# Patient Record
Sex: FEMALE | Race: WHITE | NOT HISPANIC OR LATINO | URBAN - METROPOLITAN AREA
[De-identification: names, ages, dates, MRNs, and addresses within clinical notes are randomized per-mention and may not be internally consistent; named-entity substitution may affect disease eponyms.]

---

## 2017-04-28 ENCOUNTER — OUTPATIENT (OUTPATIENT)
Dept: OUTPATIENT SERVICES | Facility: HOSPITAL | Age: 67
LOS: 1 days | Discharge: HOME | End: 2017-04-28

## 2017-06-28 DIAGNOSIS — N39.0 URINARY TRACT INFECTION, SITE NOT SPECIFIED: ICD-10-CM

## 2017-08-29 ENCOUNTER — OUTPATIENT (OUTPATIENT)
Dept: OUTPATIENT SERVICES | Facility: HOSPITAL | Age: 67
LOS: 1 days | Discharge: HOME | End: 2017-08-29

## 2017-08-29 DIAGNOSIS — E11.9 TYPE 2 DIABETES MELLITUS WITHOUT COMPLICATIONS: ICD-10-CM

## 2017-08-29 DIAGNOSIS — M35.00 SJOGREN SYNDROME, UNSPECIFIED: ICD-10-CM

## 2017-08-29 DIAGNOSIS — K11.5 SIALOLITHIASIS: ICD-10-CM

## 2017-08-29 DIAGNOSIS — M81.0 AGE-RELATED OSTEOPOROSIS WITHOUT CURRENT PATHOLOGICAL FRACTURE: ICD-10-CM

## 2017-08-29 DIAGNOSIS — E03.9 HYPOTHYROIDISM, UNSPECIFIED: ICD-10-CM

## 2017-10-18 ENCOUNTER — OUTPATIENT (OUTPATIENT)
Dept: OUTPATIENT SERVICES | Facility: HOSPITAL | Age: 67
LOS: 1 days | Discharge: HOME | End: 2017-10-18

## 2017-10-18 DIAGNOSIS — N39.0 URINARY TRACT INFECTION, SITE NOT SPECIFIED: ICD-10-CM

## 2017-11-18 ENCOUNTER — OUTPATIENT (OUTPATIENT)
Dept: OUTPATIENT SERVICES | Facility: HOSPITAL | Age: 67
LOS: 1 days | Discharge: HOME | End: 2017-11-18

## 2017-11-18 DIAGNOSIS — M48.061 SPINAL STENOSIS, LUMBAR REGION WITHOUT NEUROGENIC CLAUDICATION: ICD-10-CM

## 2018-04-18 ENCOUNTER — OUTPATIENT (OUTPATIENT)
Dept: OUTPATIENT SERVICES | Facility: HOSPITAL | Age: 68
LOS: 1 days | Discharge: HOME | End: 2018-04-18

## 2018-04-18 DIAGNOSIS — D64.9 ANEMIA, UNSPECIFIED: ICD-10-CM

## 2018-04-18 DIAGNOSIS — D50.9 IRON DEFICIENCY ANEMIA, UNSPECIFIED: ICD-10-CM

## 2018-04-18 DIAGNOSIS — E78.5 HYPERLIPIDEMIA, UNSPECIFIED: ICD-10-CM

## 2018-04-18 DIAGNOSIS — E05.90 THYROTOXICOSIS, UNSPECIFIED WITHOUT THYROTOXIC CRISIS OR STORM: ICD-10-CM

## 2018-04-18 DIAGNOSIS — E53.8 DEFICIENCY OF OTHER SPECIFIED B GROUP VITAMINS: ICD-10-CM

## 2018-04-18 DIAGNOSIS — E11.9 TYPE 2 DIABETES MELLITUS WITHOUT COMPLICATIONS: ICD-10-CM

## 2018-04-30 ENCOUNTER — OUTPATIENT (OUTPATIENT)
Dept: OUTPATIENT SERVICES | Facility: HOSPITAL | Age: 68
LOS: 1 days | Discharge: HOME | End: 2018-04-30

## 2018-04-30 DIAGNOSIS — L23.9 ALLERGIC CONTACT DERMATITIS, UNSPECIFIED CAUSE: ICD-10-CM

## 2018-04-30 DIAGNOSIS — R21 RASH AND OTHER NONSPECIFIC SKIN ERUPTION: ICD-10-CM

## 2018-06-19 ENCOUNTER — OUTPATIENT (OUTPATIENT)
Dept: OUTPATIENT SERVICES | Facility: HOSPITAL | Age: 68
LOS: 1 days | Discharge: HOME | End: 2018-06-19

## 2018-06-19 DIAGNOSIS — D64.9 ANEMIA, UNSPECIFIED: ICD-10-CM

## 2018-08-24 ENCOUNTER — OUTPATIENT (OUTPATIENT)
Dept: OUTPATIENT SERVICES | Facility: HOSPITAL | Age: 68
LOS: 1 days | Discharge: HOME | End: 2018-08-24

## 2018-08-24 DIAGNOSIS — D64.9 ANEMIA, UNSPECIFIED: ICD-10-CM

## 2018-08-24 DIAGNOSIS — N39.0 URINARY TRACT INFECTION, SITE NOT SPECIFIED: ICD-10-CM

## 2018-08-24 DIAGNOSIS — E53.8 DEFICIENCY OF OTHER SPECIFIED B GROUP VITAMINS: ICD-10-CM

## 2018-08-24 DIAGNOSIS — D50.9 IRON DEFICIENCY ANEMIA, UNSPECIFIED: ICD-10-CM

## 2018-08-24 DIAGNOSIS — E78.5 HYPERLIPIDEMIA, UNSPECIFIED: ICD-10-CM

## 2018-08-24 DIAGNOSIS — E11.9 TYPE 2 DIABETES MELLITUS WITHOUT COMPLICATIONS: ICD-10-CM

## 2018-08-24 DIAGNOSIS — E05.90 THYROTOXICOSIS, UNSPECIFIED WITHOUT THYROTOXIC CRISIS OR STORM: ICD-10-CM

## 2018-08-24 PROBLEM — Z00.00 ENCOUNTER FOR PREVENTIVE HEALTH EXAMINATION: Status: ACTIVE | Noted: 2018-08-24

## 2018-09-14 ENCOUNTER — OUTPATIENT (OUTPATIENT)
Dept: OUTPATIENT SERVICES | Facility: HOSPITAL | Age: 68
LOS: 1 days | Discharge: HOME | End: 2018-09-14

## 2018-09-14 DIAGNOSIS — E55.9 VITAMIN D DEFICIENCY, UNSPECIFIED: ICD-10-CM

## 2018-09-14 DIAGNOSIS — D64.9 ANEMIA, UNSPECIFIED: ICD-10-CM

## 2018-09-14 DIAGNOSIS — R06.02 SHORTNESS OF BREATH: ICD-10-CM

## 2018-09-14 DIAGNOSIS — N39.0 URINARY TRACT INFECTION, SITE NOT SPECIFIED: ICD-10-CM

## 2018-09-14 DIAGNOSIS — E11.9 TYPE 2 DIABETES MELLITUS WITHOUT COMPLICATIONS: ICD-10-CM

## 2018-09-14 DIAGNOSIS — E05.90 THYROTOXICOSIS, UNSPECIFIED WITHOUT THYROTOXIC CRISIS OR STORM: ICD-10-CM

## 2018-09-14 DIAGNOSIS — E78.5 HYPERLIPIDEMIA, UNSPECIFIED: ICD-10-CM

## 2018-10-11 ENCOUNTER — OUTPATIENT (OUTPATIENT)
Dept: OUTPATIENT SERVICES | Facility: HOSPITAL | Age: 68
LOS: 1 days | Discharge: HOME | End: 2018-10-11

## 2018-10-11 DIAGNOSIS — D64.9 ANEMIA, UNSPECIFIED: ICD-10-CM

## 2019-01-04 ENCOUNTER — OUTPATIENT (OUTPATIENT)
Dept: OUTPATIENT SERVICES | Facility: HOSPITAL | Age: 69
LOS: 1 days | Discharge: HOME | End: 2019-01-04

## 2019-01-04 DIAGNOSIS — D64.9 ANEMIA, UNSPECIFIED: ICD-10-CM

## 2019-01-04 DIAGNOSIS — E11.9 TYPE 2 DIABETES MELLITUS WITHOUT COMPLICATIONS: ICD-10-CM

## 2019-01-04 DIAGNOSIS — D50.9 IRON DEFICIENCY ANEMIA, UNSPECIFIED: ICD-10-CM

## 2019-01-04 DIAGNOSIS — E53.8 DEFICIENCY OF OTHER SPECIFIED B GROUP VITAMINS: ICD-10-CM

## 2019-01-04 DIAGNOSIS — N39.9 DISORDER OF URINARY SYSTEM, UNSPECIFIED: ICD-10-CM

## 2019-01-04 DIAGNOSIS — E78.5 HYPERLIPIDEMIA, UNSPECIFIED: ICD-10-CM

## 2019-01-04 DIAGNOSIS — Z00.00 ENCOUNTER FOR GENERAL ADULT MEDICAL EXAMINATION WITHOUT ABNORMAL FINDINGS: ICD-10-CM

## 2019-08-29 ENCOUNTER — EMERGENCY (EMERGENCY)
Facility: HOSPITAL | Age: 69
LOS: 0 days | Discharge: HOME | End: 2019-08-29
Attending: EMERGENCY MEDICINE | Admitting: EMERGENCY MEDICINE
Payer: MEDICARE

## 2019-08-29 VITALS
HEART RATE: 76 BPM | TEMPERATURE: 98 F | RESPIRATION RATE: 18 BRPM | DIASTOLIC BLOOD PRESSURE: 60 MMHG | OXYGEN SATURATION: 98 % | SYSTOLIC BLOOD PRESSURE: 108 MMHG

## 2019-08-29 DIAGNOSIS — Z23 ENCOUNTER FOR IMMUNIZATION: ICD-10-CM

## 2019-08-29 DIAGNOSIS — M25.571 PAIN IN RIGHT ANKLE AND JOINTS OF RIGHT FOOT: ICD-10-CM
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
## 2019-08-29 DIAGNOSIS — S81.012A LACERATION WITHOUT FOREIGN BODY, LEFT KNEE, INITIAL ENCOUNTER: ICD-10-CM
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
## 2019-08-29 DIAGNOSIS — S80.02XA CONTUSION OF LEFT KNEE, INITIAL ENCOUNTER: ICD-10-CM
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
## 2019-08-29 PROCEDURE — 73610 X-RAY EXAM OF ANKLE: CPT | Mod: 26,RT

## 2019-08-29 PROCEDURE — 99283 EMERGENCY DEPT VISIT LOW MDM: CPT | Mod: 25

## 2019-08-29 PROCEDURE — 73502 X-RAY EXAM HIP UNI 2-3 VIEWS: CPT | Mod: 26,LT

## 2019-08-29 PROCEDURE — 73630 X-RAY EXAM OF FOOT: CPT | Mod: 26,RT

## 2019-08-29 PROCEDURE — 12002 RPR S/N/AX/GEN/TRNK2.6-7.5CM: CPT

## 2019-08-29 PROCEDURE — 73562 X-RAY EXAM OF KNEE 3: CPT | Mod: 26,LT

## 2019-08-29 RX ORDER — CEPHALEXIN 500 MG
1 CAPSULE ORAL
Qty: 40 | Refills: 0
Start: 2019-08-29 | End: 2019-09-07

## 2019-08-29 RX ORDER — TETANUS TOXOID, REDUCED DIPHTHERIA TOXOID AND ACELLULAR PERTUSSIS VACCINE, ADSORBED 5; 2.5; 8; 8; 2.5 [IU]/.5ML; [IU]/.5ML; UG/.5ML; UG/.5ML; UG/.5ML
0.5 SUSPENSION INTRAMUSCULAR ONCE
Refills: 0 | Status: COMPLETED | OUTPATIENT
Start: 2019-08-29 | End: 2019-08-29

## 2019-08-29 RX ORDER — OXYCODONE AND ACETAMINOPHEN 5; 325 MG/1; MG/1
1 TABLET ORAL ONCE
Refills: 0 | Status: DISCONTINUED | OUTPATIENT
Start: 2019-08-29 | End: 2019-08-29

## 2019-08-29 RX ADMIN — TETANUS TOXOID, REDUCED DIPHTHERIA TOXOID AND ACELLULAR PERTUSSIS VACCINE, ADSORBED 0.5 MILLILITER(S): 5; 2.5; 8; 8; 2.5 SUSPENSION INTRAMUSCULAR at 16:27

## 2019-08-29 RX ADMIN — OXYCODONE AND ACETAMINOPHEN 1 TABLET(S): 5; 325 TABLET ORAL at 16:27

## 2019-08-29 NOTE — ED PROVIDER NOTE - SECONDARY DIAGNOSIS.
Tetanus-diphtheria vaccination administered at current visit Knee contusion Ankle pain, right Status post fall Foot pain, right

## 2019-08-29 NOTE — ED PROVIDER NOTE - PATIENT PORTAL LINK FT
You can access the FollowMyHealth Patient Portal offered by [facility] by registering at the following website: [email or web address]. By joining [employer]’s FollowMyHealth portal, you will also be able to view your health information using other applications (apps) compatible with our system.

## 2019-08-29 NOTE — ED ADULT NURSE NOTE - OBJECTIVE STATEMENT
Pt states she twisted her right knee and fell on left knee. Pt has abrasion to left knee. Pt c/o pain to b/l legs/

## 2019-08-29 NOTE — ED ADULT NURSE NOTE - NSIMPLEMENTINTERV_GEN_ALL_ED
Implemented All Fall Risk Interventions:  [place] to call system. Call bell, personal items and telephone within reach. Instruct patient to call for assistance. Room bathroom lighting operational. Non-slip footwear when patient is off stretcher. Physically safe environment: no spills, clutter or unnecessary equipment. Stretcher in lowest position, wheels locked, appropriate side rails in place. Provide visual cue, wrist band, yellow gown, etc. Monitor gait and stability. Monitor for mental status changes and reorient to person, place, and time. Review medications for side effects contributing to fall risk. Reinforce activity limits and safety measures with patient and family.

## 2019-08-29 NOTE — ED PROVIDER NOTE - PROVIDER TOKENS
PROVIDER:[TOKEN:[[id number]]],FREE:[LAST:[EMERGENCY DEPT OR YOUR PRIMARY CARE PHYSICIAN IN 14 DAYS FOR SUTURE REMOVAL],PHONE:[(   )    -],FAX:[(   )    -]],PROVIDER:[TOKEN:[[id number]]]

## 2019-08-29 NOTE — ED PROVIDER NOTE - NSFOLLOWUPINSTRUCTIONS_ED_ALL_ED_FT
Follow up with your primary care physician / emergency department in 14 days for suture removal.    Laceration Care, Adult  ImageA laceration is a cut that goes through all of the layers of the skin and into the tissue that is right under the skin. Some lacerations heal on their own. Others need to be closed with stitches (sutures), staples, skin adhesive strips, or skin glue. Proper laceration care minimizes the risk of infection and helps the laceration to heal better.    How is this treated?  If sutures or staples were used:     Keep the wound clean and dry.  If you were given a bandage (dressing), you should change it at least one time per day or as told by your health care provider. You should also change it if it becomes wet or dirty.  Keep the wound completely dry for the first 24 hours or as told by your health care provider. After that time, you may shower or bathe. However, make sure that the wound is not soaked in water until after the sutures or staples have been removed.  Clean the wound one time each day or as told by your health care provider:    Wash the wound with soap and water.  Rinse the wound with water to remove all soap.  Pat the wound dry with a clean towel. Do not rub the wound.    After cleaning the wound, apply a thin layer of antibiotic ointment as told by your health care provider. This will help to prevent infection and keep the dressing from sticking to the wound.  Have the sutures or staples removed as told by your health care provider.  If skin adhesive strips were used:     Keep the wound clean and dry.  If you were given a bandage (dressing), you should change it at least one time per day or as told by your health care provider. You should also change it if it becomes dirty or wet.  Do not get the skin adhesive strips wet. You may shower or bathe, but be careful to keep the wound dry.  If the wound gets wet, pat it dry with a clean towel. Do not rub the wound.  Skin adhesive strips fall off on their own. You may trim the strips as the wound heals. Do not remove skin adhesive strips that are still stuck to the wound. They will fall off in time.  If skin glue was used:     Try to keep the wound dry, but you may briefly wet it in the shower or bath. Do not soak the wound in water, such as by swimming.  After you have showered or bathed, gently pat the wound dry with a clean towel. Do not rub the wound.  Do not do any activities that will make you sweat heavily until the skin glue has fallen off on its own.  Do not apply liquid, cream, or ointment medicine to the wound while the skin glue is in place. Using those may loosen the film before the wound has healed.  If you were given a bandage (dressing), you should change it at least one time per day or as told by your health care provider. You should also change it if it becomes dirty or wet.  If a dressing is placed over the wound, be careful not to apply tape directly over the skin glue. Doing that may cause the glue to be pulled off before the wound has healed.  Do not pick at the glue. The skin glue usually remains in place for 5–10 days, then it falls off of the skin.  General Instructions     Take over-the-counter and prescription medicines only as told by your health care provider.  If you were prescribed an antibiotic medicine or ointment, take or apply it as told by your doctor. Do not stop using it even if your condition improves.  To help prevent scarring, make sure to cover your wound with sunscreen whenever you are outside after stitches are removed, after adhesive strips are removed, or when glue remains in place and the wound is healed. Make sure to wear a sunscreen of at least 30 SPF.  Do not scratch or pick at the wound.  Keep all follow-up visits as told by your health care provider. This is important.  Check your wound every day for signs of infection. Watch for:    Redness, swelling, or pain.  Fluid, blood, or pus.    Raise (elevate) the injured area above the level of your heart while you are sitting or lying down, if possible.  Contact a health care provider if:  You received a tetanus shot and you have swelling, severe pain, redness, or bleeding at the injection site.  You have a fever.  A wound that was closed breaks open.  You notice a bad smell coming from your wound or your dressing.  You notice something coming out of the wound, such as wood or glass.  Your pain is not controlled with medicine.  You have increased redness, swelling, or pain at the site of your wound.  You have fluid, blood, or pus coming from your wound.  You notice a change in the color of your skin near your wound.  You need to change the dressing frequently due to fluid, blood, or pus draining from the wound.  You develop a new rash.  You develop numbness around the wound.  Get help right away if:  You develop severe swelling around the wound.  Your pain suddenly increases and is severe.  You develop painful lumps near the wound or on skin that is anywhere on your body.  You have a red streak going away from your wound.  The wound is on your hand or foot and you cannot properly move a finger or toe.  The wound is on your hand or foot and you notice that your fingers or toes look pale or bluish.  This information is not intended to replace advice given to you by your health care provider. Make sure you discuss any questions you have with your health care provider.    Contusion    A contusion is a deep bruise. Contusions are the result of a blunt injury to tissues and muscle fibers under the skin. The injury causes bleeding under the skin. The skin overlying the contusion may turn blue, purple, or yellow. Minor injuries will give you a painless contusion, but more severe contusions may stay painful and swollen for a few weeks.     CAUSES  This condition is usually caused by a blow, trauma, or direct force to an area of the body.    SYMPTOMS  Symptoms of this condition include:    Swelling of the injured area.  Pain and tenderness in the injured area.  Discoloration. The area may have redness and then turn blue, purple, or yellow.    DIAGNOSIS  This condition is diagnosed based on a physical exam and medical history. An X-ray, CT scan, or MRI may be needed to determine if there are any associated injuries, such as broken bones (fractures).    TREATMENT  Specific treatment for this condition depends on what area of the body was injured. In general, the best treatment for a contusion is resting, icing, applying pressure to (compression), and elevating the injured area. This is often called the RICE strategy. Over-the-counter anti-inflammatory medicines may also be recommended for pain control.     HOME CARE INSTRUCTIONS  Rest the injured area.   If directed, apply ice to the injured area:  Put ice in a plastic bag.  Place a towel between your skin and the bag.  Leave the ice on for 20 minutes, 2–3 times per day.  If directed, apply light compression to the injured area using an elastic bandage. Make sure the bandage is not wrapped too tightly. Remove and reapply the bandage as directed by your health care provider.  If possible, raise (elevate) the injured area above the level of your heart while you are sitting or lying down.   Take over-the-counter and prescription medicines only as told by your health care provider.    SEEK MEDICAL CARE IF:  Your symptoms do not improve after several days of treatment.  Your symptoms get worse.   You have difficulty moving the injured area.    SEEK IMMEDIATE MEDICAL CARE IF:  You have severe pain.  You have numbness in a hand or foot.   Your hand or foot turns pale or cold.

## 2019-08-29 NOTE — ED PROVIDER NOTE - CARE PLAN
Principal Discharge DX:	Knee laceration  Secondary Diagnosis:	Tetanus-diphtheria vaccination administered at current visit  Secondary Diagnosis:	Knee contusion  Secondary Diagnosis:	Ankle pain, right  Secondary Diagnosis:	Foot pain, right  Secondary Diagnosis:	Status post fall

## 2019-08-29 NOTE — ED PROVIDER NOTE - CARE PROVIDERS DIRECT ADDRESSES
,DirectAddress_Unknown,DirectAddress_Unknown,[email or web address]

## 2019-08-29 NOTE — ED PROVIDER NOTE - CLINICAL SUMMARY MEDICAL DECISION MAKING FREE TEXT BOX
70yo F presents after mechanical fall. Knee laceration repaired. Right ankle splinted. Xrays without obvious acute fractures. Tdap given. Will discharge with ortho fu.

## 2019-08-29 NOTE — ED PROVIDER NOTE - NEURO NEGATIVE STATEMENT, MLM
no head trauma/loss of consciousness, no gait abnormality, no headache, no sensory deficits, and no weakness.

## 2019-08-29 NOTE — ED PROVIDER NOTE - OBJECTIVE STATEMENT
70 y/o F, PMHx Pulmonary HTN, Chronic Back Pain, Gout & Dyslipidemia, presents to the ED with complaints of a laceration of her left knee sustained this afternoon. Patient was ambulating out of a car when she tripped and fell with resultant left knee laceration. She denies any head trauma/LOC and was helped up by her significant other following injury. She ambulated without difficulty following fall. She additionally admits to right knee foot pain 70 y/o F, PMHx Pulmonary HTN, Chronic Back Pain, Gout & Dyslipidemia, presents to the ED with complaints of a laceration of her left knee sustained this afternoon. Patient was ambulating out of a car when she tripped and fell with resultant left knee laceration. She denies any head trauma/LOC and was helped up by her significant other following injury. She ambulated without difficulty following fall. She additionally admits to right foot pain and right ankle pain although she states that she has a 'chronic right ankle sprain' - -follows with orthopedist and recently had MRI of right ankle three days ago. She does not recall when her last tetanus vaccine was. Patient denies blood thinner use.

## 2019-08-29 NOTE — ED PROVIDER NOTE - MUSCULOSKELETAL NEGATIVE STATEMENT, MLM
+ right knee pain ; + right foot pain + right ankle pain ; + hx  of gout ; + hx of chronic back pain; no weakness.

## 2019-08-29 NOTE — ED PROVIDER NOTE - SKIN, MLM
+ 6 cm jagged flap laceration along anterior aspect of left knee with minimal active bleeding ; no deep tissue injury visualized ; + left anterior knee ecchymoses ; no evidence of rash.

## 2019-08-29 NOTE — ED PROVIDER NOTE - ATTENDING CONTRIBUTION TO CARE
I personally evaluated the patient. I reviewed the Resident’s or Physician Assistant’s note (as assigned above), and agree with the findings and plan except as documented in my note.    68yo F with PMHx pulmonary HTN, DLD, gout, chronic back pain, presents after mechanical fall after tripping when getting out of a car, sustained laceration to left knee. Also c/o right ankle pain and right foot pain but states she follows with ortho for chronic ankle pain/chronic sprain. Ambulated after fall. No head trauma, no LOC, not on AC. Unknown last tetanus.     Vital signs reviewed  GENERAL: Patient well appearing, NAD  HEAD: NCAT  EYES: Anicteric  ENT: MMM  NECK: Supple, non tender  RESPIRATORY: Normal respiratory effort. CTA B/L. No wheezing, rales, rhonchi  CARDIOVASCULAR: Regular rate and rhythm  ABDOMEN: Soft. Nondistended. Nontender. No guarding or rebound.   MUSCULOSKELETAL/EXTREMITIES: Brisk cap refill. 2+ pedal pulses. Anterior left knee laceration with anterior tenderness. Full ROM of knee. No crepitus. Mild-moderate TTP along right lateral malleolus and right 1st MTP. Full ROM of ankle. No hip tenderness. No midline back TTP.   SKIN:  6cm jagged flap laceration to anterior left knee. No deep tissue injury seen or exposed bone.   NEURO: AAOx3. No gross FND.

## 2019-08-29 NOTE — ED PROVIDER NOTE - MUSCULOSKELETAL, MLM
No obvious deformities to RLE & LLE ; + tenderness overlying anterior aspect of left knee with full ROM exhibited ; no crepitus ; + tenderness along right 1st MTP and along right lateral malleolus with full ROM exhibited ; no hip tenderness ; Spine appears normal,

## 2019-08-29 NOTE — ED PROCEDURE NOTE - CPROC ED POST PROC CARE GUIDE1
Instructed patient/caregiver regarding signs and symptoms of infection./Elevate the injured extremity as instructed./Verbal/written post procedure instructions were given to patient/caregiver./Instructed patient/caregiver to follow-up with primary care physician./Keep the cast/splint/dressing clean and dry.
Instructed patient/caregiver regarding signs and symptoms of infection./Instructed patient/caregiver to follow-up with primary care physician./Verbal/written post procedure instructions were given to patient/caregiver./Keep the cast/splint/dressing clean and dry.

## 2019-09-11 ENCOUNTER — APPOINTMENT (OUTPATIENT)
Dept: NEUROLOGY | Facility: CLINIC | Age: 69
End: 2019-09-11
Payer: MEDICARE

## 2019-09-11 VITALS
TEMPERATURE: 95.3 F | WEIGHT: 168 LBS | SYSTOLIC BLOOD PRESSURE: 122 MMHG | HEIGHT: 58 IN | OXYGEN SATURATION: 99 % | HEART RATE: 66 BPM | BODY MASS INDEX: 35.26 KG/M2 | DIASTOLIC BLOOD PRESSURE: 70 MMHG

## 2019-09-11 PROCEDURE — 99214 OFFICE O/P EST MOD 30 MIN: CPT

## 2019-09-11 RX ORDER — ALLOPURINOL 300 MG/1
300 TABLET ORAL DAILY
Refills: 0 | Status: ACTIVE | COMMUNITY

## 2019-09-11 RX ORDER — ATORVASTATIN CALCIUM 80 MG/1
80 TABLET, FILM COATED ORAL DAILY
Refills: 0 | Status: ACTIVE | COMMUNITY

## 2019-09-11 RX ORDER — BUPROPION HYDROCHLORIDE 100 MG/1
100 TABLET, FILM COATED, EXTENDED RELEASE ORAL
Refills: 0 | Status: ACTIVE | COMMUNITY

## 2019-09-11 RX ORDER — HYDROXYZINE HYDROCHLORIDE 50 MG/1
50 TABLET ORAL
Refills: 0 | Status: ACTIVE | COMMUNITY

## 2019-09-11 RX ORDER — SPIRONOLACTONE 50 MG/1
50 TABLET ORAL DAILY
Refills: 0 | Status: ACTIVE | COMMUNITY

## 2019-09-11 RX ORDER — ATENOLOL 50 MG/1
50 TABLET ORAL DAILY
Refills: 0 | Status: ACTIVE | COMMUNITY

## 2019-09-11 RX ORDER — NIFEDIPINE 60 MG/1
60 TABLET, EXTENDED RELEASE ORAL DAILY
Refills: 0 | Status: ACTIVE | COMMUNITY

## 2019-09-11 RX ORDER — CHROMIUM 200 MCG
TABLET ORAL DAILY
Refills: 0 | Status: ACTIVE | COMMUNITY

## 2019-09-11 RX ORDER — TRAZODONE HYDROCHLORIDE 100 MG/1
100 TABLET ORAL DAILY
Refills: 0 | Status: ACTIVE | COMMUNITY

## 2019-09-11 RX ORDER — FLUOXETINE HYDROCHLORIDE 20 MG/1
20 CAPSULE ORAL
Refills: 0 | Status: ACTIVE | COMMUNITY

## 2019-09-11 NOTE — PHYSICAL EXAM
[FreeTextEntry1] : She is happy in a good mood does not have any tremor when she has extension of the arms she has a low amplitude tremor that is slightly worse with action but overall is having a reasonable handwriting and is able to draw a spiral without significant issues.\par Mental status exam is normal she does have a slight shortness of breath the carotids are not showing bruit. The range of motion of the left shoulder is very limited and also at the left elbow. There is no dysmetria no drift

## 2019-09-11 NOTE — HISTORY OF PRESENT ILLNESS
[FreeTextEntry1] : Evelyn is here for followup. She does carry multiple medical conditions. She does have significant bleeding issues and the diagnosis of COPD and also spine disease mostly affecting the lumbar spine and asleep apnea and recently she has been seeing me for higher essential tremor and according to her medication given at this low dose of primidone 50 mg half a tablet twice a day was helping her. About 2 weeks ago as she was going out she tripped on her right foot and had couple of the small fractures and fell on her left knee causing significant laceration that required multiple stitches.\par She is now on antibiotics for that reason and is planning to go back tomorrow to the surgeon to remove the stitches.\par Four[place]te she did not have any trauma to the head. Category 3 she denies having had any alteration of mental nystagmus prior to the fall. Overall she describes her gait to be a stable only by using the cane in the past and now she prefers to use the walker. Around the area of stitches she does have a slight redness and swelling and her left leg on the left side is also slightly swollen. She denies having had any issues with her mood

## 2019-09-11 NOTE — ASSESSMENT
[FreeTextEntry1] : Nuria is here for followup. She is seeing me now for her essential tremor it is my continue her treatment and I will see her in followup in 6 months.

## 2019-11-06 ENCOUNTER — OUTPATIENT (OUTPATIENT)
Dept: OUTPATIENT SERVICES | Facility: HOSPITAL | Age: 69
LOS: 1 days | Discharge: HOME | End: 2019-11-06

## 2019-11-06 DIAGNOSIS — I10 ESSENTIAL (PRIMARY) HYPERTENSION: ICD-10-CM

## 2019-11-06 DIAGNOSIS — I50.30 UNSPECIFIED DIASTOLIC (CONGESTIVE) HEART FAILURE: ICD-10-CM

## 2019-11-06 DIAGNOSIS — N39.0 URINARY TRACT INFECTION, SITE NOT SPECIFIED: ICD-10-CM

## 2019-11-06 DIAGNOSIS — E11.65 TYPE 2 DIABETES MELLITUS WITH HYPERGLYCEMIA: ICD-10-CM

## 2019-11-06 DIAGNOSIS — N18.3 CHRONIC KIDNEY DISEASE, STAGE 3 (MODERATE): ICD-10-CM

## 2019-11-12 ENCOUNTER — OUTPATIENT (OUTPATIENT)
Dept: OUTPATIENT SERVICES | Facility: HOSPITAL | Age: 69
LOS: 1 days | Discharge: HOME | End: 2019-11-12
Payer: MEDICARE

## 2019-11-12 DIAGNOSIS — Z12.31 ENCOUNTER FOR SCREENING MAMMOGRAM FOR MALIGNANT NEOPLASM OF BREAST: ICD-10-CM

## 2019-11-12 PROCEDURE — 77063 BREAST TOMOSYNTHESIS BI: CPT | Mod: 26

## 2019-11-12 PROCEDURE — 77067 SCR MAMMO BI INCL CAD: CPT | Mod: 26

## 2019-11-13 DIAGNOSIS — Z78.0 ASYMPTOMATIC MENOPAUSAL STATE: ICD-10-CM

## 2019-11-13 DIAGNOSIS — Z13.820 ENCOUNTER FOR SCREENING FOR OSTEOPOROSIS: ICD-10-CM

## 2019-11-13 DIAGNOSIS — M81.0 AGE-RELATED OSTEOPOROSIS WITHOUT CURRENT PATHOLOGICAL FRACTURE: ICD-10-CM

## 2020-02-15 ENCOUNTER — OUTPATIENT (OUTPATIENT)
Dept: OUTPATIENT SERVICES | Facility: HOSPITAL | Age: 70
LOS: 1 days | Discharge: HOME | End: 2020-02-15
Payer: MEDICARE

## 2020-02-15 DIAGNOSIS — R92.2 INCONCLUSIVE MAMMOGRAM: ICD-10-CM

## 2020-02-15 PROCEDURE — 76641 ULTRASOUND BREAST COMPLETE: CPT | Mod: 26,50

## 2020-03-11 ENCOUNTER — APPOINTMENT (OUTPATIENT)
Dept: NEUROLOGY | Facility: CLINIC | Age: 70
End: 2020-03-11

## 2020-06-01 RX ORDER — PRIMIDONE 50 MG/1
50 TABLET ORAL
Qty: 90 | Refills: 2 | Status: ACTIVE | COMMUNITY
Start: 2019-09-11 | End: 1900-01-01

## 2020-09-04 NOTE — ED PROVIDER NOTE - CARE PROVIDER_API CALL
Care Management/Social Work Discharge Arrangements: No need for help at home was identified during your hospital stay. If you feel you need more help please contact your primary care provider for additional resources.     You will be followed by a Care Transition Nurse to provide individualized support during your transition home.  This service is provided to you free of charge.  Your Transition Nurse is Bela Matos.  Please note your Transition Nurse will call you soon after your discharge home.  Your Transition Nurse is available to you Monday thru Friday, 8 am to 4:30 pm at [phone] for any questions or concerns.       Please follow up at [facility] for your Neulasta injection on Sunday 9/6 at 830am. They can be reached at [phone].  
Brielle Reid (DO)  Internal Medicine  [facility] [place] [address]  Phone: [phone]  Fax: [phone]  Follow Up Time:     EMERGENCY DEPT OR YOUR PRIMARY CARE PHYSICIAN IN 14 DAYS FOR SUTURE REMOVAL,   Phone: (   )    -  Fax: (   )    -  Follow Up Time:     Galdino Calderon (MD)  Orthopaedic Surgery  [address] [place] [address]  Phone: [phone]  Fax: [phone]  Follow Up Time:

## 2020-11-23 ENCOUNTER — APPOINTMENT (OUTPATIENT)
Dept: NEUROLOGY | Facility: CLINIC | Age: 70
End: 2020-11-23

## 2021-01-05 ENCOUNTER — APPOINTMENT (OUTPATIENT)
Dept: NEUROLOGY | Facility: CLINIC | Age: 71
End: 2021-01-05
Payer: MEDICARE

## 2021-01-05 VITALS
TEMPERATURE: 97.9 F | SYSTOLIC BLOOD PRESSURE: 132 MMHG | WEIGHT: 160 LBS | HEIGHT: 58 IN | HEART RATE: 90 BPM | DIASTOLIC BLOOD PRESSURE: 88 MMHG | OXYGEN SATURATION: 98 % | BODY MASS INDEX: 33.58 KG/M2

## 2021-01-05 PROCEDURE — 99072 ADDL SUPL MATRL&STAF TM PHE: CPT

## 2021-01-05 PROCEDURE — 99214 OFFICE O/P EST MOD 30 MIN: CPT

## 2021-01-06 NOTE — PHYSICAL EXAM
[FreeTextEntry1] : A/A/Ox3\par at her baseline of normal articulated language\par good facial expression\par good attention\par 3/3 recall\par CN-- normal\par Decreased R.O.M of the neck and left shoulder\par atrophy of IOM mainly first\par slight decrease \par slight atrophy of the left proximal deltoid\par ++ tremor  low amplitude fast action and also mild vocal \par No drift\par no dysmetria\par needs help to stand up ,hesitant gait. kyphoscolisis\par Decreased DTR in ankles and knees\par \par \par

## 2021-01-06 NOTE — ASSESSMENT
[FreeTextEntry1] : 1-----essential tremor\par 2-gait disorder \par 3- spinal stenosis\par 4-neuropathy  (spinal nerve and peripheral)\par \par Plan \par EMG\par C-spine MRI

## 2021-01-22 ENCOUNTER — OUTPATIENT (OUTPATIENT)
Dept: OUTPATIENT SERVICES | Facility: HOSPITAL | Age: 71
LOS: 1 days | Discharge: HOME | End: 2021-01-22
Payer: MEDICARE

## 2021-01-22 DIAGNOSIS — G25.0 ESSENTIAL TREMOR: ICD-10-CM

## 2021-01-22 PROCEDURE — 72141 MRI NECK SPINE W/O DYE: CPT | Mod: 26

## 2021-02-20 LAB
CK SERPL-CCNC: 46 U/L
ERYTHROCYTE [SEDIMENTATION RATE] IN BLOOD BY WESTERGREN METHOD: 40 MM/HR

## 2021-02-21 LAB — CRP SERPL-MCNC: 0.67 MG/DL

## 2021-02-22 LAB — ANA SER IF-ACNC: NEGATIVE

## 2021-02-28 DIAGNOSIS — E78.01 FAMILIAL HYPERCHOLESTEROLEMIA: ICD-10-CM

## 2021-02-28 DIAGNOSIS — M10.9 GOUT, UNSPECIFIED: ICD-10-CM

## 2021-02-28 RX ORDER — FUROSEMIDE 20 MG/1
20 TABLET ORAL
Refills: 0 | Status: ACTIVE | COMMUNITY

## 2021-02-28 RX ORDER — FLUTICASONE PROPIONATE 50 UG/1
50 SPRAY, METERED NASAL
Refills: 0 | Status: ACTIVE | COMMUNITY

## 2021-02-28 RX ORDER — LANSOPRAZOLE 30 MG/1
30 CAPSULE, DELAYED RELEASE ORAL
Refills: 0 | Status: ACTIVE | COMMUNITY

## 2021-02-28 RX ORDER — DIAZEPAM 10 MG/1
10 TABLET ORAL
Refills: 0 | Status: ACTIVE | COMMUNITY

## 2021-02-28 RX ORDER — FLUTICASONE FUROATE AND VILANTEROL TRIFENATATE 100; 25 UG/1; UG/1
100-25 POWDER RESPIRATORY (INHALATION)
Refills: 0 | Status: ACTIVE | COMMUNITY

## 2021-02-28 RX ORDER — GABAPENTIN 800 MG/1
800 TABLET, FILM COATED ORAL
Refills: 0 | Status: ACTIVE | COMMUNITY

## 2021-02-28 RX ORDER — NITROFURANTOIN (MONOHYDRATE/MACROCRYSTALS) 25; 75 MG/1; MG/1
100 CAPSULE ORAL
Refills: 0 | Status: ACTIVE | COMMUNITY

## 2021-03-13 ENCOUNTER — APPOINTMENT (OUTPATIENT)
Dept: PULMONOLOGY | Facility: CLINIC | Age: 71
End: 2021-03-13
Payer: MEDICARE

## 2021-03-13 VITALS
BODY MASS INDEX: 35.48 KG/M2 | DIASTOLIC BLOOD PRESSURE: 80 MMHG | HEART RATE: 55 BPM | RESPIRATION RATE: 16 BRPM | SYSTOLIC BLOOD PRESSURE: 118 MMHG | HEIGHT: 58 IN | WEIGHT: 169 LBS | OXYGEN SATURATION: 97 %

## 2021-03-13 DIAGNOSIS — I10 ESSENTIAL (PRIMARY) HYPERTENSION: ICD-10-CM

## 2021-03-13 DIAGNOSIS — E66.9 OBESITY, UNSPECIFIED: ICD-10-CM

## 2021-03-13 PROCEDURE — 99072 ADDL SUPL MATRL&STAF TM PHE: CPT

## 2021-03-13 PROCEDURE — 99214 OFFICE O/P EST MOD 30 MIN: CPT

## 2021-03-14 PROBLEM — E66.9 OBESITY: Status: ACTIVE | Noted: 2021-02-28

## 2021-03-14 PROBLEM — I10 HYPERTENSION: Status: ACTIVE | Noted: 2021-02-28

## 2021-03-14 RX ORDER — FLUTICASONE PROPIONATE AND SALMETEROL XINAFOATE 230; 21 UG/1; UG/1
230-21 AEROSOL, METERED RESPIRATORY (INHALATION)
Qty: 3 | Refills: 0 | Status: ACTIVE | COMMUNITY

## 2021-03-14 NOTE — PHYSICAL EXAM
[Clear to Auscultation Bilaterally] : clear to auscultation bilaterally [Normal to Percussion] : normal to percussion [Soft] : soft [TextBox_2] : appears to be in pain [TextBox_125] : l forearm scar

## 2021-03-19 ENCOUNTER — RX RENEWAL (OUTPATIENT)
Age: 71
End: 2021-03-19

## 2021-06-02 ENCOUNTER — RX RENEWAL (OUTPATIENT)
Age: 71
End: 2021-06-02

## 2021-06-02 RX ORDER — ALBUTEROL SULFATE 90 UG/1
108 (90 BASE) INHALANT RESPIRATORY (INHALATION)
Qty: 1 | Refills: 0 | Status: ACTIVE | COMMUNITY
Start: 2021-03-14 | End: 1900-01-01

## 2021-07-06 ENCOUNTER — APPOINTMENT (OUTPATIENT)
Dept: NEUROLOGY | Facility: CLINIC | Age: 71
End: 2021-07-06
Payer: MEDICARE

## 2021-07-06 VITALS
SYSTOLIC BLOOD PRESSURE: 124 MMHG | HEIGHT: 58 IN | BODY MASS INDEX: 35.57 KG/M2 | DIASTOLIC BLOOD PRESSURE: 60 MMHG | HEART RATE: 70 BPM | TEMPERATURE: 97.9 F | OXYGEN SATURATION: 97 % | WEIGHT: 169.44 LBS

## 2021-07-06 PROCEDURE — 99072 ADDL SUPL MATRL&STAF TM PHE: CPT

## 2021-07-06 PROCEDURE — 99213 OFFICE O/P EST LOW 20 MIN: CPT

## 2021-07-06 NOTE — ASSESSMENT
[FreeTextEntry1] : Spine disease ( cervical and lumbar)\par CTS S/P surgery\par neuropathy \par Radiculopathy\par asthma\par mood D/o\par DLD\par essential tremor\par \par plan\par PT\par continue current level\par repeat EMG\par blood test\par consider muscle BX after the next EMG

## 2021-07-06 NOTE — PHYSICAL EXAM
[FreeTextEntry1] : A/A/OX3\par good mood\par + tremor . mainly action Rt> left\par decreased ROM of the neck\par + atrophy of the thenar  and IOM\par slight scoliotic posture\par no drift\par no dysmetria

## 2021-07-06 NOTE — HISTORY OF PRESENT ILLNESS
[FreeTextEntry1] : Nuria is here for the F/u.\par She is doing  much better with the primidone. 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 and also CTS on both sides . The study also raised the possibility of myopathic discharges\par She says she decided not to get the spinal stimulator.and only to go with injections.\par No falls\par She does continue to have dysesthesia and radiating pain to the back of the right ear and to the Right shoulder

## 2021-09-03 ENCOUNTER — EMERGENCY (EMERGENCY)
Facility: HOSPITAL | Age: 71
LOS: 0 days | Discharge: HOME | End: 2021-09-03
Attending: STUDENT IN AN ORGANIZED HEALTH CARE EDUCATION/TRAINING PROGRAM | Admitting: STUDENT IN AN ORGANIZED HEALTH CARE EDUCATION/TRAINING PROGRAM
Payer: MEDICARE

## 2021-09-03 VITALS
RESPIRATION RATE: 18 BRPM | SYSTOLIC BLOOD PRESSURE: 99 MMHG | HEART RATE: 67 BPM | TEMPERATURE: 98 F | OXYGEN SATURATION: 97 % | DIASTOLIC BLOOD PRESSURE: 56 MMHG

## 2021-09-03 VITALS
SYSTOLIC BLOOD PRESSURE: 90 MMHG | DIASTOLIC BLOOD PRESSURE: 52 MMHG | HEART RATE: 74 BPM | RESPIRATION RATE: 18 BRPM | TEMPERATURE: 98 F | OXYGEN SATURATION: 99 %

## 2021-09-03 DIAGNOSIS — I27.20 PULMONARY HYPERTENSION, UNSPECIFIED: ICD-10-CM
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
## 2021-09-03 DIAGNOSIS — S82.842A DISPLACED BIMALLEOLAR FRACTURE OF LEFT LOWER LEG, INITIAL ENCOUNTER FOR CLOSED FRACTURE: ICD-10-CM

## 2021-09-03 DIAGNOSIS — M10.9 GOUT, UNSPECIFIED: ICD-10-CM
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
## 2021-09-03 DIAGNOSIS — M25.572 PAIN IN LEFT ANKLE AND JOINTS OF LEFT FOOT: ICD-10-CM

## 2021-09-03 DIAGNOSIS — I10 ESSENTIAL (PRIMARY) HYPERTENSION: ICD-10-CM

## 2021-09-03 DIAGNOSIS — Z96.651 PRESENCE OF RIGHT ARTIFICIAL KNEE JOINT: ICD-10-CM

## 2021-09-03 DIAGNOSIS — E78.5 HYPERLIPIDEMIA, UNSPECIFIED: ICD-10-CM

## 2021-09-03 LAB
ALBUMIN SERPL ELPH-MCNC: 4.2 G/DL — SIGNIFICANT CHANGE UP (ref 3.5–5.2)
ALP SERPL-CCNC: 158 U/L — HIGH (ref 30–115)
ALT FLD-CCNC: 213 U/L — HIGH (ref 0–41)
ANION GAP SERPL CALC-SCNC: 12 MMOL/L — SIGNIFICANT CHANGE UP (ref 7–14)
APTT BLD: 24.3 SEC — LOW (ref 27–39.2)
AST SERPL-CCNC: 207 U/L — HIGH (ref 0–41)
BASOPHILS # BLD AUTO: 0.05 K/UL — SIGNIFICANT CHANGE UP (ref 0–0.2)
BASOPHILS NFR BLD AUTO: 0.4 % — SIGNIFICANT CHANGE UP (ref 0–1)
BILIRUB SERPL-MCNC: 0.3 MG/DL — SIGNIFICANT CHANGE UP (ref 0.2–1.2)
BUN SERPL-MCNC: 31 MG/DL — HIGH (ref 10–20)
CALCIUM SERPL-MCNC: 9 MG/DL — SIGNIFICANT CHANGE UP (ref 8.5–10.1)
CHLORIDE SERPL-SCNC: 107 MMOL/L — SIGNIFICANT CHANGE UP (ref 98–110)
CO2 SERPL-SCNC: 19 MMOL/L — SIGNIFICANT CHANGE UP (ref 17–32)
CREAT SERPL-MCNC: 1.3 MG/DL — SIGNIFICANT CHANGE UP (ref 0.7–1.5)
EOSINOPHIL # BLD AUTO: 0.02 K/UL — SIGNIFICANT CHANGE UP (ref 0–0.7)
EOSINOPHIL NFR BLD AUTO: 0.2 % — SIGNIFICANT CHANGE UP (ref 0–8)
GLUCOSE SERPL-MCNC: 101 MG/DL — HIGH (ref 70–99)
HCT VFR BLD CALC: 34.6 % — LOW (ref 37–47)
HGB BLD-MCNC: 11.7 G/DL — LOW (ref 12–16)
IMM GRANULOCYTES NFR BLD AUTO: 0.6 % — HIGH (ref 0.1–0.3)
INR BLD: 1.03 RATIO — SIGNIFICANT CHANGE UP (ref 0.65–1.3)
LYMPHOCYTES # BLD AUTO: 1.33 K/UL — SIGNIFICANT CHANGE UP (ref 1.2–3.4)
LYMPHOCYTES # BLD AUTO: 12 % — LOW (ref 20.5–51.1)
MCHC RBC-ENTMCNC: 33.8 G/DL — SIGNIFICANT CHANGE UP (ref 32–37)
MCHC RBC-ENTMCNC: 36.7 PG — HIGH (ref 27–31)
MCV RBC AUTO: 108.5 FL — HIGH (ref 81–99)
MONOCYTES # BLD AUTO: 1.08 K/UL — HIGH (ref 0.1–0.6)
MONOCYTES NFR BLD AUTO: 9.7 % — HIGH (ref 1.7–9.3)
NEUTROPHILS # BLD AUTO: 8.57 K/UL — HIGH (ref 1.4–6.5)
NEUTROPHILS NFR BLD AUTO: 77.1 % — HIGH (ref 42.2–75.2)
NRBC # BLD: 0 /100 WBCS — SIGNIFICANT CHANGE UP (ref 0–0)
PLATELET # BLD AUTO: 148 K/UL — SIGNIFICANT CHANGE UP (ref 130–400)
POTASSIUM SERPL-MCNC: 6 MMOL/L — CRITICAL HIGH (ref 3.5–5)
POTASSIUM SERPL-SCNC: 6 MMOL/L — CRITICAL HIGH (ref 3.5–5)
PROT SERPL-MCNC: 6.3 G/DL — SIGNIFICANT CHANGE UP (ref 6–8)
PROTHROM AB SERPL-ACNC: 11.8 SEC — SIGNIFICANT CHANGE UP (ref 9.95–12.87)
RBC # BLD: 3.19 M/UL — LOW (ref 4.2–5.4)
RBC # FLD: 13.1 % — SIGNIFICANT CHANGE UP (ref 11.5–14.5)
SODIUM SERPL-SCNC: 138 MMOL/L — SIGNIFICANT CHANGE UP (ref 135–146)
WBC # BLD: 11.12 K/UL — HIGH (ref 4.8–10.8)
WBC # FLD AUTO: 11.12 K/UL — HIGH (ref 4.8–10.8)

## 2021-09-03 PROCEDURE — 73610 X-RAY EXAM OF ANKLE: CPT | Mod: 26,LT,77

## 2021-09-03 PROCEDURE — 73590 X-RAY EXAM OF LOWER LEG: CPT | Mod: 26,LT

## 2021-09-03 PROCEDURE — 73620 X-RAY EXAM OF FOOT: CPT | Mod: 26,LT

## 2021-09-03 PROCEDURE — 73610 X-RAY EXAM OF ANKLE: CPT | Mod: 26,LT

## 2021-09-03 PROCEDURE — 99284 EMERGENCY DEPT VISIT MOD MDM: CPT

## 2021-09-03 RX ORDER — MORPHINE SULFATE 50 MG/1
4 CAPSULE, EXTENDED RELEASE ORAL ONCE
Refills: 0 | Status: DISCONTINUED | OUTPATIENT
Start: 2021-09-03 | End: 2021-09-03

## 2021-09-03 RX ORDER — KETOROLAC TROMETHAMINE 30 MG/ML
15 SYRINGE (ML) INJECTION ONCE
Refills: 0 | Status: DISCONTINUED | OUTPATIENT
Start: 2021-09-03 | End: 2021-09-03

## 2021-09-03 RX ADMIN — MORPHINE SULFATE 4 MILLIGRAM(S): 50 CAPSULE, EXTENDED RELEASE ORAL at 14:39

## 2021-09-03 RX ADMIN — Medication 15 MILLIGRAM(S): at 18:21

## 2021-09-03 NOTE — ED PROVIDER NOTE - NS ED ROS FT
GENERAL: In mild distress   SKIN: No lacerations or abrasions   HEAD: Normocephalic; atraumatic.  EYES: PERRLA, EOMI  CARD: S1, S2 normal; no murmurs, gallops, or rubs. Regular rate and rhythm. DP and PT pulses 2+/4  RESP: LCTAB; No wheezes, rales, rhonchi, or stridor.  EXT: L ankle edematous with deformity noted, TTP over bilateral lateral malleoli and proximal foot.  Limited ROM testing due to pain  NEURO: Alert, oriented. Sensation intact in bilateral LEs, limited strength testing in LE due to pain but able to move toes.   PSYCH: Cooperative, appropriate. Constitutional: No fatigue, confusion, or amnesia.  Head: No headache  ENT: No visual changes.  Cardiac:  No chest pain or SOB.  Respiratory:  No respiratory distress or hemoptysis.  GI:  No nausea, vomiting, or abdominal pain.  :  No incontinence.  MS:  Reports L ankle pain  Neuro:  No dizziness, LOC, paralysis, or N/T.  Skin:  No lacerations or abrasions.

## 2021-09-03 NOTE — ED PROVIDER NOTE - NSFOLLOWUPINSTRUCTIONS_ED_ALL_ED_FT
Ankle Fracture    The ankle joint is made up of the lower (distal) sections of your lower leg bones(tibia and fibula) along with a bone in your foot (talus). An ankle fracture is a break in one, two, or all three of these sections of bone. There are two general types of ankle fractures:  Stable fracture. This happens when one of your bones is broken, but the bones of your ankle joint stay in their normal positions.  Unstable fracture. This type can include more than one broken bone. It can also happen if your outer bone is broken and the tough bands of tissue that connect bones (ligaments)are also injured at your inner ankle. This type of fracture allows the talus to move out of its normal position.  What are the causes?  This condition may be caused by:  A hard, direct hit (blow) to the ankle.  Quickly and severely twisting your ankle, often while your foot is planted and the rest of your body moving.  Trauma, such as a car accident or falling from a height.  What increases the risk?  This condition is more likely to occur in people who:  Smoke.  Are overweight.  Participate in sports that involve quick direction changes, as in soccer.  Do high-impact sports like gymnastics or football.  Are involved in a high-impact car accident.  What are the signs or symptoms?  ImageSymptoms of this condition include:  Tender and swollen ankle.  Bruising around the injured ankle.  Pain when moving or pressing on the ankle.  Trouble walking or using the ankle to support your body weight (putting weight on the ankle).  Pain that gets worse when moving or standing and gets better with rest.  How is this diagnosed?  An ankle fracture is usually diagnosed with a physical exam and X-rays. A CT scan or MRI may also be done.    How is this treated?  Treatment for this condition depends on the type of ankle fracture you have. Stable fractures are treated with a cast, boot, or splint to hold the ankle still and crutches to avoid putting weight on the injured ankle until the fracture heals. Unstable fractures require surgery to ensure that the bones heal properly. After surgery, you will have a splint. After your incision is healed, your surgeon may give you a cast or a boot. You will not be able to put weight on your injured side for several weeks.    After your ankle has healed, you will do exercises to improve the strength and mobility of your ankle.    Follow these instructions at home:  If you have a splint:     Wear the splint as told by your health care provider. Remove it only as told by your health care provider.  Loosen the splint if your toes tingle, become numb, or turn cold and blue.  Keep the splint clean.  If the splint is not waterproof:  Do not let it get wet.  Cover it with a watertight covering when you take a bath or a shower.  If you have a cast:     Do not stick anything inside the cast to scratch your skin. Doing that increases your risk of infection.  Check the skin around the cast every day. Tell your health care provider about any concerns.  You may put lotion on dry skin around the edges of the cast. Do not put lotion on the skin underneath the cast.  Keep the cast clean.  If the cast is not waterproof:  Do not let it get wet.  Cover it with a watertight covering when you take a bath or a shower.  Managing pain, stiffness, and swelling     If directed, put ice on the injured area:  If you have a removable splint, remove it as told by your health care provider.  Put ice in a plastic bag.  Place a towel between your skin and the bag or between your cast and the bag.  Leave the ice on for 20 minutes, 2–3 times a day.  Move your toes often to avoid stiffness and to lessen swelling.  Raise (elevate) the injured area above the level of your heart while you are sitting or lying down.  General instructions     Do not use the injured limb to support your body weight until your health care provider says that you can. Use crutches as told by your health care provider  Take over-the-counter and prescription medicines only as told by your health care provider.  Ask your health care provider when it is safe to drive if you have a cast or splint.  Do exercises as told by your health care provider.  Do not use any products that contain nicotine or tobacco, such as cigarettes and e-cigarettes. These can delay bone healing. If you need help quitting, ask your health care provider  Keep all follow-up visits as told by your health care provider. This is important.  Contact a health care provider if:  You have pain or swelling that gets worse or does not get better with rest or medicine.  Get help right away if:  Your cast gets damaged.  You have severe pain that lasts.  You develop new pain or swelling.  Your skin or toenails below the injury turn blue or gray, feel cold, become numb, or have a loss of sensitivity to touch.  Summary  An ankle fracture can either be stable or unstable. This is determined after a physical exam and imaging studies like X-rays, a CT scan, or MRI.  Stable fractures are treated with a cast, boot, or splint to hold the ankle still until the fracture heals. Unstable fractures require surgery to ensure that the bones heal properly.  You will not be able to put weight on your injured side for several weeks.  Pain medicines, icing, and raising (elevating) your injured ankle when sitting or lying down may help with pain relief. Follow instructions as told by your health care provider.  This information is not intended to replace advice given to you by your health care provider. Make sure you discuss any questions you have with your health care provider.

## 2021-09-03 NOTE — ED PROVIDER NOTE - PATIENT PORTAL LINK FT
You can access the FollowMyHealth Patient Portal offered by [facility] by registering at the following website: [email or web address]. By joining [employer]’s FollowMyHealth portal, you will also be able to view your health information using other applications (apps) compatible with our system.

## 2021-09-03 NOTE — CONSULT NOTE ADULT - ASSESSMENT
Orthopaedic Surgery Consult Note    70yo Female with past medical history of pulm HTN, gout, HLD, presenting with a left ankle injury after a fall today. Patient states states she had a mechanical fall at home this afternoon. Denies head trauma or LOC. Notes L ankle pain/swelling/deformity but denies pain elsewhere. Denies numbness/tingling in the extremity. Denies taking blood thinners. States she walks with a cane/walker at baseline, mostly walks at home. Says she is unable to walk more than one block.      PMHx  HTN  Gout  Pulm HTN  HLD    Surg Hx:  R TKA with Dr. Sales     Social Hx:  Lives at home with significant other    Allergies  No Known Allergies      T(C): 36.7 (09-03-21 @ 17:13), Max: 36.8 (09-03-21 @ 13:10)  HR: 67 (09-03-21 @ 17:13) (67 - 74)  BP: 99/56 (09-03-21 @ 17:13) (90/52 - 99/56)  RR: 18 (09-03-21 @ 17:13) (18 - 18)  SpO2: 97% (09-03-21 @ 17:13) (97% - 99%)    P/E:  AOx3, NAD  Non-labored breathing    LLE  Skin intact  Moderate edema to the ankle  Mild deformity noted  ROM limited by pain  TTP diffusely around the ankle  Compartments soft and compressible, no pain w/ passive stretch of digits  SILT sp/dp/t/sural/saph  Firing ta/ehl/fhl/gs  DP pulse 2+, cap refill <2      Labs                        11.7   11.12 )-----------( 148      ( 03 Sep 2021 14:19 )             34.6     09-03    138  |  107  |  31<H>  ----------------------------<  101<H>  6.0<HH>   |  19  |  1.3    Ca    9.0      03 Sep 2021 14:19    TPro  6.3  /  Alb  4.2  /  TBili  0.3  /  DBili  x   /  AST  207<H>  /  ALT  213<H>  /  AlkPhos  158<H>  09-03    LIVER FUNCTIONS - ( 03 Sep 2021 14:19 )  Alb: 4.2 g/dL / Pro: 6.3 g/dL / ALK PHOS: 158 U/L / ALT: 213 U/L / AST: 207 U/L / GGT: x           PT/INR - ( 03 Sep 2021 14:19 )   PT: 11.80 sec;   INR: 1.03 ratio         PTT - ( 03 Sep 2021 14:19 )  PTT:24.3 sec    Imaging:  Left bimalleolar ankle fracture  Os trigonum noted    A/P:  70yo Female w/ left supination-adduction bimalleolar ankle fracture s/p closed reduction in AO splint    Weight bearing: NWB LLE  Pain control  Ice/elevation  Cast/Splint care instructions provided  Discussed with patient that her ankle fracture is an unstable fracture pattern and will require surgery on an outpatient basis for definitive fixation  Follow-up w/ Dr. Clemons please call [phone] to schedule an appointment  Return to ED with uncontrolled pain/bleeding/fever/chills/numbness/tingling/cool extremity/inability to move extremity

## 2021-09-03 NOTE — ED ADULT NURSE NOTE - OBJECTIVE STATEMENT
pt c/o right ankle pain after trip down 1 step./ denies other injury. denies head trama . denies ac.

## 2021-09-03 NOTE — ED PROVIDER NOTE - CLINICAL SUMMARY MEDICAL DECISION MAKING FREE TEXT BOX
.  72 y/o F p/w L ankle pain and swelling s/p mech fall today from standing. No other injury. cannot ambulate after fall.    PE: NAD; NCAT; MMM; neck supple; RRR; No resp distress; LLE: + swelling and ttp b/l mal; dp pulses 2+ b/l symmetric; ROM limited by pain; neuro grossly non focal; cooperative.    xray shows elsie frx.   Pt seen abd splinted by Ortho.  Pt stable for dc w/ ortho f/up as discussed.  Pt understands signs and symptoms for ED return.  dc home.  .  .

## 2021-09-03 NOTE — ED ADULT NURSE NOTE - CHIEF COMPLAINT
32 y/o M with a PMHx of Anxiety and no PSHx presents to ED c/o sudden onset of back pain x 3 days. Pt denies any trauma/heavy lifting. Simply awoke with the pain. Denies urinary or fecal incontinence, denies saddle anaesthesia. NKDA.
The patient is a 71y Female complaining of fall.

## 2021-09-03 NOTE — ED PROVIDER NOTE - PROGRESS NOTE DETAILS
Mikey: ortho consulted and aware of bimalleolar fracture, will come and see. Mikey: hemolyzed labs discussed with pt, pt not having any abdominal pain, states shes scheduled for bloodwork soon anyways

## 2021-09-03 NOTE — ED PROVIDER NOTE - OBJECTIVE STATEMENT
70 yo female w/ PMH of pulmonary HTN, gout, DLD, HTN presents for L ankle pain after fall.  Was in between motorized chair and ramp when she tripped over ramp and fell, was able to brace herself, no LOC no head trauma no blood thinners, started having L ankle pain after, worse with palpation and walking, sharp pain, in bilateral malleoli and foot, denies having had before.  No paralysis or N/T.

## 2021-09-03 NOTE — ED PROVIDER NOTE - CARE PROVIDER_API CALL
Ghazala Clemons)  Orthopaedic Surgery  [address] [place] [id number]  Phone: [phone]  Fax: [phone]  Follow Up Time: 1-3 Days

## 2021-09-03 NOTE — ED PROVIDER NOTE - PHYSICAL EXAMINATION
GENERAL: In mild distress   SKIN: No lacerations or abrasions   HEAD: Normocephalic; atraumatic.  EYES: PERRLA, EOMI  CARD: S1, S2 normal; no murmurs, gallops, or rubs. Regular rate and rhythm. DP and PT pulses 2+/4  RESP: LCTAB; No wheezes, rales, rhonchi, or stridor.  EXT: L ankle edematous with deformity noted, TTP over bilateral lateral malleoli and proximal foot.  Limited ROM testing due to pain  NEURO: Alert, oriented. Sensation intact in bilateral LEs, limited strength testing in LE due to pain but able to move toes.   PSYCH: Cooperative, appropriate.

## 2021-09-13 ENCOUNTER — OUTPATIENT (OUTPATIENT)
Dept: OUTPATIENT SERVICES | Facility: HOSPITAL | Age: 71
LOS: 1 days | Discharge: HOME | End: 2021-09-13

## 2021-09-13 ENCOUNTER — OUTPATIENT (OUTPATIENT)
Dept: OUTPATIENT SERVICES | Facility: HOSPITAL | Age: 71
LOS: 1 days | Discharge: HOME | End: 2021-09-13
Payer: MEDICARE

## 2021-09-13 ENCOUNTER — APPOINTMENT (OUTPATIENT)
Dept: OPHTHALMOLOGY | Facility: CLINIC | Age: 71
End: 2021-09-13

## 2021-09-13 ENCOUNTER — RESULT REVIEW (OUTPATIENT)
Age: 71
End: 2021-09-13

## 2021-09-13 ENCOUNTER — LABORATORY RESULT (OUTPATIENT)
Age: 71
End: 2021-09-13

## 2021-09-13 VITALS
DIASTOLIC BLOOD PRESSURE: 55 MMHG | TEMPERATURE: 97 F | SYSTOLIC BLOOD PRESSURE: 117 MMHG | OXYGEN SATURATION: 97 % | WEIGHT: 147.71 LBS | RESPIRATION RATE: 20 BRPM | HEIGHT: 59 IN | HEART RATE: 66 BPM

## 2021-09-13 DIAGNOSIS — Z96.651 PRESENCE OF RIGHT ARTIFICIAL KNEE JOINT: Chronic | ICD-10-CM

## 2021-09-13 DIAGNOSIS — S82.202D UNSPECIFIED FRACTURE OF SHAFT OF LEFT TIBIA, SUBSEQUENT ENCOUNTER FOR CLOSED FRACTURE WITH ROUTINE HEALING: ICD-10-CM

## 2021-09-13 DIAGNOSIS — Z11.59 ENCOUNTER FOR SCREENING FOR OTHER VIRAL DISEASES: ICD-10-CM

## 2021-09-13 DIAGNOSIS — Z01.818 ENCOUNTER FOR OTHER PREPROCEDURAL EXAMINATION: ICD-10-CM

## 2021-09-13 DIAGNOSIS — T85.192A OTHER MECHANICAL COMPLICATION OF IMPLANTED ELECTRONIC NEUROSTIMULATOR OF SPINAL CORD ELECTRODE (LEAD), INITIAL ENCOUNTER: Chronic | ICD-10-CM

## 2021-09-13 LAB
ALBUMIN SERPL ELPH-MCNC: 4.3 G/DL — SIGNIFICANT CHANGE UP (ref 3.5–5.2)
ALP SERPL-CCNC: 136 U/L — HIGH (ref 30–115)
ALT FLD-CCNC: 27 U/L — SIGNIFICANT CHANGE UP (ref 0–41)
ANION GAP SERPL CALC-SCNC: 14 MMOL/L — SIGNIFICANT CHANGE UP (ref 7–14)
APTT BLD: 33.4 SEC — SIGNIFICANT CHANGE UP (ref 27–39.2)
AST SERPL-CCNC: 23 U/L — SIGNIFICANT CHANGE UP (ref 0–41)
BASOPHILS # BLD AUTO: 0.06 K/UL — SIGNIFICANT CHANGE UP (ref 0–0.2)
BASOPHILS NFR BLD AUTO: 0.6 % — SIGNIFICANT CHANGE UP (ref 0–1)
BILIRUB SERPL-MCNC: 0.7 MG/DL — SIGNIFICANT CHANGE UP (ref 0.2–1.2)
BUN SERPL-MCNC: 23 MG/DL — HIGH (ref 10–20)
CALCIUM SERPL-MCNC: 9.2 MG/DL — SIGNIFICANT CHANGE UP (ref 8.5–10.1)
CHLORIDE SERPL-SCNC: 103 MMOL/L — SIGNIFICANT CHANGE UP (ref 98–110)
CO2 SERPL-SCNC: 22 MMOL/L — SIGNIFICANT CHANGE UP (ref 17–32)
CREAT SERPL-MCNC: 1.3 MG/DL — SIGNIFICANT CHANGE UP (ref 0.7–1.5)
EOSINOPHIL # BLD AUTO: 0.35 K/UL — SIGNIFICANT CHANGE UP (ref 0–0.7)
EOSINOPHIL NFR BLD AUTO: 3.4 % — SIGNIFICANT CHANGE UP (ref 0–8)
GLUCOSE SERPL-MCNC: 120 MG/DL — HIGH (ref 70–99)
HCT VFR BLD CALC: 36.3 % — LOW (ref 37–47)
HGB BLD-MCNC: 11.9 G/DL — LOW (ref 12–16)
IMM GRANULOCYTES NFR BLD AUTO: 0.6 % — HIGH (ref 0.1–0.3)
INR BLD: 1.18 RATIO — SIGNIFICANT CHANGE UP (ref 0.65–1.3)
LYMPHOCYTES # BLD AUTO: 1.38 K/UL — SIGNIFICANT CHANGE UP (ref 1.2–3.4)
LYMPHOCYTES # BLD AUTO: 13.6 % — LOW (ref 20.5–51.1)
MCHC RBC-ENTMCNC: 32.8 G/DL — SIGNIFICANT CHANGE UP (ref 32–37)
MCHC RBC-ENTMCNC: 35.7 PG — HIGH (ref 27–31)
MCV RBC AUTO: 109 FL — HIGH (ref 81–99)
MONOCYTES # BLD AUTO: 0.87 K/UL — HIGH (ref 0.1–0.6)
MONOCYTES NFR BLD AUTO: 8.6 % — SIGNIFICANT CHANGE UP (ref 1.7–9.3)
NEUTROPHILS # BLD AUTO: 7.44 K/UL — HIGH (ref 1.4–6.5)
NEUTROPHILS NFR BLD AUTO: 73.2 % — SIGNIFICANT CHANGE UP (ref 42.2–75.2)
NRBC # BLD: 0 /100 WBCS — SIGNIFICANT CHANGE UP (ref 0–0)
PLATELET # BLD AUTO: 359 K/UL — SIGNIFICANT CHANGE UP (ref 130–400)
POTASSIUM SERPL-MCNC: 4.3 MMOL/L — SIGNIFICANT CHANGE UP (ref 3.5–5)
POTASSIUM SERPL-SCNC: 4.3 MMOL/L — SIGNIFICANT CHANGE UP (ref 3.5–5)
PROT SERPL-MCNC: 6.7 G/DL — SIGNIFICANT CHANGE UP (ref 6–8)
PROTHROM AB SERPL-ACNC: 13.6 SEC — HIGH (ref 9.95–12.87)
RBC # BLD: 3.33 M/UL — LOW (ref 4.2–5.4)
RBC # FLD: 13.9 % — SIGNIFICANT CHANGE UP (ref 11.5–14.5)
SODIUM SERPL-SCNC: 139 MMOL/L — SIGNIFICANT CHANGE UP (ref 135–146)
WBC # BLD: 10.16 K/UL — SIGNIFICANT CHANGE UP (ref 4.8–10.8)
WBC # FLD AUTO: 10.16 K/UL — SIGNIFICANT CHANGE UP (ref 4.8–10.8)

## 2021-09-13 PROCEDURE — 71046 X-RAY EXAM CHEST 2 VIEWS: CPT | Mod: 26

## 2021-09-13 PROCEDURE — 93010 ELECTROCARDIOGRAM REPORT: CPT

## 2021-09-13 NOTE — H&P PST ADULT - HISTORY OF PRESENT ILLNESS
71 year old female here for above, pt fell on a step at home on 9/3, seen in ED, +ankle fracture, seen by Dr Clemons, needs procedure.  pt denies cp, + sob, + rey has multiple pulmonary issues, denies palpitations  pt denies urinary frequency urgency or burning, pt states only urinates every 12 hours, unable to currently given urine specimen  Anesthesia Alert  NO--Difficult Airway  NO--History of neck surgery or radiation  NO--Limited ROM of neck  NO--History of Malignant hyperthermia  NO--Personal or family history of Pseudocholinesterase deficiency  NO--Prior Anesthesia Complication  NO--Latex Allergy  NO--Loose teeth  NO--History of Rheumatoid Arthritis  + HX od MARLENI, not using machine  NO-bleeding risks   71 year old female here for above, pt fell on a step at home on 9/3, seen in ED, +ankle fracture, seen by Dr Clemons, needs procedure.  pt denies cp, + sob, + rey has multiple pulmonary issues, denies palpitations  pt denies urinary frequency urgency or burning, pt states only urinates every 12 hours, unable to currently given urine specimen  Anesthesia Alert  NO--Difficult Airway  NO--History of neck surgery or radiation  NO--Limited ROM of neck  NO--History of Malignant hyperthermia  NO--Personal or family history of Pseudocholinesterase deficiency  NO--Prior Anesthesia Complication  NO--Latex Allergy  NO--Loose teeth  NO--History of Rheumatoid Arthritis  + HX of MARLENI, not using machine  NO-bleeding risks  Pt denies any s/s covid 19 and reports no contact with known positive people. Pt has appointment for repeat covid testing pre op and instructed to continue to self monitor and report any concerns to MD. Pt will continue to practice self isolation and  exposure control measures pre op

## 2021-09-13 NOTE — H&P PST ADULT - PSYCHIATRIC COMMENTS
Problem: Pain  Goal: #Acceptable pain level achieved/maintained at rest using NRS/Faces  Description: This goal is used for patients who can self-report.  Acceptable means the level is at or below the identified comfort/function goal.  Outcome: Outcome Not Met, Continue to Monitor   Pt denies any change in pain  
A: Met with Clinton Rogel to explain  role and to discuss aftercare options.    R: Outpatient Mental Health Medication Provider: Patient has an upcoming appointment with Denise Brachmann for outpatient mental health medication management. Patient signed an FALGUNI for this provider and would like an aftercare appointment made.         Primary Care Provider: Patient currently sees PCP Vitaly Velasco. Patient does not want information shared with this provider. Patient did not sign an FALGUNI for this provider.         Outpatient Therapist: Patient currently sees Monica Geren for outpatient therapy. Patient signed an FALGUNI for this provider and would like an aftercare appointment made.    Patient is aware of aftercare options. Patient does have virtual capabilities at home. Patient confirmed address and telephone number listed on the facesheet.     P: Will collaborate with treatment team and with the patient before setting up further aftercare, which will be complete by time of discharge.     Carlee López LPC   6/7/2021    
regarding upcoming surgery

## 2021-09-13 NOTE — H&P PST ADULT - NSICDXFAMILYHX_GEN_ALL_CORE_FT
FAMILY HISTORY:  Father  Still living? No  Family history of Alzheimer's disease, Age at diagnosis: 71-80    Mother  Still living? No  Family history of bladder cancer, Age at diagnosis: 51-60

## 2021-09-13 NOTE — H&P PST ADULT - NSICDXPASTMEDICALHX_GEN_ALL_CORE_FT
PAST MEDICAL HISTORY:  Anxiety     COPD (chronic obstructive pulmonary disease)     Depression     H/O osteoporosis     H/O pulmonary hypertension     History of neck pain     History of pulmonary hypertension     Hyperlipidemia     Osteoarthritis     Spinal stenosis of lumbar region with radiculopathy

## 2021-09-14 PROBLEM — M48.061 SPINAL STENOSIS, LUMBAR REGION WITHOUT NEUROGENIC CLAUDICATION: Chronic | Status: ACTIVE | Noted: 2021-09-13

## 2021-09-14 PROBLEM — M19.90 UNSPECIFIED OSTEOARTHRITIS, UNSPECIFIED SITE: Chronic | Status: ACTIVE | Noted: 2021-09-13

## 2021-09-14 PROBLEM — E78.5 HYPERLIPIDEMIA, UNSPECIFIED: Chronic | Status: ACTIVE | Noted: 2021-09-13

## 2021-09-14 PROBLEM — F41.9 ANXIETY DISORDER, UNSPECIFIED: Chronic | Status: ACTIVE | Noted: 2021-09-13

## 2021-09-14 PROBLEM — Z87.39 PERSONAL HISTORY OF OTHER DISEASES OF THE MUSCULOSKELETAL SYSTEM AND CONNECTIVE TISSUE: Chronic | Status: ACTIVE | Noted: 2021-09-13

## 2021-09-14 PROBLEM — J44.9 CHRONIC OBSTRUCTIVE PULMONARY DISEASE, UNSPECIFIED: Chronic | Status: ACTIVE | Noted: 2021-09-13

## 2021-09-14 PROBLEM — Z86.79 PERSONAL HISTORY OF OTHER DISEASES OF THE CIRCULATORY SYSTEM: Chronic | Status: ACTIVE | Noted: 2021-09-13

## 2021-09-14 PROBLEM — F32.9 MAJOR DEPRESSIVE DISORDER, SINGLE EPISODE, UNSPECIFIED: Chronic | Status: ACTIVE | Noted: 2021-09-13

## 2021-09-21 ENCOUNTER — INPATIENT (INPATIENT)
Facility: HOSPITAL | Age: 71
LOS: 22 days | Discharge: NURSING, REHAB OR LONG-TERM CARE | End: 2021-10-14
Attending: STUDENT IN AN ORGANIZED HEALTH CARE EDUCATION/TRAINING PROGRAM | Admitting: STUDENT IN AN ORGANIZED HEALTH CARE EDUCATION/TRAINING PROGRAM
Payer: MEDICARE

## 2021-09-21 VITALS
DIASTOLIC BLOOD PRESSURE: 52 MMHG | TEMPERATURE: 99 F | OXYGEN SATURATION: 96 % | HEART RATE: 79 BPM | SYSTOLIC BLOOD PRESSURE: 103 MMHG | RESPIRATION RATE: 18 BRPM

## 2021-09-21 DIAGNOSIS — M81.0 AGE-RELATED OSTEOPOROSIS WITHOUT CURRENT PATHOLOGICAL FRACTURE: ICD-10-CM

## 2021-09-21 DIAGNOSIS — I26.94 MULTIPLE SUBSEGMENTAL PULMONARY EMBOLI WITHOUT ACUTE COR PULMONALE: ICD-10-CM

## 2021-09-21 DIAGNOSIS — Z96.651 PRESENCE OF RIGHT ARTIFICIAL KNEE JOINT: Chronic | ICD-10-CM

## 2021-09-21 DIAGNOSIS — S82.899A OTHER FRACTURE OF UNSPECIFIED LOWER LEG, INITIAL ENCOUNTER FOR CLOSED FRACTURE: ICD-10-CM

## 2021-09-21 DIAGNOSIS — I12.9 HYPERTENSIVE CHRONIC KIDNEY DISEASE WITH STAGE 1 THROUGH STAGE 4 CHRONIC KIDNEY DISEASE, OR UNSPECIFIED CHRONIC KIDNEY DISEASE: ICD-10-CM

## 2021-09-21 DIAGNOSIS — Z91.048 OTHER NONMEDICINAL SUBSTANCE ALLERGY STATUS: ICD-10-CM

## 2021-09-21 DIAGNOSIS — E87.6 HYPOKALEMIA: ICD-10-CM

## 2021-09-21 DIAGNOSIS — I27.20 PULMONARY HYPERTENSION, UNSPECIFIED: ICD-10-CM

## 2021-09-21 DIAGNOSIS — N18.30 CHRONIC KIDNEY DISEASE, STAGE 3 UNSPECIFIED: ICD-10-CM

## 2021-09-21 DIAGNOSIS — J44.9 CHRONIC OBSTRUCTIVE PULMONARY DISEASE, UNSPECIFIED: ICD-10-CM

## 2021-09-21 DIAGNOSIS — S82.872A DISPLACED PILON FRACTURE OF LEFT TIBIA, INITIAL ENCOUNTER FOR CLOSED FRACTURE: ICD-10-CM

## 2021-09-21 DIAGNOSIS — G47.33 OBSTRUCTIVE SLEEP APNEA (ADULT) (PEDIATRIC): ICD-10-CM
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
## 2021-09-21 DIAGNOSIS — M19.90 UNSPECIFIED OSTEOARTHRITIS, UNSPECIFIED SITE: ICD-10-CM

## 2021-09-21 DIAGNOSIS — F41.9 ANXIETY DISORDER, UNSPECIFIED: ICD-10-CM

## 2021-09-21 DIAGNOSIS — M54.12 RADICULOPATHY, CERVICAL REGION: ICD-10-CM

## 2021-09-21 DIAGNOSIS — R53.81 OTHER MALAISE: ICD-10-CM

## 2021-09-21 DIAGNOSIS — N17.9 ACUTE KIDNEY FAILURE, UNSPECIFIED: ICD-10-CM
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
## 2021-09-21 DIAGNOSIS — J96.11 CHRONIC RESPIRATORY FAILURE WITH HYPOXIA: ICD-10-CM

## 2021-09-21 DIAGNOSIS — R33.8 OTHER RETENTION OF URINE: ICD-10-CM

## 2021-09-21 DIAGNOSIS — S82.62XA DISPLACED FRACTURE OF LATERAL MALLEOLUS OF LEFT FIBULA, INITIAL ENCOUNTER FOR CLOSED FRACTURE: ICD-10-CM

## 2021-09-21 DIAGNOSIS — D72.829 ELEVATED WHITE BLOOD CELL COUNT, UNSPECIFIED: ICD-10-CM

## 2021-09-21 DIAGNOSIS — E78.5 HYPERLIPIDEMIA, UNSPECIFIED: ICD-10-CM

## 2021-09-21 DIAGNOSIS — M48.061 SPINAL STENOSIS, LUMBAR REGION WITHOUT NEUROGENIC CLAUDICATION: ICD-10-CM

## 2021-09-21 DIAGNOSIS — Z96.651 PRESENCE OF RIGHT ARTIFICIAL KNEE JOINT: ICD-10-CM

## 2021-09-21 DIAGNOSIS — F32.A DEPRESSION, UNSPECIFIED: ICD-10-CM

## 2021-09-21 DIAGNOSIS — K56.7 ILEUS, UNSPECIFIED: ICD-10-CM
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
## 2021-09-21 DIAGNOSIS — T85.192A OTHER MECHANICAL COMPLICATION OF IMPLANTED ELECTRONIC NEUROSTIMULATOR OF SPINAL CORD ELECTRODE (LEAD), INITIAL ENCOUNTER: Chronic | ICD-10-CM

## 2021-09-21 DIAGNOSIS — M10.9 GOUT, UNSPECIFIED: ICD-10-CM

## 2021-09-21 LAB
ALBUMIN SERPL ELPH-MCNC: 4 G/DL — SIGNIFICANT CHANGE UP (ref 3.5–5.2)
ALP SERPL-CCNC: 117 U/L — HIGH (ref 30–115)
ALT FLD-CCNC: 10 U/L — SIGNIFICANT CHANGE UP (ref 0–41)
ANION GAP SERPL CALC-SCNC: 13 MMOL/L — SIGNIFICANT CHANGE UP (ref 7–14)
APTT BLD: 34.1 SEC — SIGNIFICANT CHANGE UP (ref 27–39.2)
AST SERPL-CCNC: 17 U/L — SIGNIFICANT CHANGE UP (ref 0–41)
BASOPHILS # BLD AUTO: 0.05 K/UL — SIGNIFICANT CHANGE UP (ref 0–0.2)
BASOPHILS NFR BLD AUTO: 0.8 % — SIGNIFICANT CHANGE UP (ref 0–1)
BILIRUB SERPL-MCNC: 0.5 MG/DL — SIGNIFICANT CHANGE UP (ref 0.2–1.2)
BLD GP AB SCN SERPL QL: SIGNIFICANT CHANGE UP
BUN SERPL-MCNC: 13 MG/DL — SIGNIFICANT CHANGE UP (ref 10–20)
CALCIUM SERPL-MCNC: 9.3 MG/DL — SIGNIFICANT CHANGE UP (ref 8.5–10.1)
CHLORIDE SERPL-SCNC: 109 MMOL/L — SIGNIFICANT CHANGE UP (ref 98–110)
CO2 SERPL-SCNC: 21 MMOL/L — SIGNIFICANT CHANGE UP (ref 17–32)
CREAT SERPL-MCNC: 1.1 MG/DL — SIGNIFICANT CHANGE UP (ref 0.7–1.5)
EOSINOPHIL # BLD AUTO: 0.24 K/UL — SIGNIFICANT CHANGE UP (ref 0–0.7)
EOSINOPHIL NFR BLD AUTO: 3.9 % — SIGNIFICANT CHANGE UP (ref 0–8)
GLUCOSE SERPL-MCNC: 89 MG/DL — SIGNIFICANT CHANGE UP (ref 70–99)
HCT VFR BLD CALC: 32.6 % — LOW (ref 37–47)
HGB BLD-MCNC: 10.8 G/DL — LOW (ref 12–16)
IMM GRANULOCYTES NFR BLD AUTO: 0.3 % — SIGNIFICANT CHANGE UP (ref 0.1–0.3)
INR BLD: 1.21 RATIO — SIGNIFICANT CHANGE UP (ref 0.65–1.3)
LYMPHOCYTES # BLD AUTO: 1.49 K/UL — SIGNIFICANT CHANGE UP (ref 1.2–3.4)
LYMPHOCYTES # BLD AUTO: 24.3 % — SIGNIFICANT CHANGE UP (ref 20.5–51.1)
MCHC RBC-ENTMCNC: 33.1 G/DL — SIGNIFICANT CHANGE UP (ref 32–37)
MCHC RBC-ENTMCNC: 35.3 PG — HIGH (ref 27–31)
MCV RBC AUTO: 106.5 FL — HIGH (ref 81–99)
MONOCYTES # BLD AUTO: 0.64 K/UL — HIGH (ref 0.1–0.6)
MONOCYTES NFR BLD AUTO: 10.4 % — HIGH (ref 1.7–9.3)
NEUTROPHILS # BLD AUTO: 3.7 K/UL — SIGNIFICANT CHANGE UP (ref 1.4–6.5)
NEUTROPHILS NFR BLD AUTO: 60.3 % — SIGNIFICANT CHANGE UP (ref 42.2–75.2)
NRBC # BLD: 0 /100 WBCS — SIGNIFICANT CHANGE UP (ref 0–0)
NT-PROBNP SERPL-SCNC: 1185 PG/ML — HIGH (ref 0–300)
PLATELET # BLD AUTO: 267 K/UL — SIGNIFICANT CHANGE UP (ref 130–400)
POTASSIUM SERPL-MCNC: 4 MMOL/L — SIGNIFICANT CHANGE UP (ref 3.5–5)
POTASSIUM SERPL-SCNC: 4 MMOL/L — SIGNIFICANT CHANGE UP (ref 3.5–5)
PROT SERPL-MCNC: 6.2 G/DL — SIGNIFICANT CHANGE UP (ref 6–8)
PROTHROM AB SERPL-ACNC: 13.9 SEC — HIGH (ref 9.95–12.87)
RBC # BLD: 3.06 M/UL — LOW (ref 4.2–5.4)
RBC # FLD: 13.5 % — SIGNIFICANT CHANGE UP (ref 11.5–14.5)
SARS-COV-2 RNA SPEC QL NAA+PROBE: SIGNIFICANT CHANGE UP
SODIUM SERPL-SCNC: 143 MMOL/L — SIGNIFICANT CHANGE UP (ref 135–146)
TROPONIN T SERPL-MCNC: <0.01 NG/ML — SIGNIFICANT CHANGE UP
WBC # BLD: 6.14 K/UL — SIGNIFICANT CHANGE UP (ref 4.8–10.8)
WBC # FLD AUTO: 6.14 K/UL — SIGNIFICANT CHANGE UP (ref 4.8–10.8)

## 2021-09-21 PROCEDURE — 93010 ELECTROCARDIOGRAM REPORT: CPT

## 2021-09-21 PROCEDURE — 99285 EMERGENCY DEPT VISIT HI MDM: CPT

## 2021-09-21 PROCEDURE — 71045 X-RAY EXAM CHEST 1 VIEW: CPT | Mod: 26

## 2021-09-21 RX ORDER — FLUTICASONE PROPIONATE 50 MCG
1 SPRAY, SUSPENSION NASAL
Refills: 0 | Status: DISCONTINUED | OUTPATIENT
Start: 2021-09-21 | End: 2021-09-23

## 2021-09-21 RX ORDER — HYOSCYAMINE SULFATE 0.13 MG
0.12 TABLET ORAL DAILY
Refills: 0 | Status: DISCONTINUED | OUTPATIENT
Start: 2021-09-21 | End: 2021-09-23

## 2021-09-21 RX ORDER — HEPARIN SODIUM 5000 [USP'U]/ML
5000 INJECTION INTRAVENOUS; SUBCUTANEOUS EVERY 8 HOURS
Refills: 0 | Status: DISCONTINUED | OUTPATIENT
Start: 2021-09-21 | End: 2021-09-22

## 2021-09-21 RX ORDER — PRIMIDONE 250 MG/1
50 TABLET ORAL AT BEDTIME
Refills: 0 | Status: DISCONTINUED | OUTPATIENT
Start: 2021-09-21 | End: 2021-09-23

## 2021-09-21 RX ORDER — BUPROPION HYDROCHLORIDE 150 MG/1
150 TABLET, EXTENDED RELEASE ORAL DAILY
Refills: 0 | Status: DISCONTINUED | OUTPATIENT
Start: 2021-09-21 | End: 2021-09-23

## 2021-09-21 RX ORDER — ATENOLOL 25 MG/1
50 TABLET ORAL DAILY
Refills: 0 | Status: DISCONTINUED | OUTPATIENT
Start: 2021-09-21 | End: 2021-09-23

## 2021-09-21 RX ORDER — ATORVASTATIN CALCIUM 80 MG/1
80 TABLET, FILM COATED ORAL AT BEDTIME
Refills: 0 | Status: DISCONTINUED | OUTPATIENT
Start: 2021-09-21 | End: 2021-09-23

## 2021-09-21 RX ORDER — FOLIC ACID 0.8 MG
1 TABLET ORAL DAILY
Refills: 0 | Status: DISCONTINUED | OUTPATIENT
Start: 2021-09-21 | End: 2021-09-23

## 2021-09-21 RX ORDER — COLCHICINE 0.6 MG
0.6 TABLET ORAL DAILY
Refills: 0 | Status: DISCONTINUED | OUTPATIENT
Start: 2021-09-21 | End: 2021-09-23

## 2021-09-21 RX ORDER — NIFEDIPINE 30 MG
60 TABLET, EXTENDED RELEASE 24 HR ORAL DAILY
Refills: 0 | Status: DISCONTINUED | OUTPATIENT
Start: 2021-09-21 | End: 2021-09-23

## 2021-09-21 RX ORDER — ALLOPURINOL 300 MG
300 TABLET ORAL AT BEDTIME
Refills: 0 | Status: DISCONTINUED | OUTPATIENT
Start: 2021-09-21 | End: 2021-09-23

## 2021-09-21 RX ORDER — BUDESONIDE AND FORMOTEROL FUMARATE DIHYDRATE 160; 4.5 UG/1; UG/1
2 AEROSOL RESPIRATORY (INHALATION)
Refills: 0 | Status: DISCONTINUED | OUTPATIENT
Start: 2021-09-21 | End: 2021-09-23

## 2021-09-21 RX ORDER — SPIRONOLACTONE 25 MG/1
50 TABLET, FILM COATED ORAL DAILY
Refills: 0 | Status: DISCONTINUED | OUTPATIENT
Start: 2021-09-21 | End: 2021-09-23

## 2021-09-21 RX ORDER — FUROSEMIDE 40 MG
20 TABLET ORAL DAILY
Refills: 0 | Status: DISCONTINUED | OUTPATIENT
Start: 2021-09-21 | End: 2021-09-23

## 2021-09-21 RX ORDER — OXYCODONE AND ACETAMINOPHEN 5; 325 MG/1; MG/1
2 TABLET ORAL ONCE
Refills: 0 | Status: DISCONTINUED | OUTPATIENT
Start: 2021-09-21 | End: 2021-09-21

## 2021-09-21 RX ORDER — CHLORHEXIDINE GLUCONATE 213 G/1000ML
1 SOLUTION TOPICAL
Refills: 0 | Status: DISCONTINUED | OUTPATIENT
Start: 2021-09-21 | End: 2021-09-23

## 2021-09-21 RX ORDER — OXYCODONE AND ACETAMINOPHEN 5; 325 MG/1; MG/1
2 TABLET ORAL EVERY 6 HOURS
Refills: 0 | Status: DISCONTINUED | OUTPATIENT
Start: 2021-09-21 | End: 2021-09-23

## 2021-09-21 RX ORDER — VALACYCLOVIR 500 MG/1
500 TABLET, FILM COATED ORAL AT BEDTIME
Refills: 0 | Status: DISCONTINUED | OUTPATIENT
Start: 2021-09-21 | End: 2021-09-23

## 2021-09-21 RX ORDER — DIAZEPAM 5 MG
10 TABLET ORAL THREE TIMES A DAY
Refills: 0 | Status: DISCONTINUED | OUTPATIENT
Start: 2021-09-21 | End: 2021-09-23

## 2021-09-21 RX ORDER — FENTANYL CITRATE 50 UG/ML
1 INJECTION INTRAVENOUS
Refills: 0 | Status: DISCONTINUED | OUTPATIENT
Start: 2021-09-21 | End: 2021-09-23

## 2021-09-21 RX ORDER — TRAZODONE HCL 50 MG
100 TABLET ORAL AT BEDTIME
Refills: 0 | Status: DISCONTINUED | OUTPATIENT
Start: 2021-09-21 | End: 2021-09-23

## 2021-09-21 RX ADMIN — OXYCODONE AND ACETAMINOPHEN 2 TABLET(S): 5; 325 TABLET ORAL at 18:27

## 2021-09-21 RX ADMIN — BUDESONIDE AND FORMOTEROL FUMARATE DIHYDRATE 2 PUFF(S): 160; 4.5 AEROSOL RESPIRATORY (INHALATION) at 21:51

## 2021-09-21 RX ADMIN — Medication 20 MILLIGRAM(S): at 21:53

## 2021-09-21 RX ADMIN — Medication 300 MILLIGRAM(S): at 21:53

## 2021-09-21 RX ADMIN — OXYCODONE AND ACETAMINOPHEN 2 TABLET(S): 5; 325 TABLET ORAL at 19:32

## 2021-09-21 RX ADMIN — HEPARIN SODIUM 5000 UNIT(S): 5000 INJECTION INTRAVENOUS; SUBCUTANEOUS at 21:53

## 2021-09-21 RX ADMIN — Medication 100 MILLIGRAM(S): at 21:52

## 2021-09-21 RX ADMIN — PRIMIDONE 50 MILLIGRAM(S): 250 TABLET ORAL at 21:53

## 2021-09-21 RX ADMIN — ATORVASTATIN CALCIUM 80 MILLIGRAM(S): 80 TABLET, FILM COATED ORAL at 21:52

## 2021-09-21 NOTE — ED PROVIDER NOTE - PROGRESS NOTE DETAILS
d/w ortho, as pt was in contact with Dr. Clemons earlier. Ortho to come to see, in hopes of expediting clearances

## 2021-09-21 NOTE — ED PROVIDER NOTE - NS ED ROS FT
CONST: No fever, chills or bodyaches  EYES: No pain, redness, drainage or visual changes.  ENT: No ear pain or discharge, nasal discharge or congestion. No sore throat  CARD: No chest pain. Previously had palpitations at home, resolved  RESP: SOB resolved  GI: No abdominal pain, N/V/D  MS: LLE in cast  SKIN: No rashes  NEURO: No  paresthesias

## 2021-09-21 NOTE — ED PROVIDER NOTE - CARE PLAN
1 Principal Discharge DX:	Fracture, ankle  Secondary Diagnosis:	Pulmonary hypertension, pre-operative cardiovascular examination  Secondary Diagnosis:	Encounter for pre-operative respiratory clearance  Secondary Diagnosis:	Insufficient social support

## 2021-09-21 NOTE — H&P ADULT - ATTENDING COMMENTS
HPI:  70 yo woman with a PMHx of COPD/Asthma uses home oxygen O2 NC 2L, chronic back pain, cervical radiculopathy, Pulmonary Hypertension, hx of Juda palsy, Gout, CKD 3, DLD, HTN, recent L ankle fx admitted for preoperative work up and optimization. She was told by Dr Clemons to come in after her surgery was cancelled due to not being able to f/u for medical clearance appointments   He reported a mechanical fall, sustaining a left ankle fracture which was casted. She was discharged and her care taker at home recently suffered a stroke and is also debilitated. She does not have and HHA at home, she was told that they would be in place following her ankle surgery. Due to her debility, chronic medical conditions, and lack of support she has been unable to adequately care for herself at home, having difficulty in all activities of daily living (toileting, eating, basic mobility). She is confined only to the first floor of her home, as a result, she has been unable to reach her O2 concentrator thus using only her portable O2 which ran out 3-4 days ago.   She reports increasing dyspnea with minimal exertion for months which was worsened now with her cast. She uses a cane / walker to ambulate but currently in a wheelchair.   She notes palpitations but not chest pain, diaphoresis, cough, fevers, chills, dyspnea at rest, n/v, abdominal pain, melena, hematuria, hematochezia. She noted some diarrhea which seems to have resolved though has been having poor oral intake as well.   She follows with Dr weinstein, as per her, he intended to have a repeat echo and NM stress test done for her she was unable to have it due to COVID and her increasing debility.       REVIEW OF SYSTEMS:  CONSTITUTIONAL:  +weakness and fatigue. No fevers, chills, night sweats, weight loss  EYES/ENT: No visual changes. No vertigo or dysphagia  NECK: No neck pain or stiffness  RESPIRATORY: No cough, wheezing, hemoptysis. +shortness of breath with exertion   CARDIOVASCULAR: No chest pain. + palpitations. No lower extremity edema  GASTROINTESTINAL: No abdominal pain. No nausea, vomiting, diarrhea, or hematemesis  GENITOURINARY: No dysuria or hematuria   NEUROLOGICAL: No focal numbness or weakness  SKIN: No rashes or itching  HEMATOLOGIC: No easy bruising or prolonged bleeding.      PHYSICAL EXAM:  GENERAL: NAD, well-developed, Non-toxic, stated age   HEAD:  Atraumatic, Normocephalic  EYES: EOMI, Sclera White   NECK: Supple, No JVD  CHEST/LUNG: Clear to auscultation bilaterally; No wheezing, rhonchi, or crackles  HEART: Regular rate and rhythm; s1, s2, No murmurs, rubs, or gallops  ABDOMEN: Soft, Nontender, Nondistended; Bowel sounds present, No rebound or guarding noted   EXTREMITIES:  No lower extremity edema or calf tenderness to palpation.  No clubbing or cyanosis  PSYCH: AAOx3, pleasant, cooperative, not anxious  NEUROLOGY: 5/5 strength in all extremities, no downward drift. Sensation grossly intact.   SKIN: No rashes or lesions      ASSESSMENT AND PLAN:        My note supersedes the residents in the event of a discrepancy. HPI:  70 yo woman with a PMHx of COPD/Asthma uses home oxygen O2 NC 2L, chronic back pain, cervical radiculopathy, Pulmonary Hypertension, hx of Weaver palsy, Gout, CKD 3, DLD, HTN, recent L ankle fx admitted for preoperative work up and optimization. She was told by Dr Clemons to come in after her surgery was cancelled due to not being able to f/u for medical clearance appointments   He reported a mechanical fall, sustaining a left ankle fracture which was casted. She was discharged and her care taker at home recently suffered a stroke and is also debilitated. She does not have and HHA at home, she was told that they would be in place following her ankle surgery. Due to her debility, chronic medical conditions, and lack of support she has been unable to adequately care for herself at home, having difficulty in all activities of daily living (toileting, eating, basic mobility). She is confined only to the first floor of her home, as a result, she has been unable to reach her O2 concentrator thus using only her portable O2 which ran out 3-4 days ago.   She reports increasing dyspnea with minimal exertion for months which was worsened now with her cast. She uses a cane / walker to ambulate but currently in a wheelchair.   She notes palpitations but not chest pain, diaphoresis, cough, fevers, chills, dyspnea at rest, n/v, abdominal pain, melena, hematuria, hematochezia. She noted some diarrhea which seems to have resolved though has been having poor oral intake as well.   She follows with Dr flood, as per her, he intended to have a repeat echo and NM stress test done for her she was unable to have it due to COVID and her increasing debility.     REVIEW OF SYSTEMS:  CONSTITUTIONAL:  +weakness and fatigue. No fevers, chills, night sweats, weight loss  EYES/ENT: No visual changes. No vertigo or dysphagia  NECK: No neck pain or stiffness  RESPIRATORY: No cough, wheezing, hemoptysis. +shortness of breath with exertion   CARDIOVASCULAR: No chest pain. + palpitations. No lower extremity edema  GASTROINTESTINAL: No abdominal pain. No nausea, vomiting, diarrhea, or hematemesis  GENITOURINARY: No dysuria or hematuria   NEUROLOGICAL: No focal numbness or weakness  SKIN: No rashes or itching  HEMATOLOGIC: No easy bruising or prolonged bleeding.    PHYSICAL EXAM:  GENERAL: NAD, well-developed, Non-toxic, stated age   HEAD:  Atraumatic, Normocephalic  EYES: EOMI, Sclera White   NECK: Supple, No JVD  CHEST/LUNG: Clear to auscultation bilaterally; No wheezing, rhonchi, or crackles  HEART: Regular rate and rhythm; s1, s2, No murmurs, rubs, or gallops  ABDOMEN: Soft, Nontender, Nondistended; Bowel sounds present, No rebound or guarding noted   EXTREMITIES:  No lower extremity edema or calf tenderness to palpation.  No clubbing or cyanosis  PSYCH: AAOx3, pleasant, cooperative, not anxious  NEUROLOGY: 5/5 strength in all extremities, no downward drift. Sensation grossly intact.   SKIN: No rashes or lesions      ASSESSMENT AND PLAN:  Debility   Inability to care for one's self  Left ankle fracture:   -Ortho eval with Dr Clemons (may likely benefit from having repair done during her hospital stay given how difficulty her mobility is)  -Cardio eval for surgical clearance       -Check 2D Echo, will leave the stress test to Dr Flood's discretion   -Pulm Evaluation for medical optimization and risk stratification if requested by ortho  -Case management / Social work evaluation to establish HHA, though more likely will benefit from STR/SNF rather than returning home    -MEDICAL RISK ASSESSMENT: she is likely a high risk for anesthesia and operative procedures when taking into account her overall comorbidities, declining functional capacity, and age.          -ACS surgical risk calculator estimates a  21.3% of any complication and 17.8% risk of a serious complication. Both are above the estimated          respective averages of 6.4% and 4.9%.        -This only serves as a risk estimation, the decision to proceed with a procedure is determined by the operating provider and the patient.     Chronic hypoxic respiratory failure, on 2L home O2  Pulm HTN / COPD / Asthma   -cont with sildenafil, ICS, flonase     Gout: cont with allopurinol and colchicine     Anxiety and depression: cont home psych medications     CBP, Cervical radiculopathy: cont home pain management regimen  -Start senna and dulcolax while on opioids     HTN: cont nifedipine    DVT ppx: Lovenox/Heparin  GI ppx: Not indicated  GOC: Full code.    **She should follow up with her PMD. She has an EXTENSIVE medication list. She is at risk for polypharmacy**     My note supersedes the residents in the event of a discrepancy.

## 2021-09-21 NOTE — ED PROVIDER NOTE - PHYSICAL EXAMINATION
CONST: Well appearing in NAD  EYES: PERRL, EOMI, Sclera and conjunctiva clear.   CARD: Normal S1 S2; Normal rate and rhythm  RESP: Equal BS B/L, No wheezes, rhonchi or rales. No distress  GI: Soft, non-tender, non-distended.  MS: LLE in short leg plaster cast  SKIN: Warm, dry, no acute rashes. Good turgor  NEURO: A&Ox3, No focal deficits. Strength 5/5 with no sensory deficits.

## 2021-09-21 NOTE — ED ADULT NURSE NOTE - COVID-19 ORDERING FACILITY
[facility]  - [facility] [place]

## 2021-09-21 NOTE — H&P ADULT - NSHPLABSRESULTS_GEN_ALL_CORE
LABS:                        10.8   6.14  )-----------( 267      ( 21 Sep 2021 15:41 )             32.6     09-21    143  |  109  |  13  ----------------------------<  89  4.0   |  21  |  1.1    Ca    9.3      21 Sep 2021 15:41    TPro  6.2  /  Alb  4.0  /  TBili  0.5  /  DBili  x   /  AST  17  /  ALT  10  /  AlkPhos  117<H>  09-21    PT/INR - ( 21 Sep 2021 15:41 )   PT: 13.90 sec;   INR: 1.21 ratio    PTT - ( 21 Sep 2021 15:41 )  PTT:34.1 sec    Xray Ankle Complete 3 Views, Left (09.03.21 @ 18:42)     Findings/  impression: Post splint casting of the left ankle obscuring the fine bony detail evaluation. Again seen is acute mildly displaced medial malleolar fracture and distal fibular fracture at the wrist moderate global. Diffuse osteopenia. The ankle mortise appears symmetric.

## 2021-09-21 NOTE — ED ADULT TRIAGE NOTE - CHIEF COMPLAINT QUOTE
pt c/o dyspnea over past couple of days, hx pf COPD and asthma. on home O2. pt also reports decrease UO, has known decrease kidney function

## 2021-09-21 NOTE — H&P ADULT - NSHPSOCIALHISTORY_GEN_ALL_CORE
Lives at home with partner. Uses cane to ambulate, has increasing difficulty due to ankle fx.  Denies hx of tobacco, alcohol, illicit drug use.

## 2021-09-21 NOTE — ED PROVIDER NOTE - OBJECTIVE STATEMENT
72yo female with PMHx pulmonary HTN, COPD, asthma on home 02 prn 3L, cervical disc herniations, neuropathy leading to chronic pain med use, s/p mechanical fall on 9/13/21 sustaining a L bimalleolar fracture presents for increased anxiety/SOB and unable to complete outpatient pre-op clearances secondary to inability to be able to functionally leave home. Pt also reports lacking help at home as she lives in a 3 floor home and significant other is partially blind. Pt was scheduled for ORIF with Dr. Clemons last week but surgery was canceled 2/2 to pt being unable to go for covid testing, see cardiology or pulmonary. Pt discussed with Dr. Clemons today who advised pt come to ED for assistance and evaluation of SOB. Pt reports SOB resolved upon ED arrival as she feels less anxious now

## 2021-09-21 NOTE — H&P ADULT - NSHPPHYSICALEXAM_GEN_ALL_CORE
VITALS:   T(C): 36.9 (09-21-21 @ 15:33), Max: 37.2 (09-21-21 @ 14:30)  HR: 79 (09-21-21 @ 15:33) (79 - 79)  BP: 118/65 (09-21-21 @ 15:33) (103/52 - 118/65)  RR: 18 (09-21-21 @ 15:33) (18 - 18)  SpO2: 98% (09-21-21 @ 15:33) (96% - 98%)    GENERAL: NAD, lying in bed comfortably, on O2 NC 2L  HEAD:  Atraumatic, normocephalic, mild L sided lower face weakness  EYES: EOMI, PERRLA, conjunctiva and sclera clear  ENT: Moist mucous membranes  NECK: Supple, no JVD  HEART: Regular rate and rhythm, no murmurs, rubs, or gallops  LUNGS: Slightly labored respirations when speaking, Clear to auscultation bilaterally, no crackles, wheezing, or rhonchi  ABDOMEN: Soft, slightly tender, large, reducible ventral hernia, BS+  EXTREMITIES: 2+ peripheral pulses bilaterally. No clubbing, cyanosis, or edema  NERVOUS SYSTEM:  A&Ox3, no focal deficits

## 2021-09-21 NOTE — ED ADULT NURSE NOTE - NSFALLRSKASSESASSIST_ED_ALL_ED
#[phone]  Pt states she was to have gotten a call back from the nurse or Dr. Ladi Hampton as to what to do prior to getting second COVID vaccine as she did so poorly with the first vaccine. Please call to advise as soon as possible as vaccine is this week.
MyChart message was sent to patient to stay hydrated, Gatorade helps, Vitamin C and Zinc.
yes

## 2021-09-21 NOTE — ED PROVIDER NOTE - CLINICAL SUMMARY MEDICAL DECISION MAKING FREE TEXT BOX
71 year old female with a pmh of COPD/ ASTHMA uses 02 prn, pulmonary htn gout hld htn and recent left ankle fx biba for admission. Patient was scheduled for repair but was cancelled as she was unable to get cardiac /pulm clearance as she's unable to get around. Endorsed sob at home but states it might have been anxiety and reports she's feeling better right now VS reviewed. LAbs imaging ekg obtained and reviewed. Ortho consulted. PAtient admitted for poor social support and preop testing.

## 2021-09-21 NOTE — H&P ADULT - HISTORY OF PRESENT ILLNESS
70 yo F with PMHx of COPD, Asthma uses home oxygen PRN, chronic back pain, Pulmonary Hypertension, Gout, DLD, HTN, recent L ankle fx presents for ankle fx repair. Pt was reportedly scheduled for repair of ankle with Dr. Clemons but pt was unable to obtain appointments for Pulmonary (Dr. Evans) and Cardiac (Dr. Flood) clearance due to difficulties getting to appointments and therefore, procedure canceled. Was advised by Dr. Clemons to report to hospital to obtain clearance and proceed with procedure.   Pt had initially endorsed shortness of breath but also reports feeling anxious and currently denies any complaints.  In the ED, T 98.9, /52, HR 79, RR 18, SpO2 96% on O2 NC 2L.  70 yo F with PMHx of COPD, Asthma uses home oxygen O2 NC 2L, chronic back pain, cervical radiculopathy, Pulmonary Hypertension, hx of [place] palsy, Gout, CKD 3, DLD, HTN, recent L ankle fx presents for ankle fx repair. Pt was reportedly scheduled for repair of ankle with Dr. Clemons but pt was unable to obtain appointments for Pulmonary (Dr. Evans) and Cardiac (Dr. Flood) clearance due to difficulties getting to appointments, inability to afford ambulette service and therefore, procedure canceled. Was advised by Dr. Clemons to report to hospital to obtain clearance and proceed with procedure.   Pt states she has been having shortness of breath for years, often has difficulties getting around due to shortness of breath, also has chronic pains in neck, back, RUE.  In the ED, T 98.9, /52, HR 79, RR 18, SpO2 96% on O2 NC 2L.

## 2021-09-21 NOTE — H&P ADULT - ASSESSMENT
72 yo F with PMHx of COPD, Asthma uses home oxygen PRN, chronic back pain, Pulmonary Hypertension, Gout, DLD, HTN, recent L ankle fx presents for ankle fx repair.    #L medial Malleolar Fx, Distal Fibular Fx  - Sees Dr. Clemons  - Had initially experienced fall earlier in September, presented to hospital and was found to have L bimalleolar ankle fx s/p closed reduction  - On follow up as outpt, found to have persistent fx and was scheduled for repair  - Was unable to obtain Pulmonary, Cardiac clearance due to physical dysfunction  - Will c/s Pulmonary (sees Dr. Evans) and Cardiology (sees Dr. Flood) for pre op risk stratification  - Ortho follow up regarding repair when clearance obtained     70 yo F with PMHx of COPD, Asthma uses home oxygen PRN, chronic back pain, cervical radiculopathy, Pulmonary Hypertension, Gout, DLD, HTN, CKD 3, recent L ankle fx presents for ankle fx repair.    #L medial Malleolar Fx, Distal Fibular Fx  - Sees Dr. Clemons  - Had initially experienced fall earlier in September, presented to hospital and was found to have L bimalleolar ankle fx s/p closed reduction  - On follow up as outpt, found to have persistent fx and was scheduled for repair  - Was unable to obtain Pulmonary, Cardiac clearance due to physical dysfunction  - Will c/s Pulmonary (sees Dr. Evans) and Cardiology (sees Dr. Flood) for pre op risk stratification  - Ortho follow up regarding repair when clearance obtained  - EKG, CXR performed    #Chronic Back Pain  #Hx of Cervical Radiculopathy  - Sees pain management, Dr. Mayers  - C/w Fentanyl patch PRN, Percocet PRN    #Gout  - C/w Allopurinol    #Pulmonary HTN  - C/w Sildenafil    #HTN  - C/w Atenolol, Nifedipine    #DLD  - C/w Atorvastatin    #COPD, Asthma on home O2 2L  #MARLENI  - C/w Symbicort    #Anxiety  - C/w Wellbutrin  - Valium PRN    #CKD 3  - Stable, sees Dr. Mcarthur    DVT ppx: HSQ  Diet: DASH  Activity: JIA KHANE  Full Code  Dispo: Acute   70 yo F with PMHx of COPD, Asthma uses home oxygen PRN, chronic back pain, cervical radiculopathy, Pulmonary Hypertension, Gout, DLD, HTN, CKD 3, recent L ankle fx presents for ankle fx repair.    #L medial Malleolar Fx, Distal Fibular Fx  - Sees Dr. Clemons  - Had initially experienced fall earlier in September, presented to hospital and was found to have L bimalleolar ankle fx s/p closed reduction  - On follow up as outpt, found to have persistent fx and was scheduled for repair  - Was unable to obtain Pulmonary, Cardiac clearance due to physical dysfunction  - Will c/s Cardiology (sees Dr. Flood) for pre op risk stratification  - Per Ortho, likely will not require Pulmonary clearance  - Ortho will follow, schedule procedure when clearance obtained  - EKG, CXR performed    #Chronic Back Pain  #Hx of Cervical Radiculopathy  - Sees pain management, Dr. Mayers  - C/w Fentanyl patch PRN, Percocet PRN    #Gout  - C/w Allopurinol    #Pulmonary HTN  - C/w Sildenafil    #HTN  - C/w Atenolol, Nifedipine    #DLD  - C/w Atorvastatin    #COPD, Asthma on home O2 2L  #MARLENI  - C/w Symbicort    #Anxiety  - C/w Wellbutrin  - Valium PRN    #CKD 3  - Stable, sees Dr. Mcarthur    DVT ppx: HSQ  Diet: DASH  Activity: JIA KHANE  Full Code  Dispo: Acute

## 2021-09-21 NOTE — ED PROVIDER NOTE - ATTENDING CONTRIBUTION TO CARE
71 year old female with a pmh of COPD/ ASTHMA uses 02 prn, pulmonary htn gout hld htn and recent left ankle fx biba for admission. Patient was scheduled for repair but was cancelled as she was unable to get cardiac /pulm clearance as she's unable to get around. Endorsed sob at home but states it might have been anxiety and reports she's feeling better right now. Does endorse decreased urination. No fever chills cough cp n/v abdominal pain.  on exam  CONSTITUTIONAL: WA / WN / NAD  HEAD: NCAT  EYES: PERRL; EOMI;   ENT: Normal pharynx; mucous membranes pink/moist, no erythema.  NECK: Supple; no meningeal signs  CARD: RRR; nl S1/S2; no M/R/G. Pulses equal bilaterally.  RESP: Respiratory rate and effort are normal; breath sounds clear and equal bilaterally.  ABD: Soft, NT ND + large reducible ventral hernia  MSK/EXT: + left lower extremity in cast.   SKIN: Warm and dry;   NEURO: AAOx3  PSYCH: Memory Intact, Normal Affect

## 2021-09-21 NOTE — ED ADULT NURSE NOTE - NSIMPLEMENTINTERV_GEN_ALL_ED
Implemented All Fall with Harm Risk Interventions:  [place] to call system. Call bell, personal items and telephone within reach. Instruct patient to call for assistance. Room bathroom lighting operational. Non-slip footwear when patient is off stretcher. Physically safe environment: no spills, clutter or unnecessary equipment. Stretcher in lowest position, wheels locked, appropriate side rails in place. Provide visual cue, wrist band, yellow gown, etc. Monitor gait and stability. Monitor for mental status changes and reorient to person, place, and time. Review medications for side effects contributing to fall risk. Reinforce activity limits and safety measures with patient and family. Provide visual clues: red socks.

## 2021-09-21 NOTE — ED PROVIDER NOTE - SECONDARY DIAGNOSIS.
Insufficient social support Encounter for pre-operative respiratory clearance Pulmonary hypertension, pre-operative cardiovascular examination

## 2021-09-22 LAB
ALBUMIN SERPL ELPH-MCNC: 3.9 G/DL — SIGNIFICANT CHANGE UP (ref 3.5–5.2)
ALP SERPL-CCNC: 112 U/L — SIGNIFICANT CHANGE UP (ref 30–115)
ALT FLD-CCNC: 9 U/L — SIGNIFICANT CHANGE UP (ref 0–41)
ANION GAP SERPL CALC-SCNC: 10 MMOL/L — SIGNIFICANT CHANGE UP (ref 7–14)
APPEARANCE UR: CLEAR — SIGNIFICANT CHANGE UP
AST SERPL-CCNC: 16 U/L — SIGNIFICANT CHANGE UP (ref 0–41)
BASOPHILS # BLD AUTO: 0.05 K/UL — SIGNIFICANT CHANGE UP (ref 0–0.2)
BASOPHILS NFR BLD AUTO: 1 % — SIGNIFICANT CHANGE UP (ref 0–1)
BILIRUB SERPL-MCNC: 0.4 MG/DL — SIGNIFICANT CHANGE UP (ref 0.2–1.2)
BILIRUB UR-MCNC: NEGATIVE — SIGNIFICANT CHANGE UP
BUN SERPL-MCNC: 14 MG/DL — SIGNIFICANT CHANGE UP (ref 10–20)
CALCIUM SERPL-MCNC: 9.3 MG/DL — SIGNIFICANT CHANGE UP (ref 8.5–10.1)
CHLORIDE SERPL-SCNC: 109 MMOL/L — SIGNIFICANT CHANGE UP (ref 98–110)
CO2 SERPL-SCNC: 24 MMOL/L — SIGNIFICANT CHANGE UP (ref 17–32)
COLOR SPEC: YELLOW — SIGNIFICANT CHANGE UP
CREAT SERPL-MCNC: 0.9 MG/DL — SIGNIFICANT CHANGE UP (ref 0.7–1.5)
DIFF PNL FLD: NEGATIVE — SIGNIFICANT CHANGE UP
EOSINOPHIL # BLD AUTO: 0.38 K/UL — SIGNIFICANT CHANGE UP (ref 0–0.7)
EOSINOPHIL NFR BLD AUTO: 7.5 % — SIGNIFICANT CHANGE UP (ref 0–8)
GLUCOSE SERPL-MCNC: 87 MG/DL — SIGNIFICANT CHANGE UP (ref 70–99)
GLUCOSE UR QL: NEGATIVE — SIGNIFICANT CHANGE UP
HCT VFR BLD CALC: 32.6 % — LOW (ref 37–47)
HCV AB S/CO SERPL IA: 0.04 COI — SIGNIFICANT CHANGE UP
HCV AB SERPL-IMP: SIGNIFICANT CHANGE UP
HGB BLD-MCNC: 10.6 G/DL — LOW (ref 12–16)
IMM GRANULOCYTES NFR BLD AUTO: 0.2 % — SIGNIFICANT CHANGE UP (ref 0.1–0.3)
KETONES UR-MCNC: NEGATIVE — SIGNIFICANT CHANGE UP
LEUKOCYTE ESTERASE UR-ACNC: NEGATIVE — SIGNIFICANT CHANGE UP
LYMPHOCYTES # BLD AUTO: 1.99 K/UL — SIGNIFICANT CHANGE UP (ref 1.2–3.4)
LYMPHOCYTES # BLD AUTO: 39.2 % — SIGNIFICANT CHANGE UP (ref 20.5–51.1)
MAGNESIUM SERPL-MCNC: 1.7 MG/DL — LOW (ref 1.8–2.4)
MCHC RBC-ENTMCNC: 32.5 G/DL — SIGNIFICANT CHANGE UP (ref 32–37)
MCHC RBC-ENTMCNC: 35.7 PG — HIGH (ref 27–31)
MCV RBC AUTO: 109.8 FL — HIGH (ref 81–99)
MONOCYTES # BLD AUTO: 0.59 K/UL — SIGNIFICANT CHANGE UP (ref 0.1–0.6)
MONOCYTES NFR BLD AUTO: 11.6 % — HIGH (ref 1.7–9.3)
NEUTROPHILS # BLD AUTO: 2.06 K/UL — SIGNIFICANT CHANGE UP (ref 1.4–6.5)
NEUTROPHILS NFR BLD AUTO: 40.5 % — LOW (ref 42.2–75.2)
NITRITE UR-MCNC: NEGATIVE — SIGNIFICANT CHANGE UP
NRBC # BLD: 0 /100 WBCS — SIGNIFICANT CHANGE UP (ref 0–0)
PH UR: 6 — SIGNIFICANT CHANGE UP (ref 5–8)
PLATELET # BLD AUTO: 241 K/UL — SIGNIFICANT CHANGE UP (ref 130–400)
POTASSIUM SERPL-MCNC: 4.6 MMOL/L — SIGNIFICANT CHANGE UP (ref 3.5–5)
POTASSIUM SERPL-SCNC: 4.6 MMOL/L — SIGNIFICANT CHANGE UP (ref 3.5–5)
PROT SERPL-MCNC: 5.8 G/DL — LOW (ref 6–8)
PROT UR-MCNC: SIGNIFICANT CHANGE UP
RBC # BLD: 2.97 M/UL — LOW (ref 4.2–5.4)
RBC # FLD: 14.2 % — SIGNIFICANT CHANGE UP (ref 11.5–14.5)
SODIUM SERPL-SCNC: 143 MMOL/L — SIGNIFICANT CHANGE UP (ref 135–146)
SP GR SPEC: 1.02 — SIGNIFICANT CHANGE UP (ref 1.01–1.03)
UROBILINOGEN FLD QL: SIGNIFICANT CHANGE UP
WBC # BLD: 5.08 K/UL — SIGNIFICANT CHANGE UP (ref 4.8–10.8)
WBC # FLD AUTO: 5.08 K/UL — SIGNIFICANT CHANGE UP (ref 4.8–10.8)

## 2021-09-22 PROCEDURE — 71045 X-RAY EXAM CHEST 1 VIEW: CPT | Mod: 26

## 2021-09-22 PROCEDURE — 93306 TTE W/DOPPLER COMPLETE: CPT | Mod: 26

## 2021-09-22 PROCEDURE — 73610 X-RAY EXAM OF ANKLE: CPT | Mod: 26,LT

## 2021-09-22 PROCEDURE — 99223 1ST HOSP IP/OBS HIGH 75: CPT

## 2021-09-22 RX ORDER — VALACYCLOVIR 500 MG/1
1 TABLET, FILM COATED ORAL
Qty: 0 | Refills: 0 | DISCHARGE

## 2021-09-22 RX ORDER — HYOSCYAMINE SULFATE 0.13 MG
0 TABLET ORAL
Qty: 0 | Refills: 0 | DISCHARGE

## 2021-09-22 RX ORDER — FLUOXETINE HCL 10 MG
1 CAPSULE ORAL
Qty: 0 | Refills: 0 | DISCHARGE

## 2021-09-22 RX ORDER — LIDOCAINE 4 G/100G
1 CREAM TOPICAL DAILY
Refills: 0 | Status: DISCONTINUED | OUTPATIENT
Start: 2021-09-22 | End: 2021-09-23

## 2021-09-22 RX ORDER — FOLIC ACID 0.8 MG
1 TABLET ORAL
Qty: 0 | Refills: 0 | DISCHARGE

## 2021-09-22 RX ORDER — MAGNESIUM SULFATE 500 MG/ML
2 VIAL (ML) INJECTION ONCE
Refills: 0 | Status: COMPLETED | OUTPATIENT
Start: 2021-09-22 | End: 2021-09-22

## 2021-09-22 RX ORDER — HEPARIN SODIUM 5000 [USP'U]/ML
5000 INJECTION INTRAVENOUS; SUBCUTANEOUS ONCE
Refills: 0 | Status: COMPLETED | OUTPATIENT
Start: 2021-09-22 | End: 2021-09-22

## 2021-09-22 RX ORDER — CHOLECALCIFEROL (VITAMIN D3) 125 MCG
400 CAPSULE ORAL DAILY
Refills: 0 | Status: DISCONTINUED | OUTPATIENT
Start: 2021-09-22 | End: 2021-09-23

## 2021-09-22 RX ORDER — METHYLPREDNISOLONE 4 MG
250 TABLET ORAL DAILY
Refills: 0 | Status: DISCONTINUED | OUTPATIENT
Start: 2021-09-22 | End: 2021-09-22

## 2021-09-22 RX ORDER — INFLUENZA VIRUS VACCINE 15; 15; 15; 15 UG/.5ML; UG/.5ML; UG/.5ML; UG/.5ML
0.5 SUSPENSION INTRAMUSCULAR ONCE
Refills: 0 | Status: DISCONTINUED | OUTPATIENT
Start: 2021-09-22 | End: 2021-10-14

## 2021-09-22 RX ORDER — FLUOXETINE HCL 10 MG
20 CAPSULE ORAL
Refills: 0 | Status: DISCONTINUED | OUTPATIENT
Start: 2021-09-22 | End: 2021-09-23

## 2021-09-22 RX ADMIN — OXYCODONE AND ACETAMINOPHEN 2 TABLET(S): 5; 325 TABLET ORAL at 11:20

## 2021-09-22 RX ADMIN — Medication 20 MILLIGRAM(S): at 13:22

## 2021-09-22 RX ADMIN — Medication 1 SPRAY(S): at 17:16

## 2021-09-22 RX ADMIN — Medication 100 MILLIGRAM(S): at 21:08

## 2021-09-22 RX ADMIN — VALACYCLOVIR 500 MILLIGRAM(S): 500 TABLET, FILM COATED ORAL at 21:10

## 2021-09-22 RX ADMIN — Medication 250 MILLIGRAM(S): at 08:53

## 2021-09-22 RX ADMIN — Medication 20 MILLIGRAM(S): at 05:19

## 2021-09-22 RX ADMIN — BUPROPION HYDROCHLORIDE 150 MILLIGRAM(S): 150 TABLET, EXTENDED RELEASE ORAL at 11:21

## 2021-09-22 RX ADMIN — SPIRONOLACTONE 50 MILLIGRAM(S): 25 TABLET, FILM COATED ORAL at 05:20

## 2021-09-22 RX ADMIN — ATORVASTATIN CALCIUM 80 MILLIGRAM(S): 80 TABLET, FILM COATED ORAL at 21:08

## 2021-09-22 RX ADMIN — HEPARIN SODIUM 5000 UNIT(S): 5000 INJECTION INTRAVENOUS; SUBCUTANEOUS at 05:21

## 2021-09-22 RX ADMIN — OXYCODONE AND ACETAMINOPHEN 2 TABLET(S): 5; 325 TABLET ORAL at 13:21

## 2021-09-22 RX ADMIN — HEPARIN SODIUM 5000 UNIT(S): 5000 INJECTION INTRAVENOUS; SUBCUTANEOUS at 17:09

## 2021-09-22 RX ADMIN — Medication 20 MILLIGRAM(S): at 05:21

## 2021-09-22 RX ADMIN — BUDESONIDE AND FORMOTEROL FUMARATE DIHYDRATE 2 PUFF(S): 160; 4.5 AEROSOL RESPIRATORY (INHALATION) at 21:02

## 2021-09-22 RX ADMIN — Medication 50 MILLIGRAM(S): at 11:22

## 2021-09-22 RX ADMIN — Medication 400 UNIT(S): at 08:55

## 2021-09-22 RX ADMIN — Medication 20 MILLIGRAM(S): at 21:08

## 2021-09-22 RX ADMIN — OXYCODONE AND ACETAMINOPHEN 2 TABLET(S): 5; 325 TABLET ORAL at 17:05

## 2021-09-22 RX ADMIN — Medication 0.6 MILLIGRAM(S): at 11:21

## 2021-09-22 RX ADMIN — Medication 0.12 MILLIGRAM(S): at 11:22

## 2021-09-22 RX ADMIN — Medication 10 MILLIGRAM(S): at 17:15

## 2021-09-22 RX ADMIN — LIDOCAINE 1 PATCH: 4 CREAM TOPICAL at 21:02

## 2021-09-22 RX ADMIN — LIDOCAINE 1 PATCH: 4 CREAM TOPICAL at 08:51

## 2021-09-22 RX ADMIN — Medication 300 MILLIGRAM(S): at 21:08

## 2021-09-22 RX ADMIN — PRIMIDONE 50 MILLIGRAM(S): 250 TABLET ORAL at 21:08

## 2021-09-22 RX ADMIN — ATENOLOL 50 MILLIGRAM(S): 25 TABLET ORAL at 05:20

## 2021-09-22 RX ADMIN — Medication 1 SPRAY(S): at 05:20

## 2021-09-22 RX ADMIN — Medication 20 MILLIGRAM(S): at 08:54

## 2021-09-22 RX ADMIN — Medication 50 GRAM(S): at 18:00

## 2021-09-22 RX ADMIN — Medication 1 MILLIGRAM(S): at 11:21

## 2021-09-22 RX ADMIN — BUDESONIDE AND FORMOTEROL FUMARATE DIHYDRATE 2 PUFF(S): 160; 4.5 AEROSOL RESPIRATORY (INHALATION) at 17:11

## 2021-09-22 RX ADMIN — CHLORHEXIDINE GLUCONATE 1 APPLICATION(S): 213 SOLUTION TOPICAL at 05:20

## 2021-09-22 RX ADMIN — Medication 20 MILLIGRAM(S): at 17:08

## 2021-09-22 RX ADMIN — Medication 60 MILLIGRAM(S): at 05:19

## 2021-09-22 RX ADMIN — VALACYCLOVIR 500 MILLIGRAM(S): 500 TABLET, FILM COATED ORAL at 17:13

## 2021-09-22 NOTE — CONSULT NOTE ADULT - ASSESSMENT
ORTHOPEDIC SURGERY INITIAL CONSULTATION      70yo Female w/ left supination-adduction bimalleolar ankle fracture s/p closed reduction in AO splint on 9/3/21. transitioned to [facility] in orthopaedic clinic.   Planned for outpatient surgery, booked for 9/23/21. However, with past medical history of pulm HTN, gout, HLD - and given decreased social support, she was unable to make it to her medical clearance appointments.   Denies head trauma or loss of consciousness.  Denies other injuries.    PMHx  HTN  Gout  Pulm HTN  HLD    Surg Hx:  R TKA with Dr. Henrry Sharpe Hx:  Lives at home with significant other    Allergies  No Known Allergies      Exam:  General: In no acute distress, awake/alert and oriented    LLE  cast removed  Skin intact, minimal swelling  TTP over the lateral and medial mal  Nontender calcaneous, base of 5th MT, midfoot, lisfranc joints, or forefoot  Firing FHL, EHL, tib ant, and gastroc-soleus  Sensation intact to light touch sural/saphenous/SP/DP/tibial  Toes warm and well perfused, brisk cap refill, 2+ DP/PT pulses    Complete orthopaedic secondary exam unremarkable for any other injuries    Imaging:  Multiple views of Left ankle, foot and tib-fib demonstrate   displaced bimalleolar ankle fracture      Procedure: placed in a new AO splint     Impression: 71F presenting with left elsie ankle fracture -- re-splinted today.   Patient booked for L ankle open reduction internal fixation Thursday - pending medical clearance / optimization       - Medicine team consult, needs medical clearance  - Cardiology consult per medicine eval  Private Cardiologist: Dr. Katz, Fellow [facility] ()  - Preop labs: need CBC, BMP, PT/PTT, active type and screen   - Chest XR  - EKG  - Nonweight bearing  - Pain control   ORTHOPEDIC SURGERY INITIAL CONSULTATION      70yo Female w/ left supination-adduction bimalleolar ankle fracture s/p closed reduction in AO splint on 9/3/21. transitioned to [facility] in orthopaedic clinic.   Planned for outpatient surgery, booked for 9/23/21. However, with past medical history of pulm HTN, gout, HLD - and given decreased social support, she was unable to make it to her medical clearance appointments.   Denies head trauma or loss of consciousness.  Denies other injuries.    PMHx  HTN  Gout  Pulm HTN  HLD    Surg Hx:  R TKA with Dr. Henrry Sharpe Hx:  Lives at home with significant other    Allergies  No Known Allergies      Exam:  General: In no acute distress, awake/alert and oriented    LLE  cast removed  Skin intact, minimal swelling  TTP over the lateral and medial mal  Nontender calcaneous, base of 5th MT, midfoot, lisfranc joints, or forefoot  Firing FHL, EHL, tib ant, and gastroc-soleus  Sensation intact to light touch sural/saphenous/SP/DP/tibial  Toes warm and well perfused, brisk cap refill, 2+ DP/PT pulses    Complete orthopaedic secondary exam unremarkable for any other injuries    Imaging:  Multiple views of Left ankle, foot and tib-fib demonstrate   displaced bimalleolar ankle fracture      Procedure: placed in a new AO splint     Impression: 71F presenting with left elsie ankle fracture -- re-splinted today.   Patient booked for L ankle open reduction internal fixation Thursday - pending medical clearance / optimization       - Medicine team consult, needs medical clearance  - Cardiology consult per medicine eval  Private Cardiologist: Dr. Flood   - Preop labs: need CBC, BMP, PT/PTT, active type and screen   - Chest XR  - EKG  - Nonweight bearing  - Pain control

## 2021-09-22 NOTE — PRE PROCEDURE NOTE - PRE PROCEDURE EVALUATION
ORTHOPEDIC SURGERY PRE OP NOTE    Diagnosis: left bimalleolar ankle fracture      Planned Procedure: left ankle open reduction internal fixation     Consent: TO BE OBTAINED BY ATTENDING                   Risks/benefits/alternatives were discussed with the patient/family and they wish to proceed with surgery.       ANTICIPATED DATE OF PROCEDURE : 2021  SCHEDULED CASE OR ADD-ON CASE: scheduled      Consent: to be obtained in pre-op by attending; patient is able to consent for herself at this time     Clearances:   [X] Medicine:   -MEDICAL RISK ASSESSMENT: she is likely a high risk for anesthesia and operative procedures when taking into account her overall comorbidities, declining functional capacity, and age.          -ACS surgical risk calculator estimates a  21.3% of any complication and 17.8% risk of a serious complication. Both are above the estimated          respective averages of 6.4% and 4.9%.        -This only serves as a risk estimation, the decision to proceed with a procedure is determined by the operating provider and the patient.     [x] Cardiology:  on 21 outpatient; note in [employer] application: moderate risk surgery  [X] Pulmonology:  on 9/15/21 outpatient; note in [employer] application: intermediate risk surgery   [ ]Anesthesia: asking for echo to be done prior to OR     T(C): 35.3 (21 @ 01:02), Max: 37.2 (21 @ 14:30)  HR: 70 (21 @ 01:02) (70 - 79)  BP: 122/58 (21 @ 01:02) (103/52 - 122/58)  RR: 18 (21 @ 01:02) (18 - 18)  SpO2: 98% (21 @ 01:02) (96% - 98%)    Labs:                        10.6   5.08  )-----------( 241      ( 22 Sep 2021 08:00 )             32.6         143  |  109  |  14  ----------------------------<  87  4.6   |  24  |  0.9    Ca    9.3      22 Sep 2021 08:00  Mg     1.7         TPro  5.8<L>  /  Alb  3.9  /  TBili  0.4  /  DBili  x   /  AST  16  /  ALT  9   /  AlkPhos  112      PT/INR - ( 21 Sep 2021 15:41 )   PT: 13.90 sec;   INR: 1.21 ratio         PTT - ( 21 Sep 2021 15:41 )  PTT:34.1 sec  Type and Screen:  [x] 1st:   [ ] 2nd: pending collection     COVID-19 PCR: NotDetec (21 Sep 2021 15:27)    [ ]Pregnancy test ( if female of childbearing age) : n/a  [x]EK/21  [ ]CXR: pending       DIET: NPO after midnight  IVF: per primary team      ANTICOAGULATION STATUS ( include name of anticoagulant) :  [***] CONTINUE (**name of anticoagulant)   [X] DISCONTINUE HSQ morning of Surgery ; resume post op       A/P: Patient is a 71y y/o Female Pending left ankle ORIF tomorrow    [ ] -NPO and IVF @ midnight  [ ]pain control/analgesia prn per primary team   [ ]Incentive Spirometry   [ ]F/U Clearance- Anesthesia   [ ]F/U Pending Labs  [ ]Notify Ortho with any questions- spectra [phone]    [ ]DISCUSSED WITH PRIMARY TEAM MEMBER (name of team member): [phone]   [ ]Date and Time DISCUSSED WITH PRIMARY TEAM MEMBER:  @ [phone]

## 2021-09-22 NOTE — CHART NOTE - NSCHARTNOTEFT_GEN_A_CORE
Patient has Pulmonology Clearance note by  on 9/15/2021 which can be found in [employer] application- intermediate risk surgery.   Patient has Cardiology Clearance note by  on 9/14/2021 which can also be found in [employer] application- Moderate risk surgery.

## 2021-09-22 NOTE — PATIENT PROFILE ADULT - TRANSPORTATION - CHOOSE ALL APPLY
car, none/public transportation, does not know how to access/public transportation, none available/rides, unreliable from others

## 2021-09-22 NOTE — PROGRESS NOTE ADULT - SUBJECTIVE AND OBJECTIVE BOX
SUBJECTIVE  Patient is a 71y old Female who presents with a chief complaint of left ankle fx (21 Sep 2021 18:26)  Currently admitted to medicine with the primary diagnosis of Fracture, ankle    Today is hospital day 1d, and this morning she is examined at bedside,  and reports no overnight events.         OBJECTIVE  PAST MEDICAL & SURGICAL HISTORY  History of neck pain    Spinal stenosis of lumbar region with radiculopathy    Anxiety    Depression    Osteoarthritis    H/O osteoporosis    History of pulmonary hypertension    H/O pulmonary hypertension    COPD (chronic obstructive pulmonary disease)    Hyperlipidemia    H/O total knee replacement, right    Failed spinal cord stimulator      ALLERGIES:  adhesives (Pruritus; Rash)  No Known Drug Allergies    MEDICATIONS:  STANDING MEDICATIONS  allopurinol 300 milliGRAM(s) Oral at bedtime  ATENolol  Tablet 50 milliGRAM(s) Oral daily  atorvastatin 80 milliGRAM(s) Oral at bedtime  budesonide 160 MICROgram(s)/formoterol 4.5 MICROgram(s) Inhaler 2 Puff(s) Inhalation two times a day  buPROPion XL (24-Hour) . 150 milliGRAM(s) Oral daily  chlorhexidine 4% Liquid 1 Application(s) Topical <User Schedule>  cholecalciferol 400 Unit(s) Oral daily  colchicine 0.6 milliGRAM(s) Oral daily  FLUoxetine 20 milliGRAM(s) Oral two times a day  fluticasone propionate 50 MICROgram(s)/spray Nasal Spray 1 Spray(s) Both Nostrils two times a day  folic acid 1 milliGRAM(s) Oral daily  furosemide    Tablet 20 milliGRAM(s) Oral daily  heparin   Injectable 5000 Unit(s) SubCutaneous every 8 hours  hyoscyamine SL 0.125 milliGRAM(s) SubLingual daily  influenza   Vaccine 0.5 milliLiter(s) IntraMuscular once  lidocaine   4% Patch 1 Patch Transdermal daily  magnesium gluconate 250 milliGRAM(s) Oral daily  NIFEdipine XL 60 milliGRAM(s) Oral daily  predniSONE   Tablet 10 milliGRAM(s) Oral two times a day  primidone 50 milliGRAM(s) Oral at bedtime  promethazine 50 milliGRAM(s) Oral daily  sildenafil (REVATIO) 20 milliGRAM(s) Oral three times a day  spironolactone 50 milliGRAM(s) Oral daily  traZODone 100 milliGRAM(s) Oral at bedtime  valACYclovir 500 milliGRAM(s) Oral at bedtime    PRN MEDICATIONS  diazepam    Tablet 10 milliGRAM(s) Oral three times a day PRN  fentaNYL   Patch  25 MICROgram(s)/Hr 1 Patch Transdermal every 72 hours PRN  oxycodone    5 mG/acetaminophen 325 mG 2 Tablet(s) Oral every 6 hours PRN      VITAL SIGNS: Last 24 Hours  T(C): 35.3 (22 Sep 2021 01:02), Max: 37.2 (21 Sep 2021 14:30)  T(F): 95.6 (22 Sep 2021 01:02), Max: 98.9 (21 Sep 2021 14:30)  HR: 70 (22 Sep 2021 01:02) (70 - 79)  BP: 122/58 (22 Sep 2021 01:02) (103/52 - 122/58)  BP(mean): --  RR: 18 (22 Sep 2021 01:02) (18 - 18)  SpO2: 98% (22 Sep 2021 01:02) (96% - 98%)    LABS:                        10.8   6.14  )-----------( 267      ( 21 Sep 2021 15:41 )             32.6     09-21    143  |  109  |  13  ----------------------------<  89  4.0   |  21  |  1.1    Ca    9.3      21 Sep 2021 15:41    TPro  6.2  /  Alb  4.0  /  TBili  0.5  /  DBili  x   /  AST  17  /  ALT  10  /  AlkPhos  117<H>  09-21    PT/INR - ( 21 Sep 2021 15:41 )   PT: 13.90 sec;   INR: 1.21 ratio         PTT - ( 21 Sep 2021 15:41 )  PTT:34.1 sec      Troponin T, Serum: <0.01 ng/mL (09-21-21 @ 15:41)      CARDIAC MARKERS ( 21 Sep 2021 15:41 )  x     / <0.01 ng/mL / x     / x     / x          RADIOLOGY:      PHYSICAL EXAM:    GENERAL: NAD, well-developed, AAOx3  HEENT:  Atraumatic, Normocephalic. EOMI, PERRLA, conjunctiva and sclera clear, No JVD  PULMONARY: Clear to auscultation bilaterally; No wheeze  CARDIOVASCULAR: Regular rate and rhythm; No murmurs, rubs, or gallops  GASTROINTESTINAL: Soft, Nontender, Nondistended; Bowel sounds present  MUSCULOSKELETAL:  2+ Peripheral Pulses, No clubbing, cyanosis, or edema  NEUROLOGY: non-focal  SKIN: No rashes or lesions      ADMISSION SUMMARY  70 yo F with PMHx of COPD, Asthma uses home oxygen PRN, chronic back pain, cervical radiculopathy, Pulmonary Hypertension, Gout, DLD, HTN, CKD 3, recent L ankle fx presents for ankle fx repair.    #L medial Malleolar Fx, Distal Fibular Fx  - Sees Dr. Clemons  - Had initially experienced fall earlier in September, presented to hospital and was found to have L bimalleolar ankle fx s/p closed reduction  - On follow up as outpt, found to have persistent fx and was scheduled for repair  - Was unable to obtain Pulmonary, Cardiac clearance due to physical dysfunction  - Will c/s Cardiology (sees Dr. Flood) for pre op risk stratification  - Per Ortho, likely will not require Pulmonary clearance  - Ortho will follow, schedule procedure when clearance obtained  - EKG, CXR performed    #Chronic Back Pain  #Hx of Cervical Radiculopathy  - Sees pain management, Dr. Mayers  - C/w Fentanyl patch PRN, Percocet PRN    #Gout  - C/w Allopurinol    #Pulmonary HTN  - C/w Sildenafil    #HTN  - C/w Atenolol, Nifedipine    #DLD  - C/w Atorvastatin    #COPD, Asthma on home O2 2L  #MARLENI  - C/w Symbicort    #Anxiety  - C/w Wellbutrin  - Valium PRN    #CKD 3  - Stable, sees Dr. Mcarthur    DVT ppx: HSQ  Diet: DASH  Activity: AAT, NWB LLE  Full Code  Dispo: Acute

## 2021-09-23 LAB
ALBUMIN SERPL ELPH-MCNC: 3.9 G/DL — SIGNIFICANT CHANGE UP (ref 3.5–5.2)
ALP SERPL-CCNC: 135 U/L — HIGH (ref 30–115)
ALT FLD-CCNC: 11 U/L — SIGNIFICANT CHANGE UP (ref 0–41)
ANION GAP SERPL CALC-SCNC: 10 MMOL/L — SIGNIFICANT CHANGE UP (ref 7–14)
AST SERPL-CCNC: 19 U/L — SIGNIFICANT CHANGE UP (ref 0–41)
BASOPHILS # BLD AUTO: 0.03 K/UL — SIGNIFICANT CHANGE UP (ref 0–0.2)
BASOPHILS NFR BLD AUTO: 0.7 % — SIGNIFICANT CHANGE UP (ref 0–1)
BILIRUB SERPL-MCNC: 0.3 MG/DL — SIGNIFICANT CHANGE UP (ref 0.2–1.2)
BUN SERPL-MCNC: 22 MG/DL — HIGH (ref 10–20)
CALCIUM SERPL-MCNC: 9.1 MG/DL — SIGNIFICANT CHANGE UP (ref 8.5–10.1)
CHLORIDE SERPL-SCNC: 106 MMOL/L — SIGNIFICANT CHANGE UP (ref 98–110)
CO2 SERPL-SCNC: 22 MMOL/L — SIGNIFICANT CHANGE UP (ref 17–32)
COVID-19 SPIKE DOMAIN AB INTERP: POSITIVE
COVID-19 SPIKE DOMAIN ANTIBODY RESULT: 30.5 U/ML — HIGH
CREAT SERPL-MCNC: 1.4 MG/DL — SIGNIFICANT CHANGE UP (ref 0.7–1.5)
EOSINOPHIL # BLD AUTO: 0.02 K/UL — SIGNIFICANT CHANGE UP (ref 0–0.7)
EOSINOPHIL NFR BLD AUTO: 0.4 % — SIGNIFICANT CHANGE UP (ref 0–8)
GLUCOSE SERPL-MCNC: 144 MG/DL — HIGH (ref 70–99)
HCT VFR BLD CALC: 32.4 % — LOW (ref 37–47)
HGB BLD-MCNC: 10.7 G/DL — LOW (ref 12–16)
IMM GRANULOCYTES NFR BLD AUTO: 0.9 % — HIGH (ref 0.1–0.3)
LYMPHOCYTES # BLD AUTO: 0.68 K/UL — LOW (ref 1.2–3.4)
LYMPHOCYTES # BLD AUTO: 14.8 % — LOW (ref 20.5–51.1)
MAGNESIUM SERPL-MCNC: 1.9 MG/DL — SIGNIFICANT CHANGE UP (ref 1.8–2.4)
MCHC RBC-ENTMCNC: 33 G/DL — SIGNIFICANT CHANGE UP (ref 32–37)
MCHC RBC-ENTMCNC: 35.7 PG — HIGH (ref 27–31)
MCV RBC AUTO: 108 FL — HIGH (ref 81–99)
MONOCYTES # BLD AUTO: 0.25 K/UL — SIGNIFICANT CHANGE UP (ref 0.1–0.6)
MONOCYTES NFR BLD AUTO: 5.4 % — SIGNIFICANT CHANGE UP (ref 1.7–9.3)
NEUTROPHILS # BLD AUTO: 3.58 K/UL — SIGNIFICANT CHANGE UP (ref 1.4–6.5)
NEUTROPHILS NFR BLD AUTO: 77.8 % — HIGH (ref 42.2–75.2)
NRBC # BLD: 0 /100 WBCS — SIGNIFICANT CHANGE UP (ref 0–0)
PLATELET # BLD AUTO: 238 K/UL — SIGNIFICANT CHANGE UP (ref 130–400)
POTASSIUM SERPL-MCNC: 5 MMOL/L — SIGNIFICANT CHANGE UP (ref 3.5–5)
POTASSIUM SERPL-SCNC: 5 MMOL/L — SIGNIFICANT CHANGE UP (ref 3.5–5)
PROT SERPL-MCNC: 5.8 G/DL — LOW (ref 6–8)
RBC # BLD: 3 M/UL — LOW (ref 4.2–5.4)
RBC # FLD: 13.1 % — SIGNIFICANT CHANGE UP (ref 11.5–14.5)
SARS-COV-2 IGG+IGM SERPL QL IA: 30.5 U/ML — HIGH
SARS-COV-2 IGG+IGM SERPL QL IA: POSITIVE
SODIUM SERPL-SCNC: 138 MMOL/L — SIGNIFICANT CHANGE UP (ref 135–146)
WBC # BLD: 4.6 K/UL — LOW (ref 4.8–10.8)
WBC # FLD AUTO: 4.6 K/UL — LOW (ref 4.8–10.8)

## 2021-09-23 PROCEDURE — 99232 SBSQ HOSP IP/OBS MODERATE 35: CPT

## 2021-09-23 RX ORDER — SPIRONOLACTONE 25 MG/1
50 TABLET, FILM COATED ORAL DAILY
Refills: 0 | Status: DISCONTINUED | OUTPATIENT
Start: 2021-09-23 | End: 2021-10-14

## 2021-09-23 RX ORDER — FUROSEMIDE 40 MG
20 TABLET ORAL DAILY
Refills: 0 | Status: DISCONTINUED | OUTPATIENT
Start: 2021-09-23 | End: 2021-10-14

## 2021-09-23 RX ORDER — BUDESONIDE AND FORMOTEROL FUMARATE DIHYDRATE 160; 4.5 UG/1; UG/1
2 AEROSOL RESPIRATORY (INHALATION)
Refills: 0 | Status: DISCONTINUED | OUTPATIENT
Start: 2021-09-23 | End: 2021-10-14

## 2021-09-23 RX ORDER — HEPARIN SODIUM 5000 [USP'U]/ML
5000 INJECTION INTRAVENOUS; SUBCUTANEOUS EVERY 8 HOURS
Refills: 0 | Status: DISCONTINUED | OUTPATIENT
Start: 2021-09-23 | End: 2021-10-04

## 2021-09-23 RX ORDER — VALACYCLOVIR 500 MG/1
500 TABLET, FILM COATED ORAL AT BEDTIME
Refills: 0 | Status: DISCONTINUED | OUTPATIENT
Start: 2021-09-23 | End: 2021-10-14

## 2021-09-23 RX ORDER — CEFAZOLIN SODIUM 1 G
2000 VIAL (EA) INJECTION EVERY 8 HOURS
Refills: 0 | Status: COMPLETED | OUTPATIENT
Start: 2021-09-23 | End: 2021-09-24

## 2021-09-23 RX ORDER — SODIUM CHLORIDE 9 MG/ML
500 INJECTION, SOLUTION INTRAVENOUS
Refills: 0 | Status: DISCONTINUED | OUTPATIENT
Start: 2021-09-23 | End: 2021-09-23

## 2021-09-23 RX ORDER — MAGNESIUM HYDROXIDE 400 MG/1
30 TABLET, CHEWABLE ORAL DAILY
Refills: 0 | Status: DISCONTINUED | OUTPATIENT
Start: 2021-09-23 | End: 2021-10-14

## 2021-09-23 RX ORDER — FLUOXETINE HCL 10 MG
20 CAPSULE ORAL
Refills: 0 | Status: DISCONTINUED | OUTPATIENT
Start: 2021-09-23 | End: 2021-10-14

## 2021-09-23 RX ORDER — ALLOPURINOL 300 MG
300 TABLET ORAL AT BEDTIME
Refills: 0 | Status: DISCONTINUED | OUTPATIENT
Start: 2021-09-23 | End: 2021-10-14

## 2021-09-23 RX ORDER — FENTANYL CITRATE 50 UG/ML
1 INJECTION INTRAVENOUS
Refills: 0 | Status: DISCONTINUED | OUTPATIENT
Start: 2021-09-23 | End: 2021-09-28

## 2021-09-23 RX ORDER — FOLIC ACID 0.8 MG
1 TABLET ORAL DAILY
Refills: 0 | Status: DISCONTINUED | OUTPATIENT
Start: 2021-09-23 | End: 2021-10-14

## 2021-09-23 RX ORDER — ATENOLOL 25 MG/1
50 TABLET ORAL DAILY
Refills: 0 | Status: DISCONTINUED | OUTPATIENT
Start: 2021-09-23 | End: 2021-10-14

## 2021-09-23 RX ORDER — HYOSCYAMINE SULFATE 0.13 MG
0.12 TABLET ORAL DAILY
Refills: 0 | Status: DISCONTINUED | OUTPATIENT
Start: 2021-09-23 | End: 2021-10-14

## 2021-09-23 RX ORDER — COLCHICINE 0.6 MG
0.6 TABLET ORAL DAILY
Refills: 0 | Status: DISCONTINUED | OUTPATIENT
Start: 2021-09-23 | End: 2021-10-14

## 2021-09-23 RX ORDER — TRAZODONE HCL 50 MG
100 TABLET ORAL AT BEDTIME
Refills: 0 | Status: DISCONTINUED | OUTPATIENT
Start: 2021-09-23 | End: 2021-10-14

## 2021-09-23 RX ORDER — CHOLECALCIFEROL (VITAMIN D3) 125 MCG
400 CAPSULE ORAL DAILY
Refills: 0 | Status: DISCONTINUED | OUTPATIENT
Start: 2021-09-23 | End: 2021-10-14

## 2021-09-23 RX ORDER — DIAZEPAM 5 MG
10 TABLET ORAL THREE TIMES A DAY
Refills: 0 | Status: DISCONTINUED | OUTPATIENT
Start: 2021-09-23 | End: 2021-09-23

## 2021-09-23 RX ORDER — OXYCODONE AND ACETAMINOPHEN 5; 325 MG/1; MG/1
2 TABLET ORAL EVERY 6 HOURS
Refills: 0 | Status: DISCONTINUED | OUTPATIENT
Start: 2021-09-23 | End: 2021-09-29

## 2021-09-23 RX ORDER — BUPROPION HYDROCHLORIDE 150 MG/1
150 TABLET, EXTENDED RELEASE ORAL DAILY
Refills: 0 | Status: DISCONTINUED | OUTPATIENT
Start: 2021-09-23 | End: 2021-10-04

## 2021-09-23 RX ORDER — PRIMIDONE 250 MG/1
50 TABLET ORAL AT BEDTIME
Refills: 0 | Status: DISCONTINUED | OUTPATIENT
Start: 2021-09-23 | End: 2021-10-14

## 2021-09-23 RX ORDER — LIDOCAINE 4 G/100G
1 CREAM TOPICAL DAILY
Refills: 0 | Status: DISCONTINUED | OUTPATIENT
Start: 2021-09-23 | End: 2021-10-14

## 2021-09-23 RX ORDER — NIFEDIPINE 30 MG
60 TABLET, EXTENDED RELEASE 24 HR ORAL DAILY
Refills: 0 | Status: DISCONTINUED | OUTPATIENT
Start: 2021-09-23 | End: 2021-10-14

## 2021-09-23 RX ORDER — FLUTICASONE PROPIONATE 50 MCG
1 SPRAY, SUSPENSION NASAL
Refills: 0 | Status: DISCONTINUED | OUTPATIENT
Start: 2021-09-23 | End: 2021-10-14

## 2021-09-23 RX ORDER — ATORVASTATIN CALCIUM 80 MG/1
80 TABLET, FILM COATED ORAL AT BEDTIME
Refills: 0 | Status: DISCONTINUED | OUTPATIENT
Start: 2021-09-23 | End: 2021-10-14

## 2021-09-23 RX ADMIN — Medication 20 MILLIGRAM(S): at 05:52

## 2021-09-23 RX ADMIN — Medication 60 MILLIGRAM(S): at 05:52

## 2021-09-23 RX ADMIN — Medication 10 MILLIGRAM(S): at 05:52

## 2021-09-23 RX ADMIN — SODIUM CHLORIDE 50 MILLILITER(S): 9 INJECTION, SOLUTION INTRAVENOUS at 10:07

## 2021-09-23 RX ADMIN — Medication 1 SPRAY(S): at 19:04

## 2021-09-23 RX ADMIN — SPIRONOLACTONE 50 MILLIGRAM(S): 25 TABLET, FILM COATED ORAL at 05:52

## 2021-09-23 RX ADMIN — Medication 300 MILLIGRAM(S): at 21:23

## 2021-09-23 RX ADMIN — VALACYCLOVIR 500 MILLIGRAM(S): 500 TABLET, FILM COATED ORAL at 21:23

## 2021-09-23 RX ADMIN — CHLORHEXIDINE GLUCONATE 1 APPLICATION(S): 213 SOLUTION TOPICAL at 05:44

## 2021-09-23 RX ADMIN — OXYCODONE AND ACETAMINOPHEN 2 TABLET(S): 5; 325 TABLET ORAL at 09:25

## 2021-09-23 RX ADMIN — BUDESONIDE AND FORMOTEROL FUMARATE DIHYDRATE 2 PUFF(S): 160; 4.5 AEROSOL RESPIRATORY (INHALATION) at 20:39

## 2021-09-23 RX ADMIN — Medication 100 MILLIGRAM(S): at 21:23

## 2021-09-23 RX ADMIN — ATENOLOL 50 MILLIGRAM(S): 25 TABLET ORAL at 05:52

## 2021-09-23 RX ADMIN — ATORVASTATIN CALCIUM 80 MILLIGRAM(S): 80 TABLET, FILM COATED ORAL at 21:22

## 2021-09-23 RX ADMIN — Medication 20 MILLIGRAM(S): at 05:53

## 2021-09-23 RX ADMIN — MAGNESIUM HYDROXIDE 30 MILLILITER(S): 400 TABLET, CHEWABLE ORAL at 21:27

## 2021-09-23 RX ADMIN — Medication 100 MILLIGRAM(S): at 21:22

## 2021-09-23 RX ADMIN — Medication 1 SPRAY(S): at 05:53

## 2021-09-23 RX ADMIN — Medication 10 MILLIGRAM(S): at 19:04

## 2021-09-23 RX ADMIN — Medication 20 MILLIGRAM(S): at 19:04

## 2021-09-23 RX ADMIN — PRIMIDONE 50 MILLIGRAM(S): 250 TABLET ORAL at 21:24

## 2021-09-23 RX ADMIN — HEPARIN SODIUM 5000 UNIT(S): 5000 INJECTION INTRAVENOUS; SUBCUTANEOUS at 21:22

## 2021-09-23 NOTE — CHART NOTE - NSCHARTNOTEFT_GEN_A_CORE
PACU ANESTHESIA ADMISSION NOTE      Procedure: Open reduction and internal fixation of left pilon fracture    Open reduction and internal fixation of fracture of left lateral malleolus      Post op diagnosis:  Pilon fracture of left tibia    Closed fracture of left lateral malleolus        ____  Intubated  TV:______       Rate: ______      FiO2: ______    _x___  Patent Airway    __x__  Full return of protective reflexes    __x__  Full recovery from anesthesia / back to baseline     Vitals:   T: 98.3F          R:    16              BP:   90/53               Sat:  95                 P: 63      Mental Status:  _x_ Awake   __x___ Alert   _____ Drowsy   _____ Sedated    Nausea/Vomiting:  _x___ NO  ______Yes,   See Post - Op Orders          Pain Scale (0-10):  __0/10___    Treatment: ____ None    __x__ See Post - Op/PCA Orders    Post - Operative Fluids:   ____ Oral   _x___ See Post - Op Orders    Plan: Discharge:   ____Home       __x___Floor     _____Critical Care    _____  Other:_________________    Comments: Pt tolerated procedure well, no anesthesia related complications. Care of pt endorsed to PACU, report given to PACU RN. Discharge to next level of care when criteria are met.

## 2021-09-23 NOTE — BRIEF OPERATIVE NOTE - NSICDXBRIEFPREOP_GEN_ALL_CORE_FT
PRE-OP DIAGNOSIS:  Pilon fracture of left tibia 23-Sep-2021 14:46:36  Ghazala Clemons  Closed fracture of left lateral malleolus 23-Sep-2021 14:46:47  Ghazala Clemons

## 2021-09-23 NOTE — PRE-ANESTHESIA EVALUATION ADULT - NSANTHPMHFT_GEN_ALL_CORE
72 yo woman with a PMHx of COPD/Asthma uses home oxygen O2 NC 2L, chronic back pain, cervical radiculopathy, Pulmonary Hypertension (paS 36mmhG), hx of [place] palsy, Gout, CKD 3, DLD, HTN, recent L ankle fx admitted for preoperative work up and optimization.

## 2021-09-23 NOTE — PRE-OP CHECKLIST - LATEX ALLERGY
Last OV: 7/11/19  Next OV: 7/13/2020  Labs: [facility]
Medication Refill:    REPATHA SURECLICK 581 MG/ML SOAJ  INJECT 140MG SUBCUTANEOUSLY EVERY 2 WEEKS      atorvastatin (LIPITOR) 20 MG tablet  TAKE 1 TABLET BY MOUTH  DAILY      clopidogrel (PLAVIX) 75 MG tablet   TAKE 1 TABLET BY MOUTH  DAILY    metoprolol succinate (TOPROL XL) 50 MG extended release tablet   TAKE 1 TABLET BY MOUTH  DAILY    90days    Pt switched pharmacies to   [facility]
no

## 2021-09-23 NOTE — BRIEF OPERATIVE NOTE - NSICDXBRIEFPOSTOP_GEN_ALL_CORE_FT
POST-OP DIAGNOSIS:  Closed fracture of left lateral malleolus 23-Sep-2021 14:47:01  Ghazala Clemons  Pilon fracture of left tibia 23-Sep-2021 14:46:53  Ghazala Clemons

## 2021-09-23 NOTE — PROGRESS NOTE ADULT - ASSESSMENT
ORTHOPEDIC POST-OP CHECK    Subjective: POD0 s/p left ankle ORIF.  Seen and examined at bedside, eating dinner.  Doing well, pain controlled. Nerve block still active. No active complaints.    MEDICATIONS  (STANDING):  allopurinol 300 milliGRAM(s) Oral at bedtime  ATENolol  Tablet 50 milliGRAM(s) Oral daily  atorvastatin 80 milliGRAM(s) Oral at bedtime  budesonide 160 MICROgram(s)/formoterol 4.5 MICROgram(s) Inhaler 2 Puff(s) Inhalation two times a day  buPROPion XL (24-Hour) . 150 milliGRAM(s) Oral daily  ceFAZolin   IVPB 2000 milliGRAM(s) IV Intermittent every 8 hours  cholecalciferol 400 Unit(s) Oral daily  colchicine 0.6 milliGRAM(s) Oral daily  FLUoxetine 20 milliGRAM(s) Oral two times a day  fluticasone propionate 50 MICROgram(s)/spray Nasal Spray 1 Spray(s) Both Nostrils two times a day  folic acid 1 milliGRAM(s) Oral daily  furosemide    Tablet 20 milliGRAM(s) Oral daily  heparin   Injectable 5000 Unit(s) SubCutaneous every 8 hours  hyoscyamine SL 0.125 milliGRAM(s) SubLingual daily  influenza   Vaccine 0.5 milliLiter(s) IntraMuscular once  lidocaine   4% Patch 1 Patch Transdermal daily  multivitamin 1 Tablet(s) Oral daily  NIFEdipine XL 60 milliGRAM(s) Oral daily  predniSONE   Tablet 10 milliGRAM(s) Oral two times a day  primidone 50 milliGRAM(s) Oral at bedtime  promethazine 50 milliGRAM(s) Oral daily  sildenafil (REVATIO) 20 milliGRAM(s) Oral three times a day  spironolactone 50 milliGRAM(s) Oral daily  traZODone 100 milliGRAM(s) Oral at bedtime  valACYclovir 500 milliGRAM(s) Oral at bedtime    MEDICATIONS  (PRN):  diazepam    Tablet 10 milliGRAM(s) Oral three times a day PRN anxiety  fentaNYL   Patch  25 MICROgram(s)/Hr 1 Patch Transdermal every 72 hours PRN pain  magnesium hydroxide Suspension 30 milliLiter(s) Oral daily PRN Constipation  oxycodone    5 mG/acetaminophen 325 mG 2 Tablet(s) Oral every 6 hours PRN Severe Pain (7 - 10)      Objective:  T(C): 36.2 (09-23-21 @ 19:10), Max: 36.9 (09-23-21 @ 18:30)  HR: 75 (09-23-21 @ 19:10) (62 - 75)  BP: 105/51 (09-23-21 @ 19:10) (77/52 - 114/58)  RR: 18 (09-23-21 @ 19:10) (14 - 24)  SpO2: 95% (09-23-21 @ 19:10) (52% - 100%)    Gen: AAOx3. NAD. NLB on RA.    LLE  Splint c/d/i  Sensory: unable to assess, nerve block still active  Motor: firing EHl/FHL  Digits wwp    Labs:                        10.7   4.60  )-----------( 238      ( 23 Sep 2021 06:35 )             32.4     09-23    138  |  106  |  22<H>  ----------------------------<  144<H>  5.0   |  22  |  1.4    Ca    9.1      23 Sep 2021 06:35  Mg     1.9     09-23    TPro  5.8<L>  /  Alb  3.9  /  TBili  0.3  /  DBili  x   /  AST  19  /  ALT  11  /  AlkPhos  135<H>  09-23      A/P: 71yFemale POD0 s/p left ankle ORIF, doing well.    - Activity: NWB LLE  - Abx: Ancef 2g q8hr x3 doses for a total of 24hrs post-operatively  - DVT PPx: LVX/SQH per primary team  - Pain control  - IS encouraged  - AM labs  - PT/Rehab  - D/C planning

## 2021-09-23 NOTE — PROGRESS NOTE ADULT - SUBJECTIVE AND OBJECTIVE BOX
SUBJECTIVE  Patient is a 71y old Female who presents with a chief complaint of left ankle fx (22 Sep 2021 07:39)  Currently admitted to medicine with the primary diagnosis of Fracture, ankle    Today is hospital day 2d, and this morning she is stable, pending Surgery and reports NO overnight events.       OBJECTIVE  PAST MEDICAL & SURGICAL HISTORY  History of neck pain    Spinal stenosis of lumbar region with radiculopathy    Anxiety    Depression    Osteoarthritis    H/O osteoporosis    History of pulmonary hypertension    H/O pulmonary hypertension    COPD (chronic obstructive pulmonary disease)    Hyperlipidemia    H/O total knee replacement, right    Failed spinal cord stimulator      ALLERGIES:  adhesives (Pruritus; Rash)  No Known Drug Allergies    MEDICATIONS:  STANDING MEDICATIONS  allopurinol 300 milliGRAM(s) Oral at bedtime  ATENolol  Tablet 50 milliGRAM(s) Oral daily  atorvastatin 80 milliGRAM(s) Oral at bedtime  budesonide 160 MICROgram(s)/formoterol 4.5 MICROgram(s) Inhaler 2 Puff(s) Inhalation two times a day  buPROPion XL (24-Hour) . 150 milliGRAM(s) Oral daily  chlorhexidine 4% Liquid 1 Application(s) Topical <User Schedule>  cholecalciferol 400 Unit(s) Oral daily  colchicine 0.6 milliGRAM(s) Oral daily  FLUoxetine 20 milliGRAM(s) Oral two times a day  fluticasone propionate 50 MICROgram(s)/spray Nasal Spray 1 Spray(s) Both Nostrils two times a day  folic acid 1 milliGRAM(s) Oral daily  furosemide    Tablet 20 milliGRAM(s) Oral daily  hyoscyamine SL 0.125 milliGRAM(s) SubLingual daily  influenza   Vaccine 0.5 milliLiter(s) IntraMuscular once  lidocaine   4% Patch 1 Patch Transdermal daily  NIFEdipine XL 60 milliGRAM(s) Oral daily  predniSONE   Tablet 10 milliGRAM(s) Oral two times a day  primidone 50 milliGRAM(s) Oral at bedtime  promethazine 50 milliGRAM(s) Oral daily  sildenafil (REVATIO) 20 milliGRAM(s) Oral three times a day  spironolactone 50 milliGRAM(s) Oral daily  traZODone 100 milliGRAM(s) Oral at bedtime  valACYclovir 500 milliGRAM(s) Oral at bedtime    PRN MEDICATIONS  diazepam    Tablet 10 milliGRAM(s) Oral three times a day PRN  fentaNYL   Patch  25 MICROgram(s)/Hr 1 Patch Transdermal every 72 hours PRN  oxycodone    5 mG/acetaminophen 325 mG 2 Tablet(s) Oral every 6 hours PRN      VITAL SIGNS: Last 24 Hours  T(C): 35.9 (23 Sep 2021 05:47), Max: 36.6 (22 Sep 2021 12:37)  T(F): 96.6 (23 Sep 2021 05:47), Max: 97.8 (22 Sep 2021 12:37)  HR: 74 (23 Sep 2021 05:47) (62 - 74)  BP: 110/58 (23 Sep 2021 05:47) (94/53 - 110/58)  BP(mean): --  RR: 18 (23 Sep 2021 05:47) (18 - 18)  SpO2: 97% (23 Sep 2021 01:50) (97% - 97%)    LABS:                        10.7   4.60  )-----------( 238      ( 23 Sep 2021 06:35 )             32.4     09-23    138  |  106  |  22<H>  ----------------------------<  144<H>  5.0   |  22  |  1.4    Ca    9.1      23 Sep 2021 06:35  Mg     1.9     -    TPro  5.8<L>  /  Alb  3.9  /  TBili  0.3  /  DBili  x   /  AST  19  /  ALT  11  /  AlkPhos  135<H>  09-23    PT/INR - ( 21 Sep 2021 15:41 )   PT: 13.90 sec;   INR: 1.21 ratio         PTT - ( 21 Sep 2021 15:41 )  PTT:34.1 sec  Urinalysis Basic - ( 21 Sep 2021 17:32 )    Color: Yellow / Appearance: Clear / S.022 / pH: x  Gluc: x / Ketone: Negative  / Bili: Negative / Urobili: <2 mg/dL   Blood: x / Protein: Trace / Nitrite: Negative   Leuk Esterase: Negative / RBC: x / WBC x   Sq Epi: x / Non Sq Epi: x / Bacteria: x            CARDIAC MARKERS ( 21 Sep 2021 15:41 )  x     / <0.01 ng/mL / x     / x     / x          RADIOLOGY:      PHYSICAL EXAM:    GENERAL: NAD, well-developed, AAOx3, on 2L NC  HEENT:  Atraumatic, Normocephalic. EOMI, PERRLA, conjunctiva and sclera clear, No JVD  PULMONARY: Clear to auscultation bilaterally; No wheeze  CARDIOVASCULAR: Regular rate and rhythm; No murmurs, rubs, or gallops  GASTROINTESTINAL: Soft, Nontender, Nondistended; Bowel sounds present  MUSCULOSKELETAL:  2+ Peripheral Pulses, No clubbing, cyanosis, or edema  NEUROLOGY: non-focal  SKIN: No rashes or lesions      ADMISSION SUMMARY  70 yo F with PMHx of COPD, Asthma uses home oxygen PRN, chronic back pain, cervical radiculopathy, Pulmonary Hypertension, Gout, DLD, HTN, CKD 3, recent L ankle fx presents for ankle fx repair.    #L medial Malleolar Fx, Distal Fibular Fx  - Sees Dr. Clemons  - Had initially experienced fall earlier in September, presented to hospital and was found to have L bimalleolar ankle fx s/p closed reduction  - On follow up as outpt, found to have persistent fx and was scheduled for repair  - Was unable to obtain Pulmonary, Cardiac clearance due to physical dysfunction  - FU c/s Cardiology (sees Dr. Flood) for pre op risk stratification  - Per Ortho, likely will not require Pulmonary clearance  -Procedure scheduled for today for Left ankle open fixation and reduction  - EKG, CXR performed    #Chronic Back Pain  #Hx of Cervical Radiculopathy  - Sees pain management, Dr. Mayers  - C/w Fentanyl patch PRN, Percocet PRN    #Gout  - C/w Allopurinol    #Pulmonary HTN  - C/w Sildenafil    #HTN  - C/w Atenolol, Nifedipine    #DLD  - C/w Atorvastatin    #COPD, Asthma on home O2 2L  #MARLENI  - C/w Symbicort    #Anxiety  - C/w Wellbutrin  - Valium PRN    #CKD 3  - Stable, sees Dr. Mcarthur    DVT ppx: HSQ  Diet: DASH  Activity: AAT, NWB LLE  Full Code  Dispo: Acute

## 2021-09-23 NOTE — BRIEF OPERATIVE NOTE - NSICDXBRIEFPROCEDURE_GEN_ALL_CORE_FT
PROCEDURES:  Open reduction and internal fixation of left pilon fracture 23-Sep-2021 14:45:29  Ghazala Clemons  Open reduction and internal fixation of fracture of left lateral malleolus 23-Sep-2021 14:45:57  Ghazala Clemons

## 2021-09-24 ENCOUNTER — TRANSCRIPTION ENCOUNTER (OUTPATIENT)
Age: 71
End: 2021-09-24

## 2021-09-24 LAB
ANION GAP SERPL CALC-SCNC: 13 MMOL/L — SIGNIFICANT CHANGE UP (ref 7–14)
BASOPHILS # BLD AUTO: 0 K/UL — SIGNIFICANT CHANGE UP (ref 0–0.2)
BASOPHILS NFR BLD AUTO: 0 % — SIGNIFICANT CHANGE UP (ref 0–1)
BUN SERPL-MCNC: 25 MG/DL — HIGH (ref 10–20)
CALCIUM SERPL-MCNC: 8.6 MG/DL — SIGNIFICANT CHANGE UP (ref 8.5–10.1)
CHLORIDE SERPL-SCNC: 101 MMOL/L — SIGNIFICANT CHANGE UP (ref 98–110)
CO2 SERPL-SCNC: 22 MMOL/L — SIGNIFICANT CHANGE UP (ref 17–32)
CREAT SERPL-MCNC: 1.3 MG/DL — SIGNIFICANT CHANGE UP (ref 0.7–1.5)
EOSINOPHIL # BLD AUTO: 0 K/UL — SIGNIFICANT CHANGE UP (ref 0–0.7)
EOSINOPHIL NFR BLD AUTO: 0 % — SIGNIFICANT CHANGE UP (ref 0–8)
GLUCOSE SERPL-MCNC: 165 MG/DL — HIGH (ref 70–99)
HCT VFR BLD CALC: 27.3 % — LOW (ref 37–47)
HGB BLD-MCNC: 9.2 G/DL — LOW (ref 12–16)
IMM GRANULOCYTES NFR BLD AUTO: 0.6 % — HIGH (ref 0.1–0.3)
LYMPHOCYTES # BLD AUTO: 0.48 K/UL — LOW (ref 1.2–3.4)
LYMPHOCYTES # BLD AUTO: 6.2 % — LOW (ref 20.5–51.1)
MAGNESIUM SERPL-MCNC: 1.9 MG/DL — SIGNIFICANT CHANGE UP (ref 1.8–2.4)
MCHC RBC-ENTMCNC: 33.7 G/DL — SIGNIFICANT CHANGE UP (ref 32–37)
MCHC RBC-ENTMCNC: 36.1 PG — HIGH (ref 27–31)
MCV RBC AUTO: 107.1 FL — HIGH (ref 81–99)
MONOCYTES # BLD AUTO: 0.56 K/UL — SIGNIFICANT CHANGE UP (ref 0.1–0.6)
MONOCYTES NFR BLD AUTO: 7.2 % — SIGNIFICANT CHANGE UP (ref 1.7–9.3)
NEUTROPHILS # BLD AUTO: 6.64 K/UL — HIGH (ref 1.4–6.5)
NEUTROPHILS NFR BLD AUTO: 86 % — HIGH (ref 42.2–75.2)
NRBC # BLD: 0 /100 WBCS — SIGNIFICANT CHANGE UP (ref 0–0)
PLATELET # BLD AUTO: 232 K/UL — SIGNIFICANT CHANGE UP (ref 130–400)
POTASSIUM SERPL-MCNC: 4.6 MMOL/L — SIGNIFICANT CHANGE UP (ref 3.5–5)
POTASSIUM SERPL-SCNC: 4.6 MMOL/L — SIGNIFICANT CHANGE UP (ref 3.5–5)
RBC # BLD: 2.55 M/UL — LOW (ref 4.2–5.4)
RBC # FLD: 12.8 % — SIGNIFICANT CHANGE UP (ref 11.5–14.5)
SARS-COV-2 RNA SPEC QL NAA+PROBE: SIGNIFICANT CHANGE UP
SODIUM SERPL-SCNC: 136 MMOL/L — SIGNIFICANT CHANGE UP (ref 135–146)
WBC # BLD: 7.73 K/UL — SIGNIFICANT CHANGE UP (ref 4.8–10.8)
WBC # FLD AUTO: 7.73 K/UL — SIGNIFICANT CHANGE UP (ref 4.8–10.8)

## 2021-09-24 PROCEDURE — 99232 SBSQ HOSP IP/OBS MODERATE 35: CPT

## 2021-09-24 RX ORDER — ACETAMINOPHEN 500 MG
650 TABLET ORAL ONCE
Refills: 0 | Status: COMPLETED | OUTPATIENT
Start: 2021-09-24 | End: 2021-09-24

## 2021-09-24 RX ORDER — ASPIRIN/CALCIUM CARB/MAGNESIUM 324 MG
1 TABLET ORAL
Qty: 45 | Refills: 0
Start: 2021-09-24 | End: 2021-11-07

## 2021-09-24 RX ADMIN — OXYCODONE AND ACETAMINOPHEN 2 TABLET(S): 5; 325 TABLET ORAL at 11:48

## 2021-09-24 RX ADMIN — SPIRONOLACTONE 50 MILLIGRAM(S): 25 TABLET, FILM COATED ORAL at 05:31

## 2021-09-24 RX ADMIN — Medication 20 MILLIGRAM(S): at 05:31

## 2021-09-24 RX ADMIN — Medication 400 UNIT(S): at 11:45

## 2021-09-24 RX ADMIN — Medication 1 MILLIGRAM(S): at 11:44

## 2021-09-24 RX ADMIN — Medication 1 SPRAY(S): at 16:57

## 2021-09-24 RX ADMIN — Medication 100 MILLIGRAM(S): at 15:20

## 2021-09-24 RX ADMIN — Medication 10 MILLIGRAM(S): at 16:56

## 2021-09-24 RX ADMIN — PRIMIDONE 50 MILLIGRAM(S): 250 TABLET ORAL at 21:35

## 2021-09-24 RX ADMIN — Medication 60 MILLIGRAM(S): at 05:31

## 2021-09-24 RX ADMIN — Medication 20 MILLIGRAM(S): at 16:57

## 2021-09-24 RX ADMIN — Medication 0.12 MILLIGRAM(S): at 11:44

## 2021-09-24 RX ADMIN — HEPARIN SODIUM 5000 UNIT(S): 5000 INJECTION INTRAVENOUS; SUBCUTANEOUS at 05:31

## 2021-09-24 RX ADMIN — ATENOLOL 50 MILLIGRAM(S): 25 TABLET ORAL at 05:31

## 2021-09-24 RX ADMIN — Medication 100 MILLIGRAM(S): at 05:30

## 2021-09-24 RX ADMIN — BUPROPION HYDROCHLORIDE 150 MILLIGRAM(S): 150 TABLET, EXTENDED RELEASE ORAL at 11:44

## 2021-09-24 RX ADMIN — HEPARIN SODIUM 5000 UNIT(S): 5000 INJECTION INTRAVENOUS; SUBCUTANEOUS at 15:20

## 2021-09-24 RX ADMIN — Medication 1 TABLET(S): at 11:45

## 2021-09-24 RX ADMIN — BUDESONIDE AND FORMOTEROL FUMARATE DIHYDRATE 2 PUFF(S): 160; 4.5 AEROSOL RESPIRATORY (INHALATION) at 09:04

## 2021-09-24 RX ADMIN — OXYCODONE AND ACETAMINOPHEN 2 TABLET(S): 5; 325 TABLET ORAL at 05:39

## 2021-09-24 RX ADMIN — Medication 300 MILLIGRAM(S): at 21:35

## 2021-09-24 RX ADMIN — Medication 10 MILLIGRAM(S): at 05:31

## 2021-09-24 RX ADMIN — Medication 20 MILLIGRAM(S): at 21:35

## 2021-09-24 RX ADMIN — Medication 0.6 MILLIGRAM(S): at 11:46

## 2021-09-24 RX ADMIN — Medication 20 MILLIGRAM(S): at 15:20

## 2021-09-24 RX ADMIN — HEPARIN SODIUM 5000 UNIT(S): 5000 INJECTION INTRAVENOUS; SUBCUTANEOUS at 21:36

## 2021-09-24 RX ADMIN — LIDOCAINE 1 PATCH: 4 CREAM TOPICAL at 11:47

## 2021-09-24 RX ADMIN — Medication 1 SPRAY(S): at 05:32

## 2021-09-24 RX ADMIN — OXYCODONE AND ACETAMINOPHEN 2 TABLET(S): 5; 325 TABLET ORAL at 11:43

## 2021-09-24 RX ADMIN — Medication 50 MILLIGRAM(S): at 11:44

## 2021-09-24 RX ADMIN — VALACYCLOVIR 500 MILLIGRAM(S): 500 TABLET, FILM COATED ORAL at 22:04

## 2021-09-24 RX ADMIN — ATORVASTATIN CALCIUM 80 MILLIGRAM(S): 80 TABLET, FILM COATED ORAL at 21:35

## 2021-09-24 RX ADMIN — Medication 100 MILLIGRAM(S): at 21:35

## 2021-09-24 NOTE — OCCUPATIONAL THERAPY INITIAL EVALUATION ADULT - ADDITIONAL COMMENTS
Above status reflective of function s/p original injury in September. PRior to fall pt utilized walker and/or cane, received assistance with IADLs however was I with ADLs.

## 2021-09-24 NOTE — PROGRESS NOTE ADULT - ASSESSMENT
ORTHO PROGRESS NOTE     71yFemale POD #1 s/p left ankle ORIF    Patient seen and examined at bedside . The patient is awake and alert in NAD. No complaints of chest pain, SOB, N/V.    MEDICATIONS  (STANDING):  allopurinol 300 milliGRAM(s) Oral at bedtime  ATENolol  Tablet 50 milliGRAM(s) Oral daily  atorvastatin 80 milliGRAM(s) Oral at bedtime  budesonide 160 MICROgram(s)/formoterol 4.5 MICROgram(s) Inhaler 2 Puff(s) Inhalation two times a day  buPROPion XL (24-Hour) . 150 milliGRAM(s) Oral daily  ceFAZolin   IVPB 2000 milliGRAM(s) IV Intermittent every 8 hours  cholecalciferol 400 Unit(s) Oral daily  colchicine 0.6 milliGRAM(s) Oral daily  FLUoxetine 20 milliGRAM(s) Oral two times a day  fluticasone propionate 50 MICROgram(s)/spray Nasal Spray 1 Spray(s) Both Nostrils two times a day  folic acid 1 milliGRAM(s) Oral daily  furosemide    Tablet 20 milliGRAM(s) Oral daily  heparin   Injectable 5000 Unit(s) SubCutaneous every 8 hours  hyoscyamine SL 0.125 milliGRAM(s) SubLingual daily  influenza   Vaccine 0.5 milliLiter(s) IntraMuscular once  lidocaine   4% Patch 1 Patch Transdermal daily  multivitamin 1 Tablet(s) Oral daily  NIFEdipine XL 60 milliGRAM(s) Oral daily  predniSONE   Tablet 10 milliGRAM(s) Oral two times a day  primidone 50 milliGRAM(s) Oral at bedtime  promethazine 50 milliGRAM(s) Oral daily  sildenafil (REVATIO) 20 milliGRAM(s) Oral three times a day  spironolactone 50 milliGRAM(s) Oral daily  traZODone 100 milliGRAM(s) Oral at bedtime  valACYclovir 500 milliGRAM(s) Oral at bedtime    MEDICATIONS  (PRN):  diazepam    Tablet 10 milliGRAM(s) Oral three times a day PRN anxiety  fentaNYL   Patch  25 MICROgram(s)/Hr 1 Patch Transdermal every 72 hours PRN pain  magnesium hydroxide Suspension 30 milliLiter(s) Oral daily PRN Constipation  oxycodone    5 mG/acetaminophen 325 mG 2 Tablet(s) Oral every 6 hours PRN Severe Pain (7 - 10)      Vital Signs Last 24 Hrs  T(C): 35.6 (24 Sep 2021 06:28), Max: 36.9 (23 Sep 2021 18:30)  T(F): 96.1 (24 Sep 2021 06:28), Max: 98.5 (23 Sep 2021 18:30)  HR: 81 (24 Sep 2021 06:28) (62 - 81)  BP: 117/68 (24 Sep 2021 06:28) (77/52 - 117/68)  BP(mean): 79 (23 Sep 2021 18:30) (56 - 94)  RR: 20 (24 Sep 2021 06:28) (14 - 24)  SpO2: 98% (23 Sep 2021 22:06) (52% - 100%)    PE:   Dressing C/D/I   Compartments soft and compressible  Motor intact distally  SILT distally  CR<2sec                               9.2    7.73  )-----------( 232      ( 24 Sep 2021 05:35 )             27.3     09-24    136  |  101  |  25<H>  ----------------------------<  165<H>  4.6   |  22  |  1.3    Ca    8.6      24 Sep 2021 05:35  Mg     1.9     09-24    TPro  5.8<L>  /  Alb  3.9  /  TBili  0.3  /  DBili  x   /  AST  19  /  ALT  11  /  AlkPhos  135<H>  09-23 09-24-21 @ 07:01  -  09-24-21 @ 09:13  --------------------------------------------------------  IN: 0 mL / OUT: 1500 mL / NET: -1500 mL          A/P: 71yFemale POD #1 s/p left ankle ORIF, doing well.   OOB to Chair   NWB  PT/OT  Pain control   Ext icing/elevation  Incentive Spirometry   DVT Prophylaxis   Discharge planning; patient stable for discharge from orthopedic standpoint. F/U with Dr. Clemons in 2 weeks. Please discharge patient with 6 total weeks of aspirin 325mg for dvt ppx           ORTHO PROGRESS NOTE     71yFemale POD #1 s/p left ankle ORIF    Patient seen and examined at bedside . The patient is awake and alert in NAD. No complaints of chest pain, SOB, N/V.    MEDICATIONS  (STANDING):  allopurinol 300 milliGRAM(s) Oral at bedtime  ATENolol  Tablet 50 milliGRAM(s) Oral daily  atorvastatin 80 milliGRAM(s) Oral at bedtime  budesonide 160 MICROgram(s)/formoterol 4.5 MICROgram(s) Inhaler 2 Puff(s) Inhalation two times a day  buPROPion XL (24-Hour) . 150 milliGRAM(s) Oral daily  ceFAZolin   IVPB 2000 milliGRAM(s) IV Intermittent every 8 hours  cholecalciferol 400 Unit(s) Oral daily  colchicine 0.6 milliGRAM(s) Oral daily  FLUoxetine 20 milliGRAM(s) Oral two times a day  fluticasone propionate 50 MICROgram(s)/spray Nasal Spray 1 Spray(s) Both Nostrils two times a day  folic acid 1 milliGRAM(s) Oral daily  furosemide    Tablet 20 milliGRAM(s) Oral daily  heparin   Injectable 5000 Unit(s) SubCutaneous every 8 hours  hyoscyamine SL 0.125 milliGRAM(s) SubLingual daily  influenza   Vaccine 0.5 milliLiter(s) IntraMuscular once  lidocaine   4% Patch 1 Patch Transdermal daily  multivitamin 1 Tablet(s) Oral daily  NIFEdipine XL 60 milliGRAM(s) Oral daily  predniSONE   Tablet 10 milliGRAM(s) Oral two times a day  primidone 50 milliGRAM(s) Oral at bedtime  promethazine 50 milliGRAM(s) Oral daily  sildenafil (REVATIO) 20 milliGRAM(s) Oral three times a day  spironolactone 50 milliGRAM(s) Oral daily  traZODone 100 milliGRAM(s) Oral at bedtime  valACYclovir 500 milliGRAM(s) Oral at bedtime    MEDICATIONS  (PRN):  diazepam    Tablet 10 milliGRAM(s) Oral three times a day PRN anxiety  fentaNYL   Patch  25 MICROgram(s)/Hr 1 Patch Transdermal every 72 hours PRN pain  magnesium hydroxide Suspension 30 milliLiter(s) Oral daily PRN Constipation  oxycodone    5 mG/acetaminophen 325 mG 2 Tablet(s) Oral every 6 hours PRN Severe Pain (7 - 10)      Vital Signs Last 24 Hrs  T(C): 35.6 (24 Sep 2021 06:28), Max: 36.9 (23 Sep 2021 18:30)  T(F): 96.1 (24 Sep 2021 06:28), Max: 98.5 (23 Sep 2021 18:30)  HR: 81 (24 Sep 2021 06:28) (62 - 81)  BP: 117/68 (24 Sep 2021 06:28) (77/52 - 117/68)  BP(mean): 79 (23 Sep 2021 18:30) (56 - 94)  RR: 20 (24 Sep 2021 06:28) (14 - 24)  SpO2: 98% (23 Sep 2021 22:06) (52% - 100%)    PE:   Dressing C/D/I   Compartments soft and compressible  Motor intact distally  SILT distally  CR<2sec                               9.2    7.73  )-----------( 232      ( 24 Sep 2021 05:35 )             27.3     09-24    136  |  101  |  25<H>  ----------------------------<  165<H>  4.6   |  22  |  1.3    Ca    8.6      24 Sep 2021 05:35  Mg     1.9     09-24    TPro  5.8<L>  /  Alb  3.9  /  TBili  0.3  /  DBili  x   /  AST  19  /  ALT  11  /  AlkPhos  135<H>  09-23 09-24-21 @ 07:01  -  09-24-21 @ 09:13  --------------------------------------------------------  IN: 0 mL / OUT: 1500 mL / NET: -1500 mL          A/P: 71yFemale POD #1 s/p left ankle ORIF, doing well.   OOB to Chair   NWB  PT/OT  Pain control   Ext icing/elevation  Incentive Spirometry   DVT Prophylaxis   Discharge planning; patient stable for discharge from orthopedic standpoint. F/U with Dr. Clemons in 2 weeks. Please discharge patient with 6 total weeks of aspirin 325mg for dvt ppx  stable in splint  dc planning

## 2021-09-24 NOTE — DISCHARGE NOTE PROVIDER - PROVIDER TOKENS
PROVIDER:[TOKEN:[[id number]],FOLLOWUP:[2 weeks]],PROVIDER:[TOKEN:[[id number]],FOLLOWUP:[2 weeks]],PROVIDER:[TOKEN:[[id number]],FOLLOWUP:[2 weeks]],PROVIDER:[TOKEN:[[id number]],FOLLOWUP:[1 week]] PROVIDER:[TOKEN:[[id number]],FOLLOWUP:[2 weeks]],PROVIDER:[TOKEN:[[id number]],FOLLOWUP:[2 weeks]],PROVIDER:[TOKEN:[[id number]],FOLLOWUP:[2 weeks]],PROVIDER:[TOKEN:[[id number]],FOLLOWUP:[1 week]],PROVIDER:[TOKEN:[[id number]],FOLLOWUP:[2 weeks]]

## 2021-09-24 NOTE — PHYSICAL THERAPY INITIAL EVALUATION ADULT - PERTINENT HX OF CURRENT PROBLEM, REHAB EVAL
70 yo F with PMHx of COPD, Asthma uses home oxygen O2 NC 2L, chronic back pain, cervical radiculopathy, Pulmonary Hypertension, hx of [place] palsy, Gout, CKD 3, DLD, HTN, recent L ankle fx presents for ankle fx repair.

## 2021-09-24 NOTE — DISCHARGE NOTE PROVIDER - NSDCCPCAREPLAN_GEN_ALL_CORE_FT
PRINCIPAL DISCHARGE DIAGNOSIS  Diagnosis: Fracture, ankle  Assessment and Plan of Treatment: Fracture  A fracture is a break in one of your bones. This can occur from a variety of injuries, especially traumatic ones. Symptoms include pain, bruising, or swelling. Do not use the injured limb. If a fracture is in one of the bones below your waist, do not put weight on that limb unless instructed to do so by your healthcare provider. Crutches or a cane may have been provided. A splint or cast may have been applied by your health care provider. Make sure to keep it dry and follow up with an orthopedist as instructed.  SEEK IMMEDIATE MEDICAL CARE IF YOU HAVE ANY OF THE FOLLOWING SYMPTOMS: numbness, tingling, increasing pain, or weakness in any part of the injured limb.        SECONDARY DISCHARGE DIAGNOSES  Diagnosis: Pulmonary hypertension, pre-operative cardiovascular examination  Assessment and Plan of Treatment:     Diagnosis: Encounter for pre-operative respiratory clearance  Assessment and Plan of Treatment:     Diagnosis: Insufficient social support  Assessment and Plan of Treatment:      PRINCIPAL DISCHARGE DIAGNOSIS  Diagnosis: Fracture, ankle  Assessment and Plan of Treatment: Fracture  A fracture is a break in one of your bones. This can occur from a variety of injuries, especially traumatic ones. Symptoms include pain, bruising, or swelling. Do not use the injured limb. If a fracture is in one of the bones below your waist, do not put weight on that limb unless instructed to do so by your healthcare provider. Crutches or a cane may have been provided. A splint or cast may have been applied by your health care provider. Make sure to keep it dry and follow up with an orthopedist as instructed.  SEEK IMMEDIATE MEDICAL CARE IF YOU HAVE ANY OF THE FOLLOWING SYMPTOMS: numbness, tingling, increasing pain, or weakness in any part of the injured limb.        SECONDARY DISCHARGE DIAGNOSES  Diagnosis: Acute pulmonary embolism  Assessment and Plan of Treatment: A pulmonary embolism (PE) is the sudden blockage of a blood vessel in the lungs by an embolus. A PE can become life-threatening. Go to follow-up appointments and take blood thinners as directed.  To prevent another PE:   •Change your body position or move around often. Move and stretch in your seat several times each hour.  •Exercise regularly to help increase your blood flow. Walking is a good low-impact exercise.   •Maintain a healthy weight.   •Do not smoke. Nicotine and other chemicals in cigarettes and cigars can damage blood vessels and increase your risk for another PE.   CALL YOUR LOCAL EMERGENCY NUMBER ([phone] IF:   •You feel lightheaded, short of breath, and have chest pain.  •You cough up blood.  Call your doctor if:   •Your arm or leg feels warm, tender, and painful. It may look swollen and red.  •You have questions or concerns about your condition or care.      Diagnosis: Ileus  Assessment and Plan of Treatment: An ileus is a condition that develops when the muscles of your intestines stop say. This causes a blockage that prevents food and waste from passing through normally.   You may need any of the following:   •Laxatives may help your intestines work properly again. They may also help soften your bowel movement to make it easier to pass.  •NSAIDs help decrease swelling and pain or fever. This medicine is available with or without a doctor's order. NSAIDs can cause stomach bleeding or kidney problems in certain people. If you take blood thinner medicine, always ask your healthcare provider if NSAIDs are safe for you. Always read the medicine label and follow directions.  SEEK CARE IMMEDIATELY IF YOU HAVE a fever and severe abdominal pain or bloating, blood in bowel movements, heart is racing or pounding. Call your doctor if you have nausea or are vomiting, cannot pass gas or a bowel movement, have abdominal bloating or pain that does not go away or cannot eat.

## 2021-09-24 NOTE — OCCUPATIONAL THERAPY INITIAL EVALUATION ADULT - RANGE OF MOTION EXAMINATION, UPPER EXTREMITY
Pt has h/o pinched nerve in neck and b/l hand tremors which she is currently being treated for by neurologists. Tremors note present during IE./bilateral UE Active ROM was WNL (within normal limits)

## 2021-09-24 NOTE — PHYSICAL THERAPY INITIAL EVALUATION ADULT - LIVES WITH, PROFILE
Pt lives in a townhouse with 5 PAUL and 3 flights of stairs indoors. Pt reports having a chair lift in home./spouse

## 2021-09-24 NOTE — PROGRESS NOTE ADULT - SUBJECTIVE AND OBJECTIVE BOX
SUBJECTIVE  Patient is a 71y old Female who presents with a chief complaint of left ankle fx (23 Sep 2021 20:03)  Currently admitted to medicine with the primary diagnosis of Fracture, ankle    Today is hospital day 3d, and this morning she is s/p surgery on 9/23, tolerated well, no complications.   this morning pt is reporting urinary retention.   Bladder scan done showed 900 ml, straight cath was done.       OBJECTIVE  PAST MEDICAL & SURGICAL HISTORY  History of neck pain    Spinal stenosis of lumbar region with radiculopathy    Anxiety    Depression    Osteoarthritis    H/O osteoporosis    History of pulmonary hypertension    H/O pulmonary hypertension    COPD (chronic obstructive pulmonary disease)    Hyperlipidemia    H/O total knee replacement, right    Failed spinal cord stimulator      ALLERGIES:  adhesives (Pruritus; Rash)  No Known Drug Allergies    MEDICATIONS:  STANDING MEDICATIONS  allopurinol 300 milliGRAM(s) Oral at bedtime  ATENolol  Tablet 50 milliGRAM(s) Oral daily  atorvastatin 80 milliGRAM(s) Oral at bedtime  budesonide 160 MICROgram(s)/formoterol 4.5 MICROgram(s) Inhaler 2 Puff(s) Inhalation two times a day  buPROPion XL (24-Hour) . 150 milliGRAM(s) Oral daily  ceFAZolin   IVPB 2000 milliGRAM(s) IV Intermittent every 8 hours  cholecalciferol 400 Unit(s) Oral daily  colchicine 0.6 milliGRAM(s) Oral daily  FLUoxetine 20 milliGRAM(s) Oral two times a day  fluticasone propionate 50 MICROgram(s)/spray Nasal Spray 1 Spray(s) Both Nostrils two times a day  folic acid 1 milliGRAM(s) Oral daily  furosemide    Tablet 20 milliGRAM(s) Oral daily  heparin   Injectable 5000 Unit(s) SubCutaneous every 8 hours  hyoscyamine SL 0.125 milliGRAM(s) SubLingual daily  influenza   Vaccine 0.5 milliLiter(s) IntraMuscular once  lidocaine   4% Patch 1 Patch Transdermal daily  multivitamin 1 Tablet(s) Oral daily  NIFEdipine XL 60 milliGRAM(s) Oral daily  predniSONE   Tablet 10 milliGRAM(s) Oral two times a day  primidone 50 milliGRAM(s) Oral at bedtime  promethazine 50 milliGRAM(s) Oral daily  sildenafil (REVATIO) 20 milliGRAM(s) Oral three times a day  spironolactone 50 milliGRAM(s) Oral daily  traZODone 100 milliGRAM(s) Oral at bedtime  valACYclovir 500 milliGRAM(s) Oral at bedtime    PRN MEDICATIONS  diazepam    Tablet 10 milliGRAM(s) Oral three times a day PRN  fentaNYL   Patch  25 MICROgram(s)/Hr 1 Patch Transdermal every 72 hours PRN  magnesium hydroxide Suspension 30 milliLiter(s) Oral daily PRN  oxycodone    5 mG/acetaminophen 325 mG 2 Tablet(s) Oral every 6 hours PRN      VITAL SIGNS: Last 24 Hours  T(C): 35.6 (24 Sep 2021 06:28), Max: 36.9 (23 Sep 2021 18:30)  T(F): 96.1 (24 Sep 2021 06:28), Max: 98.5 (23 Sep 2021 18:30)  HR: 81 (24 Sep 2021 06:28) (62 - 81)  BP: 117/68 (24 Sep 2021 06:28) (77/52 - 117/68)  BP(mean): 79 (23 Sep 2021 18:30) (56 - 94)  RR: 20 (24 Sep 2021 06:28) (14 - 24)  SpO2: 98% (23 Sep 2021 22:06) (52% - 100%)    LABS:                        9.2    7.73  )-----------( 232      ( 24 Sep 2021 05:35 )             27.3     09-24    136  |  101  |  25<H>  ----------------------------<  165<H>  4.6   |  22  |  1.3    Ca    8.6      24 Sep 2021 05:35  Mg     1.9     09-24    TPro  5.8<L>  /  Alb  3.9  /  TBili  0.3  /  DBili  x   /  AST  19  /  ALT  11  /  AlkPhos  135<H>  09-23                  RADIOLOGY:      PHYSICAL EXAM:    GENERAL: NAD, well-developed, AAOx3  HEENT:  Atraumatic, Normocephalic. EOMI, PERRLA, conjunctiva and sclera clear, No JVD  PULMONARY: Clear to auscultation bilaterally; No wheeze  CARDIOVASCULAR: Regular rate and rhythm; No murmurs, rubs, or gallops  GASTROINTESTINAL: Soft, Nontender, Nondistended; Bowel sounds present  MUSCULOSKELETAL:  2+ Peripheral Pulses, No clubbing, cyanosis, or edema  NEUROLOGY: non-focal  SKIN: No rashes or lesions      ADMISSION SUMMARY  70 yo F with PMHx of COPD, Asthma uses home oxygen PRN, chronic back pain, cervical radiculopathy, Pulmonary Hypertension, Gout, DLD, HTN, CKD 3, recent L ankle fx presents for ankle fx repair.    #L medial Malleolar Fx, Distal Fibular Fx  - Sees Dr. Clemons  - Had initially experienced fall earlier in September, presented to hospital and was found to have L bimalleolar ankle fx s/p closed reduction  - On follow up as outpt, found to have persistent fx and was scheduled for repair  - sp  left ankle ORIF POD #1 surgery on 9/23, tolerated procedure well, no complications.   - ANcef 3 doses post surgery.   - NWB on LLE.   - PT/rehab.    #Urinary retention:  - post op, Urinary retention : 1500 ml on straight cath.   - FU bladder scan in 6 hours, attempt straight cath again if large volume.   - likely due to anesthesia, and immobility.     #Chronic Back Pain  #Hx of Cervical Radiculopathy  - Sees pain management, Dr. Mayers  - C/w Fentanyl patch PRN, Percocet PRN    #Gout  - C/w Allopurinol    #Pulmonary HTN  - C/w Sildenafil    #HTN  - C/w Atenolol, Nifedipine    #DLD  - C/w Atorvastatin    #COPD, Asthma on home O2 2L  #MARLENI  - C/w Symbicort    #Anxiety  - C/w Wellbutrin  - Valium PRN    #CKD 3  - Stable, sees Dr. Mcarthur    DVT ppx: HSQ  Diet: DASH  Activity: AAT, NWB LLE  Full Code  Dispo: Acute

## 2021-09-24 NOTE — DISCHARGE NOTE PROVIDER - CARE PROVIDER_API CALL
Ghazala Clemons)  Orthopaedic Surgery  [address] [place] [address]  Phone: [phone]  Fax: [phone]  Follow Up Time: 2 weeks    Abdi Evans)  Critical Care Medicine; Internal Medicine; Pulmonary Disease  [address] [facility], [address]  [place] [address]  Phone: [phone]  Fax: [phone]  Follow Up Time: 2 weeks    Prince Flood  CARDIOVASCULAR DISEASE  [address] [facility] [address]  [place] [address]  Phone: [phone]  Fax: [phone]  Follow Up Time: 2 weeks    Brielle Reid  INTERNAL MEDICINE  [facility] [place] [address]  Phone: [phone]  Fax: [phone]  Follow Up Time: 1 week   Ghazala Clemons)  Orthopaedic Surgery  [address] [place] [address]  Phone: [phone]  Fax: [phone]  Follow Up Time: 2 weeks    Abdi Evans)  Critical Care Medicine; Internal Medicine; Pulmonary Disease  [address] [facility], [address]  [place] [address]  Phone: [phone]  Fax: [phone]  Follow Up Time: 2 weeks    Prince Flood  CARDIOVASCULAR DISEASE  [address] [facility] [address]  [place] [address]  Phone: [phone]  Fax: [phone]  Follow Up Time: 2 weeks    Brielle Reid  INTERNAL MEDICINE  [address] [place] [address]  Phone: [phone]  Fax: [phone]  Follow Up Time: 1 week    Mary Alice Hood)  Internal Medicine; Nephrology  [address] [place] [address]  Phone: [phone]  Fax: [phone]  Follow Up Time: 2 weeks

## 2021-09-24 NOTE — CONSULT NOTE ADULT - SUBJECTIVE AND OBJECTIVE BOX
Date of Admission: 9/21/21    CHIEF COMPLAINT: L ankel fracture    HISTORY OF PRESENT ILLNESS: 71yFemale with PMH below presented to the hospital for above CC and for perioperative management of this. Cardiology called for perioperative management and consult called in after procedure occured.     PAST MEDICAL & SURGICAL HISTORY:  History of neck pain    Spinal stenosis of lumbar region with radiculopathy    Anxiety    Depression    Osteoarthritis    H/O osteoporosis    History of pulmonary hypertension    H/O pulmonary hypertension    COPD (chronic obstructive pulmonary disease)    Hyperlipidemia    H/O total knee replacement, right    Failed spinal cord stimulator        FAMILY HISTORY:  [ ] no pertinent family history of premature cardiovascular disease in first degree relatives.  Mother:   Father:   Siblings:     SOCIAL HISTORY:    [ ] Non-smoker  [ ] Smoker  [ ] Alcohol    Allergies    adhesives (Pruritus; Rash)  No Known Drug Allergies    Intolerances    	    REVIEW OF SYSTEMS:  CONSTITUTIONAL: No fever, weight loss, or fatigue  CARDIOLOGY: PAtient denies chest pain, shortness of breath or syncopal episodes.   RESPIRATORY: denies shortness of breath, wheezeing.   NEUROLOGICAL: NO weakness, no focal deficits to report.  ENDOCRINOLOGICAL: no recent change in diabetic medications.   GI: no BRBPR, no N,V,diarrhea.    PSYCHIATRY: normal mood and affect  HEENT: no nasal discharge, no ecchymosis  SKIN: no ecchymosis, no breakdown  MUSCULOSKELETAL: Full range of motion x4.     PHYSICAL EXAM:  T(C): 35.6 (09-24-21 @ 06:28), Max: 36.9 (09-23-21 @ 18:30)  HR: 81 (09-24-21 @ 06:28) (62 - 81)  BP: 117/68 (09-24-21 @ 06:28) (77/52 - 117/68)  RR: 20 (09-24-21 @ 06:28) (14 - 24)  SpO2: 98% (09-23-21 @ 22:06) (52% - 100%)  Wt(kg): --  I&O's Summary    24 Sep 2021 07:01  -  24 Sep 2021 11:27  --------------------------------------------------------  IN: 0 mL / OUT: 1500 mL / NET: -1500 mL        General Appearance: well appearing, normal for age and gender. 	  Neck: normal JVP, no bruit.   Eyes: No xanthomalasia, Extra Ocular muscles intact.   Cardiovascular: regular rate and rhythm S1 S2, No JVD, No murmurs, No edema  Respiratory: Lungs clear to auscultation	  Psychiatry: Alert and oriented x 3, Mood & affect appropriate  Gastrointestinal:  Soft, Non-tender  Skin/Integumen: No rashes, No ecchymoses, No cyanosis	  Neurologic: Non-focal  Musculoskeletal/ extremities: Normal range of motion, No clubbing, cyanosis or edema  Vascular: Peripheral pulses palpable 2+ bilaterally    LABS:	 	                          9.2    7.73  )-----------( 232      ( 24 Sep 2021 05:35 )             27.3     09-24    136  |  101  |  25<H>  ----------------------------<  165<H>  4.6   |  22  |  1.3    Ca    8.6      24 Sep 2021 05:35  Mg     1.9     09-24    TPro  5.8<L>  /  Alb  3.9  /  TBili  0.3  /  DBili  x   /  AST  19  /  ALT  11  /  AlkPhos  135<H>  09-23            CARDIAC MARKERS:            TELEMETRY EVENTS: 	    ECG:  < from: 12 Lead ECG (09.21.21 @ 16:25) >  Diagnosis Line Normal sinus rhythm  Normal ECG    < end of copied text >  	  RADIOLOGY:  OTHER: 	    PREVIOUS DIAGNOSTIC TESTING:    [x ] Echocardiogram: < from: TTE Echo Complete w/o Contrast w/ Doppler (09.22.21 @ 15:51) >   1. Basal inferior segment and basal inferolateral segment are abnormal as described above.   2. LV Ejection Fraction by Victor's Method with a biplane EF of 59 %.   3. Mild to moderately enlarged left atrium.   4. Normal right atrial size.   5. Moderate mitral valve regurgitation.   6. Thickening of the anterior and posterior mitral valve leaflets.   7. Mild-moderate tricuspid regurgitation.   8. Moderate aortic valve thickening with normal leaflet opening.   9. Estimated pulmonary artery systolic pressure is 36.0 mmHg assuminga right atrial pressure of 8 mmHg, which is consistent with borderline pulmonary hypertension.    < end of copied text >    [ ]  Catheterization:  [ ] Stress Test:  	  	    Home Medications:  Advair Diskus 500 mcg-50 mcg inhalation powder: inhaled once a day (22 Sep 2021 07:01)  allopurinol 300 mg oral tablet: 1 tab(s) orally once a day (at bedtime) (22 Sep 2021 07:01)  atenolol 50 mg oral tablet: 1 tab(s) orally once a day (22 Sep 2021 07:01)  colchicine 0.6 mg oral capsule: 1 cap(s) orally once a day (22 Sep 2021 07:01)  fentanyl topical 25 mcg/hr transdermal film, extended release (obsolete): 1 patch transdermal every 72 hours (22 Sep 2021 07:01)  FLUoxetine 20 mg oral capsule: 1 cap(s) orally 2 times a day (22 Sep 2021 07:01)  fluticasone 50 mcg/inh inhalation powder: 1 puff(s) inhaled 2 times a day (22 Sep 2021 07:01)  folic acid 1 mg oral tablet: 1 tab(s) orally once a day (22 Sep 2021 07:01)  Fosamax 70 mg oral tablet: 1 tab(s) orally once a week (22 Sep 2021 07:01)  furosemide 20 mg oral tablet: 1 tab(s) orally once a day (22 Sep 2021 07:01)  hyoscyamine 0.125 mg oral tablet: orally once a day (22 Sep 2021 07:01)  Lidoderm 5% topical film: Apply topically to affected area once a day (22 Sep 2021 07:01)  Lipitor 80 mg oral tablet: 1 tab(s) orally once a day (at bedtime) (22 Sep 2021 07:01)  magnesium carbonate 250 mg oral capsule: 1 cap(s) orally once a day (22 Sep 2021 07:01)  oxycodone-acetaminophen 5 mg-325 mg oral tablet: 1 tab(s) orally every 8 hours, As Needed (22 Sep 2021 07:01)  predniSONE 10 mg oral tablet: 1 tab(s) orally 2 times a day (22 Sep 2021 07:01)  primidone 50 mg oral tablet: 1 tab(s) orally once a day (at bedtime) (22 Sep 2021 07:01)  Procardia XL 60 mg oral tablet, extended release: 1 tab(s) orally once a day (22 Sep 2021 07:01)  promethazine 50 mg oral tablet: 1 tab(s) orally once, As Needed (22 Sep 2021 07:01)  sildenafil 20 mg oral tablet: 1 tab(s) orally 3 times a day (22 Sep 2021 07:01)  spironolactone 50 mg oral tablet: orally once a day (22 Sep 2021 07:01)  traZODone 100 mg oral tablet: orally once a day (at bedtime) (22 Sep 2021 07:01)  Valium 10 mg oral tablet: 1 tab(s) orally 3 times a day, As Needed (22 Sep 2021 07:01)  Valtrex 500 mg oral tablet: 1 tab(s) orally once a day (22 Sep 2021 07:01)  Vitamin D3 400 intl units (10 mcg) oral tablet: 1 tab(s) orally once a day (22 Sep 2021 07:01)  Wellbutrin: 150  orally once a day (22 Sep 2021 07:01)    MEDICATIONS  (STANDING):  allopurinol 300 milliGRAM(s) Oral at bedtime  ATENolol  Tablet 50 milliGRAM(s) Oral daily  atorvastatin 80 milliGRAM(s) Oral at bedtime  budesonide 160 MICROgram(s)/formoterol 4.5 MICROgram(s) Inhaler 2 Puff(s) Inhalation two times a day  buPROPion XL (24-Hour) . 150 milliGRAM(s) Oral daily  ceFAZolin   IVPB 2000 milliGRAM(s) IV Intermittent every 8 hours  cholecalciferol 400 Unit(s) Oral daily  colchicine 0.6 milliGRAM(s) Oral daily  FLUoxetine 20 milliGRAM(s) Oral two times a day  fluticasone propionate 50 MICROgram(s)/spray Nasal Spray 1 Spray(s) Both Nostrils two times a day  folic acid 1 milliGRAM(s) Oral daily  furosemide    Tablet 20 milliGRAM(s) Oral daily  heparin   Injectable 5000 Unit(s) SubCutaneous every 8 hours  hyoscyamine SL 0.125 milliGRAM(s) SubLingual daily  influenza   Vaccine 0.5 milliLiter(s) IntraMuscular once  lidocaine   4% Patch 1 Patch Transdermal daily  multivitamin 1 Tablet(s) Oral daily  NIFEdipine XL 60 milliGRAM(s) Oral daily  predniSONE   Tablet 10 milliGRAM(s) Oral two times a day  primidone 50 milliGRAM(s) Oral at bedtime  promethazine 50 milliGRAM(s) Oral daily  sildenafil (REVATIO) 20 milliGRAM(s) Oral three times a day  spironolactone 50 milliGRAM(s) Oral daily  traZODone 100 milliGRAM(s) Oral at bedtime  valACYclovir 500 milliGRAM(s) Oral at bedtime    MEDICATIONS  (PRN):  diazepam    Tablet 10 milliGRAM(s) Oral three times a day PRN anxiety  fentaNYL   Patch  25 MICROgram(s)/Hr 1 Patch Transdermal every 72 hours PRN pain  magnesium hydroxide Suspension 30 milliLiter(s) Oral daily PRN Constipation  oxycodone    5 mG/acetaminophen 325 mG 2 Tablet(s) Oral every 6 hours PRN Severe Pain (7 - 10)

## 2021-09-24 NOTE — OCCUPATIONAL THERAPY INITIAL EVALUATION ADULT - PERTINENT HX OF CURRENT PROBLEM, REHAB EVAL
Pt is a right hand dominant female admitted 9/21 from home. Pt sustained a fall in the beginning of the month, sustained L medial malleolar fx, distal fib fx and had closed reduction. Pt went home and required A with all ADLs, confined to one area of her home at wheelchair level. Per MD request, pt returned to the hospital and is s/ L ankle fx repair, ORIF 9/24

## 2021-09-24 NOTE — DISCHARGE NOTE PROVIDER - HOSPITAL COURSE
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  pt Had initially experienced fall earlier in September, presented to hospital and was found to have L bimalleolar ankle fx s/p closed reduction, On follow up as outpt, found to have persistent fx and was scheduled for repair. pt underwent  left ankle ORIF \surgery on 9/23, tolerated procedure well, no complications.     During her hospital stay, she had Urinary retention post op, Urinary retention : 1500 ml on straight cath. likely due to anesthesia, and immobility.    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  Pt had initially experienced fall earlier in September, presented to hospital and was found to have L bimalleolar ankle fx s/p closed reduction, On follow up as outpt, found to have persistent fx and was scheduled for repair. Pt underwent  left ankle ORIF surgery on 9/23, tolerated procedure well, no complications.     During her hospital stay, she had Urinary retention post op, Urinary retention : 1500 ml on straight cath. She failed trial of void 3 times and was discharged with Breen in place. Likely due to anesthesia, and immobility.    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 Patient had initially experienced fall earlier in September, presented to hospital and was found to have L bimalleolar ankle fx s/p closed reduction. On follow up as outpatient, she was found to have persistent fx and was scheduled for repair. Pt underwent left ankle ORIF surgery on 9/23, tolerated procedure well, no complications.     During her hospital stay, she had urinary retention post op, urinary retention : 1500 ml on straight cath. She failed trial of void 4 times and was discharged with Breen in place. Likely due to anesthesia, and immobility. In addition, patient had abdominal distension with constipation likely secondary to ileus. A CT of the abdomen and pelvis was conducted to rule out mechanical obstruction. It was negative for obstruction but incidentally identified an acute pulmonary embolism. A CT angiogram was done and confirmed the embolism. It suggested you were experiencing an acute pulmonary emboli within the distal left main pulmonary artery with no evidence of right heart strain. We immediately started you on a heparin protocol but transitioned you to Apixaban 10mg, twice daily. Your constipation has passed and you've been passing watery stools. Also, you missed your outpatient follow up with Ortho so we did an x-ray of the ankle and had them see you before you left. They recommended you receive a CAM boot for your left ankle and to continue working with PT/OT/Rehab.

## 2021-09-24 NOTE — PHYSICAL THERAPY INITIAL EVALUATION ADULT - GENERAL OBSERVATIONS, REHAB EVAL
[id number] - [id number] Pt encountered semifowlers in bed + bed alarm, +NC 3L/min in NAD. Pt agreeable for b/s PT.

## 2021-09-24 NOTE — CONSULT NOTE ADULT - ASSESSMENT
pulmonary hypertension  COPD on home O2  HTN    - continue present antihypertensive management  - agree with anesthesia plan of care from perspective of local neurological block and sedation  - will follow up as outpatient with Dr. Flood

## 2021-09-24 NOTE — OCCUPATIONAL THERAPY INITIAL EVALUATION ADULT - GENERAL OBSERVATIONS, REHAB EVAL
Pt encountered semi delarosa in bed. NAD. +primafit, +1.5 liters 02 via NC, +iv lock, + LLE casted. Agreeable to OT IE. Pt left in b/s chair, +chair alarm, in NAD. PT Ravinder present at bedside. RN aware

## 2021-09-24 NOTE — DISCHARGE NOTE PROVIDER - NPI NUMBER (FOR SYSADMIN USE ONLY) :
[[id number]],[[id number]] [[id number]]

## 2021-09-24 NOTE — OCCUPATIONAL THERAPY INITIAL EVALUATION ADULT - PATIENT PROFILE REVIEW, REHAB EVAL
"Hypertension      HPI  Marshall Ivey CMS/[facility])    Pt's chart thoroughly reviewed. Pt seen for OT IE 8:00-8:45. Session interrupted per RN for bedside care. Direct pt care 30 min/yes

## 2021-09-24 NOTE — DISCHARGE NOTE PROVIDER - CARE PROVIDERS DIRECT ADDRESSES
,[email or web address],DirectAddress_Unknown,DirectAddress_Unknown,DirectAddress_Unknown ,[email or web address],DirectAddress_Unknown,DirectAddress_Unknown,DirectAddress_Unknown,[email or web address]

## 2021-09-24 NOTE — DISCHARGE NOTE PROVIDER - NSDCMRMEDTOKEN_GEN_ALL_CORE_FT
Advair Diskus 500 mcg-50 mcg inhalation powder: inhaled once a day  allopurinol 300 mg oral tablet: 1 tab(s) orally once a day (at bedtime)  aspirin 325 mg oral delayed release tablet: 1 tab(s) orally once a day for 6 weeks  atenolol 50 mg oral tablet: 1 tab(s) orally once a day  colchicine 0.6 mg oral capsule: 1 cap(s) orally once a day  fentanyl topical 25 mcg/hr transdermal film, extended release (obsolete): 1 patch transdermal every 72 hours  FLUoxetine 20 mg oral capsule: 1 cap(s) orally 2 times a day  fluticasone 50 mcg/inh inhalation powder: 1 puff(s) inhaled 2 times a day  folic acid 1 mg oral tablet: 1 tab(s) orally once a day  Fosamax 70 mg oral tablet: 1 tab(s) orally once a week  furosemide 20 mg oral tablet: 1 tab(s) orally once a day  hyoscyamine 0.125 mg oral tablet: orally once a day  Lidoderm 5% topical film: Apply topically to affected area once a day  Lipitor 80 mg oral tablet: 1 tab(s) orally once a day (at bedtime)  magnesium carbonate 250 mg oral capsule: 1 cap(s) orally once a day  oxycodone-acetaminophen 5 mg-325 mg oral tablet: 1 tab(s) orally every 8 hours, As Needed  predniSONE 10 mg oral tablet: 1 tab(s) orally 2 times a day  primidone 50 mg oral tablet: 1 tab(s) orally once a day (at bedtime)  Procardia XL 60 mg oral tablet, extended release: 1 tab(s) orally once a day  promethazine 50 mg oral tablet: 1 tab(s) orally once, As Needed  sildenafil 20 mg oral tablet: 1 tab(s) orally 3 times a day  spironolactone 50 mg oral tablet: orally once a day  traZODone 100 mg oral tablet: orally once a day (at bedtime)  Valium 10 mg oral tablet: 1 tab(s) orally 3 times a day, As Needed  Valtrex 500 mg oral tablet: 1 tab(s) orally once a day  Vitamin D3 400 intl units (10 mcg) oral tablet: 1 tab(s) orally once a day  Wellbutrin: 150  orally once a day   acetaminophen 325 mg oral tablet: 2 tab(s) orally every 6 hours, As needed, Moderate Pain (4 - 6)  Advair Diskus 500 mcg-50 mcg inhalation powder: inhaled once a day  allopurinol 300 mg oral tablet: 1 tab(s) orally once a day (at bedtime)  apixaban 5 mg oral tablet: 1 tab(s) orally 2 times a day  aspirin 325 mg oral delayed release tablet: 1 tab(s) orally once a day for 6 weeks  atenolol 50 mg oral tablet: 1 tab(s) orally once a day  colchicine 0.6 mg oral capsule: 1 cap(s) orally once a day  fentanyl topical 25 mcg/hr transdermal film, extended release (obsolete): 1 patch transdermal every 72 hours  FLUoxetine 20 mg oral capsule: 1 cap(s) orally 2 times a day  fluticasone 50 mcg/inh inhalation powder: 1 puff(s) inhaled 2 times a day  folic acid 1 mg oral tablet: 1 tab(s) orally once a day  Fosamax 70 mg oral tablet: 1 tab(s) orally once a week  furosemide 20 mg oral tablet: 1 tab(s) orally once a day  hyoscyamine 0.125 mg oral tablet: orally once a day  Lidoderm 5% topical film: Apply topically to affected area once a day  Lipitor 80 mg oral tablet: 1 tab(s) orally once a day (at bedtime)  magnesium carbonate 250 mg oral capsule: 1 cap(s) orally once a day  melatonin 5 mg oral tablet: 1 tab(s) orally once a day (at bedtime), As needed, Insomnia  nystatin 100,000 units/g topical powder: 1 application topically 2 times a day  oxycodone-acetaminophen 5 mg-325 mg oral tablet: 1 tab(s) orally every 8 hours, As Needed  predniSONE 10 mg oral tablet: 1 tab(s) orally 2 times a day  primidone 50 mg oral tablet: 1 tab(s) orally once a day (at bedtime)  Procardia XL 60 mg oral tablet, extended release: 1 tab(s) orally once a day  promethazine 50 mg oral tablet: 1 tab(s) orally once, As Needed  sildenafil 20 mg oral tablet: 1 tab(s) orally 3 times a day  simethicone 80 mg oral tablet, chewable: 1 tab(s) orally once a day, As needed, Indigestion  spironolactone 50 mg oral tablet: orally once a day  tamsulosin 0.4 mg oral capsule: 1 cap(s) orally once a day (at bedtime)  traZODone 100 mg oral tablet: orally once a day (at bedtime)  Valium 10 mg oral tablet: 1 tab(s) orally 3 times a day, As Needed  Valtrex 500 mg oral tablet: 1 tab(s) orally once a day  Vitamin D3 400 intl units (10 mcg) oral tablet: 1 tab(s) orally once a day  Wellbutrin: 150  orally once a day   acetaminophen 325 mg oral tablet: 2 tab(s) orally every 6 hours, As needed, Moderate Pain (4 - 6)  Advair Diskus 500 mcg-50 mcg inhalation powder: inhaled once a day  allopurinol 300 mg oral tablet: 1 tab(s) orally once a day (at bedtime)  apixaban 5 mg oral tablet: 1 tab(s) orally 2 times a day  atenolol 50 mg oral tablet: 1 tab(s) orally once a day  colchicine 0.6 mg oral capsule: 1 cap(s) orally once a day  fentanyl topical 25 mcg/hr transdermal film, extended release (obsolete): 1 patch transdermal every 72 hours  FLUoxetine 20 mg oral capsule: 1 cap(s) orally 2 times a day  fluticasone 50 mcg/inh inhalation powder: 1 puff(s) inhaled 2 times a day  folic acid 1 mg oral tablet: 1 tab(s) orally once a day  Fosamax 70 mg oral tablet: 1 tab(s) orally once a week  furosemide 20 mg oral tablet: 1 tab(s) orally once a day  hyoscyamine 0.125 mg oral tablet: orally once a day  Lidoderm 5% topical film: Apply topically to affected area once a day  Lipitor 80 mg oral tablet: 1 tab(s) orally once a day (at bedtime)  magnesium carbonate 250 mg oral capsule: 1 cap(s) orally once a day  melatonin 5 mg oral tablet: 1 tab(s) orally once a day (at bedtime), As needed, Insomnia  nystatin 100,000 units/g topical powder: 1 application topically 2 times a day  oxycodone-acetaminophen 5 mg-325 mg oral tablet: 1 tab(s) orally every 8 hours, As Needed  predniSONE 10 mg oral tablet: 1 tab(s) orally 2 times a day  primidone 50 mg oral tablet: 1 tab(s) orally once a day (at bedtime)  Procardia XL 60 mg oral tablet, extended release: 1 tab(s) orally once a day  promethazine 50 mg oral tablet: 1 tab(s) orally once, As Needed  sildenafil 20 mg oral tablet: 1 tab(s) orally 3 times a day  simethicone 80 mg oral tablet, chewable: 1 tab(s) orally once a day, As needed, Indigestion  spironolactone 50 mg oral tablet: orally once a day  tamsulosin 0.4 mg oral capsule: 1 cap(s) orally once a day (at bedtime)  traZODone 100 mg oral tablet: orally once a day (at bedtime)  Valium 10 mg oral tablet: 1 tab(s) orally 3 times a day, As Needed  Valtrex 500 mg oral tablet: 1 tab(s) orally once a day  Vitamin D3 400 intl units (10 mcg) oral tablet: 1 tab(s) orally once a day  Wellbutrin: 150  orally once a day

## 2021-09-25 LAB
ANION GAP SERPL CALC-SCNC: 11 MMOL/L — SIGNIFICANT CHANGE UP (ref 7–14)
BASOPHILS # BLD AUTO: 0.01 K/UL — SIGNIFICANT CHANGE UP (ref 0–0.2)
BASOPHILS NFR BLD AUTO: 0.1 % — SIGNIFICANT CHANGE UP (ref 0–1)
BUN SERPL-MCNC: 35 MG/DL — HIGH (ref 10–20)
CALCIUM SERPL-MCNC: 8.9 MG/DL — SIGNIFICANT CHANGE UP (ref 8.5–10.1)
CHLORIDE SERPL-SCNC: 103 MMOL/L — SIGNIFICANT CHANGE UP (ref 98–110)
CO2 SERPL-SCNC: 23 MMOL/L — SIGNIFICANT CHANGE UP (ref 17–32)
CREAT SERPL-MCNC: 1.3 MG/DL — SIGNIFICANT CHANGE UP (ref 0.7–1.5)
EOSINOPHIL # BLD AUTO: 0.01 K/UL — SIGNIFICANT CHANGE UP (ref 0–0.7)
EOSINOPHIL NFR BLD AUTO: 0.1 % — SIGNIFICANT CHANGE UP (ref 0–8)
GLUCOSE SERPL-MCNC: 123 MG/DL — HIGH (ref 70–99)
HCT VFR BLD CALC: 27.3 % — LOW (ref 37–47)
HGB BLD-MCNC: 8.9 G/DL — LOW (ref 12–16)
IMM GRANULOCYTES NFR BLD AUTO: 0.8 % — HIGH (ref 0.1–0.3)
LYMPHOCYTES # BLD AUTO: 1.02 K/UL — LOW (ref 1.2–3.4)
LYMPHOCYTES # BLD AUTO: 13.9 % — LOW (ref 20.5–51.1)
MAGNESIUM SERPL-MCNC: 2 MG/DL — SIGNIFICANT CHANGE UP (ref 1.8–2.4)
MCHC RBC-ENTMCNC: 32.6 G/DL — SIGNIFICANT CHANGE UP (ref 32–37)
MCHC RBC-ENTMCNC: 35.6 PG — HIGH (ref 27–31)
MCV RBC AUTO: 109.2 FL — HIGH (ref 81–99)
MONOCYTES # BLD AUTO: 0.77 K/UL — HIGH (ref 0.1–0.6)
MONOCYTES NFR BLD AUTO: 10.5 % — HIGH (ref 1.7–9.3)
NEUTROPHILS # BLD AUTO: 5.48 K/UL — SIGNIFICANT CHANGE UP (ref 1.4–6.5)
NEUTROPHILS NFR BLD AUTO: 74.6 % — SIGNIFICANT CHANGE UP (ref 42.2–75.2)
NRBC # BLD: 0 /100 WBCS — SIGNIFICANT CHANGE UP (ref 0–0)
PLATELET # BLD AUTO: 222 K/UL — SIGNIFICANT CHANGE UP (ref 130–400)
POTASSIUM SERPL-MCNC: 4.7 MMOL/L — SIGNIFICANT CHANGE UP (ref 3.5–5)
POTASSIUM SERPL-SCNC: 4.7 MMOL/L — SIGNIFICANT CHANGE UP (ref 3.5–5)
RBC # BLD: 2.5 M/UL — LOW (ref 4.2–5.4)
RBC # FLD: 13.4 % — SIGNIFICANT CHANGE UP (ref 11.5–14.5)
SODIUM SERPL-SCNC: 137 MMOL/L — SIGNIFICANT CHANGE UP (ref 135–146)
WBC # BLD: 7.35 K/UL — SIGNIFICANT CHANGE UP (ref 4.8–10.8)
WBC # FLD AUTO: 7.35 K/UL — SIGNIFICANT CHANGE UP (ref 4.8–10.8)

## 2021-09-25 PROCEDURE — 99232 SBSQ HOSP IP/OBS MODERATE 35: CPT

## 2021-09-25 RX ORDER — POLYETHYLENE GLYCOL 3350 17 G/17G
17 POWDER, FOR SOLUTION ORAL
Refills: 0 | Status: DISCONTINUED | OUTPATIENT
Start: 2021-09-25 | End: 2021-10-03

## 2021-09-25 RX ORDER — SENNA PLUS 8.6 MG/1
2 TABLET ORAL AT BEDTIME
Refills: 0 | Status: DISCONTINUED | OUTPATIENT
Start: 2021-09-25 | End: 2021-09-29

## 2021-09-25 RX ADMIN — Medication 10 MILLIGRAM(S): at 17:32

## 2021-09-25 RX ADMIN — POLYETHYLENE GLYCOL 3350 17 GRAM(S): 17 POWDER, FOR SOLUTION ORAL at 17:32

## 2021-09-25 RX ADMIN — Medication 60 MILLIGRAM(S): at 05:46

## 2021-09-25 RX ADMIN — Medication 1 TABLET(S): at 12:34

## 2021-09-25 RX ADMIN — HEPARIN SODIUM 5000 UNIT(S): 5000 INJECTION INTRAVENOUS; SUBCUTANEOUS at 22:08

## 2021-09-25 RX ADMIN — Medication 100 MILLIGRAM(S): at 22:08

## 2021-09-25 RX ADMIN — Medication 300 MILLIGRAM(S): at 22:08

## 2021-09-25 RX ADMIN — HEPARIN SODIUM 5000 UNIT(S): 5000 INJECTION INTRAVENOUS; SUBCUTANEOUS at 05:45

## 2021-09-25 RX ADMIN — HEPARIN SODIUM 5000 UNIT(S): 5000 INJECTION INTRAVENOUS; SUBCUTANEOUS at 15:12

## 2021-09-25 RX ADMIN — Medication 0.12 MILLIGRAM(S): at 12:33

## 2021-09-25 RX ADMIN — LIDOCAINE 1 PATCH: 4 CREAM TOPICAL at 18:02

## 2021-09-25 RX ADMIN — Medication 20 MILLIGRAM(S): at 05:46

## 2021-09-25 RX ADMIN — Medication 50 MILLIGRAM(S): at 12:33

## 2021-09-25 RX ADMIN — OXYCODONE AND ACETAMINOPHEN 2 TABLET(S): 5; 325 TABLET ORAL at 08:11

## 2021-09-25 RX ADMIN — LIDOCAINE 1 PATCH: 4 CREAM TOPICAL at 12:35

## 2021-09-25 RX ADMIN — Medication 20 MILLIGRAM(S): at 22:08

## 2021-09-25 RX ADMIN — PRIMIDONE 50 MILLIGRAM(S): 250 TABLET ORAL at 22:08

## 2021-09-25 RX ADMIN — Medication 1 SPRAY(S): at 17:32

## 2021-09-25 RX ADMIN — ATENOLOL 50 MILLIGRAM(S): 25 TABLET ORAL at 05:46

## 2021-09-25 RX ADMIN — Medication 1 SPRAY(S): at 05:55

## 2021-09-25 RX ADMIN — BUDESONIDE AND FORMOTEROL FUMARATE DIHYDRATE 2 PUFF(S): 160; 4.5 AEROSOL RESPIRATORY (INHALATION) at 07:56

## 2021-09-25 RX ADMIN — BUPROPION HYDROCHLORIDE 150 MILLIGRAM(S): 150 TABLET, EXTENDED RELEASE ORAL at 13:28

## 2021-09-25 RX ADMIN — OXYCODONE AND ACETAMINOPHEN 2 TABLET(S): 5; 325 TABLET ORAL at 08:40

## 2021-09-25 RX ADMIN — SENNA PLUS 2 TABLET(S): 8.6 TABLET ORAL at 22:09

## 2021-09-25 RX ADMIN — Medication 10 MILLIGRAM(S): at 05:45

## 2021-09-25 RX ADMIN — VALACYCLOVIR 500 MILLIGRAM(S): 500 TABLET, FILM COATED ORAL at 22:10

## 2021-09-25 RX ADMIN — Medication 1 MILLIGRAM(S): at 12:34

## 2021-09-25 RX ADMIN — ATORVASTATIN CALCIUM 80 MILLIGRAM(S): 80 TABLET, FILM COATED ORAL at 22:08

## 2021-09-25 RX ADMIN — Medication 20 MILLIGRAM(S): at 15:11

## 2021-09-25 RX ADMIN — Medication 400 UNIT(S): at 12:35

## 2021-09-25 RX ADMIN — BUDESONIDE AND FORMOTEROL FUMARATE DIHYDRATE 2 PUFF(S): 160; 4.5 AEROSOL RESPIRATORY (INHALATION) at 22:17

## 2021-09-25 RX ADMIN — Medication 0.6 MILLIGRAM(S): at 12:33

## 2021-09-25 NOTE — PROGRESS NOTE ADULT - SUBJECTIVE AND OBJECTIVE BOX
BERNARD SPEARS  71y, Female  Allergy: adhesives (Pruritus; Rash)  No Known Drug Allergies    Hospital Day: 4d    Patient seen and examined earlier today. Feeling constipated, endorsing some right hip pain.    PMH/PSH:  PAST MEDICAL & SURGICAL HISTORY:  History of neck pain    Spinal stenosis of lumbar region with radiculopathy    Anxiety    Depression    Osteoarthritis    H/O osteoporosis    History of pulmonary hypertension    H/O pulmonary hypertension    COPD (chronic obstructive pulmonary disease)    Hyperlipidemia    H/O total knee replacement, right    Failed spinal cord stimulator        LAST 24-Hr EVENTS:    VITALS:  T(F): 98.7 (09-25-21 @ 12:51), Max: 98.7 (09-25-21 @ 12:51)  HR: 72 (09-25-21 @ 12:51)  BP: 97/50 (09-25-21 @ 12:51) (97/50 - 149/66)  RR: 20 (09-25-21 @ 12:51)  SpO2: --        TESTS & MEASUREMENTS:  Weight (Kg):       09-24-21 @ 07:01  -  09-25-21 @ 07:00  --------------------------------------------------------  IN: 0 mL / OUT: 2400 mL / NET: -2400 mL    09-25-21 @ 07:01  -  09-25-21 @ 16:58  --------------------------------------------------------  IN: 0 mL / OUT: 600 mL / NET: -600 mL                            8.9    7.35  )-----------( 222      ( 25 Sep 2021 04:30 )             27.3       09-25    137  |  103  |  35<H>  ----------------------------<  123<H>  4.7   |  23  |  1.3    Ca    8.9      25 Sep 2021 04:30  Mg     2.0     09-25                      Serum Pro-Brain Natriuretic Peptide: 1185 pg/mL (09-21-21 @ 15:41)    COVID-19 PCR: NotDetec (09-24-21 @ 15:30)  COVID-19 PCR: NotDetec (09-21-21 @ 15:27)      RADIOLOGY, ECG, & ADDITIONAL TESTS:      RECENT DIAGNOSTIC ORDERS:      MEDICATIONS:  MEDICATIONS  (STANDING):  acetaminophen   Tablet .. 650 milliGRAM(s) Oral once  allopurinol 300 milliGRAM(s) Oral at bedtime  ATENolol  Tablet 50 milliGRAM(s) Oral daily  atorvastatin 80 milliGRAM(s) Oral at bedtime  budesonide 160 MICROgram(s)/formoterol 4.5 MICROgram(s) Inhaler 2 Puff(s) Inhalation two times a day  buPROPion XL (24-Hour) . 150 milliGRAM(s) Oral daily  cholecalciferol 400 Unit(s) Oral daily  colchicine 0.6 milliGRAM(s) Oral daily  FLUoxetine 20 milliGRAM(s) Oral two times a day  fluticasone propionate 50 MICROgram(s)/spray Nasal Spray 1 Spray(s) Both Nostrils two times a day  folic acid 1 milliGRAM(s) Oral daily  furosemide    Tablet 20 milliGRAM(s) Oral daily  heparin   Injectable 5000 Unit(s) SubCutaneous every 8 hours  hyoscyamine SL 0.125 milliGRAM(s) SubLingual daily  influenza   Vaccine 0.5 milliLiter(s) IntraMuscular once  lidocaine   4% Patch 1 Patch Transdermal daily  multivitamin 1 Tablet(s) Oral daily  NIFEdipine XL 60 milliGRAM(s) Oral daily  predniSONE   Tablet 10 milliGRAM(s) Oral two times a day  primidone 50 milliGRAM(s) Oral at bedtime  promethazine 50 milliGRAM(s) Oral daily  sildenafil (REVATIO) 20 milliGRAM(s) Oral three times a day  spironolactone 50 milliGRAM(s) Oral daily  traZODone 100 milliGRAM(s) Oral at bedtime  valACYclovir 500 milliGRAM(s) Oral at bedtime    MEDICATIONS  (PRN):  diazepam    Tablet 10 milliGRAM(s) Oral three times a day PRN anxiety  fentaNYL   Patch  25 MICROgram(s)/Hr 1 Patch Transdermal every 72 hours PRN pain  magnesium hydroxide Suspension 30 milliLiter(s) Oral daily PRN Constipation  oxycodone    5 mG/acetaminophen 325 mG 2 Tablet(s) Oral every 6 hours PRN Severe Pain (7 - 10)      HOME MEDICATIONS:  Advair Diskus 500 mcg-50 mcg inhalation powder (09-22)  allopurinol 300 mg oral tablet (09-22)  atenolol 50 mg oral tablet (09-22)  colchicine 0.6 mg oral capsule (09-22)  fentanyl topical 25 mcg/hr transdermal film, extended release (obsolete) (09-22)  FLUoxetine 20 mg oral capsule (09-22)  fluticasone 50 mcg/inh inhalation powder (09-22)  folic acid 1 mg oral tablet (09-22)  Fosamax 70 mg oral tablet (09-22)  furosemide 20 mg oral tablet (09-22)  hyoscyamine 0.125 mg oral tablet (09-22)  Lidoderm 5% topical film (09-22)  Lipitor 80 mg oral tablet (09-22)  magnesium carbonate 250 mg oral capsule (09-22)  oxycodone-acetaminophen 5 mg-325 mg oral tablet (09-22)  predniSONE 10 mg oral tablet (09-22)  primidone 50 mg oral tablet (09-22)  Procardia XL 60 mg oral tablet, extended release (09-22)  promethazine 50 mg oral tablet (09-22)  sildenafil 20 mg oral tablet (09-22)  spironolactone 50 mg oral tablet (09-22)  traZODone 100 mg oral tablet (09-22)  Valium 10 mg oral tablet (09-22)  Valtrex 500 mg oral tablet (09-22)  Vitamin D3 400 intl units (10 mcg) oral tablet (09-22)  Wellbutrin (09-22)      PHYSICAL EXAM:  GENERAL: NAD, well-developed, AAOx3  HEENT:  Atraumatic, Normocephalic. EOMI, PERRLA, conjunctiva and sclera clear, No JVD  PULMONARY: Clear to auscultation bilaterally; No wheeze  CARDIOVASCULAR: Regular rate and rhythm; No murmurs, rubs, or gallops  GASTROINTESTINAL: Soft, Nontender, Nondistended; Bowel sounds present  MUSCULOSKELETAL:  2+ Peripheral Pulses, L ankle bandaged   NEUROLOGY: non-focal  SKIN: No rashes or lesions

## 2021-09-25 NOTE — PROGRESS NOTE ADULT - ASSESSMENT
ORTHO PROGRESS NOTE     71yFemale POD #2 s/p left ankle MARITO    Patient seen and examined at bedside . The patient is resting comfortably.    MEDICATIONS  (STANDING):  acetaminophen   Tablet .. 650 milliGRAM(s) Oral once  allopurinol 300 milliGRAM(s) Oral at bedtime  ATENolol  Tablet 50 milliGRAM(s) Oral daily  atorvastatin 80 milliGRAM(s) Oral at bedtime  budesonide 160 MICROgram(s)/formoterol 4.5 MICROgram(s) Inhaler 2 Puff(s) Inhalation two times a day  buPROPion XL (24-Hour) . 150 milliGRAM(s) Oral daily  cholecalciferol 400 Unit(s) Oral daily  colchicine 0.6 milliGRAM(s) Oral daily  FLUoxetine 20 milliGRAM(s) Oral two times a day  fluticasone propionate 50 MICROgram(s)/spray Nasal Spray 1 Spray(s) Both Nostrils two times a day  folic acid 1 milliGRAM(s) Oral daily  furosemide    Tablet 20 milliGRAM(s) Oral daily  heparin   Injectable 5000 Unit(s) SubCutaneous every 8 hours  hyoscyamine SL 0.125 milliGRAM(s) SubLingual daily  influenza   Vaccine 0.5 milliLiter(s) IntraMuscular once  lidocaine   4% Patch 1 Patch Transdermal daily  multivitamin 1 Tablet(s) Oral daily  NIFEdipine XL 60 milliGRAM(s) Oral daily  predniSONE   Tablet 10 milliGRAM(s) Oral two times a day  primidone 50 milliGRAM(s) Oral at bedtime  promethazine 50 milliGRAM(s) Oral daily  sildenafil (REVATIO) 20 milliGRAM(s) Oral three times a day  spironolactone 50 milliGRAM(s) Oral daily  traZODone 100 milliGRAM(s) Oral at bedtime  valACYclovir 500 milliGRAM(s) Oral at bedtime    MEDICATIONS  (PRN):  diazepam    Tablet 10 milliGRAM(s) Oral three times a day PRN anxiety  fentaNYL   Patch  25 MICROgram(s)/Hr 1 Patch Transdermal every 72 hours PRN pain  magnesium hydroxide Suspension 30 milliLiter(s) Oral daily PRN Constipation  oxycodone    5 mG/acetaminophen 325 mG 2 Tablet(s) Oral every 6 hours PRN Severe Pain (7 - 10)      Vital Signs Last 24 Hrs  T(C): 36.5 (25 Sep 2021 06:28), Max: 36.7 (24 Sep 2021 20:13)  T(F): 97.7 (25 Sep 2021 06:28), Max: 98 (24 Sep 2021 20:13)  HR: 73 (25 Sep 2021 06:28) (70 - 92)  BP: 149/66 (25 Sep 2021 06:28) (132/55 - 149/66)  BP(mean): --  RR: 20 (25 Sep 2021 06:28) (18 - 20)  SpO2: --    PE:   Dressing C/D/I   Compartments soft and compressible  Motor intact distally  SILT distally  CR<2sec                               9.2    7.73  )-----------( 232      ( 24 Sep 2021 05:35 )             27.3     09-24    136  |  101  |  25<H>  ----------------------------<  165<H>  4.6   |  22  |  1.3    Ca    8.6      24 Sep 2021 05:35  Mg     1.9     09-24 09-24-21 @ 07:01  -  09-25-21 @ 07:00  --------------------------------------------------------  IN: 0 mL / OUT: 2400 mL / NET: -2400 mL          A/P: 71yFemale POD #2 s/p left ankle ORIF, doing well.   OOB to Chair   NWB  PT/OT  Pain control   Ext icing/elevation  Incentive Spirometry   DVT Prophylaxis   Discharge planning; patient stable for discharge from orthopedic standpoint. F/U with Dr. Clemons in 2 weeks. Please discharge patient with 6 total weeks of aspirin 325mg for dvt ppx

## 2021-09-25 NOTE — PROGRESS NOTE ADULT - ASSESSMENT
70 yo F with PMHx of COPD, Asthma uses home oxygen PRN, chronic back pain, cervical radiculopathy, Pulmonary Hypertension, Gout, DLD, HTN, CKD 3, recent L ankle fx presents for ankle fx repair.    #L medial Malleolar Fx, Distal Fibular Fx  - Had initially experienced fall earlier in September, presented to hospital and was found to have L bimalleolar ankle fx s/p closed reduction  - On follow up as outpt, found to have persistent fx and was scheduled for repair  - Was unable to obtain Pulmonary, Cardiac clearance due to physical dysfunction  - s/p cardiac/pulm/medicine risk stratification   - echo ordered - EF 59% and mild PH   - Ortho following s/p ORIF, POD 2  - plan for dc to rehab     #Urinary Retention   - post op retention   - madrid catheter in place, TOV today     #Chronic Back Pain  #Hx of Cervical Radiculopathy  - Sees pain management, Dr. Mayers  - C/w Fentanyl patch PRN, Percocet PRN    #Gout  - C/w Allopurinol    #Pulmonary HTN  - C/w Sildenafil    #HTN  - C/w Atenolol, Nifedipine    #DLD  - C/w Atorvastatin    #COPD, Asthma on home O2 2L  #MARLENI  - C/w Symbicort    #Anxiety  - C/w Wellbutrin  - Valium PRN    #CKD 3  - Stable, sees Dr. Mcarthur    Heparin sq     #Progress Note Handoff:  Pending (specify): rehab placment  Family discussion: d/w patient   Disposition: Home___/SNF___/Other________/Unknown at this time___x_____    Pinky Padron DO .

## 2021-09-26 LAB
ANION GAP SERPL CALC-SCNC: 12 MMOL/L — SIGNIFICANT CHANGE UP (ref 7–14)
BASOPHILS # BLD AUTO: 0.01 K/UL — SIGNIFICANT CHANGE UP (ref 0–0.2)
BASOPHILS NFR BLD AUTO: 0.1 % — SIGNIFICANT CHANGE UP (ref 0–1)
BUN SERPL-MCNC: 38 MG/DL — HIGH (ref 10–20)
CALCIUM SERPL-MCNC: 8.8 MG/DL — SIGNIFICANT CHANGE UP (ref 8.5–10.1)
CHLORIDE SERPL-SCNC: 102 MMOL/L — SIGNIFICANT CHANGE UP (ref 98–110)
CO2 SERPL-SCNC: 23 MMOL/L — SIGNIFICANT CHANGE UP (ref 17–32)
CREAT SERPL-MCNC: 1.3 MG/DL — SIGNIFICANT CHANGE UP (ref 0.7–1.5)
EOSINOPHIL # BLD AUTO: 0.02 K/UL — SIGNIFICANT CHANGE UP (ref 0–0.7)
EOSINOPHIL NFR BLD AUTO: 0.3 % — SIGNIFICANT CHANGE UP (ref 0–8)
GLUCOSE SERPL-MCNC: 110 MG/DL — HIGH (ref 70–99)
HCT VFR BLD CALC: 26.7 % — LOW (ref 37–47)
HGB BLD-MCNC: 8.8 G/DL — LOW (ref 12–16)
IMM GRANULOCYTES NFR BLD AUTO: 1 % — HIGH (ref 0.1–0.3)
LYMPHOCYTES # BLD AUTO: 1.15 K/UL — LOW (ref 1.2–3.4)
LYMPHOCYTES # BLD AUTO: 15.1 % — LOW (ref 20.5–51.1)
MAGNESIUM SERPL-MCNC: 1.8 MG/DL — SIGNIFICANT CHANGE UP (ref 1.8–2.4)
MCHC RBC-ENTMCNC: 33 G/DL — SIGNIFICANT CHANGE UP (ref 32–37)
MCHC RBC-ENTMCNC: 35.8 PG — HIGH (ref 27–31)
MCV RBC AUTO: 108.5 FL — HIGH (ref 81–99)
MONOCYTES # BLD AUTO: 0.72 K/UL — HIGH (ref 0.1–0.6)
MONOCYTES NFR BLD AUTO: 9.4 % — HIGH (ref 1.7–9.3)
NEUTROPHILS # BLD AUTO: 5.64 K/UL — SIGNIFICANT CHANGE UP (ref 1.4–6.5)
NEUTROPHILS NFR BLD AUTO: 74.1 % — SIGNIFICANT CHANGE UP (ref 42.2–75.2)
NRBC # BLD: 0 /100 WBCS — SIGNIFICANT CHANGE UP (ref 0–0)
PLATELET # BLD AUTO: 213 K/UL — SIGNIFICANT CHANGE UP (ref 130–400)
POTASSIUM SERPL-MCNC: 4.6 MMOL/L — SIGNIFICANT CHANGE UP (ref 3.5–5)
POTASSIUM SERPL-SCNC: 4.6 MMOL/L — SIGNIFICANT CHANGE UP (ref 3.5–5)
RBC # BLD: 2.46 M/UL — LOW (ref 4.2–5.4)
RBC # FLD: 13.2 % — SIGNIFICANT CHANGE UP (ref 11.5–14.5)
SODIUM SERPL-SCNC: 137 MMOL/L — SIGNIFICANT CHANGE UP (ref 135–146)
WBC # BLD: 7.62 K/UL — SIGNIFICANT CHANGE UP (ref 4.8–10.8)
WBC # FLD AUTO: 7.62 K/UL — SIGNIFICANT CHANGE UP (ref 4.8–10.8)

## 2021-09-26 PROCEDURE — 99232 SBSQ HOSP IP/OBS MODERATE 35: CPT

## 2021-09-26 RX ORDER — LACTULOSE 10 G/15ML
20 SOLUTION ORAL ONCE
Refills: 0 | Status: COMPLETED | OUTPATIENT
Start: 2021-09-26 | End: 2021-09-26

## 2021-09-26 RX ADMIN — POLYETHYLENE GLYCOL 3350 17 GRAM(S): 17 POWDER, FOR SOLUTION ORAL at 18:09

## 2021-09-26 RX ADMIN — ATENOLOL 50 MILLIGRAM(S): 25 TABLET ORAL at 05:10

## 2021-09-26 RX ADMIN — ATORVASTATIN CALCIUM 80 MILLIGRAM(S): 80 TABLET, FILM COATED ORAL at 21:52

## 2021-09-26 RX ADMIN — SENNA PLUS 2 TABLET(S): 8.6 TABLET ORAL at 21:52

## 2021-09-26 RX ADMIN — BUDESONIDE AND FORMOTEROL FUMARATE DIHYDRATE 2 PUFF(S): 160; 4.5 AEROSOL RESPIRATORY (INHALATION) at 20:14

## 2021-09-26 RX ADMIN — Medication 400 UNIT(S): at 11:47

## 2021-09-26 RX ADMIN — LIDOCAINE 1 PATCH: 4 CREAM TOPICAL at 19:09

## 2021-09-26 RX ADMIN — Medication 20 MILLIGRAM(S): at 05:10

## 2021-09-26 RX ADMIN — Medication 1 TABLET(S): at 11:46

## 2021-09-26 RX ADMIN — Medication 20 MILLIGRAM(S): at 14:45

## 2021-09-26 RX ADMIN — PRIMIDONE 50 MILLIGRAM(S): 250 TABLET ORAL at 21:52

## 2021-09-26 RX ADMIN — LIDOCAINE 1 PATCH: 4 CREAM TOPICAL at 23:08

## 2021-09-26 RX ADMIN — VALACYCLOVIR 500 MILLIGRAM(S): 500 TABLET, FILM COATED ORAL at 21:53

## 2021-09-26 RX ADMIN — Medication 10 MILLIGRAM(S): at 18:10

## 2021-09-26 RX ADMIN — Medication 0.12 MILLIGRAM(S): at 11:46

## 2021-09-26 RX ADMIN — Medication 60 MILLIGRAM(S): at 05:09

## 2021-09-26 RX ADMIN — HEPARIN SODIUM 5000 UNIT(S): 5000 INJECTION INTRAVENOUS; SUBCUTANEOUS at 21:52

## 2021-09-26 RX ADMIN — LIDOCAINE 1 PATCH: 4 CREAM TOPICAL at 11:47

## 2021-09-26 RX ADMIN — Medication 20 MILLIGRAM(S): at 05:11

## 2021-09-26 RX ADMIN — BUPROPION HYDROCHLORIDE 150 MILLIGRAM(S): 150 TABLET, EXTENDED RELEASE ORAL at 11:46

## 2021-09-26 RX ADMIN — HEPARIN SODIUM 5000 UNIT(S): 5000 INJECTION INTRAVENOUS; SUBCUTANEOUS at 05:12

## 2021-09-26 RX ADMIN — SPIRONOLACTONE 50 MILLIGRAM(S): 25 TABLET, FILM COATED ORAL at 05:10

## 2021-09-26 RX ADMIN — LACTULOSE 20 GRAM(S): 10 SOLUTION ORAL at 14:45

## 2021-09-26 RX ADMIN — BUDESONIDE AND FORMOTEROL FUMARATE DIHYDRATE 2 PUFF(S): 160; 4.5 AEROSOL RESPIRATORY (INHALATION) at 08:37

## 2021-09-26 RX ADMIN — Medication 1 SPRAY(S): at 18:10

## 2021-09-26 RX ADMIN — HEPARIN SODIUM 5000 UNIT(S): 5000 INJECTION INTRAVENOUS; SUBCUTANEOUS at 14:46

## 2021-09-26 RX ADMIN — Medication 10 MILLIGRAM(S): at 05:10

## 2021-09-26 RX ADMIN — Medication 0.6 MILLIGRAM(S): at 11:46

## 2021-09-26 RX ADMIN — OXYCODONE AND ACETAMINOPHEN 2 TABLET(S): 5; 325 TABLET ORAL at 11:41

## 2021-09-26 RX ADMIN — Medication 50 MILLIGRAM(S): at 11:46

## 2021-09-26 RX ADMIN — Medication 20 MILLIGRAM(S): at 19:32

## 2021-09-26 RX ADMIN — Medication 300 MILLIGRAM(S): at 21:52

## 2021-09-26 RX ADMIN — Medication 1 MILLIGRAM(S): at 11:46

## 2021-09-26 RX ADMIN — Medication 20 MILLIGRAM(S): at 21:52

## 2021-09-26 RX ADMIN — SPIRONOLACTONE 50 MILLIGRAM(S): 25 TABLET, FILM COATED ORAL at 19:33

## 2021-09-26 RX ADMIN — Medication 1 SPRAY(S): at 05:13

## 2021-09-26 RX ADMIN — Medication 100 MILLIGRAM(S): at 21:52

## 2021-09-26 NOTE — PROGRESS NOTE ADULT - SUBJECTIVE AND OBJECTIVE BOX
BERNARD SPEARS  71y, Female  Allergy: adhesives (Pruritus; Rash)  No Known Drug Allergies    Hospital Day: 5d    Patient seen and examined earlier today. Feeling well, still constipated, considering enema.     PMH/PSH:  PAST MEDICAL & SURGICAL HISTORY:  History of neck pain    Spinal stenosis of lumbar region with radiculopathy    Anxiety    Depression    Osteoarthritis    H/O osteoporosis    History of pulmonary hypertension    H/O pulmonary hypertension    COPD (chronic obstructive pulmonary disease)    Hyperlipidemia    H/O total knee replacement, right    Failed spinal cord stimulator        LAST 24-Hr EVENTS:    VITALS:  T(F): 98.2 (09-26-21 @ 13:54), Max: 98.2 (09-26-21 @ 13:54)  HR: 62 (09-26-21 @ 13:54)  BP: 98/49 (09-26-21 @ 13:54) (98/49 - 112/57)  RR: 18 (09-26-21 @ 13:54)  SpO2: --        TESTS & MEASUREMENTS:  Weight (Kg):       09-24-21 @ 07:01  -  09-25-21 @ 07:00  --------------------------------------------------------  IN: 0 mL / OUT: 2400 mL / NET: -2400 mL    09-25-21 @ 07:01  -  09-26-21 @ 07:00  --------------------------------------------------------  IN: 0 mL / OUT: 1200 mL / NET: -1200 mL    09-26-21 @ 07:01  -  09-26-21 @ 15:19  --------------------------------------------------------  IN: 0 mL / OUT: 1300 mL / NET: -1300 mL                            8.8    7.62  )-----------( 213      ( 26 Sep 2021 07:06 )             26.7       09-26    137  |  102  |  38<H>  ----------------------------<  110<H>  4.6   |  23  |  1.3    Ca    8.8      26 Sep 2021 07:06  Mg     1.8     09-26                      Serum Pro-Brain Natriuretic Peptide: 1185 pg/mL (09-21-21 @ 15:41)    COVID-19 PCR: NotDetec (09-24-21 @ 15:30)  COVID-19 PCR: NotDetec (09-21-21 @ 15:27)      RADIOLOGY, ECG, & ADDITIONAL TESTS:      RECENT DIAGNOSTIC ORDERS:      MEDICATIONS:  MEDICATIONS  (STANDING):  acetaminophen   Tablet .. 650 milliGRAM(s) Oral once  allopurinol 300 milliGRAM(s) Oral at bedtime  ATENolol  Tablet 50 milliGRAM(s) Oral daily  atorvastatin 80 milliGRAM(s) Oral at bedtime  budesonide 160 MICROgram(s)/formoterol 4.5 MICROgram(s) Inhaler 2 Puff(s) Inhalation two times a day  buPROPion XL (24-Hour) . 150 milliGRAM(s) Oral daily  cholecalciferol 400 Unit(s) Oral daily  colchicine 0.6 milliGRAM(s) Oral daily  FLUoxetine 20 milliGRAM(s) Oral two times a day  fluticasone propionate 50 MICROgram(s)/spray Nasal Spray 1 Spray(s) Both Nostrils two times a day  folic acid 1 milliGRAM(s) Oral daily  furosemide    Tablet 20 milliGRAM(s) Oral daily  heparin   Injectable 5000 Unit(s) SubCutaneous every 8 hours  hyoscyamine SL 0.125 milliGRAM(s) SubLingual daily  influenza   Vaccine 0.5 milliLiter(s) IntraMuscular once  lidocaine   4% Patch 1 Patch Transdermal daily  multivitamin 1 Tablet(s) Oral daily  NIFEdipine XL 60 milliGRAM(s) Oral daily  polyethylene glycol 3350 17 Gram(s) Oral two times a day  predniSONE   Tablet 10 milliGRAM(s) Oral two times a day  primidone 50 milliGRAM(s) Oral at bedtime  promethazine 50 milliGRAM(s) Oral daily  senna 2 Tablet(s) Oral at bedtime  sildenafil (REVATIO) 20 milliGRAM(s) Oral three times a day  spironolactone 50 milliGRAM(s) Oral daily  traZODone 100 milliGRAM(s) Oral at bedtime  valACYclovir 500 milliGRAM(s) Oral at bedtime    MEDICATIONS  (PRN):  diazepam    Tablet 10 milliGRAM(s) Oral three times a day PRN anxiety  fentaNYL   Patch  25 MICROgram(s)/Hr 1 Patch Transdermal every 72 hours PRN pain  magnesium hydroxide Suspension 30 milliLiter(s) Oral daily PRN Constipation  oxycodone    5 mG/acetaminophen 325 mG 2 Tablet(s) Oral every 6 hours PRN Severe Pain (7 - 10)      HOME MEDICATIONS:  Advair Diskus 500 mcg-50 mcg inhalation powder (09-22)  allopurinol 300 mg oral tablet (09-22)  atenolol 50 mg oral tablet (09-22)  colchicine 0.6 mg oral capsule (09-22)  fentanyl topical 25 mcg/hr transdermal film, extended release (obsolete) (09-22)  FLUoxetine 20 mg oral capsule (09-22)  fluticasone 50 mcg/inh inhalation powder (09-22)  folic acid 1 mg oral tablet (09-22)  Fosamax 70 mg oral tablet (09-22)  furosemide 20 mg oral tablet (09-22)  hyoscyamine 0.125 mg oral tablet (09-22)  Lidoderm 5% topical film (09-22)  Lipitor 80 mg oral tablet (09-22)  magnesium carbonate 250 mg oral capsule (09-22)  oxycodone-acetaminophen 5 mg-325 mg oral tablet (09-22)  predniSONE 10 mg oral tablet (09-22)  primidone 50 mg oral tablet (09-22)  Procardia XL 60 mg oral tablet, extended release (09-22)  promethazine 50 mg oral tablet (09-22)  sildenafil 20 mg oral tablet (09-22)  spironolactone 50 mg oral tablet (09-22)  traZODone 100 mg oral tablet (09-22)  Valium 10 mg oral tablet (09-22)  Valtrex 500 mg oral tablet (09-22)  Vitamin D3 400 intl units (10 mcg) oral tablet (09-22)  Wellbutrin (09-22)      PHYSICAL EXAM:  GENERAL: NAD, well-developed, AAOx3  HEENT:  Atraumatic, Normocephalic. EOMI, PERRLA, conjunctiva and sclera clear, No JVD  PULMONARY: Clear to auscultation bilaterally; No wheeze  CARDIOVASCULAR: Regular rate and rhythm; No murmurs, rubs, or gallops  GASTROINTESTINAL: Soft, Nontender, Nondistended; Bowel sounds present  MUSCULOSKELETAL:  2+ Peripheral Pulses, L ankle bandaged   NEUROLOGY: non-focal  SKIN: No rashes or lesions

## 2021-09-26 NOTE — PROGRESS NOTE ADULT - ASSESSMENT
Orthopaedic Surgery Progress Note    SUBJECTIVE  No acute events overnight.   Pt was seen and examined by the orthopedic surgery team during morning rounds. Doing well. Normal Vitals. Tolerating diet. Making adequate urine.   Denies n/v, abdominal pain, diarrhea or any other major complaints.    MEDS  MEDICATIONS  (STANDING):  acetaminophen   Tablet .. 650 milliGRAM(s) Oral once  allopurinol 300 milliGRAM(s) Oral at bedtime  ATENolol  Tablet 50 milliGRAM(s) Oral daily  atorvastatin 80 milliGRAM(s) Oral at bedtime  budesonide 160 MICROgram(s)/formoterol 4.5 MICROgram(s) Inhaler 2 Puff(s) Inhalation two times a day  buPROPion XL (24-Hour) . 150 milliGRAM(s) Oral daily  cholecalciferol 400 Unit(s) Oral daily  colchicine 0.6 milliGRAM(s) Oral daily  FLUoxetine 20 milliGRAM(s) Oral two times a day  fluticasone propionate 50 MICROgram(s)/spray Nasal Spray 1 Spray(s) Both Nostrils two times a day  folic acid 1 milliGRAM(s) Oral daily  furosemide    Tablet 20 milliGRAM(s) Oral daily  heparin   Injectable 5000 Unit(s) SubCutaneous every 8 hours  hyoscyamine SL 0.125 milliGRAM(s) SubLingual daily  influenza   Vaccine 0.5 milliLiter(s) IntraMuscular once  lidocaine   4% Patch 1 Patch Transdermal daily  multivitamin 1 Tablet(s) Oral daily  NIFEdipine XL 60 milliGRAM(s) Oral daily  polyethylene glycol 3350 17 Gram(s) Oral two times a day  predniSONE   Tablet 10 milliGRAM(s) Oral two times a day  primidone 50 milliGRAM(s) Oral at bedtime  promethazine 50 milliGRAM(s) Oral daily  senna 2 Tablet(s) Oral at bedtime  sildenafil (REVATIO) 20 milliGRAM(s) Oral three times a day  spironolactone 50 milliGRAM(s) Oral daily  traZODone 100 milliGRAM(s) Oral at bedtime  valACYclovir 500 milliGRAM(s) Oral at bedtime    MEDICATIONS  (PRN):  diazepam    Tablet 10 milliGRAM(s) Oral three times a day PRN anxiety  fentaNYL   Patch  25 MICROgram(s)/Hr 1 Patch Transdermal every 72 hours PRN pain  magnesium hydroxide Suspension 30 milliLiter(s) Oral daily PRN Constipation  oxycodone    5 mG/acetaminophen 325 mG 2 Tablet(s) Oral every 6 hours PRN Severe Pain (7 - 10)    --------------------------------------    T(C): 36.1 (09-26-21 @ 05:59), Max: 37.1 (09-25-21 @ 12:51)  HR: 62 (09-26-21 @ 05:59) (62 - 72)  BP: 112/57 (09-26-21 @ 05:59) (97/50 - 112/57)  RR: 18 (09-26-21 @ 05:59) (18 - 20)  SpO2: --    P/E:  AOx3, NAD  Nonlabored breathing    LLE  SPlint in place, c/d/i  SILT sp/dp/t/sural/saph  Firing quads/hamstrings/ta/ehl/fhl/gs  DP pulse 2+ /  cap refill <2    --------------------------------------    Labs                        8.9    7.35  )-----------( 222      ( 25 Sep 2021 04:30 )             27.3     09-25    137  |  103  |  35<H>  ----------------------------<  123<H>  4.7   |  23  |  1.3    Ca    8.9      25 Sep 2021 04:30  Mg     2.0     09-25            --------------------------------------    A/P:   70yo Female POD#3 s/p left ankle ORIF, doing well postop.     Weightbearing status: NWB LLE  - OOBTC twice per day w/ assistance  Pain control: per protocol  DVT ppx: scd's + HSQ  Labs/Drains: trend H/H  - Repeat labs in AM  Dressing changes: to be performed by the orthopaedic surgery service  Home Meds: please reinstate as appropriate    --------------------------------------    Disposition:   Rehab per PT evaluation  --------------------------------------

## 2021-09-26 NOTE — PROGRESS NOTE ADULT - ASSESSMENT
72 yo F with PMHx of COPD, Asthma uses home oxygen PRN, chronic back pain, cervical radiculopathy, Pulmonary Hypertension, Gout, DLD, HTN, CKD 3, recent L ankle fx presents for ankle fx repair.    #L medial Malleolar Fx, Distal Fibular Fx  - Had initially experienced fall earlier in September, presented to hospital and was found to have L bimalleolar ankle fx s/p closed reduction  - On follow up as outpt, found to have persistent fx and was scheduled for repair  - Was unable to obtain Pulmonary, Cardiac clearance due to physical dysfunction  - s/p cardiac/pulm/medicine risk stratification   - echo ordered - EF 59% and mild PH   - Ortho following s/p ORIF, POD 3  - plan for dc to rehab     #Urinary Retention   - post op retention   - madrid catheter in place, TOV failed x2     #Chronic Back Pain  #Hx of Cervical Radiculopathy  - Sees pain management, Dr. Mayers  - C/w Fentanyl patch PRN, Percocet PRN    #Gout  - C/w Allopurinol    #Pulmonary HTN  - C/w Sildenafil    #HTN  - C/w Atenolol, Nifedipine    #DLD  - C/w Atorvastatin    #COPD, Asthma on home O2 2L  #MARLENI  - C/w Symbicort    #Anxiety  - C/w Wellbutrin  - Valium PRN    #CKD 3  - Stable, sees Dr. Mcarthur    Heparin sq     #Progress Note Handoff:  Pending (specify): rehab placment  Family discussion: d/w patient   Disposition: Home___/SNF___/Other________/Unknown at this time___x_____    Pinky Padron DO .

## 2021-09-27 LAB
ANION GAP SERPL CALC-SCNC: 15 MMOL/L — HIGH (ref 7–14)
BASOPHILS # BLD AUTO: 0.03 K/UL — SIGNIFICANT CHANGE UP (ref 0–0.2)
BASOPHILS NFR BLD AUTO: 0.3 % — SIGNIFICANT CHANGE UP (ref 0–1)
BUN SERPL-MCNC: 32 MG/DL — HIGH (ref 10–20)
CALCIUM SERPL-MCNC: 9.1 MG/DL — SIGNIFICANT CHANGE UP (ref 8.5–10.1)
CHLORIDE SERPL-SCNC: 97 MMOL/L — LOW (ref 98–110)
CO2 SERPL-SCNC: 23 MMOL/L — SIGNIFICANT CHANGE UP (ref 17–32)
CREAT SERPL-MCNC: 1.2 MG/DL — SIGNIFICANT CHANGE UP (ref 0.7–1.5)
EOSINOPHIL # BLD AUTO: 0.04 K/UL — SIGNIFICANT CHANGE UP (ref 0–0.7)
EOSINOPHIL NFR BLD AUTO: 0.4 % — SIGNIFICANT CHANGE UP (ref 0–8)
GLUCOSE SERPL-MCNC: 122 MG/DL — HIGH (ref 70–99)
HCT VFR BLD CALC: 30.1 % — LOW (ref 37–47)
HGB BLD-MCNC: 10 G/DL — LOW (ref 12–16)
IMM GRANULOCYTES NFR BLD AUTO: 1.2 % — HIGH (ref 0.1–0.3)
LYMPHOCYTES # BLD AUTO: 1.44 K/UL — SIGNIFICANT CHANGE UP (ref 1.2–3.4)
LYMPHOCYTES # BLD AUTO: 15.3 % — LOW (ref 20.5–51.1)
MAGNESIUM SERPL-MCNC: 1.8 MG/DL — SIGNIFICANT CHANGE UP (ref 1.8–2.4)
MCHC RBC-ENTMCNC: 33.2 G/DL — SIGNIFICANT CHANGE UP (ref 32–37)
MCHC RBC-ENTMCNC: 36 PG — HIGH (ref 27–31)
MCV RBC AUTO: 108.3 FL — HIGH (ref 81–99)
MONOCYTES # BLD AUTO: 0.77 K/UL — HIGH (ref 0.1–0.6)
MONOCYTES NFR BLD AUTO: 8.2 % — SIGNIFICANT CHANGE UP (ref 1.7–9.3)
NEUTROPHILS # BLD AUTO: 7.05 K/UL — HIGH (ref 1.4–6.5)
NEUTROPHILS NFR BLD AUTO: 74.6 % — SIGNIFICANT CHANGE UP (ref 42.2–75.2)
NRBC # BLD: 0 /100 WBCS — SIGNIFICANT CHANGE UP (ref 0–0)
PLATELET # BLD AUTO: 186 K/UL — SIGNIFICANT CHANGE UP (ref 130–400)
POTASSIUM SERPL-MCNC: 4.2 MMOL/L — SIGNIFICANT CHANGE UP (ref 3.5–5)
POTASSIUM SERPL-SCNC: 4.2 MMOL/L — SIGNIFICANT CHANGE UP (ref 3.5–5)
RBC # BLD: 2.78 M/UL — LOW (ref 4.2–5.4)
RBC # FLD: 13.2 % — SIGNIFICANT CHANGE UP (ref 11.5–14.5)
SARS-COV-2 RNA SPEC QL NAA+PROBE: SIGNIFICANT CHANGE UP
SODIUM SERPL-SCNC: 135 MMOL/L — SIGNIFICANT CHANGE UP (ref 135–146)
WBC # BLD: 9.44 K/UL — SIGNIFICANT CHANGE UP (ref 4.8–10.8)
WBC # FLD AUTO: 9.44 K/UL — SIGNIFICANT CHANGE UP (ref 4.8–10.8)

## 2021-09-27 PROCEDURE — 99231 SBSQ HOSP IP/OBS SF/LOW 25: CPT

## 2021-09-27 RX ADMIN — SPIRONOLACTONE 50 MILLIGRAM(S): 25 TABLET, FILM COATED ORAL at 06:01

## 2021-09-27 RX ADMIN — Medication 400 UNIT(S): at 11:04

## 2021-09-27 RX ADMIN — POLYETHYLENE GLYCOL 3350 17 GRAM(S): 17 POWDER, FOR SOLUTION ORAL at 06:01

## 2021-09-27 RX ADMIN — Medication 10 MILLIGRAM(S): at 17:46

## 2021-09-27 RX ADMIN — Medication 20 MILLIGRAM(S): at 06:01

## 2021-09-27 RX ADMIN — BUPROPION HYDROCHLORIDE 150 MILLIGRAM(S): 150 TABLET, EXTENDED RELEASE ORAL at 11:04

## 2021-09-27 RX ADMIN — Medication 1 TABLET(S): at 11:04

## 2021-09-27 RX ADMIN — Medication 0.12 MILLIGRAM(S): at 11:04

## 2021-09-27 RX ADMIN — Medication 10 MILLIGRAM(S): at 06:01

## 2021-09-27 RX ADMIN — Medication 20 MILLIGRAM(S): at 17:46

## 2021-09-27 RX ADMIN — OXYCODONE AND ACETAMINOPHEN 2 TABLET(S): 5; 325 TABLET ORAL at 21:31

## 2021-09-27 RX ADMIN — Medication 300 MILLIGRAM(S): at 21:23

## 2021-09-27 RX ADMIN — Medication 50 MILLIGRAM(S): at 11:04

## 2021-09-27 RX ADMIN — Medication 1 SPRAY(S): at 17:45

## 2021-09-27 RX ADMIN — Medication 60 MILLIGRAM(S): at 06:01

## 2021-09-27 RX ADMIN — VALACYCLOVIR 500 MILLIGRAM(S): 500 TABLET, FILM COATED ORAL at 21:22

## 2021-09-27 RX ADMIN — LIDOCAINE 1 PATCH: 4 CREAM TOPICAL at 11:05

## 2021-09-27 RX ADMIN — PRIMIDONE 50 MILLIGRAM(S): 250 TABLET ORAL at 21:23

## 2021-09-27 RX ADMIN — Medication 1 SPRAY(S): at 06:01

## 2021-09-27 RX ADMIN — HEPARIN SODIUM 5000 UNIT(S): 5000 INJECTION INTRAVENOUS; SUBCUTANEOUS at 21:24

## 2021-09-27 RX ADMIN — OXYCODONE AND ACETAMINOPHEN 2 TABLET(S): 5; 325 TABLET ORAL at 11:35

## 2021-09-27 RX ADMIN — Medication 0.6 MILLIGRAM(S): at 11:04

## 2021-09-27 RX ADMIN — HEPARIN SODIUM 5000 UNIT(S): 5000 INJECTION INTRAVENOUS; SUBCUTANEOUS at 13:39

## 2021-09-27 RX ADMIN — OXYCODONE AND ACETAMINOPHEN 2 TABLET(S): 5; 325 TABLET ORAL at 22:01

## 2021-09-27 RX ADMIN — OXYCODONE AND ACETAMINOPHEN 2 TABLET(S): 5; 325 TABLET ORAL at 11:05

## 2021-09-27 RX ADMIN — Medication 20 MILLIGRAM(S): at 21:24

## 2021-09-27 RX ADMIN — BUDESONIDE AND FORMOTEROL FUMARATE DIHYDRATE 2 PUFF(S): 160; 4.5 AEROSOL RESPIRATORY (INHALATION) at 08:03

## 2021-09-27 RX ADMIN — HEPARIN SODIUM 5000 UNIT(S): 5000 INJECTION INTRAVENOUS; SUBCUTANEOUS at 06:01

## 2021-09-27 RX ADMIN — ATENOLOL 50 MILLIGRAM(S): 25 TABLET ORAL at 06:01

## 2021-09-27 RX ADMIN — LIDOCAINE 1 PATCH: 4 CREAM TOPICAL at 20:24

## 2021-09-27 RX ADMIN — Medication 100 MILLIGRAM(S): at 21:23

## 2021-09-27 RX ADMIN — Medication 20 MILLIGRAM(S): at 13:39

## 2021-09-27 RX ADMIN — ATORVASTATIN CALCIUM 80 MILLIGRAM(S): 80 TABLET, FILM COATED ORAL at 21:23

## 2021-09-27 RX ADMIN — Medication 1 MILLIGRAM(S): at 11:04

## 2021-09-27 NOTE — PROGRESS NOTE ADULT - SUBJECTIVE AND OBJECTIVE BOX
BERNARD SPEARS  71y, Female  Allergy: adhesives (Pruritus; Rash)  No Known Drug Allergies    Hospital Day: 6d    Patient seen and examined earlier today. Endorses some abdominal distention, needs to have a BM, has been constipated.     PMH/PSH:  PAST MEDICAL & SURGICAL HISTORY:  History of neck pain    Spinal stenosis of lumbar region with radiculopathy    Anxiety    Depression    Osteoarthritis    H/O osteoporosis    History of pulmonary hypertension    H/O pulmonary hypertension    COPD (chronic obstructive pulmonary disease)    Hyperlipidemia    H/O total knee replacement, right    Failed spinal cord stimulator        LAST 24-Hr EVENTS:    VITALS:  T(F): 98.1 (09-27-21 @ 13:03), Max: 98.1 (09-27-21 @ 13:03)  HR: 64 (09-27-21 @ 13:03)  BP: 137/61 (09-27-21 @ 13:03) (115/57 - 137/61)  RR: 18 (09-27-21 @ 13:03)  SpO2: --        TESTS & MEASUREMENTS:  Weight (Kg):       09-25-21 @ 07:01  -  09-26-21 @ 07:00  --------------------------------------------------------  IN: 0 mL / OUT: 1200 mL / NET: -1200 mL    09-26-21 @ 07:01  -  09-27-21 @ 07:00  --------------------------------------------------------  IN: 0 mL / OUT: 1700 mL / NET: -1700 mL                            10.0   9.44  )-----------( 186      ( 27 Sep 2021 04:30 )             30.1       09-27    135  |  97<L>  |  32<H>  ----------------------------<  122<H>  4.2   |  23  |  1.2    Ca    9.1      27 Sep 2021 04:30  Mg     1.8     09-27                      Serum Pro-Brain Natriuretic Peptide: 1185 pg/mL (09-21-21 @ 15:41)    COVID-19 PCR: NotDetec (09-24-21 @ 15:30)  COVID-19 PCR: NotDetec (09-21-21 @ 15:27)      RADIOLOGY, ECG, & ADDITIONAL TESTS:      RECENT DIAGNOSTIC ORDERS:  COVID-19 PCR: Routine  Specimen Source: Nasopharyngeal (09-27-21 @ 14:49)      MEDICATIONS:  MEDICATIONS  (STANDING):  acetaminophen   Tablet .. 650 milliGRAM(s) Oral once  allopurinol 300 milliGRAM(s) Oral at bedtime  ATENolol  Tablet 50 milliGRAM(s) Oral daily  atorvastatin 80 milliGRAM(s) Oral at bedtime  budesonide 160 MICROgram(s)/formoterol 4.5 MICROgram(s) Inhaler 2 Puff(s) Inhalation two times a day  buPROPion XL (24-Hour) . 150 milliGRAM(s) Oral daily  cholecalciferol 400 Unit(s) Oral daily  colchicine 0.6 milliGRAM(s) Oral daily  FLUoxetine 20 milliGRAM(s) Oral two times a day  fluticasone propionate 50 MICROgram(s)/spray Nasal Spray 1 Spray(s) Both Nostrils two times a day  folic acid 1 milliGRAM(s) Oral daily  furosemide    Tablet 20 milliGRAM(s) Oral daily  heparin   Injectable 5000 Unit(s) SubCutaneous every 8 hours  hyoscyamine SL 0.125 milliGRAM(s) SubLingual daily  influenza   Vaccine 0.5 milliLiter(s) IntraMuscular once  lidocaine   4% Patch 1 Patch Transdermal daily  multivitamin 1 Tablet(s) Oral daily  NIFEdipine XL 60 milliGRAM(s) Oral daily  polyethylene glycol 3350 17 Gram(s) Oral two times a day  predniSONE   Tablet 10 milliGRAM(s) Oral two times a day  primidone 50 milliGRAM(s) Oral at bedtime  promethazine 50 milliGRAM(s) Oral daily  senna 2 Tablet(s) Oral at bedtime  sildenafil (REVATIO) 20 milliGRAM(s) Oral three times a day  spironolactone 50 milliGRAM(s) Oral daily  traZODone 100 milliGRAM(s) Oral at bedtime  valACYclovir 500 milliGRAM(s) Oral at bedtime    MEDICATIONS  (PRN):  diazepam    Tablet 10 milliGRAM(s) Oral three times a day PRN anxiety  fentaNYL   Patch  25 MICROgram(s)/Hr 1 Patch Transdermal every 72 hours PRN pain  magnesium hydroxide Suspension 30 milliLiter(s) Oral daily PRN Constipation  oxycodone    5 mG/acetaminophen 325 mG 2 Tablet(s) Oral every 6 hours PRN Severe Pain (7 - 10)      HOME MEDICATIONS:  Advair Diskus 500 mcg-50 mcg inhalation powder (09-22)  allopurinol 300 mg oral tablet (09-22)  atenolol 50 mg oral tablet (09-22)  colchicine 0.6 mg oral capsule (09-22)  fentanyl topical 25 mcg/hr transdermal film, extended release (obsolete) (09-22)  FLUoxetine 20 mg oral capsule (09-22)  fluticasone 50 mcg/inh inhalation powder (09-22)  folic acid 1 mg oral tablet (09-22)  Fosamax 70 mg oral tablet (09-22)  furosemide 20 mg oral tablet (09-22)  hyoscyamine 0.125 mg oral tablet (09-22)  Lidoderm 5% topical film (09-22)  Lipitor 80 mg oral tablet (09-22)  magnesium carbonate 250 mg oral capsule (09-22)  oxycodone-acetaminophen 5 mg-325 mg oral tablet (09-22)  predniSONE 10 mg oral tablet (09-22)  primidone 50 mg oral tablet (09-22)  Procardia XL 60 mg oral tablet, extended release (09-22)  promethazine 50 mg oral tablet (09-22)  sildenafil 20 mg oral tablet (09-22)  spironolactone 50 mg oral tablet (09-22)  traZODone 100 mg oral tablet (09-22)  Valium 10 mg oral tablet (09-22)  Valtrex 500 mg oral tablet (09-22)  Vitamin D3 400 intl units (10 mcg) oral tablet (09-22)  Wellbutrin (09-22)      PHYSICAL EXAM:  GENERAL: NAD, well-developed, AAOx3  HEENT:  Atraumatic, Normocephalic. EOMI, PERRLA, conjunctiva and sclera clear, No JVD  PULMONARY: Clear to auscultation bilaterally; No wheeze  CARDIOVASCULAR: Regular rate and rhythm; No murmurs, rubs, or gallops  GASTROINTESTINAL: Soft, distended, nontender, BS present   MUSCULOSKELETAL:  2+ Peripheral Pulses, L ankle bandaged   NEUROLOGY: non-focal  SKIN: No rashes or lesions

## 2021-09-27 NOTE — PROGRESS NOTE ADULT - SUBJECTIVE AND OBJECTIVE BOX
BERNARD SPEARS  71y, Female  Allergy: adhesives (Pruritus; Rash)  No Known Drug Allergies    Hospital Day: 6d    Patient seen and examined earlier today. Feeling well, still constipated. Abdomen is distended and firm.     PMH/PSH:  PAST MEDICAL & SURGICAL HISTORY:  History of neck pain    Spinal stenosis of lumbar region with radiculopathy    Anxiety    Depression    Osteoarthritis    H/O osteoporosis    History of pulmonary hypertension    H/O pulmonary hypertension    COPD (chronic obstructive pulmonary disease)    Hyperlipidemia    H/O total knee replacement, right    Failed spinal cord stimulator      Vitals:  ICU Vital Signs Last 24 Hrs  T(C): 36.1 (27 Sep 2021 05:59), Max: 36.8 (26 Sep 2021 13:54)  T(F): 97 (27 Sep 2021 05:59), Max: 98.2 (26 Sep 2021 13:54)  HR: 65 (27 Sep 2021 05:59) (62 - 66)  BP: 121/59 (27 Sep 2021 05:59) (98/49 - 121/59)  RR: 18 (27 Sep 2021 05:59) (18 - 18)    PHYSICAL EXAM:  GENERAL: NAD, well-developed, AAOx3  HEENT:  Atraumatic, Normocephalic. EOMI, PERRLA, conjunctiva and sclera clear, No JVD  PULMONARY: Clear to auscultation bilaterally; No wheeze  CARDIOVASCULAR: Regular rate and rhythm; No murmurs, rubs, or gallops  GASTROINTESTINAL: Soft, Nontender, Distended; Bowel sounds present  MUSCULOSKELETAL:  2+ Peripheral Pulses, L ankle bandaged   NEUROLOGY: non-focal  SKIN: No rashes or lesions    Labs:                        10.0   9.44  )-----------( 186      ( 27 Sep 2021 04:30 )             30.1   09-27    135  |  97<L>  |  32<H>  ----------------------------<  122<H>  4.2   |  23  |  1.2    Ca    9.1      27 Sep 2021 04:30  Mg     1.8     09-27    I&O's Detail    26 Sep 2021 07:01  -  27 Sep 2021 07:00  --------------------------------------------------------  IN:  Total IN: 0 mL    OUT:    Indwelling Catheter - Urethral (mL): 1700 mL  Total OUT: 1700 mL    Total NET: -1700 mL    Serum Pro-Brain Natriuretic Peptide (09.21.21 @ 15:41)   Serum Pro-Brain Natriuretic Peptide: 1185 pg/mL     MEDICATIONS  (STANDING):  acetaminophen   Tablet .. 650 milliGRAM(s) Oral once  allopurinol 300 milliGRAM(s) Oral at bedtime  ATENolol  Tablet 50 milliGRAM(s) Oral daily  atorvastatin 80 milliGRAM(s) Oral at bedtime  budesonide 160 MICROgram(s)/formoterol 4.5 MICROgram(s) Inhaler 2 Puff(s) Inhalation two times a day  buPROPion XL (24-Hour) . 150 milliGRAM(s) Oral daily  cholecalciferol 400 Unit(s) Oral daily  colchicine 0.6 milliGRAM(s) Oral daily  FLUoxetine 20 milliGRAM(s) Oral two times a day  fluticasone propionate 50 MICROgram(s)/spray Nasal Spray 1 Spray(s) Both Nostrils two times a day  folic acid 1 milliGRAM(s) Oral daily  furosemide    Tablet 20 milliGRAM(s) Oral daily  heparin   Injectable 5000 Unit(s) SubCutaneous every 8 hours  hyoscyamine SL 0.125 milliGRAM(s) SubLingual daily  influenza   Vaccine 0.5 milliLiter(s) IntraMuscular once  lidocaine   4% Patch 1 Patch Transdermal daily  multivitamin 1 Tablet(s) Oral daily  NIFEdipine XL 60 milliGRAM(s) Oral daily  polyethylene glycol 3350 17 Gram(s) Oral two times a day  predniSONE   Tablet 10 milliGRAM(s) Oral two times a day  primidone 50 milliGRAM(s) Oral at bedtime  promethazine 50 milliGRAM(s) Oral daily  senna 2 Tablet(s) Oral at bedtime  sildenafil (REVATIO) 20 milliGRAM(s) Oral three times a day  spironolactone 50 milliGRAM(s) Oral daily  traZODone 100 milliGRAM(s) Oral at bedtime  valACYclovir 500 milliGRAM(s) Oral at bedtime    MEDICATIONS  (PRN):  diazepam    Tablet 10 milliGRAM(s) Oral three times a day PRN anxiety  fentaNYL   Patch  25 MICROgram(s)/Hr 1 Patch Transdermal every 72 hours PRN pain  magnesium hydroxide Suspension 30 milliLiter(s) Oral daily PRN Constipation  oxycodone    5 mG/acetaminophen 325 mG 2 Tablet(s) Oral every 6 hours PRN Severe Pain (7 - 10)       Prophylactic measure

## 2021-09-27 NOTE — PROGRESS NOTE ADULT - ASSESSMENT
2 yo F with PMHx of COPD, Asthma uses home oxygen PRN, chronic back pain, cervical radiculopathy, Pulmonary Hypertension, Gout, DLD, HTN, CKD 3, recent L ankle fx presents for ankle fx repair.    #L medial Malleolar Fx, Distal Fibular Fx  - Had initially experienced fall earlier in September, presented to hospital and was found to have L bimalleolar ankle fx s/p closed reduction  - On follow up as outpt, found to have persistent fx and was scheduled for repair  - Was unable to obtain Pulmonary, Cardiac clearance due to physical dysfunction  - s/p cardiac/pulm/medicine risk stratification   - echo ordered - EF 59% and mild PH   - Ortho following s/p ORIF, POD 4  - plan for dc to rehab     #Urinary Retention   - post op retention   - madrid catheter in place, TOV failed x2     #Constipation  - no bowel mvmts, pt's abdomen is distended and firm  - enema given last night, pt offered another today  - will continue to monitor for bowel mvmts    #Chronic Back Pain  #Hx of Cervical Radiculopathy  - Sees pain management, Dr. Mayers  - C/w Fentanyl patch PRN, Percocet PRN    #Gout  - C/w Allopurinol    #Pulmonary HTN  - C/w Sildenafil    #HTN  - C/w Atenolol, Nifedipine    #DLD  - C/w Atorvastatin    #COPD, Asthma on home O2 2L  #MARLENI  - C/w Symbicort    #Anxiety  - C/w Wellbutrin  - Valium PRN    #CKD 3  - Stable, sees Dr. Mcarthur    Heparin sq     #Progress Note Handoff:  Pending (specify): rehab placment

## 2021-09-27 NOTE — PROGRESS NOTE ADULT - ASSESSMENT
72 yo F with PMHx of COPD, Asthma uses home oxygen PRN, chronic back pain, cervical radiculopathy, Pulmonary Hypertension, Gout, DLD, HTN, CKD 3, recent L ankle fx presents for ankle fx repair.    #L medial Malleolar Fx, Distal Fibular Fx  - Had initially experienced fall earlier in September, presented to hospital and was found to have L bimalleolar ankle fx s/p closed reduction  - On follow up as outpt, found to have persistent fx and was scheduled for repair  - Was unable to obtain Pulmonary, Cardiac clearance due to physical dysfunction  - s/p cardiac/pulm/medicine risk stratification   - echo ordered - EF 59% and mild PH   - Ortho following s/p ORIF  - plan for dc to rehab     #Urinary Retention   - post op retention   - madrid catheter in place, TOV failed x2     #Chronic Back Pain  #Hx of Cervical Radiculopathy  - Sees pain management, Dr. Mayers  - C/w Fentanyl patch PRN, Percocet PRN    #Gout  - C/w Allopurinol    #Pulmonary HTN  - C/w Sildenafil    #HTN  - C/w Atenolol, Nifedipine    #DLD  - C/w Atorvastatin    #COPD, Asthma on home O2 2L  #MARLENI  - C/w Symbicort    #Anxiety  - C/w Wellbutrin  - Valium PRN    #CKD 3  - Stable, sees Dr. Mcarthur    Heparin sq     #Progress Note Handoff:  Pending (specify): rehab placment  Family discussion: d/w patient   Disposition: Home___/SNF___/Other________/Unknown at this time___x_____    Pinky Padron DO .

## 2021-09-28 LAB
ANION GAP SERPL CALC-SCNC: 14 MMOL/L — SIGNIFICANT CHANGE UP (ref 7–14)
BASOPHILS # BLD AUTO: 0.02 K/UL — SIGNIFICANT CHANGE UP (ref 0–0.2)
BASOPHILS NFR BLD AUTO: 0.3 % — SIGNIFICANT CHANGE UP (ref 0–1)
BUN SERPL-MCNC: 34 MG/DL — HIGH (ref 10–20)
CALCIUM SERPL-MCNC: 9 MG/DL — SIGNIFICANT CHANGE UP (ref 8.5–10.1)
CHLORIDE SERPL-SCNC: 97 MMOL/L — LOW (ref 98–110)
CO2 SERPL-SCNC: 24 MMOL/L — SIGNIFICANT CHANGE UP (ref 17–32)
CREAT SERPL-MCNC: 1.4 MG/DL — SIGNIFICANT CHANGE UP (ref 0.7–1.5)
EOSINOPHIL # BLD AUTO: 0.02 K/UL — SIGNIFICANT CHANGE UP (ref 0–0.7)
EOSINOPHIL NFR BLD AUTO: 0.3 % — SIGNIFICANT CHANGE UP (ref 0–8)
GLUCOSE SERPL-MCNC: 144 MG/DL — HIGH (ref 70–99)
HCT VFR BLD CALC: 28.4 % — LOW (ref 37–47)
HGB BLD-MCNC: 9.4 G/DL — LOW (ref 12–16)
IMM GRANULOCYTES NFR BLD AUTO: 1.7 % — HIGH (ref 0.1–0.3)
LYMPHOCYTES # BLD AUTO: 1.02 K/UL — LOW (ref 1.2–3.4)
LYMPHOCYTES # BLD AUTO: 13.4 % — LOW (ref 20.5–51.1)
MAGNESIUM SERPL-MCNC: 1.8 MG/DL — SIGNIFICANT CHANGE UP (ref 1.8–2.4)
MCHC RBC-ENTMCNC: 33.1 G/DL — SIGNIFICANT CHANGE UP (ref 32–37)
MCHC RBC-ENTMCNC: 35.5 PG — HIGH (ref 27–31)
MCV RBC AUTO: 107.2 FL — HIGH (ref 81–99)
MONOCYTES # BLD AUTO: 0.56 K/UL — SIGNIFICANT CHANGE UP (ref 0.1–0.6)
MONOCYTES NFR BLD AUTO: 7.3 % — SIGNIFICANT CHANGE UP (ref 1.7–9.3)
NEUTROPHILS # BLD AUTO: 5.89 K/UL — SIGNIFICANT CHANGE UP (ref 1.4–6.5)
NEUTROPHILS NFR BLD AUTO: 77 % — HIGH (ref 42.2–75.2)
NRBC # BLD: 0 /100 WBCS — SIGNIFICANT CHANGE UP (ref 0–0)
PLATELET # BLD AUTO: 223 K/UL — SIGNIFICANT CHANGE UP (ref 130–400)
POTASSIUM SERPL-MCNC: 4.5 MMOL/L — SIGNIFICANT CHANGE UP (ref 3.5–5)
POTASSIUM SERPL-SCNC: 4.5 MMOL/L — SIGNIFICANT CHANGE UP (ref 3.5–5)
RBC # BLD: 2.65 M/UL — LOW (ref 4.2–5.4)
RBC # FLD: 13.2 % — SIGNIFICANT CHANGE UP (ref 11.5–14.5)
SODIUM SERPL-SCNC: 135 MMOL/L — SIGNIFICANT CHANGE UP (ref 135–146)
WBC # BLD: 7.64 K/UL — SIGNIFICANT CHANGE UP (ref 4.8–10.8)
WBC # FLD AUTO: 7.64 K/UL — SIGNIFICANT CHANGE UP (ref 4.8–10.8)

## 2021-09-28 PROCEDURE — 99231 SBSQ HOSP IP/OBS SF/LOW 25: CPT

## 2021-09-28 RX ADMIN — Medication 20 MILLIGRAM(S): at 13:56

## 2021-09-28 RX ADMIN — Medication 1 SPRAY(S): at 17:47

## 2021-09-28 RX ADMIN — Medication 20 MILLIGRAM(S): at 17:45

## 2021-09-28 RX ADMIN — SPIRONOLACTONE 50 MILLIGRAM(S): 25 TABLET, FILM COATED ORAL at 06:02

## 2021-09-28 RX ADMIN — Medication 60 MILLIGRAM(S): at 06:02

## 2021-09-28 RX ADMIN — Medication 10 MILLIGRAM(S): at 17:46

## 2021-09-28 RX ADMIN — Medication 20 MILLIGRAM(S): at 06:02

## 2021-09-28 RX ADMIN — BUDESONIDE AND FORMOTEROL FUMARATE DIHYDRATE 2 PUFF(S): 160; 4.5 AEROSOL RESPIRATORY (INHALATION) at 08:35

## 2021-09-28 RX ADMIN — ATORVASTATIN CALCIUM 80 MILLIGRAM(S): 80 TABLET, FILM COATED ORAL at 21:52

## 2021-09-28 RX ADMIN — OXYCODONE AND ACETAMINOPHEN 2 TABLET(S): 5; 325 TABLET ORAL at 09:14

## 2021-09-28 RX ADMIN — OXYCODONE AND ACETAMINOPHEN 2 TABLET(S): 5; 325 TABLET ORAL at 17:44

## 2021-09-28 RX ADMIN — SENNA PLUS 2 TABLET(S): 8.6 TABLET ORAL at 21:52

## 2021-09-28 RX ADMIN — Medication 300 MILLIGRAM(S): at 21:51

## 2021-09-28 RX ADMIN — LIDOCAINE 1 PATCH: 4 CREAM TOPICAL at 19:08

## 2021-09-28 RX ADMIN — HEPARIN SODIUM 5000 UNIT(S): 5000 INJECTION INTRAVENOUS; SUBCUTANEOUS at 06:04

## 2021-09-28 RX ADMIN — HEPARIN SODIUM 5000 UNIT(S): 5000 INJECTION INTRAVENOUS; SUBCUTANEOUS at 21:52

## 2021-09-28 RX ADMIN — Medication 400 UNIT(S): at 12:09

## 2021-09-28 RX ADMIN — Medication 100 MILLIGRAM(S): at 21:53

## 2021-09-28 RX ADMIN — HEPARIN SODIUM 5000 UNIT(S): 5000 INJECTION INTRAVENOUS; SUBCUTANEOUS at 13:55

## 2021-09-28 RX ADMIN — Medication 20 MILLIGRAM(S): at 06:30

## 2021-09-28 RX ADMIN — POLYETHYLENE GLYCOL 3350 17 GRAM(S): 17 POWDER, FOR SOLUTION ORAL at 17:45

## 2021-09-28 RX ADMIN — ATENOLOL 50 MILLIGRAM(S): 25 TABLET ORAL at 06:03

## 2021-09-28 RX ADMIN — Medication 20 MILLIGRAM(S): at 06:03

## 2021-09-28 RX ADMIN — Medication 1 MILLIGRAM(S): at 12:05

## 2021-09-28 RX ADMIN — Medication 1 TABLET(S): at 12:07

## 2021-09-28 RX ADMIN — OXYCODONE AND ACETAMINOPHEN 2 TABLET(S): 5; 325 TABLET ORAL at 15:45

## 2021-09-28 RX ADMIN — LIDOCAINE 1 PATCH: 4 CREAM TOPICAL at 12:09

## 2021-09-28 RX ADMIN — LIDOCAINE 1 PATCH: 4 CREAM TOPICAL at 01:01

## 2021-09-28 RX ADMIN — PRIMIDONE 50 MILLIGRAM(S): 250 TABLET ORAL at 21:52

## 2021-09-28 RX ADMIN — Medication 0.6 MILLIGRAM(S): at 12:05

## 2021-09-28 RX ADMIN — BUPROPION HYDROCHLORIDE 150 MILLIGRAM(S): 150 TABLET, EXTENDED RELEASE ORAL at 12:04

## 2021-09-28 RX ADMIN — BUDESONIDE AND FORMOTEROL FUMARATE DIHYDRATE 2 PUFF(S): 160; 4.5 AEROSOL RESPIRATORY (INHALATION) at 08:09

## 2021-09-28 RX ADMIN — Medication 50 MILLIGRAM(S): at 12:08

## 2021-09-28 RX ADMIN — Medication 10 MILLIGRAM(S): at 06:03

## 2021-09-28 RX ADMIN — Medication 20 MILLIGRAM(S): at 21:52

## 2021-09-28 RX ADMIN — Medication 1 SPRAY(S): at 06:29

## 2021-09-28 RX ADMIN — Medication 0.12 MILLIGRAM(S): at 12:06

## 2021-09-28 RX ADMIN — OXYCODONE AND ACETAMINOPHEN 2 TABLET(S): 5; 325 TABLET ORAL at 09:47

## 2021-09-28 RX ADMIN — VALACYCLOVIR 500 MILLIGRAM(S): 500 TABLET, FILM COATED ORAL at 21:53

## 2021-09-28 NOTE — PROGRESS NOTE ADULT - SUBJECTIVE AND OBJECTIVE BOX
BERNARD SPEARS  71y  Female      Patient is a 71y old  Female who presents with a chief complaint of left ankle fx (27 Sep 2021 16:47)      INTERVAL HPI/OVERNIGHT EVENTS: No acute overnight events. Pt reports she had a bowel movement yesterday. Pt's abdomen is still distended but she reports it is better.       REVIEW OF SYSTEMS:  CONSTITUTIONAL: No fever, weight loss, or fatigue  EYES: No eye pain, visual disturbances, or discharge  RESPIRATORY: No cough, wheezing, chills or hemoptysis; No shortness of breath  CARDIOVASCULAR: No chest pain, palpitations, dizziness, or leg swelling  GASTROINTESTINAL: No abdominal or epigastric pain. No nausea, vomiting, or hematemesis; No diarrhea or constipation. No melena or hematochezia.          FAMILY HISTORY:  Family history of bladder cancer (Mother)    Family history of Alzheimer&#x27;s disease (Father)      T(C): 37.4 (09-28-21 @ 12:44), Max: 37.4 (09-28-21 @ 12:44)  HR: 63 (09-28-21 @ 12:44) (62 - 65)  BP: 106/78 (09-28-21 @ 12:44) (106/78 - 131/65)  RR: 18 (09-28-21 @ 12:44) (18 - 18)  Wt(kg): --Vital Signs Last 24 Hrs  T(C): 37.4 (28 Sep 2021 12:44), Max: 37.4 (28 Sep 2021 12:44)  T(F): 99.3 (28 Sep 2021 12:44), Max: 99.3 (28 Sep 2021 12:44)  HR: 63 (28 Sep 2021 12:44) (62 - 65)  BP: 106/78 (28 Sep 2021 12:44) (106/78 - 131/65)  RR: 18 (28 Sep 2021 12:44) (18 - 18)      adhesives (Pruritus; Rash)  No Known Drug Allergies      PHYSICAL EXAM:  GENERAL: NAD, well-groomed, well-developed  HEAD:  Atraumatic, Normocephalic  NERVOUS SYSTEM:  Alert & Oriented X3, Good concentration  CHEST/LUNG: Clear to percussion bilaterally;  HEART: Regular rate and rhythm  ABDOMEN: Soft, Nontender, distended;       LABS:                        9.4    7.64  )-----------( 223      ( 28 Sep 2021 06:30 )             28.4   09-28    135  |  97<L>  |  34<H>  ----------------------------<  144<H>      MEDICATIONS  (STANDING):  acetaminophen   Tablet .. 650 milliGRAM(s) Oral once  allopurinol 300 milliGRAM(s) Oral at bedtime  ATENolol  Tablet 50 milliGRAM(s) Oral daily  atorvastatin 80 milliGRAM(s) Oral at bedtime  budesonide 160 MICROgram(s)/formoterol 4.5 MICROgram(s) Inhaler 2 Puff(s) Inhalation two times a day  buPROPion XL (24-Hour) . 150 milliGRAM(s) Oral daily  cholecalciferol 400 Unit(s) Oral daily  colchicine 0.6 milliGRAM(s) Oral daily  FLUoxetine 20 milliGRAM(s) Oral two times a day  fluticasone propionate 50 MICROgram(s)/spray Nasal Spray 1 Spray(s) Both Nostrils two times a day  folic acid 1 milliGRAM(s) Oral daily  furosemide    Tablet 20 milliGRAM(s) Oral daily  heparin   Injectable 5000 Unit(s) SubCutaneous every 8 hours  hyoscyamine SL 0.125 milliGRAM(s) SubLingual daily  influenza   Vaccine 0.5 milliLiter(s) IntraMuscular once  lidocaine   4% Patch 1 Patch Transdermal daily  multivitamin 1 Tablet(s) Oral daily  NIFEdipine XL 60 milliGRAM(s) Oral daily  polyethylene glycol 3350 17 Gram(s) Oral two times a day  predniSONE   Tablet 10 milliGRAM(s) Oral two times a day  primidone 50 milliGRAM(s) Oral at bedtime  promethazine 50 milliGRAM(s) Oral daily  senna 2 Tablet(s) Oral at bedtime  sildenafil (REVATIO) 20 milliGRAM(s) Oral three times a day  spironolactone 50 milliGRAM(s) Oral daily  traZODone 100 milliGRAM(s) Oral at bedtime  valACYclovir 500 milliGRAM(s) Oral at bedtime    MEDICATIONS  (PRN):  diazepam    Tablet 10 milliGRAM(s) Oral three times a day PRN anxiety  fentaNYL   Patch  25 MICROgram(s)/Hr 1 Patch Transdermal every 72 hours PRN pain  magnesium hydroxide Suspension 30 milliLiter(s) Oral daily PRN Constipation  oxycodone    5 mG/acetaminophen 325 mG 2 Tablet(s) Oral every 6 hours PRN Severe Pain (7 - 10)  4.5   |  24  |  1.4    Ca    9.0      28 Sep 2021 06:30  Mg     1.8     09-28

## 2021-09-28 NOTE — PROGRESS NOTE ADULT - ASSESSMENT
72 yo F with PMHx of COPD, Asthma uses home oxygen PRN, chronic back pain, cervical radiculopathy, Pulmonary Hypertension, Gout, DLD, HTN, CKD 3, recent L ankle fx presents for ankle fx repair.    #L medial Malleolar Fx, Distal Fibular Fx  - Had initially experienced fall earlier in September, presented to hospital and was found to have L bimalleolar ankle fx s/p closed reduction  - On follow up as outpt, found to have persistent fx and was scheduled for repair  - Was unable to obtain Pulmonary, Cardiac clearance due to physical dysfunction  - s/p cardiac/pulm/medicine risk stratification   - echo ordered - EF 59% and mild PH   - Ortho following s/p ORIF, POD 4  - plan for dc to rehab     #Urinary Retention   - post op retention, TOV failed x2  - madrid catheter removed, awaiting TOV      #Chronic Back Pain  #Hx of Cervical Radiculopathy  - Sees pain management, Dr. Mayers  - C/w Fentanyl patch PRN, Percocet PRN    #Gout  - C/w Allopurinol    #Pulmonary HTN  - C/w Sildenafil    #HTN  - C/w Atenolol, Nifedipine    #DLD  - C/w Atorvastatin    #COPD, Asthma on home O2 2L  #MARLENI  - C/w Symbicort    #Anxiety  - C/w Wellbutrin  - Valium PRN    #CKD 3  - Stable, sees Dr. Mcarthur    Heparin sq     #Progress Note Handoff:  Pending (specify): rehab placment 70 yo F with PMHx of COPD, Asthma uses home oxygen PRN, chronic back pain, cervical radiculopathy, Pulmonary Hypertension, Gout, DLD, HTN, CKD 3, recent L ankle fx presents for ankle fx repair.    #L medial Malleolar Fx, Distal Fibular Fx  - Had initially experienced fall earlier in September, presented to hospital and was found to have L bimalleolar ankle fx s/p closed reduction  - On follow up as outpt, found to have persistent fx and was scheduled for repair  - Was unable to obtain Pulmonary, Cardiac clearance due to physical dysfunction  - s/p cardiac/pulm/medicine risk stratification   - echo ordered - EF 59% and mild PH   - Ortho following s/p ORIF, POD 4  - plan for dc to rehab     #Urinary Retention   - post op retention, TOV failed x2  - madrid catheter removed, awaiting TOV      #Chronic Back Pain  #Hx of Cervical Radiculopathy  - Sees pain management, Dr. Mayers  - d/c Fentanyl patch - patient reports not taking at home, or here in hospital  - Percocet PRN    #Gout  - C/w Allopurinol    #Pulmonary HTN  - C/w Sildenafil    #HTN  - C/w Atenolol, Nifedipine    #DLD  - C/w Atorvastatin    #COPD, Asthma on home O2 2L  #MARLENI  - C/w Symbicort    #Anxiety  - C/w Wellbutrin  - Valium PRN    #CKD 3  - Stable, sees Dr. Mcarthur    Heparin sq     #Progress Note Handoff:  Pending (specify): rehab placment

## 2021-09-28 NOTE — PROGRESS NOTE ADULT - ASSESSMENT
72 yo F with PMHx of COPD, Asthma uses home oxygen PRN, chronic back pain, cervical radiculopathy, Pulmonary Hypertension, Gout, DLD, HTN, CKD 3, recent L ankle fx presents for ankle fx repair.    #L medial Malleolar Fx, Distal Fibular Fx  - Had initially experienced fall earlier in September, presented to hospital and was found to have L bimalleolar ankle fx s/p closed reduction  - On follow up as outpt, found to have persistent fx and was scheduled for repair  - Was unable to obtain Pulmonary, Cardiac clearance due to physical dysfunction  - s/p cardiac/pulm/medicine risk stratification   - echo ordered - EF 59% and mild PH   - Ortho following s/p ORIF  - plan for dc to rehab     #Urinary Retention   - post op retention   - madrid catheter in place, TOV failed x2, repeat TOV today     #Chronic Back Pain  #Hx of Cervical Radiculopathy  - Sees pain management, Dr. Mayers  - C/w  Percocet PRN ( no longer on Fentanyl patch )     #Gout  - C/w Allopurinol    #Pulmonary HTN  - C/w Sildenafil    #HTN  - C/w Atenolol, Nifedipine    #DLD  - C/w Atorvastatin    #COPD, Asthma on home O2 2L  #MARLENI  - C/w Symbicort    #Anxiety  - C/w Wellbutrin  - Valium PRN    #CKD 3  - Stable, sees Dr. Mcarthur    Heparin sq     #Progress Note Handoff:  Pending (specify): rehab placment  Family discussion: d/w patient   Disposition: Home___/SNF___/Other________/Unknown at this time___x_____    Pinky Padron DO .

## 2021-09-28 NOTE — PROGRESS NOTE ADULT - SUBJECTIVE AND OBJECTIVE BOX
BERNARD SPEARS  71y, Female  Allergy: adhesives (Pruritus; Rash)  No Known Drug Allergies    Hospital Day: 7d    Patient seen and examined earlier today. Had bowel movement yesterday, distention improving.     PMH/PSH:  PAST MEDICAL & SURGICAL HISTORY:  History of neck pain    Spinal stenosis of lumbar region with radiculopathy    Anxiety    Depression    Osteoarthritis    H/O osteoporosis    History of pulmonary hypertension    H/O pulmonary hypertension    COPD (chronic obstructive pulmonary disease)    Hyperlipidemia    H/O total knee replacement, right    Failed spinal cord stimulator        LAST 24-Hr EVENTS:    VITALS:  T(F): 99.3 (09-28-21 @ 12:44), Max: 99.3 (09-28-21 @ 12:44)  HR: 63 (09-28-21 @ 12:44)  BP: 106/78 (09-28-21 @ 12:44) (106/78 - 131/65)  RR: 18 (09-28-21 @ 12:44)  SpO2: --        TESTS & MEASUREMENTS:  Weight (Kg):       09-26-21 @ 07:01  -  09-27-21 @ 07:00  --------------------------------------------------------  IN: 0 mL / OUT: 1700 mL / NET: -1700 mL    09-28-21 @ 07:01  -  09-28-21 @ 17:51  --------------------------------------------------------  IN: 440 mL / OUT: 0 mL / NET: 440 mL                            9.4    7.64  )-----------( 223      ( 28 Sep 2021 06:30 )             28.4       09-28    135  |  97<L>  |  34<H>  ----------------------------<  144<H>  4.5   |  24  |  1.4    Ca    9.0      28 Sep 2021 06:30  Mg     1.8     09-28                      Serum Pro-Brain Natriuretic Peptide: 1185 pg/mL (09-21-21 @ 15:41)    COVID-19 PCR: NotDetec (09-27-21 @ 16:35)  COVID-19 PCR: NotDetec (09-24-21 @ 15:30)      RADIOLOGY, ECG, & ADDITIONAL TESTS:      RECENT DIAGNOSTIC ORDERS:      MEDICATIONS:  MEDICATIONS  (STANDING):  acetaminophen   Tablet .. 650 milliGRAM(s) Oral once  allopurinol 300 milliGRAM(s) Oral at bedtime  ATENolol  Tablet 50 milliGRAM(s) Oral daily  atorvastatin 80 milliGRAM(s) Oral at bedtime  budesonide 160 MICROgram(s)/formoterol 4.5 MICROgram(s) Inhaler 2 Puff(s) Inhalation two times a day  buPROPion XL (24-Hour) . 150 milliGRAM(s) Oral daily  cholecalciferol 400 Unit(s) Oral daily  colchicine 0.6 milliGRAM(s) Oral daily  FLUoxetine 20 milliGRAM(s) Oral two times a day  fluticasone propionate 50 MICROgram(s)/spray Nasal Spray 1 Spray(s) Both Nostrils two times a day  folic acid 1 milliGRAM(s) Oral daily  furosemide    Tablet 20 milliGRAM(s) Oral daily  heparin   Injectable 5000 Unit(s) SubCutaneous every 8 hours  hyoscyamine SL 0.125 milliGRAM(s) SubLingual daily  influenza   Vaccine 0.5 milliLiter(s) IntraMuscular once  lidocaine   4% Patch 1 Patch Transdermal daily  multivitamin 1 Tablet(s) Oral daily  NIFEdipine XL 60 milliGRAM(s) Oral daily  polyethylene glycol 3350 17 Gram(s) Oral two times a day  predniSONE   Tablet 10 milliGRAM(s) Oral two times a day  primidone 50 milliGRAM(s) Oral at bedtime  promethazine 50 milliGRAM(s) Oral daily  senna 2 Tablet(s) Oral at bedtime  sildenafil (REVATIO) 20 milliGRAM(s) Oral three times a day  spironolactone 50 milliGRAM(s) Oral daily  traZODone 100 milliGRAM(s) Oral at bedtime  valACYclovir 500 milliGRAM(s) Oral at bedtime    MEDICATIONS  (PRN):  diazepam    Tablet 10 milliGRAM(s) Oral three times a day PRN anxiety  magnesium hydroxide Suspension 30 milliLiter(s) Oral daily PRN Constipation  oxycodone    5 mG/acetaminophen 325 mG 2 Tablet(s) Oral every 6 hours PRN Severe Pain (7 - 10)      HOME MEDICATIONS:  Advair Diskus 500 mcg-50 mcg inhalation powder (09-22)  allopurinol 300 mg oral tablet (09-22)  atenolol 50 mg oral tablet (09-22)  colchicine 0.6 mg oral capsule (09-22)  fentanyl topical 25 mcg/hr transdermal film, extended release (obsolete) (09-22)  FLUoxetine 20 mg oral capsule (09-22)  fluticasone 50 mcg/inh inhalation powder (09-22)  folic acid 1 mg oral tablet (09-22)  Fosamax 70 mg oral tablet (09-22)  furosemide 20 mg oral tablet (09-22)  hyoscyamine 0.125 mg oral tablet (09-22)  Lidoderm 5% topical film (09-22)  Lipitor 80 mg oral tablet (09-22)  magnesium carbonate 250 mg oral capsule (09-22)  oxycodone-acetaminophen 5 mg-325 mg oral tablet (09-22)  predniSONE 10 mg oral tablet (09-22)  primidone 50 mg oral tablet (09-22)  Procardia XL 60 mg oral tablet, extended release (09-22)  promethazine 50 mg oral tablet (09-22)  sildenafil 20 mg oral tablet (09-22)  spironolactone 50 mg oral tablet (09-22)  traZODone 100 mg oral tablet (09-22)  Valium 10 mg oral tablet (09-22)  Valtrex 500 mg oral tablet (09-22)  Vitamin D3 400 intl units (10 mcg) oral tablet (09-22)  Wellbutrin (09-22)      PHYSICAL EXAM:  GENERAL: NAD, well-developed, AAOx3  HEENT:  Atraumatic, Normocephalic. EOMI, PERRLA, conjunctiva and sclera clear, No JVD  PULMONARY: Clear to auscultation bilaterally; No wheeze  CARDIOVASCULAR: Regular rate and rhythm; No murmurs, rubs, or gallops  GASTROINTESTINAL: Soft, distended, nontender, BS present   MUSCULOSKELETAL:  2+ Peripheral Pulses, L ankle bandaged   NEUROLOGY: non-focal  SKIN: No rashes or lesions

## 2021-09-29 LAB
ANION GAP SERPL CALC-SCNC: 17 MMOL/L — HIGH (ref 7–14)
BASOPHILS # BLD AUTO: 0.03 K/UL — SIGNIFICANT CHANGE UP (ref 0–0.2)
BASOPHILS NFR BLD AUTO: 0.3 % — SIGNIFICANT CHANGE UP (ref 0–1)
BUN SERPL-MCNC: 38 MG/DL — HIGH (ref 10–20)
CALCIUM SERPL-MCNC: 9.4 MG/DL — SIGNIFICANT CHANGE UP (ref 8.5–10.1)
CHLORIDE SERPL-SCNC: 95 MMOL/L — LOW (ref 98–110)
CO2 SERPL-SCNC: 23 MMOL/L — SIGNIFICANT CHANGE UP (ref 17–32)
CREAT SERPL-MCNC: 1.3 MG/DL — SIGNIFICANT CHANGE UP (ref 0.7–1.5)
EOSINOPHIL # BLD AUTO: 0.05 K/UL — SIGNIFICANT CHANGE UP (ref 0–0.7)
EOSINOPHIL NFR BLD AUTO: 0.5 % — SIGNIFICANT CHANGE UP (ref 0–8)
GLUCOSE SERPL-MCNC: 117 MG/DL — HIGH (ref 70–99)
HCT VFR BLD CALC: 30.6 % — LOW (ref 37–47)
HGB BLD-MCNC: 10.1 G/DL — LOW (ref 12–16)
IMM GRANULOCYTES NFR BLD AUTO: 2 % — HIGH (ref 0.1–0.3)
LYMPHOCYTES # BLD AUTO: 1.67 K/UL — SIGNIFICANT CHANGE UP (ref 1.2–3.4)
LYMPHOCYTES # BLD AUTO: 16.4 % — LOW (ref 20.5–51.1)
MAGNESIUM SERPL-MCNC: 1.9 MG/DL — SIGNIFICANT CHANGE UP (ref 1.8–2.4)
MCHC RBC-ENTMCNC: 33 G/DL — SIGNIFICANT CHANGE UP (ref 32–37)
MCHC RBC-ENTMCNC: 35.4 PG — HIGH (ref 27–31)
MCV RBC AUTO: 107.4 FL — HIGH (ref 81–99)
MONOCYTES # BLD AUTO: 0.94 K/UL — HIGH (ref 0.1–0.6)
MONOCYTES NFR BLD AUTO: 9.2 % — SIGNIFICANT CHANGE UP (ref 1.7–9.3)
NEUTROPHILS # BLD AUTO: 7.3 K/UL — HIGH (ref 1.4–6.5)
NEUTROPHILS NFR BLD AUTO: 71.6 % — SIGNIFICANT CHANGE UP (ref 42.2–75.2)
NRBC # BLD: 0 /100 WBCS — SIGNIFICANT CHANGE UP (ref 0–0)
PLATELET # BLD AUTO: 168 K/UL — SIGNIFICANT CHANGE UP (ref 130–400)
POTASSIUM SERPL-MCNC: 4.3 MMOL/L — SIGNIFICANT CHANGE UP (ref 3.5–5)
POTASSIUM SERPL-SCNC: 4.3 MMOL/L — SIGNIFICANT CHANGE UP (ref 3.5–5)
RBC # BLD: 2.85 M/UL — LOW (ref 4.2–5.4)
RBC # FLD: 13.3 % — SIGNIFICANT CHANGE UP (ref 11.5–14.5)
SODIUM SERPL-SCNC: 135 MMOL/L — SIGNIFICANT CHANGE UP (ref 135–146)
WBC # BLD: 10.19 K/UL — SIGNIFICANT CHANGE UP (ref 4.8–10.8)
WBC # FLD AUTO: 10.19 K/UL — SIGNIFICANT CHANGE UP (ref 4.8–10.8)

## 2021-09-29 PROCEDURE — 99232 SBSQ HOSP IP/OBS MODERATE 35: CPT

## 2021-09-29 RX ORDER — SENNA PLUS 8.6 MG/1
2 TABLET ORAL
Refills: 0 | Status: DISCONTINUED | OUTPATIENT
Start: 2021-09-29 | End: 2021-10-03

## 2021-09-29 RX ORDER — TAMSULOSIN HYDROCHLORIDE 0.4 MG/1
0.4 CAPSULE ORAL AT BEDTIME
Refills: 0 | Status: DISCONTINUED | OUTPATIENT
Start: 2021-09-29 | End: 2021-10-14

## 2021-09-29 RX ORDER — SIMETHICONE 80 MG/1
80 TABLET, CHEWABLE ORAL ONCE
Refills: 0 | Status: COMPLETED | OUTPATIENT
Start: 2021-09-29 | End: 2021-09-29

## 2021-09-29 RX ORDER — LACTULOSE 10 G/15ML
10 SOLUTION ORAL EVERY 6 HOURS
Refills: 0 | Status: DISCONTINUED | OUTPATIENT
Start: 2021-09-29 | End: 2021-10-03

## 2021-09-29 RX ADMIN — SENNA PLUS 2 TABLET(S): 8.6 TABLET ORAL at 17:44

## 2021-09-29 RX ADMIN — Medication 20 MILLIGRAM(S): at 13:38

## 2021-09-29 RX ADMIN — LIDOCAINE 1 PATCH: 4 CREAM TOPICAL at 23:56

## 2021-09-29 RX ADMIN — POLYETHYLENE GLYCOL 3350 17 GRAM(S): 17 POWDER, FOR SOLUTION ORAL at 05:34

## 2021-09-29 RX ADMIN — LACTULOSE 10 GRAM(S): 10 SOLUTION ORAL at 23:57

## 2021-09-29 RX ADMIN — Medication 300 MILLIGRAM(S): at 21:30

## 2021-09-29 RX ADMIN — Medication 50 MILLIGRAM(S): at 11:28

## 2021-09-29 RX ADMIN — Medication 1 SPRAY(S): at 05:34

## 2021-09-29 RX ADMIN — Medication 0.12 MILLIGRAM(S): at 11:28

## 2021-09-29 RX ADMIN — Medication 100 MILLIGRAM(S): at 21:30

## 2021-09-29 RX ADMIN — SPIRONOLACTONE 50 MILLIGRAM(S): 25 TABLET, FILM COATED ORAL at 05:32

## 2021-09-29 RX ADMIN — TAMSULOSIN HYDROCHLORIDE 0.4 MILLIGRAM(S): 0.4 CAPSULE ORAL at 21:29

## 2021-09-29 RX ADMIN — ATORVASTATIN CALCIUM 80 MILLIGRAM(S): 80 TABLET, FILM COATED ORAL at 21:29

## 2021-09-29 RX ADMIN — Medication 20 MILLIGRAM(S): at 05:33

## 2021-09-29 RX ADMIN — Medication 1 MILLIGRAM(S): at 11:26

## 2021-09-29 RX ADMIN — HEPARIN SODIUM 5000 UNIT(S): 5000 INJECTION INTRAVENOUS; SUBCUTANEOUS at 05:32

## 2021-09-29 RX ADMIN — Medication 1 TABLET(S): at 11:28

## 2021-09-29 RX ADMIN — Medication 20 MILLIGRAM(S): at 21:27

## 2021-09-29 RX ADMIN — Medication 60 MILLIGRAM(S): at 05:33

## 2021-09-29 RX ADMIN — LIDOCAINE 1 PATCH: 4 CREAM TOPICAL at 11:27

## 2021-09-29 RX ADMIN — Medication 400 UNIT(S): at 11:29

## 2021-09-29 RX ADMIN — Medication 20 MILLIGRAM(S): at 17:44

## 2021-09-29 RX ADMIN — HEPARIN SODIUM 5000 UNIT(S): 5000 INJECTION INTRAVENOUS; SUBCUTANEOUS at 13:37

## 2021-09-29 RX ADMIN — LIDOCAINE 1 PATCH: 4 CREAM TOPICAL at 00:17

## 2021-09-29 RX ADMIN — OXYCODONE AND ACETAMINOPHEN 2 TABLET(S): 5; 325 TABLET ORAL at 14:20

## 2021-09-29 RX ADMIN — POLYETHYLENE GLYCOL 3350 17 GRAM(S): 17 POWDER, FOR SOLUTION ORAL at 17:45

## 2021-09-29 RX ADMIN — BUDESONIDE AND FORMOTEROL FUMARATE DIHYDRATE 2 PUFF(S): 160; 4.5 AEROSOL RESPIRATORY (INHALATION) at 07:50

## 2021-09-29 RX ADMIN — BUDESONIDE AND FORMOTEROL FUMARATE DIHYDRATE 2 PUFF(S): 160; 4.5 AEROSOL RESPIRATORY (INHALATION) at 19:57

## 2021-09-29 RX ADMIN — LACTULOSE 10 GRAM(S): 10 SOLUTION ORAL at 17:45

## 2021-09-29 RX ADMIN — BUPROPION HYDROCHLORIDE 150 MILLIGRAM(S): 150 TABLET, EXTENDED RELEASE ORAL at 11:25

## 2021-09-29 RX ADMIN — OXYCODONE AND ACETAMINOPHEN 2 TABLET(S): 5; 325 TABLET ORAL at 15:43

## 2021-09-29 RX ADMIN — Medication 0.6 MILLIGRAM(S): at 11:25

## 2021-09-29 RX ADMIN — Medication 10 MILLIGRAM(S): at 17:44

## 2021-09-29 RX ADMIN — LACTULOSE 10 GRAM(S): 10 SOLUTION ORAL at 11:26

## 2021-09-29 RX ADMIN — PRIMIDONE 50 MILLIGRAM(S): 250 TABLET ORAL at 21:29

## 2021-09-29 RX ADMIN — SIMETHICONE 80 MILLIGRAM(S): 80 TABLET, CHEWABLE ORAL at 16:46

## 2021-09-29 RX ADMIN — Medication 10 MILLIGRAM(S): at 05:32

## 2021-09-29 RX ADMIN — Medication 20 MILLIGRAM(S): at 05:32

## 2021-09-29 RX ADMIN — VALACYCLOVIR 500 MILLIGRAM(S): 500 TABLET, FILM COATED ORAL at 21:30

## 2021-09-29 RX ADMIN — Medication 1 SPRAY(S): at 17:45

## 2021-09-29 RX ADMIN — ATENOLOL 50 MILLIGRAM(S): 25 TABLET ORAL at 05:33

## 2021-09-29 RX ADMIN — HEPARIN SODIUM 5000 UNIT(S): 5000 INJECTION INTRAVENOUS; SUBCUTANEOUS at 21:30

## 2021-09-29 RX ADMIN — LIDOCAINE 1 PATCH: 4 CREAM TOPICAL at 19:25

## 2021-09-29 NOTE — PROGRESS NOTE ADULT - SUBJECTIVE AND OBJECTIVE BOX
BERNARD SPEARS  71y  Female      Patient is a 71y old  Female who presents with a chief complaint of left ankle fx (28 Sep 2021 17:50)      INTERVAL HPI/OVERNIGHT EVENTS: no acute events overnight. patient stated that she wants to ambulate. states pain is well controlled. having BMs.       REVIEW OF SYSTEMS:  CONSTITUTIONAL: No fever, weight loss, or fatigue  RESPIRATORY: No cough, wheezing, chills or hemoptysis; No shortness of breath  CARDIOVASCULAR: No chest pain, palpitations, dizziness, or leg swelling  GASTROINTESTINAL: No abdominal or epigastric pain. No nausea, vomiting, or hematemesis; No diarrhea or constipation. No melena or hematochezia.  GENITOURINARY: No dysuria, frequency, hematuria, or incontinence  NEUROLOGICAL: No headaches, memory loss, loss of strength, numbness, or tremors  SKIN: No itching, burning, rashes, or lesions   MUSCULOSKELETAL: No joint pain or swelling; No muscle, back, or extremity pain  PSYCHIATRIC: No depression, anxiety, mood swings, or difficulty sleeping  All other review of systems negative    VITALS  T(C): 37.1 (09-29-21 @ 12:43), Max: 37.1 (09-29-21 @ 12:43)  HR: 66 (09-29-21 @ 12:43) (65 - 66)  BP: 122/56 (09-29-21 @ 12:43) (102/52 - 122/58)  RR: 18 (09-29-21 @ 12:43) (16 - 18)  SpO2: --  Wt(kg): --Vital Signs Last 24 Hrs  T(C): 37.1 (29 Sep 2021 12:43), Max: 37.1 (29 Sep 2021 12:43)  T(F): 98.8 (29 Sep 2021 12:43), Max: 98.8 (29 Sep 2021 12:43)  HR: 66 (29 Sep 2021 12:43) (65 - 66)  BP: 122/56 (29 Sep 2021 12:43) (102/52 - 122/58)  BP(mean): --  RR: 18 (29 Sep 2021 12:43) (16 - 18)  SpO2: --      09-28-21 @ 07:01  -  09-29-21 @ 07:00  --------------------------------------------------------  IN: 440 mL / OUT: 2170 mL / NET: -1730 mL        PHYSICAL EXAM:  GENERAL: elderly M, NAD, non toxic  EYES: anicteric sclera, non injected conjunctiva, EOMI  ENT: oropharynx clear, MMM, fair dentition  PSYCH: no agitation, baseline mentation  NERVOUS SYSTEM:  Alert & Oriented X3, Motor Strength 5/5 B/L upper and lower extremities; Sensory intact  PULMONARY: Clear to percussion bilaterally; No rales, rhonchi, wheezing, or rubs  CARDIOVASCULAR: Regular rate and rhythm; No murmurs, rubs, or gallops  GI: Soft, Nontender, Nondistended; Bowel sounds present  EXTREMITIES:  2+ Peripheral Pulses, No clubbing, cyanosis, or edema  LYMPH: No lymphadenopathy noted  SKIN: No rashes or lesions    Consultant(s) Notes Reviewed:  [x ] YES  [ ] NO    Discussed with Consultants/Other Providers [ x] YES     LABS                          10.1   10.19 )-----------( 168      ( 29 Sep 2021 04:30 )             30.6     09-29    135  |  95<L>  |  38<H>  ----------------------------<  117<H>  4.3   |  23  |  1.3    Ca    9.4      29 Sep 2021 04:30  Mg     1.9     09-29      RADIOLOGY & ADDITIONAL TESTS:  - no images 9.29    Imaging Personally Reviewed:  [ ] YES  [X ] NO    MEDICATIONS  (STANDING):  acetaminophen   Tablet .. 650 milliGRAM(s) Oral once  allopurinol 300 milliGRAM(s) Oral at bedtime  ATENolol  Tablet 50 milliGRAM(s) Oral daily  atorvastatin 80 milliGRAM(s) Oral at bedtime  budesonide 160 MICROgram(s)/formoterol 4.5 MICROgram(s) Inhaler 2 Puff(s) Inhalation two times a day  buPROPion XL (24-Hour) . 150 milliGRAM(s) Oral daily  cholecalciferol 400 Unit(s) Oral daily  colchicine 0.6 milliGRAM(s) Oral daily  FLUoxetine 20 milliGRAM(s) Oral two times a day  fluticasone propionate 50 MICROgram(s)/spray Nasal Spray 1 Spray(s) Both Nostrils two times a day  folic acid 1 milliGRAM(s) Oral daily  furosemide    Tablet 20 milliGRAM(s) Oral daily  heparin   Injectable 5000 Unit(s) SubCutaneous every 8 hours  hyoscyamine SL 0.125 milliGRAM(s) SubLingual daily  influenza   Vaccine 0.5 milliLiter(s) IntraMuscular once  lactulose Syrup 10 Gram(s) Oral every 6 hours  lidocaine   4% Patch 1 Patch Transdermal daily  multivitamin 1 Tablet(s) Oral daily  NIFEdipine XL 60 milliGRAM(s) Oral daily  polyethylene glycol 3350 17 Gram(s) Oral two times a day  predniSONE   Tablet 10 milliGRAM(s) Oral two times a day  primidone 50 milliGRAM(s) Oral at bedtime  promethazine 50 milliGRAM(s) Oral daily  senna 2 Tablet(s) Oral two times a day  sildenafil (REVATIO) 20 milliGRAM(s) Oral three times a day  spironolactone 50 milliGRAM(s) Oral daily  tamsulosin 0.4 milliGRAM(s) Oral at bedtime  traZODone 100 milliGRAM(s) Oral at bedtime  valACYclovir 500 milliGRAM(s) Oral at bedtime    MEDICATIONS  (PRN):  diazepam    Tablet 10 milliGRAM(s) Oral three times a day PRN anxiety  magnesium hydroxide Suspension 30 milliLiter(s) Oral daily PRN Constipation  oxycodone    5 mG/acetaminophen 325 mG 2 Tablet(s) Oral every 6 hours PRN Severe Pain (7 - 10)

## 2021-09-29 NOTE — PROGRESS NOTE ADULT - ASSESSMENT
72 yo F with PMHx of COPD, Asthma uses home oxygen PRN, chronic back pain, cervical radiculopathy, Pulmonary Hypertension, Gout, DLD, HTN, CKD 3, recent L ankle fx presents for ankle fx repair.    #L medial Malleolar Fx, Distal Fibular Fx  - Had initially experienced fall earlier in September, presented to hospital and was found to have L bimalleolar ankle fx s/p closed reduction  - On follow up as outpt, found to have persistent fx and was scheduled for repair  - Was unable to obtain Pulmonary, Cardiac clearance due to physical dysfunction  - s/p cardiac/pulm/medicine risk stratification   - echo ordered - EF 59% and mild PH   - Ortho following s/p ORIF, POD #6  - plan for dc to rehab   - F/U with Dr. Clemons in 2 weeks. Please discharge patient with 6 total weeks of aspirin 325mg for dvt ppx  stable in splint  dc planning      #Urinary Retention   - post op retention in the setting of immobility  - madrid catheter in place, TOV failed x3  - starting flomax  - will need Urology follow up    #Chronic Back Pain  #Hx of Cervical Radiculopathy  - Sees pain management, Dr. Mayers  - C/w  Percocet PRN ( no longer on Fentanyl patch )     #Gout  - C/w Allopurinol    #Pulmonary HTN  - C/w Sildenafil    #HTN  - C/w Atenolol, Nifedipine    #DLD  - C/w Atorvastatin    #COPD, Asthma on home O2 2L  #MARLENI  - C/w Symbicort    #Anxiety  - C/w Wellbutrin  - Valium PRN    #CKD 3  - Stable, sees Dr. Mcarthur    Heparin sq     #Progress Note Handoff:  Pending (specify): rehab placment  Family discussion: d/w patient   Disposition: Home___/SNF___/Other________/Unknown at this time___x_____ 72 yo F with PMHx of COPD, Asthma uses home oxygen PRN, chronic back pain, cervical radiculopathy, Pulmonary Hypertension, Gout, DLD, HTN, CKD 3, recent L ankle fx presents for ankle fx repair.    #L medial Malleolar Fx, Distal Fibular Fx  - Had initially experienced fall earlier in September, presented to hospital and was found to have L bimalleolar ankle fx s/p closed reduction  - On follow up as outpt, found to have persistent fx and was scheduled for repair  - Was unable to obtain Pulmonary, Cardiac clearance due to physical dysfunction  - s/p cardiac/pulm/medicine risk stratification   - echo ordered - EF 59% and mild PH   - Ortho following s/p ORIF, POD #6  - plan for dc to rehab   - F/U with Dr. Clemons in 2 weeks. Please discharge patient with 6 total weeks of aspirin 325mg for dvt ppx  stable in splint  dc planning      #Urinary Retention   - post op retention in the setting of immobility  - madrid catheter in place, TOV failed x3  - starting flomax  - will need Urology follow up    #Chronic Back Pain  #Hx of Cervical Radiculopathy  - Sees pain management, Dr. Mayers  - C/w  Percocet PRN ( no longer on Fentanyl patch )     #Gout  - C/w Allopurinol    #Pulmonary HTN  - C/w Sildenafil    #HTN  - C/w Atenolol, Nifedipine    #DLD  - C/w Atorvastatin    #COPD, Asthma on home O2 2L  #MARLENI  - C/w Symbicort    #Anxiety  - C/w Wellbutrin  - Valium PRN    #CKD 3  - Stable, sees Dr. Mcarthur    Heparin sq     #Progress Note Handoff:  Pending (specify): rehab placment  Family discussion: d/w patient   Disposition: [facility] is available, CM spoke with liaison Afsaneh Lee and bed confirmation for today pending.

## 2021-09-30 LAB
ANION GAP SERPL CALC-SCNC: 17 MMOL/L — HIGH (ref 7–14)
BASOPHILS # BLD AUTO: 0.05 K/UL — SIGNIFICANT CHANGE UP (ref 0–0.2)
BASOPHILS NFR BLD AUTO: 0.3 % — SIGNIFICANT CHANGE UP (ref 0–1)
BUN SERPL-MCNC: 44 MG/DL — HIGH (ref 10–20)
CALCIUM SERPL-MCNC: 9.4 MG/DL — SIGNIFICANT CHANGE UP (ref 8.5–10.1)
CHLORIDE SERPL-SCNC: 95 MMOL/L — LOW (ref 98–110)
CO2 SERPL-SCNC: 23 MMOL/L — SIGNIFICANT CHANGE UP (ref 17–32)
CREAT SERPL-MCNC: 1.7 MG/DL — HIGH (ref 0.7–1.5)
EOSINOPHIL # BLD AUTO: 0.05 K/UL — SIGNIFICANT CHANGE UP (ref 0–0.7)
EOSINOPHIL NFR BLD AUTO: 0.3 % — SIGNIFICANT CHANGE UP (ref 0–8)
GLUCOSE SERPL-MCNC: 134 MG/DL — HIGH (ref 70–99)
HCT VFR BLD CALC: 33.6 % — LOW (ref 37–47)
HGB BLD-MCNC: 11.2 G/DL — LOW (ref 12–16)
IMM GRANULOCYTES NFR BLD AUTO: 1.7 % — HIGH (ref 0.1–0.3)
LYMPHOCYTES # BLD AUTO: 10.6 % — LOW (ref 20.5–51.1)
LYMPHOCYTES # BLD AUTO: 2.03 K/UL — SIGNIFICANT CHANGE UP (ref 1.2–3.4)
MAGNESIUM SERPL-MCNC: 2 MG/DL — SIGNIFICANT CHANGE UP (ref 1.8–2.4)
MCHC RBC-ENTMCNC: 33.3 G/DL — SIGNIFICANT CHANGE UP (ref 32–37)
MCHC RBC-ENTMCNC: 35.8 PG — HIGH (ref 27–31)
MCV RBC AUTO: 107.3 FL — HIGH (ref 81–99)
MONOCYTES # BLD AUTO: 1.64 K/UL — HIGH (ref 0.1–0.6)
MONOCYTES NFR BLD AUTO: 8.5 % — SIGNIFICANT CHANGE UP (ref 1.7–9.3)
NEUTROPHILS # BLD AUTO: 15.11 K/UL — HIGH (ref 1.4–6.5)
NEUTROPHILS NFR BLD AUTO: 78.6 % — HIGH (ref 42.2–75.2)
NRBC # BLD: 0 /100 WBCS — SIGNIFICANT CHANGE UP (ref 0–0)
PLATELET # BLD AUTO: 213 K/UL — SIGNIFICANT CHANGE UP (ref 130–400)
POTASSIUM SERPL-MCNC: 4.7 MMOL/L — SIGNIFICANT CHANGE UP (ref 3.5–5)
POTASSIUM SERPL-SCNC: 4.7 MMOL/L — SIGNIFICANT CHANGE UP (ref 3.5–5)
RBC # BLD: 3.13 M/UL — LOW (ref 4.2–5.4)
RBC # FLD: 13.8 % — SIGNIFICANT CHANGE UP (ref 11.5–14.5)
SODIUM SERPL-SCNC: 135 MMOL/L — SIGNIFICANT CHANGE UP (ref 135–146)
WBC # BLD: 19.2 K/UL — HIGH (ref 4.8–10.8)
WBC # FLD AUTO: 19.2 K/UL — HIGH (ref 4.8–10.8)

## 2021-09-30 PROCEDURE — 74018 RADEX ABDOMEN 1 VIEW: CPT | Mod: 26

## 2021-09-30 PROCEDURE — 99232 SBSQ HOSP IP/OBS MODERATE 35: CPT

## 2021-09-30 RX ORDER — ONDANSETRON 8 MG/1
4 TABLET, FILM COATED ORAL ONCE
Refills: 0 | Status: COMPLETED | OUTPATIENT
Start: 2021-09-30 | End: 2021-09-30

## 2021-09-30 RX ADMIN — ATENOLOL 50 MILLIGRAM(S): 25 TABLET ORAL at 05:58

## 2021-09-30 RX ADMIN — Medication 20 MILLIGRAM(S): at 05:58

## 2021-09-30 RX ADMIN — ONDANSETRON 4 MILLIGRAM(S): 8 TABLET, FILM COATED ORAL at 18:04

## 2021-09-30 RX ADMIN — Medication 10 MILLIGRAM(S): at 06:00

## 2021-09-30 RX ADMIN — BUDESONIDE AND FORMOTEROL FUMARATE DIHYDRATE 2 PUFF(S): 160; 4.5 AEROSOL RESPIRATORY (INHALATION) at 08:06

## 2021-09-30 RX ADMIN — SPIRONOLACTONE 50 MILLIGRAM(S): 25 TABLET, FILM COATED ORAL at 06:00

## 2021-09-30 RX ADMIN — Medication 20 MILLIGRAM(S): at 06:01

## 2021-09-30 RX ADMIN — Medication 60 MILLIGRAM(S): at 06:01

## 2021-09-30 RX ADMIN — SENNA PLUS 2 TABLET(S): 8.6 TABLET ORAL at 17:25

## 2021-09-30 RX ADMIN — Medication 20 MILLIGRAM(S): at 05:57

## 2021-09-30 RX ADMIN — HEPARIN SODIUM 5000 UNIT(S): 5000 INJECTION INTRAVENOUS; SUBCUTANEOUS at 21:35

## 2021-09-30 RX ADMIN — Medication 1 SPRAY(S): at 17:28

## 2021-09-30 RX ADMIN — Medication 50 MILLIGRAM(S): at 11:00

## 2021-09-30 RX ADMIN — Medication 10 MILLIGRAM(S): at 17:25

## 2021-09-30 RX ADMIN — HEPARIN SODIUM 5000 UNIT(S): 5000 INJECTION INTRAVENOUS; SUBCUTANEOUS at 13:10

## 2021-09-30 RX ADMIN — SENNA PLUS 2 TABLET(S): 8.6 TABLET ORAL at 05:59

## 2021-09-30 RX ADMIN — ONDANSETRON 4 MILLIGRAM(S): 8 TABLET, FILM COATED ORAL at 22:21

## 2021-09-30 RX ADMIN — POLYETHYLENE GLYCOL 3350 17 GRAM(S): 17 POWDER, FOR SOLUTION ORAL at 06:02

## 2021-09-30 RX ADMIN — Medication 1 SPRAY(S): at 05:59

## 2021-09-30 RX ADMIN — POLYETHYLENE GLYCOL 3350 17 GRAM(S): 17 POWDER, FOR SOLUTION ORAL at 17:25

## 2021-09-30 RX ADMIN — BUDESONIDE AND FORMOTEROL FUMARATE DIHYDRATE 2 PUFF(S): 160; 4.5 AEROSOL RESPIRATORY (INHALATION) at 21:36

## 2021-09-30 RX ADMIN — LACTULOSE 10 GRAM(S): 10 SOLUTION ORAL at 06:02

## 2021-09-30 RX ADMIN — Medication 20 MILLIGRAM(S): at 17:25

## 2021-09-30 RX ADMIN — LACTULOSE 10 GRAM(S): 10 SOLUTION ORAL at 17:24

## 2021-09-30 RX ADMIN — HEPARIN SODIUM 5000 UNIT(S): 5000 INJECTION INTRAVENOUS; SUBCUTANEOUS at 06:02

## 2021-09-30 NOTE — PROGRESS NOTE ADULT - SUBJECTIVE AND OBJECTIVE BOX
TORY BERNARD  71y  Female      Patient is a 71y old  Female who presents with a chief complaint of left ankle fx (30 Sep 2021 07:32)      INTERVAL HPI/OVERNIGHT EVENTS: patient stating that her belly is distended and has in fact NOT had a BM in 5 days despite saying she had been having regular BMs previously. Passing gas. Endorsing some nausea.       REVIEW OF SYSTEMS:  CONSTITUTIONAL: No fever, weight loss, or fatigue  RESPIRATORY: No cough, wheezing, chills or hemoptysis; No shortness of breath  CARDIOVASCULAR: No chest pain, palpitations, dizziness, or leg swelling  GASTROINTESTINAL: + distended, constipation  GENITOURINARY: No dysuria, frequency, hematuria, or incontinence  NEUROLOGICAL: No headaches, memory loss, loss of strength, numbness, or tremors  SKIN: No itching, burning, rashes, or lesions   MUSCULOSKELETAL: No joint pain or swelling; No muscle, back, or extremity pain  PSYCHIATRIC: No depression, anxiety, mood swings, or difficulty sleeping  All other review of systems negative    VITALS  T(C): 37.1 (09-30-21 @ 13:19), Max: 37.1 (09-30-21 @ 13:19)  HR: 72 (09-30-21 @ 13:19) (65 - 72)  BP: 141/103 (09-30-21 @ 13:19) (129/63 - 141/103)  RR: 18 (09-30-21 @ 13:19) (18 - 18)  SpO2: 96% (09-30-21 @ 04:06) (96% - 96%)  Wt(kg): --Vital Signs Last 24 Hrs  T(C): 37.1 (30 Sep 2021 13:19), Max: 37.1 (30 Sep 2021 13:19)  T(F): 98.8 (30 Sep 2021 13:19), Max: 98.8 (30 Sep 2021 13:19)  HR: 72 (30 Sep 2021 13:19) (65 - 72)  BP: 141/103 (30 Sep 2021 13:19) (129/63 - 141/103)  BP(mean): --  RR: 18 (30 Sep 2021 13:19) (18 - 18)  SpO2: 96% (30 Sep 2021 04:06) (96% - 96%)      09-29-21 @ 07:01  -  09-30-21 @ 07:00  --------------------------------------------------------  IN: 0 mL / OUT: 900 mL / NET: -900 mL    09-30-21 @ 07:01  -  09-30-21 @ 15:42  --------------------------------------------------------  IN: 0 mL / OUT: 300 mL / NET: -300 mL        PHYSICAL EXAM:  GENERAL: elderly M, NAD, non toxic  EYES: anicteric sclera, non injected conjunctiva, EOMI  ENT: oropharynx clear, MMM, fair dentition  PSYCH: no agitation, baseline mentation  NERVOUS SYSTEM:  Alert & Oriented X3, Motor Strength 5/5 B/L upper and lower extremities; Sensory intact  PULMONARY: Clear to percussion bilaterally; No rales, rhonchi, wheezing, or rubs  CARDIOVASCULAR: Regular rate and rhythm; No murmurs, rubs, or gallops  GI: Soft, Nontender, Nondistended; Bowel sounds present  EXTREMITIES:  2+ Peripheral Pulses, No clubbing, cyanosis, or edema  LYMPH: No lymphadenopathy noted  SKIN: No rashes or lesions    Consultant(s) Notes Reviewed:  [x ] YES  [ ] NO    Discussed with Consultants/Other Providers [ x] YES     LABS                          11.2   19.20 )-----------( 213      ( 30 Sep 2021 07:35 )             33.6     09-30    135  |  95<L>  |  44<H>  ----------------------------<  134<H>  4.7   |  23  |  1.7<H>    Ca    9.4      30 Sep 2021 07:35  Mg     2.0     09-30    RADIOLOGY & ADDITIONAL TESTS:  Xray Kidney Ureter Bladder (09.30.21 @ 09:07) >  IMPRESSION:  Generalized bowel dilatation, difficult to differentiate small from large bowel. The more significantly dilated loops may represent colon but this is indeterminate. These findings are nonspecific. Correlate with exam and with CT as clinically warranted.  Degenerative changes of the spine and hips right greater than left.        MEDICATIONS  (STANDING):  acetaminophen   Tablet .. 650 milliGRAM(s) Oral once  allopurinol 300 milliGRAM(s) Oral at bedtime  ATENolol  Tablet 50 milliGRAM(s) Oral daily  atorvastatin 80 milliGRAM(s) Oral at bedtime  budesonide 160 MICROgram(s)/formoterol 4.5 MICROgram(s) Inhaler 2 Puff(s) Inhalation two times a day  buPROPion XL (24-Hour) . 150 milliGRAM(s) Oral daily  cholecalciferol 400 Unit(s) Oral daily  colchicine 0.6 milliGRAM(s) Oral daily  FLUoxetine 20 milliGRAM(s) Oral two times a day  fluticasone propionate 50 MICROgram(s)/spray Nasal Spray 1 Spray(s) Both Nostrils two times a day  folic acid 1 milliGRAM(s) Oral daily  furosemide    Tablet 20 milliGRAM(s) Oral daily  heparin   Injectable 5000 Unit(s) SubCutaneous every 8 hours  hyoscyamine SL 0.125 milliGRAM(s) SubLingual daily  influenza   Vaccine 0.5 milliLiter(s) IntraMuscular once  lactulose Syrup 10 Gram(s) Oral every 6 hours  lidocaine   4% Patch 1 Patch Transdermal daily  multivitamin 1 Tablet(s) Oral daily  NIFEdipine XL 60 milliGRAM(s) Oral daily  polyethylene glycol 3350 17 Gram(s) Oral two times a day  predniSONE   Tablet 10 milliGRAM(s) Oral two times a day  primidone 50 milliGRAM(s) Oral at bedtime  promethazine 50 milliGRAM(s) Oral daily  senna 2 Tablet(s) Oral two times a day  sildenafil (REVATIO) 20 milliGRAM(s) Oral three times a day  spironolactone 50 milliGRAM(s) Oral daily  tamsulosin 0.4 milliGRAM(s) Oral at bedtime  traZODone 100 milliGRAM(s) Oral at bedtime  valACYclovir 500 milliGRAM(s) Oral at bedtime    MEDICATIONS  (PRN):  diazepam    Tablet 10 milliGRAM(s) Oral three times a day PRN anxiety  magnesium hydroxide Suspension 30 milliLiter(s) Oral daily PRN Constipation  oxycodone    5 mG/acetaminophen 325 mG 2 Tablet(s) Oral every 6 hours PRN Severe Pain (7 - 10)

## 2021-09-30 NOTE — PROGRESS NOTE ADULT - ASSESSMENT
72 yo F with PMHx of COPD, Asthma uses home oxygen PRN, chronic back pain, cervical radiculopathy, Pulmonary Hypertension, Gout, DLD, HTN, CKD 3, recent L ankle fx presents for ankle fx repair.      #Distended Abdomen  #Constipation  - Pt's abdomen distended for several days  - No bowel mvmts since beginning of week   - KUB ordered, pending results    #L medial Malleolar Fx, Distal Fibular Fx  - Had initially experienced fall earlier in September, presented to hospital and was found to have L bimalleolar ankle fx s/p closed reduction  - On follow up as outpt, found to have persistent fx and was scheduled for repair  - Was unable to obtain Pulmonary, Cardiac clearance due to physical dysfunction  - s/p cardiac/pulm/medicine risk stratification   - echo ordered - EF 59% and mild PH   - Ortho following s/p ORIF, POD #7  - plan for dc to rehab   - F/U with Dr. Clemons in 2 weeks. Please discharge patient with 6 total weeks of aspirin 325mg for dvt ppx  stable in splint  dc planning    #Urinary Retention   - post op retention in the setting of immobility  - madrid catheter in place, TOV failed x3  - c/w flomax  - will need Urology follow up    #Chronic Back Pain  #Hx of Cervical Radiculopathy  - Sees pain management, Dr. Mayers  - C/w  Percocet PRN ( no longer on Fentanyl patch )     #Gout  - C/w Allopurinol    #Pulmonary HTN  - C/w Sildenafil    #HTN  - C/w Atenolol, Nifedipine    #DLD  - C/w Atorvastatin    #COPD, Asthma on home O2 2L  #MARLENI  - C/w Symbicort    #Anxiety  - C/w Wellbutrin  - Valium PRN    #CKD 3  - Stable, sees Dr. Mcarthur    Heparin sq  70 yo F with PMHx of COPD, Asthma uses home oxygen PRN, chronic back pain, cervical radiculopathy, Pulmonary Hypertension, Gout, DLD, HTN, CKD 3, recent L ankle fx presents for ankle fx repair.      #Distended Abdomen  #Constipation  - Pt's abdomen distended for several days  - No bowel mvmts since beginning of week   - KUB showing Generalized bowel dilatation,  - Pt is NPO, will consider NG tube placement     #L medial Malleolar Fx, Distal Fibular Fx  - Had initially experienced fall earlier in September, presented to hospital and was found to have L bimalleolar ankle fx s/p closed reduction  - On follow up as outpt, found to have persistent fx and was scheduled for repair  - Was unable to obtain Pulmonary, Cardiac clearance due to physical dysfunction  - s/p cardiac/pulm/medicine risk stratification   - echo ordered - EF 59% and mild PH   - Ortho following s/p ORIF, POD #8  - plan for dc to rehab   - F/U with Dr. Clemons in 2 weeks. Please discharge patient with 6 total weeks of aspirin 325mg for dvt ppx  stable in splint  dc planning    #Urinary Retention   - post op retention in the setting of immobility  - madrid catheter in place, TOV failed x3  - c/w flomax  - will need Urology follow up    #Chronic Back Pain  #Hx of Cervical Radiculopathy  - Sees pain management, Dr. Mayers  - C/w  Percocet PRN ( no longer on Fentanyl patch )     #Gout  - C/w Allopurinol    #Pulmonary HTN  - C/w Sildenafil    #HTN  - C/w Atenolol, Nifedipine    #DLD  - C/w Atorvastatin    #COPD, Asthma on home O2 2L  #MARLENI  - C/w Symbicort    #Anxiety  - C/w Wellbutrin  - Valium PRN    #CKD 3  - Stable, sees Dr. Mcarthur    Heparin sq

## 2021-09-30 NOTE — PROGRESS NOTE ADULT - ASSESSMENT
70 yo F with PMHx of COPD, Asthma uses home oxygen PRN, chronic back pain, cervical radiculopathy, Pulmonary Hypertension, Gout, DLD, HTN, CKD 3, recent L ankle fx presents for ankle fx repair.    # Constipation  # Rule out obstruction  # Leukocytosis  - WBC 19k, likely reactive   - progressive worsening distension of abdomen  - has not had BM in 5 days  - KUB showed dilated loops but indeterminant study  - on lactulose syrup 10g q6h, miralax 17g bid, senna 2 tabs bid  - making NPO today, NGT in place  - CT abd/pelvis after repeat BMP to ensure resolution of CASSI      #L medial Malleolar Fx, Distal Fibular Fx  - Had initially experienced fall earlier in September, presented to hospital and was found to have L bimalleolar ankle fx s/p closed reduction  - On follow up as outpt, found to have persistent fx and was scheduled for repair  - Was unable to obtain Pulmonary, Cardiac clearance due to physical dysfunction  - s/p cardiac/pulm/medicine risk stratification   - echo ordered - EF 59% and mild PH   - Ortho following s/p ORIF, POD #7  - plan for dc to rehab   - F/U with Dr. Clemons in 2 weeks. Please discharge patient with 6 total weeks of aspirin 325mg for dvt ppx  stable in splint  dc planning    # CASSI  - sCr 1.7  - likely pre-renal 2/2 to decreased po intake in setting of possible obstruction      #Urinary Retention   - post op retention in the setting of immobility  - madrid catheter in place, TOV failed x3  - starting flomax  - will need Urology follow up    #Chronic Back Pain  #Hx of Cervical Radiculopathy  - Sees pain management, Dr. Mayers  - C/w  Percocet PRN ( no longer on Fentanyl patch )     #Gout  - C/w Allopurinol    #Pulmonary HTN  - C/w Sildenafil    #HTN  - C/w Atenolol, Nifedipine    #DLD  - C/w Atorvastatin    #COPD, Asthma on home O2 2L  #MARLENI  - C/w Symbicort    #Anxiety  - C/w Wellbutrin  - Valium PRN    #CKD 3  - Stable, sees Dr. Mcarthur    Heparin sq     #Progress Note Handoff:  Pending (specify): rehab placment  Family discussion: d/w patient   Disposition: [facility] is available, CM spoke with liaison Afsaneh STRINGER to verify bed pending resolution of obstruction

## 2021-09-30 NOTE — PROGRESS NOTE ADULT - SUBJECTIVE AND OBJECTIVE BOX
BERNARD SPEARS  71y  Female      Patient is a 71y old  Female who presents with a chief complaint of left ankle fx (29 Sep 2021 13:25)      INTERVAL HPI/OVERNIGHT EVENTS: Pt was seen and evaluated at bedside. Pt reports feeling not good this morning. Pt was complaining of coughing up brown sputum this morning. Her abdomen is distended and firm. Pt has not been able to go to bathroom in several days. KUB was ordered to identify any obstructions.       REVIEW OF SYSTEMS:  CONSTITUTIONAL: No fever, weight loss.   RESPIRATORY:   no wheezing, chills or hemoptysis; No shortness of breath.  CARDIOVASCULAR: No chest pain, palpitations, dizziness, or leg swelling  GASTROINTESTINAL: + diffuse abdominal pain. No nausea, vomiting, or hematemesis; + constipation.   NEUROLOGICAL: No headaches, memory loss, loss of strength, numbness, or tremors      ALLERY AND IMMUNOLOGIC: No hives or eczema  FAMILY HISTORY:  Family history of bladder cancer (Mother)    Family history of Alzheimer&#x27;s disease (Father)      T(C): 36.6 (09-29-21 @ 20:42), Max: 37.1 (09-29-21 @ 12:43)  HR: 65 (09-29-21 @ 20:42) (65 - 66)  BP: 137/74 (09-30-21 @ 04:06) (122/56 - 137/74)  RR: 18 (09-30-21 @ 04:06) (18 - 18)  SpO2: 96% (09-30-21 @ 04:06) (96% - 96%)  Wt(kg): --Vital Signs Last 24 Hrs  T(C): 36.6 (29 Sep 2021 20:42), Max: 37.1 (29 Sep 2021 12:43)  T(F): 97.8 (29 Sep 2021 20:42), Max: 98.8 (29 Sep 2021 12:43)  HR: 65 (29 Sep 2021 20:42) (65 - 66)  BP: 137/74 (30 Sep 2021 04:06) (122/56 - 137/74)  RR: 18 (30 Sep 2021 04:06) (18 - 18)  SpO2: 96% (30 Sep 2021 04:06) (96% - 96%)  adhesives (Pruritus; Rash)  No Known Drug Allergies      PHYSICAL EXAM:  GENERAL: NAD, well-groomed, well-developed  HEAD:  Atraumatic, Normocephalic  CHEST/LUNG: Clear to percussion bilaterally; No rales, rhonchi, wheezing, or rubs  HEART: Regular rate and rhythm; No murmurs, rubs, or gallops  ABDOMEN: Firm, tender, distended; Bowel sounds only present in RLQ  EXTREMITIES:  2+ Peripheral Pulses, No clubbing, cyanosis, or edema      LABS:                        10.1   10.19 )-----------( 168      ( 29 Sep 2021 04:30 )             30.6       09-29    135  |  95<L>  |  38<H>  ----------------------------<  117<H>  4.3   |  23  |  1.3    Ca    9.4      29 Sep 2021 04:30  Mg     1.9     09-29        RADIOLOGY & ADDITIONAL TESTS:    Pending KUB       MEDICATIONS  (STANDING):  acetaminophen   Tablet .. 650 milliGRAM(s) Oral once  allopurinol 300 milliGRAM(s) Oral at bedtime  ATENolol  Tablet 50 milliGRAM(s) Oral daily  atorvastatin 80 milliGRAM(s) Oral at bedtime  budesonide 160 MICROgram(s)/formoterol 4.5 MICROgram(s) Inhaler 2 Puff(s) Inhalation two times a day  buPROPion XL (24-Hour) . 150 milliGRAM(s) Oral daily  cholecalciferol 400 Unit(s) Oral daily  colchicine 0.6 milliGRAM(s) Oral daily  FLUoxetine 20 milliGRAM(s) Oral two times a day  fluticasone propionate 50 MICROgram(s)/spray Nasal Spray 1 Spray(s) Both Nostrils two times a day  folic acid 1 milliGRAM(s) Oral daily  furosemide    Tablet 20 milliGRAM(s) Oral daily  heparin   Injectable 5000 Unit(s) SubCutaneous every 8 hours  hyoscyamine SL 0.125 milliGRAM(s) SubLingual daily  influenza   Vaccine 0.5 milliLiter(s) IntraMuscular once  lactulose Syrup 10 Gram(s) Oral every 6 hours  lidocaine   4% Patch 1 Patch Transdermal daily  multivitamin 1 Tablet(s) Oral daily  NIFEdipine XL 60 milliGRAM(s) Oral daily  polyethylene glycol 3350 17 Gram(s) Oral two times a day  predniSONE   Tablet 10 milliGRAM(s) Oral two times a day  primidone 50 milliGRAM(s) Oral at bedtime  promethazine 50 milliGRAM(s) Oral daily  senna 2 Tablet(s) Oral two times a day  sildenafil (REVATIO) 20 milliGRAM(s) Oral three times a day  spironolactone 50 milliGRAM(s) Oral daily  tamsulosin 0.4 milliGRAM(s) Oral at bedtime  traZODone 100 milliGRAM(s) Oral at bedtime  valACYclovir 500 milliGRAM(s) Oral at bedtime    MEDICATIONS  (PRN):  diazepam    Tablet 10 milliGRAM(s) Oral three times a day PRN anxiety  magnesium hydroxide Suspension 30 milliLiter(s) Oral daily PRN Constipation  oxycodone    5 mG/acetaminophen 325 mG 2 Tablet(s) Oral every 6 hours PRN Severe Pain (7 - 10)         BERNARD SPEARS  71y  Female      Patient is a 71y old  Female who presents with a chief complaint of left ankle fx (29 Sep 2021 13:25)      INTERVAL HPI/OVERNIGHT EVENTS: Pt was seen and evaluated at bedside. Pt reports feeling not good this morning. Pt was complaining of coughing up brown sputum this morning. Her abdomen is distended and firm. Pt has not been able to go to bathroom in several days. KUB was performed.       REVIEW OF SYSTEMS:  CONSTITUTIONAL: No fever, weight loss.   RESPIRATORY:   no wheezing, chills or hemoptysis; No shortness of breath.  CARDIOVASCULAR: No chest pain, palpitations, dizziness, or leg swelling  GASTROINTESTINAL: + diffuse abdominal pain. No nausea, vomiting, or hematemesis; + constipation.   NEUROLOGICAL: No headaches, memory loss, loss of strength, numbness, or tremors      ALLERY AND IMMUNOLOGIC: No hives or eczema  FAMILY HISTORY:  Family history of bladder cancer (Mother)    Family history of Alzheimer&#x27;s disease (Father)      T(C): 36.6 (09-29-21 @ 20:42), Max: 37.1 (09-29-21 @ 12:43)  HR: 65 (09-29-21 @ 20:42) (65 - 66)  BP: 137/74 (09-30-21 @ 04:06) (122/56 - 137/74)  RR: 18 (09-30-21 @ 04:06) (18 - 18)  SpO2: 96% (09-30-21 @ 04:06) (96% - 96%)  Wt(kg): --Vital Signs Last 24 Hrs  T(C): 36.6 (29 Sep 2021 20:42), Max: 37.1 (29 Sep 2021 12:43)  T(F): 97.8 (29 Sep 2021 20:42), Max: 98.8 (29 Sep 2021 12:43)  HR: 65 (29 Sep 2021 20:42) (65 - 66)  BP: 137/74 (30 Sep 2021 04:06) (122/56 - 137/74)  RR: 18 (30 Sep 2021 04:06) (18 - 18)  SpO2: 96% (30 Sep 2021 04:06) (96% - 96%)  adhesives (Pruritus; Rash)  No Known Drug Allergies      PHYSICAL EXAM:  GENERAL: NAD, well-groomed, well-developed  HEAD:  Atraumatic, Normocephalic  CHEST/LUNG: Clear to percussion bilaterally; No rales, rhonchi, wheezing, or rubs  HEART: Regular rate and rhythm; No murmurs, rubs, or gallops  ABDOMEN: Firm, non- tender, distended; soft bowel sounds  EXTREMITIES:  2+ Peripheral Pulses, No clubbing, cyanosis, or edema      LABS:                        10.1   10.19 )-----------( 168      ( 29 Sep 2021 04:30 )             30.6       09-29    135  |  95<L>  |  38<H>  ----------------------------<  117<H>  4.3   |  23  |  1.3    Ca    9.4      29 Sep 2021 04:30  Mg     1.9     09-29        RADIOLOGY & ADDITIONAL TESTS:      EXAM:  XR KUB 1 VIEW      PROCEDURE DATE:  09/30/2021                                                                                                                                                                                                                                                           IMPRESSION:    Generalized bowel dilatation, difficult to differentiate small from large bowel. The more significantly dilated loops may represent colon but this is indeterminate. These findings are nonspecific. Correlate with exam and with CT as clinically warranted.    Degenerative changes of the spine and hips right greater than left.        MEDICATIONS  (STANDING):  acetaminophen   Tablet .. 650 milliGRAM(s) Oral once  allopurinol 300 milliGRAM(s) Oral at bedtime  ATENolol  Tablet 50 milliGRAM(s) Oral daily  atorvastatin 80 milliGRAM(s) Oral at bedtime  budesonide 160 MICROgram(s)/formoterol 4.5 MICROgram(s) Inhaler 2 Puff(s) Inhalation two times a day  buPROPion XL (24-Hour) . 150 milliGRAM(s) Oral daily  cholecalciferol 400 Unit(s) Oral daily  colchicine 0.6 milliGRAM(s) Oral daily  FLUoxetine 20 milliGRAM(s) Oral two times a day  fluticasone propionate 50 MICROgram(s)/spray Nasal Spray 1 Spray(s) Both Nostrils two times a day  folic acid 1 milliGRAM(s) Oral daily  furosemide    Tablet 20 milliGRAM(s) Oral daily  heparin   Injectable 5000 Unit(s) SubCutaneous every 8 hours  hyoscyamine SL 0.125 milliGRAM(s) SubLingual daily  influenza   Vaccine 0.5 milliLiter(s) IntraMuscular once  lactulose Syrup 10 Gram(s) Oral every 6 hours  lidocaine   4% Patch 1 Patch Transdermal daily  multivitamin 1 Tablet(s) Oral daily  NIFEdipine XL 60 milliGRAM(s) Oral daily  polyethylene glycol 3350 17 Gram(s) Oral two times a day  predniSONE   Tablet 10 milliGRAM(s) Oral two times a day  primidone 50 milliGRAM(s) Oral at bedtime  promethazine 50 milliGRAM(s) Oral daily  senna 2 Tablet(s) Oral two times a day  sildenafil (REVATIO) 20 milliGRAM(s) Oral three times a day  spironolactone 50 milliGRAM(s) Oral daily  tamsulosin 0.4 milliGRAM(s) Oral at bedtime  traZODone 100 milliGRAM(s) Oral at bedtime  valACYclovir 500 milliGRAM(s) Oral at bedtime    MEDICATIONS  (PRN):  diazepam    Tablet 10 milliGRAM(s) Oral three times a day PRN anxiety  magnesium hydroxide Suspension 30 milliLiter(s) Oral daily PRN Constipation  oxycodone    5 mG/acetaminophen 325 mG 2 Tablet(s) Oral every 6 hours PRN Severe Pain (7 - 10)

## 2021-10-01 LAB
ANION GAP SERPL CALC-SCNC: 19 MMOL/L — HIGH (ref 7–14)
BASOPHILS # BLD AUTO: 0.03 K/UL — SIGNIFICANT CHANGE UP (ref 0–0.2)
BASOPHILS NFR BLD AUTO: 0.2 % — SIGNIFICANT CHANGE UP (ref 0–1)
BUN SERPL-MCNC: 45 MG/DL — HIGH (ref 10–20)
CALCIUM SERPL-MCNC: 9.6 MG/DL — SIGNIFICANT CHANGE UP (ref 8.5–10.1)
CHLORIDE SERPL-SCNC: 95 MMOL/L — LOW (ref 98–110)
CO2 SERPL-SCNC: 26 MMOL/L — SIGNIFICANT CHANGE UP (ref 17–32)
CREAT SERPL-MCNC: 1.6 MG/DL — HIGH (ref 0.7–1.5)
EOSINOPHIL # BLD AUTO: 0.01 K/UL — SIGNIFICANT CHANGE UP (ref 0–0.7)
EOSINOPHIL NFR BLD AUTO: 0.1 % — SIGNIFICANT CHANGE UP (ref 0–8)
GLUCOSE SERPL-MCNC: 158 MG/DL — HIGH (ref 70–99)
HCT VFR BLD CALC: 33.8 % — LOW (ref 37–47)
HGB BLD-MCNC: 11.3 G/DL — LOW (ref 12–16)
IMM GRANULOCYTES NFR BLD AUTO: 1.3 % — HIGH (ref 0.1–0.3)
LYMPHOCYTES # BLD AUTO: 0.78 K/UL — LOW (ref 1.2–3.4)
LYMPHOCYTES # BLD AUTO: 4.2 % — LOW (ref 20.5–51.1)
MAGNESIUM SERPL-MCNC: 2.1 MG/DL — SIGNIFICANT CHANGE UP (ref 1.8–2.4)
MCHC RBC-ENTMCNC: 33.4 G/DL — SIGNIFICANT CHANGE UP (ref 32–37)
MCHC RBC-ENTMCNC: 35.6 PG — HIGH (ref 27–31)
MCV RBC AUTO: 106.6 FL — HIGH (ref 81–99)
MONOCYTES # BLD AUTO: 1.21 K/UL — HIGH (ref 0.1–0.6)
MONOCYTES NFR BLD AUTO: 6.6 % — SIGNIFICANT CHANGE UP (ref 1.7–9.3)
NEUTROPHILS # BLD AUTO: 16.18 K/UL — HIGH (ref 1.4–6.5)
NEUTROPHILS NFR BLD AUTO: 87.6 % — HIGH (ref 42.2–75.2)
NRBC # BLD: 0 /100 WBCS — SIGNIFICANT CHANGE UP (ref 0–0)
PLATELET # BLD AUTO: 289 K/UL — SIGNIFICANT CHANGE UP (ref 130–400)
POTASSIUM SERPL-MCNC: 4.2 MMOL/L — SIGNIFICANT CHANGE UP (ref 3.5–5)
POTASSIUM SERPL-SCNC: 4.2 MMOL/L — SIGNIFICANT CHANGE UP (ref 3.5–5)
RBC # BLD: 3.17 M/UL — LOW (ref 4.2–5.4)
RBC # FLD: 13.8 % — SIGNIFICANT CHANGE UP (ref 11.5–14.5)
SODIUM SERPL-SCNC: 140 MMOL/L — SIGNIFICANT CHANGE UP (ref 135–146)
WBC # BLD: 18.45 K/UL — HIGH (ref 4.8–10.8)
WBC # FLD AUTO: 18.45 K/UL — HIGH (ref 4.8–10.8)

## 2021-10-01 PROCEDURE — 71045 X-RAY EXAM CHEST 1 VIEW: CPT | Mod: 26

## 2021-10-01 PROCEDURE — 71045 X-RAY EXAM CHEST 1 VIEW: CPT | Mod: 26,77

## 2021-10-01 PROCEDURE — 99232 SBSQ HOSP IP/OBS MODERATE 35: CPT

## 2021-10-01 PROCEDURE — 74018 RADEX ABDOMEN 1 VIEW: CPT | Mod: 26

## 2021-10-01 RX ORDER — SODIUM CHLORIDE 9 MG/ML
1000 INJECTION, SOLUTION INTRAVENOUS
Refills: 0 | Status: DISCONTINUED | OUTPATIENT
Start: 2021-10-01 | End: 2021-10-06

## 2021-10-01 RX ORDER — TRAZODONE HCL 50 MG
100 TABLET ORAL ONCE
Refills: 0 | Status: COMPLETED | OUTPATIENT
Start: 2021-10-01 | End: 2021-10-01

## 2021-10-01 RX ORDER — ONDANSETRON 8 MG/1
4 TABLET, FILM COATED ORAL ONCE
Refills: 0 | Status: COMPLETED | OUTPATIENT
Start: 2021-10-01 | End: 2021-10-05

## 2021-10-01 RX ADMIN — LIDOCAINE 1 PATCH: 4 CREAM TOPICAL at 18:02

## 2021-10-01 RX ADMIN — Medication 20 MILLIGRAM(S): at 14:00

## 2021-10-01 RX ADMIN — SODIUM CHLORIDE 75 MILLILITER(S): 9 INJECTION, SOLUTION INTRAVENOUS at 09:00

## 2021-10-01 RX ADMIN — Medication 1 MILLIGRAM(S): at 11:55

## 2021-10-01 RX ADMIN — Medication 0.12 MILLIGRAM(S): at 11:56

## 2021-10-01 RX ADMIN — BUPROPION HYDROCHLORIDE 150 MILLIGRAM(S): 150 TABLET, EXTENDED RELEASE ORAL at 11:55

## 2021-10-01 RX ADMIN — HEPARIN SODIUM 5000 UNIT(S): 5000 INJECTION INTRAVENOUS; SUBCUTANEOUS at 22:20

## 2021-10-01 RX ADMIN — SODIUM CHLORIDE 75 MILLILITER(S): 9 INJECTION, SOLUTION INTRAVENOUS at 22:18

## 2021-10-01 RX ADMIN — Medication 1 TABLET(S): at 11:55

## 2021-10-01 RX ADMIN — Medication 0.6 MILLIGRAM(S): at 11:54

## 2021-10-01 RX ADMIN — Medication 1 SPRAY(S): at 18:01

## 2021-10-01 RX ADMIN — HEPARIN SODIUM 5000 UNIT(S): 5000 INJECTION INTRAVENOUS; SUBCUTANEOUS at 06:01

## 2021-10-01 RX ADMIN — LACTULOSE 10 GRAM(S): 10 SOLUTION ORAL at 11:54

## 2021-10-01 RX ADMIN — Medication 100 MILLIGRAM(S): at 01:23

## 2021-10-01 RX ADMIN — Medication 1 SPRAY(S): at 06:01

## 2021-10-01 RX ADMIN — LIDOCAINE 1 PATCH: 4 CREAM TOPICAL at 11:56

## 2021-10-01 RX ADMIN — Medication 50 MILLIGRAM(S): at 11:56

## 2021-10-01 RX ADMIN — HEPARIN SODIUM 5000 UNIT(S): 5000 INJECTION INTRAVENOUS; SUBCUTANEOUS at 14:00

## 2021-10-01 RX ADMIN — Medication 400 UNIT(S): at 11:59

## 2021-10-01 RX ADMIN — Medication 1 MILLIGRAM(S): at 15:23

## 2021-10-01 NOTE — PROGRESS NOTE ADULT - SUBJECTIVE AND OBJECTIVE BOX
ADRIANNEDANIELLEBERNARD PASTOR  71y  Female      Patient is a 71y old  Female who presents with a chief complaint of left ankle fx (01 Oct 2021 10:23)      INTERVAL HPI/OVERNIGHT EVENTS: no acute events overnight. patient passing more gas and having "little liquid" stools. Per nursing appears to be progressively more formed      REVIEW OF SYSTEMS:  CONSTITUTIONAL: No fever, weight loss, or fatigue  RESPIRATORY: No cough, wheezing, chills or hemoptysis; No shortness of breath  CARDIOVASCULAR: No chest pain, palpitations, dizziness, or leg swelling  GASTROINTESTINAL: No abdominal or epigastric pain. No nausea, vomiting, or hematemesis; No diarrhea or constipation. No melena or hematochezia.  GENITOURINARY: No dysuria, frequency, hematuria, or incontinence  NEUROLOGICAL: No headaches, memory loss, loss of strength, numbness, or tremors  SKIN: No itching, burning, rashes, or lesions   MUSCULOSKELETAL: No joint pain or swelling; No muscle, back, or extremity pain  PSYCHIATRIC: No depression, anxiety, mood swings, or difficulty sleeping  All other review of systems negative    VITALS  T(C): 36.6 (10-01-21 @ 06:20), Max: 37.1 (09-30-21 @ 13:19)  HR: 72 (10-01-21 @ 06:20) (72 - 72)  BP: 125/59 (10-01-21 @ 06:20) (125/59 - 141/103)  RR: 20 (10-01-21 @ 06:20) (18 - 20)  SpO2: --  Wt(kg): --Vital Signs Last 24 Hrs  T(C): 36.6 (01 Oct 2021 06:20), Max: 37.1 (30 Sep 2021 13:19)  T(F): 97.8 (01 Oct 2021 06:20), Max: 98.8 (30 Sep 2021 13:19)  HR: 72 (01 Oct 2021 06:20) (72 - 72)  BP: 125/59 (01 Oct 2021 06:20) (125/59 - 141/103)  BP(mean): --  RR: 20 (01 Oct 2021 06:20) (18 - 20)  SpO2: --      09-30-21 @ 07:01  -  10-01-21 @ 07:00  --------------------------------------------------------  IN: 0 mL / OUT: 800 mL / NET: -800 mL        PHYSICAL EXAM:  GENERAL: elderly M, NAD, non toxic  EYES: anicteric sclera, non injected conjunctiva, EOMI  ENT: oropharynx clear, MMM, fair dentition  PSYCH: no agitation, baseline mentation  NERVOUS SYSTEM:  Alert & Oriented X3, Motor Strength 5/5 B/L upper and lower extremities; Sensory intact  PULMONARY: Clear to percussion bilaterally; No rales, rhonchi, wheezing, or rubs  CARDIOVASCULAR: Regular rate and rhythm; No murmurs, rubs, or gallops  GI: Soft, Nontender, Nondistended; Bowel sounds present  EXTREMITIES:  2+ Peripheral Pulses, No clubbing, cyanosis, or edema  LYMPH: No lymphadenopathy noted  SKIN: No rashes or lesions    Consultant(s) Notes Reviewed:  [x ] YES  [ ] NO    Discussed with Consultants/Other Providers [ x] YES     LABS                          11.3   18.45 )-----------( 289      ( 01 Oct 2021 05:24 )             33.8     10-01    140  |  95<L>  |  45<H>  ----------------------------<  158<H>  4.2   |  26  |  1.6<H>    Ca    9.6      01 Oct 2021 05:24  Mg     2.1     10-01      RADIOLOGY & ADDITIONAL TESTS:  Xray Chest 1 View-PORTABLE IMMEDIATE (Xray Chest 1 View-PORTABLE IMMEDIATE .) (10.01.21 @ 10:00) >  Impression:  Right basilar atelectasis.      MEDICATIONS  (STANDING):  acetaminophen   Tablet .. 650 milliGRAM(s) Oral once  allopurinol 300 milliGRAM(s) Oral at bedtime  ATENolol  Tablet 50 milliGRAM(s) Oral daily  atorvastatin 80 milliGRAM(s) Oral at bedtime  budesonide 160 MICROgram(s)/formoterol 4.5 MICROgram(s) Inhaler 2 Puff(s) Inhalation two times a day  buPROPion XL (24-Hour) . 150 milliGRAM(s) Oral daily  cholecalciferol 400 Unit(s) Oral daily  colchicine 0.6 milliGRAM(s) Oral daily  FLUoxetine 20 milliGRAM(s) Oral two times a day  fluticasone propionate 50 MICROgram(s)/spray Nasal Spray 1 Spray(s) Both Nostrils two times a day  folic acid 1 milliGRAM(s) Oral daily  furosemide    Tablet 20 milliGRAM(s) Oral daily  heparin   Injectable 5000 Unit(s) SubCutaneous every 8 hours  hyoscyamine SL 0.125 milliGRAM(s) SubLingual daily  influenza   Vaccine 0.5 milliLiter(s) IntraMuscular once  lactated ringers. 1000 milliLiter(s) (75 mL/Hr) IV Continuous <Continuous>  lactulose Syrup 10 Gram(s) Oral every 6 hours  lidocaine   4% Patch 1 Patch Transdermal daily  multivitamin 1 Tablet(s) Oral daily  NIFEdipine XL 60 milliGRAM(s) Oral daily  polyethylene glycol 3350 17 Gram(s) Oral two times a day  predniSONE   Tablet 10 milliGRAM(s) Oral two times a day  primidone 50 milliGRAM(s) Oral at bedtime  promethazine 50 milliGRAM(s) Oral daily  senna 2 Tablet(s) Oral two times a day  sildenafil (REVATIO) 20 milliGRAM(s) Oral three times a day  spironolactone 50 milliGRAM(s) Oral daily  tamsulosin 0.4 milliGRAM(s) Oral at bedtime  traZODone 100 milliGRAM(s) Oral at bedtime  valACYclovir 500 milliGRAM(s) Oral at bedtime    MEDICATIONS  (PRN):  magnesium hydroxide Suspension 30 milliLiter(s) Oral daily PRN Constipation

## 2021-10-01 NOTE — PROGRESS NOTE ADULT - ASSESSMENT
70 yo F with PMHx of COPD, Asthma uses home oxygen PRN, chronic back pain, cervical radiculopathy, Pulmonary Hypertension, Gout, DLD, HTN, CKD 3, recent L ankle fx presents for ankle fx repair. Still very distended and symptomatic    # Constipation  # Rule out obstruction  # Leukocytosis  - WBC 19k, likely reactive-->18k  - progressive worsening distension of abdomen  - has not had BM in 5 days  - KUB showed dilated loops but indeterminant study  - on lactulose syrup 10g q6h, miralax 17g bid, senna 2 tabs bid  - making NPO today, NGT in place(but patient refused placement)  - CT abd/pelvis after repeat BMP to ensure resolution of CASSI    # CASSI  - sCr 1.7->1.6  - likely pre-renal 2/2 to decreased po intake in setting of possible obstruction  - continue mIVF @ 75cc/hr  - monitor on serial labs      #L medial Malleolar Fx, Distal Fibular Fx  - Had initially experienced fall earlier in September, presented to hospital and was found to have L bimalleolar ankle fx s/p closed reduction  - On follow up as outpt, found to have persistent fx and was scheduled for repair  - Was unable to obtain Pulmonary, Cardiac clearance due to physical dysfunction  - s/p cardiac/pulm/medicine risk stratification   - echo ordered - EF 59% and mild PH   - Ortho following s/p ORIF, POD #7  - plan for dc to rehab   - F/U with Dr. Clemons in 2 weeks. Please discharge patient with 6 total weeks of aspirin 325mg for dvt ppx  stable in splint  dc planning    #Urinary Retention   - post op retention in the setting of immobility  - madrid catheter in place, TOV failed x3  - started flomax. will repeat TOV again   - will need Urology follow up    #Chronic Back Pain  #Hx of Cervical Radiculopathy  - Sees pain management, Dr. Mayers  - C/w  Percocet PRN ( no longer on Fentanyl patch )     #Gout  - C/w Allopurinol    #Pulmonary HTN  - C/w Sildenafil    #HTN  - C/w Atenolol, Nifedipine    #DLD  - C/w Atorvastatin    #COPD, Asthma on home O2 2L  #MARLENI  - C/w Symbicort    #Anxiety  - C/w Wellbutrin  - Valium PRN    #CKD 3  - Stable, sees Dr. Mcarthur    Heparin sq     #Progress Note Handoff:  Pending (specify): CASSI resolution, obstruction resolution  Family discussion: d/w patient   Disposition: [facility] is available. 24-48hrs tentatively

## 2021-10-01 NOTE — PROGRESS NOTE ADULT - SUBJECTIVE AND OBJECTIVE BOX
TORY BERNARD  71y  Female      Patient is a 71y old  Female who presents with a chief complaint of left ankle fx (30 Sep 2021 15:11)      INTERVAL HPI/OVERNIGHT EVENTS: HOD 10. Pt was seen and evaluated at bedside. Pt has been NPO since yesterday. She reports vomiting chunks of her food that previously ate 2 days ago. Pt reports passing watery bowel mvmts throughout the night. Pt is also complaining of heartburn. She refused NG tube.       REVIEW OF SYSTEMS:  CONSTITUTIONAL: No fever, weight loss, or fatigue  RESPIRATORY: No cough, wheezing, chills or hemoptysis; No shortness of breath  CARDIOVASCULAR: No chest pain, palpitations, dizziness, or leg swelling  GASTROINTESTINAL: Mild abdominal pain. + vomiting, +diarrhea. No melena or hematochezia.    ALLERY AND IMMUNOLOGIC: No hives or eczema  FAMILY HISTORY:  Family history of bladder cancer (Mother)    Family history of Alzheimer&#x27;s disease (Father)      T(C): 36.6 (10-01-21 @ 06:20), Max: 37.1 (09-30-21 @ 13:19)  HR: 72 (10-01-21 @ 06:20) (72 - 72)  BP: 125/59 (10-01-21 @ 06:20) (125/59 - 141/103)  RR: 20 (10-01-21 @ 06:20) (18 - 20)  SpO2: --  Wt(kg): --Vital Signs Last 24 Hrs  T(C): 36.6 (01 Oct 2021 06:20), Max: 37.1 (30 Sep 2021 13:19)  T(F): 97.8 (01 Oct 2021 06:20), Max: 98.8 (30 Sep 2021 13:19)  HR: 72 (01 Oct 2021 06:20) (72 - 72)  BP: 125/59 (01 Oct 2021 06:20) (125/59 - 141/103)  RR: 20 (01 Oct 2021 06:20) (18 - 20)  adhesives (Pruritus; Rash)      No Known Drug Allergies      PHYSICAL EXAM:  GENERAL: NAD, well-groomed, well-developed  HEAD:  Atraumatic, Normocephalic  CHEST/LUNG: Clear to percussion bilaterally; No rales, rhonchi, wheezing, or rubs  HEART: Regular rate and rhythm; No murmurs, rubs, or gallops  ABDOMEN: Softer than previous exams, Nontender, distended; Bowel sounds present  EXTREMITIES:  2+ Peripheral Pulses, No clubbing, cyanosis, or edema  LYMPH: No lymphadenopathy noted  SKIN: No rashes or lesions      LABS:                        11.3   18.45 )-----------( 289      ( 01 Oct 2021 05:24 )             33.8   10-01    140  |  95<L>  |  45<H>  ----------------------------<  158<H>  4.2   |  26  |  1.6<H>    Ca    9.6      01 Oct 2021 05:24  Mg     2.1     10-01    RADIOLOGY & ADDITIONAL TESTS:  Xray Kidney Ureter Bladder (09.30.21 @ 09:07) >  IMPRESSION:  Generalized bowel dilatation, difficult to differentiate small from large bowel. The more significantly dilated loops may represent colon but this is indeterminate. These findings are nonspecific. Correlate with exam and with CT as clinically warranted.  Degenerative changes of the spine and hips right greater than left.      acetaminophen   Tablet .. 650 milliGRAM(s) Oral once  allopurinol 300 milliGRAM(s) Oral at bedtime  ATENolol  Tablet 50 milliGRAM(s) Oral daily  atorvastatin 80 milliGRAM(s) Oral at bedtime  budesonide 160 MICROgram(s)/formoterol 4.5 MICROgram(s) Inhaler 2 Puff(s) Inhalation two times a day  buPROPion XL (24-Hour) . 150 milliGRAM(s) Oral daily  cholecalciferol 400 Unit(s) Oral daily  colchicine 0.6 milliGRAM(s) Oral daily  FLUoxetine 20 milliGRAM(s) Oral two times a day  fluticasone propionate 50 MICROgram(s)/spray Nasal Spray 1 Spray(s) Both Nostrils two times a day  folic acid 1 milliGRAM(s) Oral daily  furosemide    Tablet 20 milliGRAM(s) Oral daily  heparin   Injectable 5000 Unit(s) SubCutaneous every 8 hours  hyoscyamine SL 0.125 milliGRAM(s) SubLingual daily  influenza   Vaccine 0.5 milliLiter(s) IntraMuscular once  lactated ringers. 1000 milliLiter(s) IV Continuous <Continuous>  lactulose Syrup 10 Gram(s) Oral every 6 hours  lidocaine   4% Patch 1 Patch Transdermal daily  magnesium hydroxide Suspension 30 milliLiter(s) Oral daily PRN  multivitamin 1 Tablet(s) Oral daily  NIFEdipine XL 60 milliGRAM(s) Oral daily  polyethylene glycol 3350 17 Gram(s) Oral two times a day  predniSONE   Tablet 10 milliGRAM(s) Oral two times a day  primidone 50 milliGRAM(s) Oral at bedtime  promethazine 50 milliGRAM(s) Oral daily  senna 2 Tablet(s) Oral two times a day  sildenafil (REVATIO) 20 milliGRAM(s) Oral three times a day  spironolactone 50 milliGRAM(s) Oral daily  tamsulosin 0.4 milliGRAM(s) Oral at bedtime  traZODone 100 milliGRAM(s) Oral at bedtime  valACYclovir 500 milliGRAM(s) Oral at bedtime      HEALTH ISSUES - PROBLEM Dx:

## 2021-10-01 NOTE — PROGRESS NOTE ADULT - ASSESSMENT
72 yo F with PMHx of COPD, Asthma uses home oxygen PRN, chronic back pain, cervical radiculopathy, Pulmonary Hypertension, Gout, DLD, HTN, CKD 3, recent L ankle fx presents for ankle fx repair.    # Constipation  # Rule out obstruction  # Leukocytosis  - WBC 19k -> 18k, likely reactive   - so improvement in distension of abdomen  - watery BM overnight  - KUB showed dilated loops but indeterminant study  - on lactulose syrup 10g q6h, miralax 17g bid, senna 2 tabs bid  - pt has been NPO, refusing NGT  - repeat KUB ordered  - will get enema, continue monitoring   - CT abd/pelvis after repeat BMP to ensure resolution of CASSI      #L medial Malleolar Fx, Distal Fibular Fx  - Had initially experienced fall earlier in September, presented to hospital and was found to have L bimalleolar ankle fx s/p closed reduction  - On follow up as outpt, found to have persistent fx and was scheduled for repair  - Was unable to obtain Pulmonary, Cardiac clearance due to physical dysfunction  - s/p cardiac/pulm/medicine risk stratification   - echo ordered - EF 59% and mild PH   - Ortho following s/p ORIF, POD #7  - plan for dc to rehab   - F/U with Dr. Clemons in 2 weeks. Please discharge patient with 6 total weeks of aspirin 325mg for dvt ppx  stable in splint  dc planning    # CASSI  - sCr 1.7 -> 1.6  - likely pre-renal 2/2 to decreased po intake in setting of possible obstruction  - maintenance fluid LR @ 75 started      #Urinary Retention   - post op retention in the setting of immobility  - madrid catheter in place, TOV failed x3  - c/w flomax  - TOV today  - will need Urology follow up    #Chronic Back Pain  #Hx of Cervical Radiculopathy  - Sees pain management, Dr. Mayers  - C/w  Percocet PRN ( no longer on Fentanyl patch )     #Gout  - C/w Allopurinol    #Pulmonary HTN  - C/w Sildenafil    #HTN  - C/w Atenolol, Nifedipine    #DLD  - C/w Atorvastatin    #COPD, Asthma on home O2 2L  #MARLENI  - C/w Symbicort    #Anxiety  - C/w Wellbutrin  - Valium PRN    #CKD 3  - Stable, sees Dr. Mcarthur    Heparin sq     #Progress Note Handoff:  Pending (specify): rehab placment  Family discussion: d/w patient   Disposition: [facility] is available, CM spoke with liaison Afsaneh STRINGER to verify bed pending resolution of obstruction

## 2021-10-02 LAB
ANION GAP SERPL CALC-SCNC: 17 MMOL/L — HIGH (ref 7–14)
BASOPHILS # BLD AUTO: 0.02 K/UL — SIGNIFICANT CHANGE UP (ref 0–0.2)
BASOPHILS NFR BLD AUTO: 0.1 % — SIGNIFICANT CHANGE UP (ref 0–1)
BUN SERPL-MCNC: 41 MG/DL — HIGH (ref 10–20)
CALCIUM SERPL-MCNC: 8.7 MG/DL — SIGNIFICANT CHANGE UP (ref 8.5–10.1)
CHLORIDE SERPL-SCNC: 98 MMOL/L — SIGNIFICANT CHANGE UP (ref 98–110)
CO2 SERPL-SCNC: 23 MMOL/L — SIGNIFICANT CHANGE UP (ref 17–32)
CREAT SERPL-MCNC: 1.2 MG/DL — SIGNIFICANT CHANGE UP (ref 0.7–1.5)
EOSINOPHIL # BLD AUTO: 0.16 K/UL — SIGNIFICANT CHANGE UP (ref 0–0.7)
EOSINOPHIL NFR BLD AUTO: 1 % — SIGNIFICANT CHANGE UP (ref 0–8)
GLUCOSE SERPL-MCNC: 89 MG/DL — SIGNIFICANT CHANGE UP (ref 70–99)
HCT VFR BLD CALC: 29.2 % — LOW (ref 37–47)
HGB BLD-MCNC: 9.6 G/DL — LOW (ref 12–16)
IMM GRANULOCYTES NFR BLD AUTO: 1.1 % — HIGH (ref 0.1–0.3)
LYMPHOCYTES # BLD AUTO: 1.42 K/UL — SIGNIFICANT CHANGE UP (ref 1.2–3.4)
LYMPHOCYTES # BLD AUTO: 8.8 % — LOW (ref 20.5–51.1)
MAGNESIUM SERPL-MCNC: 1.9 MG/DL — SIGNIFICANT CHANGE UP (ref 1.8–2.4)
MCHC RBC-ENTMCNC: 32.9 G/DL — SIGNIFICANT CHANGE UP (ref 32–37)
MCHC RBC-ENTMCNC: 35.7 PG — HIGH (ref 27–31)
MCV RBC AUTO: 108.6 FL — HIGH (ref 81–99)
MONOCYTES # BLD AUTO: 1.84 K/UL — HIGH (ref 0.1–0.6)
MONOCYTES NFR BLD AUTO: 11.4 % — HIGH (ref 1.7–9.3)
NEUTROPHILS # BLD AUTO: 12.48 K/UL — HIGH (ref 1.4–6.5)
NEUTROPHILS NFR BLD AUTO: 77.6 % — HIGH (ref 42.2–75.2)
NRBC # BLD: 0 /100 WBCS — SIGNIFICANT CHANGE UP (ref 0–0)
PLATELET # BLD AUTO: 278 K/UL — SIGNIFICANT CHANGE UP (ref 130–400)
POTASSIUM SERPL-MCNC: 3.4 MMOL/L — LOW (ref 3.5–5)
POTASSIUM SERPL-SCNC: 3.4 MMOL/L — LOW (ref 3.5–5)
RBC # BLD: 2.69 M/UL — LOW (ref 4.2–5.4)
RBC # FLD: 14.5 % — SIGNIFICANT CHANGE UP (ref 11.5–14.5)
SODIUM SERPL-SCNC: 138 MMOL/L — SIGNIFICANT CHANGE UP (ref 135–146)
WBC # BLD: 16.1 K/UL — HIGH (ref 4.8–10.8)
WBC # FLD AUTO: 16.1 K/UL — HIGH (ref 4.8–10.8)

## 2021-10-02 PROCEDURE — 71045 X-RAY EXAM CHEST 1 VIEW: CPT | Mod: 26

## 2021-10-02 PROCEDURE — 99233 SBSQ HOSP IP/OBS HIGH 50: CPT

## 2021-10-02 RX ADMIN — Medication 1 MILLIGRAM(S): at 15:01

## 2021-10-02 RX ADMIN — Medication 20 MILLIGRAM(S): at 21:06

## 2021-10-02 RX ADMIN — LACTULOSE 10 GRAM(S): 10 SOLUTION ORAL at 17:41

## 2021-10-02 RX ADMIN — VALACYCLOVIR 500 MILLIGRAM(S): 500 TABLET, FILM COATED ORAL at 21:07

## 2021-10-02 RX ADMIN — Medication 300 MILLIGRAM(S): at 21:06

## 2021-10-02 RX ADMIN — BUDESONIDE AND FORMOTEROL FUMARATE DIHYDRATE 2 PUFF(S): 160; 4.5 AEROSOL RESPIRATORY (INHALATION) at 08:10

## 2021-10-02 RX ADMIN — Medication 10 MILLIGRAM(S): at 17:41

## 2021-10-02 RX ADMIN — Medication 20 MILLIGRAM(S): at 17:41

## 2021-10-02 RX ADMIN — BUDESONIDE AND FORMOTEROL FUMARATE DIHYDRATE 2 PUFF(S): 160; 4.5 AEROSOL RESPIRATORY (INHALATION) at 21:04

## 2021-10-02 RX ADMIN — Medication 1 SPRAY(S): at 05:50

## 2021-10-02 RX ADMIN — HEPARIN SODIUM 5000 UNIT(S): 5000 INJECTION INTRAVENOUS; SUBCUTANEOUS at 05:50

## 2021-10-02 RX ADMIN — SODIUM CHLORIDE 75 MILLILITER(S): 9 INJECTION, SOLUTION INTRAVENOUS at 17:41

## 2021-10-02 RX ADMIN — TAMSULOSIN HYDROCHLORIDE 0.4 MILLIGRAM(S): 0.4 CAPSULE ORAL at 21:06

## 2021-10-02 RX ADMIN — POLYETHYLENE GLYCOL 3350 17 GRAM(S): 17 POWDER, FOR SOLUTION ORAL at 17:41

## 2021-10-02 RX ADMIN — Medication 100 MILLIGRAM(S): at 21:06

## 2021-10-02 RX ADMIN — HEPARIN SODIUM 5000 UNIT(S): 5000 INJECTION INTRAVENOUS; SUBCUTANEOUS at 21:06

## 2021-10-02 RX ADMIN — LACTULOSE 10 GRAM(S): 10 SOLUTION ORAL at 23:00

## 2021-10-02 RX ADMIN — PRIMIDONE 50 MILLIGRAM(S): 250 TABLET ORAL at 21:07

## 2021-10-02 RX ADMIN — ATORVASTATIN CALCIUM 80 MILLIGRAM(S): 80 TABLET, FILM COATED ORAL at 21:06

## 2021-10-02 RX ADMIN — Medication 1 SPRAY(S): at 17:42

## 2021-10-02 NOTE — PROGRESS NOTE ADULT - ASSESSMENT
70 yo F with PMHx of COPD, Asthma uses home oxygen PRN, chronic back pain, cervical radiculopathy, Pulmonary Hypertension, Gout, DLD, HTN, CKD 3, recent L ankle fx presents for ankle fx repair.    # Constipation  # Leukocytosis  - WBC 19k -> 18k, likely reactive   - so improvement in distension of abdomen  - watery BM overnight  - KUB showed dilated loops but indeterminant study  - on lactulose syrup 10g q6h, miralax 17g bid, senna 2 tabs bid  - pt has been NPO, refusing NGT  - repeat KUB ordered  - will get enema, continue monitoring   - CT abd/pelvis after repeat BMP to ensure resolution of CASSI      #L medial Malleolar Fx, Distal Fibular Fx  - Had initially experienced fall earlier in September, presented to hospital and was found to have L bimalleolar ankle fx s/p closed reduction  - On follow up as outpt, found to have persistent fx and was scheduled for repair  - Was unable to obtain Pulmonary, Cardiac clearance due to physical dysfunction  - s/p cardiac/pulm/medicine risk stratification   - echo ordered - EF 59% and mild PH   - Ortho following s/p ORIF, POD #7  - plan for dc to rehab   - F/U with Dr. Clemons in 2 weeks. Please discharge patient with 6 total weeks of aspirin 325mg for dvt ppx  stable in splint  dc planning    # CASSI  - sCr 1.7 -> 1.6  - likely pre-renal 2/2 to decreased po intake in setting of possible obstruction  - maintenance fluid LR @ 75 started      #Urinary Retention   - post op retention in the setting of immobility  - madrid catheter in place, TOV failed x3  - c/w flomax  - TOV today  - will need Urology follow up    #Chronic Back Pain  #Hx of Cervical Radiculopathy  - Sees pain management, Dr. Mayers  - C/w  Percocet PRN ( no longer on Fentanyl patch )     #Gout  - C/w Allopurinol    #Pulmonary HTN  - C/w Sildenafil    #HTN  - C/w Atenolol, Nifedipine    #DLD  - C/w Atorvastatin    #COPD, Asthma on home O2 2L  #MARLENI  - C/w Symbicort    #Anxiety  - C/w Wellbutrin  - Valium PRN    #CKD 3  - Stable, sees Dr. Mcarthur    Heparin sq     #Progress Note Handoff:  Pending (specify): rehab placment  Family discussion: d/w patient   Disposition: [facility] is available, CM spoke with liaison Afsaneh STRINGER to verify bed pending resolution of obstruction

## 2021-10-02 NOTE — PROGRESS NOTE ADULT - SUBJECTIVE AND OBJECTIVE BOX
SUBJECTIVE:  HPI:  70 yo F with PMHx of COPD, Asthma uses home oxygen O2 NC 2L, chronic back pain, cervical radiculopathy, Pulmonary Hypertension, hx of [place] palsy, Gout, CKD 3, DLD, HTN, recent L ankle fx presents for ankle fx repair. Pt was reportedly scheduled for repair of ankle with Dr. Clemons but pt was unable to obtain appointments for Pulmonary (Dr. Evans) and Cardiac (Dr. Flood) clearance due to difficulties getting to appointments, inability to afford ambulette service and therefore, procedure canceled. Was advised by Dr. Clemons to report to hospital to obtain clearance and proceed with procedure.   Pt states she has been having shortness of breath for years, often has difficulties getting around due to shortness of breath, also has chronic pains in neck, back, RUE.  In the ED, T 98.9, /52, HR 79, RR 18, SpO2 96% on O2 NC 2L.  (21 Sep 2021 18:26)      PAST MEDICAL & SURGICAL HISTORY  PAST MEDICAL & SURGICAL HISTORY:  History of neck pain    Spinal stenosis of lumbar region with radiculopathy    Anxiety    Depression    Osteoarthritis    H/O osteoporosis    History of pulmonary hypertension    H/O pulmonary hypertension    COPD (chronic obstructive pulmonary disease)    Hyperlipidemia    H/O total knee replacement, right    Failed spinal cord stimulator      SOCIAL HISTORY:    ALLERGIES:  adhesives (Pruritus; Rash)  No Known Drug Allergies    MEDICATIONS:  STANDING MEDICATIONS  acetaminophen   Tablet .. 650 milliGRAM(s) Oral once  allopurinol 300 milliGRAM(s) Oral at bedtime  ATENolol  Tablet 50 milliGRAM(s) Oral daily  atorvastatin 80 milliGRAM(s) Oral at bedtime  budesonide 160 MICROgram(s)/formoterol 4.5 MICROgram(s) Inhaler 2 Puff(s) Inhalation two times a day  buPROPion XL (24-Hour) . 150 milliGRAM(s) Oral daily  cholecalciferol 400 Unit(s) Oral daily  colchicine 0.6 milliGRAM(s) Oral daily  FLUoxetine 20 milliGRAM(s) Oral two times a day  fluticasone propionate 50 MICROgram(s)/spray Nasal Spray 1 Spray(s) Both Nostrils two times a day  folic acid 1 milliGRAM(s) Oral daily  furosemide    Tablet 20 milliGRAM(s) Oral daily  heparin   Injectable 5000 Unit(s) SubCutaneous every 8 hours  hyoscyamine SL 0.125 milliGRAM(s) SubLingual daily  influenza   Vaccine 0.5 milliLiter(s) IntraMuscular once  lactated ringers. 1000 milliLiter(s) IV Continuous <Continuous>  lactulose Syrup 10 Gram(s) Oral every 6 hours  lidocaine   4% Patch 1 Patch Transdermal daily  multivitamin 1 Tablet(s) Oral daily  NIFEdipine XL 60 milliGRAM(s) Oral daily  ondansetron Injectable 4 milliGRAM(s) IV Push once  polyethylene glycol 3350 17 Gram(s) Oral two times a day  predniSONE   Tablet 10 milliGRAM(s) Oral two times a day  primidone 50 milliGRAM(s) Oral at bedtime  promethazine 50 milliGRAM(s) Oral daily  senna 2 Tablet(s) Oral two times a day  sildenafil (REVATIO) 20 milliGRAM(s) Oral three times a day  spironolactone 50 milliGRAM(s) Oral daily  tamsulosin 0.4 milliGRAM(s) Oral at bedtime  traZODone 100 milliGRAM(s) Oral at bedtime  valACYclovir 500 milliGRAM(s) Oral at bedtime    PRN MEDICATIONS  magnesium hydroxide Suspension 30 milliLiter(s) Oral daily PRN    VITALS:   T(F): 97.9  HR: 67  BP: 116/57  RR: 18  SpO2: 98%    LABS:                        9.6    16.10 )-----------( 278      ( 02 Oct 2021 05:56 )             29.2     10-02    138  |  98  |  41<H>  ----------------------------<  89  3.4<L>   |  23  |  1.2    Ca    8.7      02 Oct 2021 05:56  Mg     1.9     10-02                    RADIOLOGY:    PHYSICAL EXAM:  GEN: No acute distress  HEENT: AT/NC PEERLA, EOMI  LUNGS: Clear to auscultation bilaterally  HEART: S1/S2 present  ABD: Soft, non-tender, non-distended. Bowel sounds present in all 4 quadrants  EXT: No edema, no rashes, no cyanosis  NEURO: AAOX3

## 2021-10-03 LAB
C DIFF BY PCR RESULT: NEGATIVE — SIGNIFICANT CHANGE UP
C DIFF TOX GENS STL QL NAA+PROBE: SIGNIFICANT CHANGE UP

## 2021-10-03 PROCEDURE — 99233 SBSQ HOSP IP/OBS HIGH 50: CPT

## 2021-10-03 PROCEDURE — 74018 RADEX ABDOMEN 1 VIEW: CPT | Mod: 26

## 2021-10-03 RX ORDER — MORPHINE SULFATE 50 MG/1
2 CAPSULE, EXTENDED RELEASE ORAL ONCE
Refills: 0 | Status: DISCONTINUED | OUTPATIENT
Start: 2021-10-03 | End: 2021-10-03

## 2021-10-03 RX ADMIN — Medication 50 MILLIGRAM(S): at 13:06

## 2021-10-03 RX ADMIN — Medication 20 MILLIGRAM(S): at 17:42

## 2021-10-03 RX ADMIN — Medication 0.12 MILLIGRAM(S): at 13:07

## 2021-10-03 RX ADMIN — Medication 20 MILLIGRAM(S): at 05:34

## 2021-10-03 RX ADMIN — HEPARIN SODIUM 5000 UNIT(S): 5000 INJECTION INTRAVENOUS; SUBCUTANEOUS at 05:34

## 2021-10-03 RX ADMIN — PRIMIDONE 50 MILLIGRAM(S): 250 TABLET ORAL at 21:45

## 2021-10-03 RX ADMIN — ATENOLOL 50 MILLIGRAM(S): 25 TABLET ORAL at 05:33

## 2021-10-03 RX ADMIN — Medication 1 SPRAY(S): at 17:43

## 2021-10-03 RX ADMIN — MORPHINE SULFATE 2 MILLIGRAM(S): 50 CAPSULE, EXTENDED RELEASE ORAL at 16:01

## 2021-10-03 RX ADMIN — LACTULOSE 10 GRAM(S): 10 SOLUTION ORAL at 13:06

## 2021-10-03 RX ADMIN — Medication 60 MILLIGRAM(S): at 05:33

## 2021-10-03 RX ADMIN — Medication 400 UNIT(S): at 13:05

## 2021-10-03 RX ADMIN — POLYETHYLENE GLYCOL 3350 17 GRAM(S): 17 POWDER, FOR SOLUTION ORAL at 05:33

## 2021-10-03 RX ADMIN — Medication 1 MILLIGRAM(S): at 13:06

## 2021-10-03 RX ADMIN — BUDESONIDE AND FORMOTEROL FUMARATE DIHYDRATE 2 PUFF(S): 160; 4.5 AEROSOL RESPIRATORY (INHALATION) at 09:03

## 2021-10-03 RX ADMIN — BUDESONIDE AND FORMOTEROL FUMARATE DIHYDRATE 2 PUFF(S): 160; 4.5 AEROSOL RESPIRATORY (INHALATION) at 21:47

## 2021-10-03 RX ADMIN — Medication 1 SPRAY(S): at 05:33

## 2021-10-03 RX ADMIN — Medication 10 MILLIGRAM(S): at 05:34

## 2021-10-03 RX ADMIN — Medication 20 MILLIGRAM(S): at 21:46

## 2021-10-03 RX ADMIN — Medication 300 MILLIGRAM(S): at 21:45

## 2021-10-03 RX ADMIN — MORPHINE SULFATE 2 MILLIGRAM(S): 50 CAPSULE, EXTENDED RELEASE ORAL at 16:00

## 2021-10-03 RX ADMIN — TAMSULOSIN HYDROCHLORIDE 0.4 MILLIGRAM(S): 0.4 CAPSULE ORAL at 21:45

## 2021-10-03 RX ADMIN — HEPARIN SODIUM 5000 UNIT(S): 5000 INJECTION INTRAVENOUS; SUBCUTANEOUS at 21:46

## 2021-10-03 RX ADMIN — Medication 0.6 MILLIGRAM(S): at 13:06

## 2021-10-03 RX ADMIN — BUPROPION HYDROCHLORIDE 150 MILLIGRAM(S): 150 TABLET, EXTENDED RELEASE ORAL at 13:06

## 2021-10-03 RX ADMIN — Medication 100 MILLIGRAM(S): at 21:45

## 2021-10-03 RX ADMIN — Medication 10 MILLIGRAM(S): at 17:43

## 2021-10-03 RX ADMIN — VALACYCLOVIR 500 MILLIGRAM(S): 500 TABLET, FILM COATED ORAL at 21:48

## 2021-10-03 RX ADMIN — HEPARIN SODIUM 5000 UNIT(S): 5000 INJECTION INTRAVENOUS; SUBCUTANEOUS at 13:06

## 2021-10-03 RX ADMIN — Medication 1 TABLET(S): at 13:06

## 2021-10-03 RX ADMIN — Medication 20 MILLIGRAM(S): at 05:33

## 2021-10-03 RX ADMIN — LACTULOSE 10 GRAM(S): 10 SOLUTION ORAL at 05:34

## 2021-10-03 RX ADMIN — Medication 20 MILLIGRAM(S): at 13:06

## 2021-10-03 RX ADMIN — SENNA PLUS 2 TABLET(S): 8.6 TABLET ORAL at 05:33

## 2021-10-03 RX ADMIN — SPIRONOLACTONE 50 MILLIGRAM(S): 25 TABLET, FILM COATED ORAL at 05:33

## 2021-10-03 RX ADMIN — SODIUM CHLORIDE 75 MILLILITER(S): 9 INJECTION, SOLUTION INTRAVENOUS at 21:48

## 2021-10-03 RX ADMIN — ATORVASTATIN CALCIUM 80 MILLIGRAM(S): 80 TABLET, FILM COATED ORAL at 21:45

## 2021-10-03 NOTE — PROGRESS NOTE ADULT - ASSESSMENT
70 yo F with PMHx of COPD, Asthma uses home oxygen PRN, chronic back pain, cervical radiculopathy, Pulmonary Hypertension, Gout, DLD, HTN, CKD 3, recent L ankle fx presents for ankle fx repair. Still very distended and symptomatic    # Constipation  # Rule out obstruction  # Leukocytosis  - WBC 19k, likely reactive-->18k-->16k  - KUB showed dilated loops but indeterminant study  - serial abdominal exams shows improvement in distension and tightness  - on lactulose syrup 10g q6h, miralax 17g bid, senna 2 tabs bid  - s/p NGT for 24hrs  - having small frequent liquid stools. unsure if post obstruction diarrhea vs C.diff?  - obtaining C.diff PCR and placing on contact isolation  - repeat KUB. possible CT abd/pelvis after if persistent colonic distension to assess for stool burden vs r/o toxic megacolon    # CASSI, RESOLVED  - sCr 1.7->1.6-->1.2  - likely pre-renal 2/2 to decreased po intake in setting of possible obstruction  - continue mIVF @ 75cc/hr  - monitor on serial labs      #L medial Malleolar Fx, Distal Fibular Fx  - Had initially experienced fall earlier in September, presented to hospital and was found to have L bimalleolar ankle fx s/p closed reduction  - On follow up as outpt, found to have persistent fx and was scheduled for repair  - Was unable to obtain Pulmonary, Cardiac clearance due to physical dysfunction  - s/p cardiac/pulm/medicine risk stratification   - echo ordered - EF 59% and mild PH   - Ortho following s/p ORIF, POD #7  - plan for dc to rehab   - F/U with Dr. Clemons in 2 weeks. Please discharge patient with 6 total weeks of aspirin 325mg for dvt ppx  stable in splint  dc planning    #Urinary Retention   - post op retention in the setting of immobility  - madrid catheter in place, TOV failed x3  - started flomax. will repeat TOV again   - will need Urology follow up    #Chronic Back Pain  #Hx of Cervical Radiculopathy  - Sees pain management, Dr. Mayers  - C/w  Percocet PRN ( no longer on Fentanyl patch )     #Gout  - C/w Allopurinol    #Pulmonary HTN  - C/w Sildenafil    #HTN  - C/w Atenolol, Nifedipine    #DLD  - C/w Atorvastatin    #COPD, Asthma on home O2 2L  #MARLENI  - C/w Symbicort    #Anxiety  - C/w Wellbutrin  - Valium PRN    #CKD 3  - Stable, sees Dr. Mcarthur    Heparin sq     #Progress Note Handoff:  Pending (specify): obstruction resolution  Family discussion: d/w patient   Disposition: [facility] is available. 24-48hrs tentatively

## 2021-10-03 NOTE — PROGRESS NOTE ADULT - SUBJECTIVE AND OBJECTIVE BOX
BERNARD SPEARS  71y  Female      Patient is a 71y old  Female who presents with a chief complaint of left ankle fx (02 Oct 2021 10:32)      INTERVAL HPI/OVERNIGHT EVENTS: no acute events overnight. patient had NGT removed d/t intolerance. Having liquid stools, not associated with abdominal pain. no n/v.       REVIEW OF SYSTEMS:  CONSTITUTIONAL: No fever, weight loss, or fatigue  RESPIRATORY: No cough, wheezing, chills or hemoptysis; No shortness of breath  CARDIOVASCULAR: No chest pain, palpitations, dizziness, or leg swelling  GASTROINTESTINAL: +diarrhea  GENITOURINARY: No dysuria, frequency, hematuria, or incontinence  NEUROLOGICAL: No headaches, memory loss, loss of strength, numbness, or tremors  SKIN: No itching, burning, rashes, or lesions   MUSCULOSKELETAL: No joint pain or swelling; No muscle, back, or extremity pain  PSYCHIATRIC: No depression, anxiety, mood swings, or difficulty sleeping  All other review of systems negative    VITALS  T(C): 36.4 (10-03-21 @ 05:10), Max: 36.8 (10-02-21 @ 19:58)  HR: 98 (10-03-21 @ 07:51) (65 - 119)  BP: 113/56 (10-03-21 @ 05:10) (113/56 - 123/61)  RR: 18 (10-03-21 @ 07:51) (18 - 18)  SpO2: 95% (10-03-21 @ 07:51) (95% - 96%)  Wt(kg): --Vital Signs Last 24 Hrs  T(C): 36.4 (03 Oct 2021 05:10), Max: 36.8 (02 Oct 2021 19:58)  T(F): 97.5 (03 Oct 2021 05:10), Max: 98.3 (02 Oct 2021 19:58)  HR: 98 (03 Oct 2021 07:51) (65 - 119)  BP: 113/56 (03 Oct 2021 05:10) (113/56 - 123/61)  BP(mean): --  RR: 18 (03 Oct 2021 07:51) (18 - 18)  SpO2: 95% (03 Oct 2021 07:51) (95% - 96%)      10-02-21 @ 07:01  -  10-03-21 @ 07:00  --------------------------------------------------------  IN: 1775 mL / OUT: 800 mL / NET: 975 mL        PHYSICAL EXAM:  GENERAL: elderly M, NAD, non toxic  EYES: anicteric sclera, non injected conjunctiva, EOMI  ENT: oropharynx clear, MMM, fair dentition  PSYCH: no agitation, baseline mentation  NERVOUS SYSTEM:  Alert & Oriented X3, Motor Strength 5/5 B/L upper and lower extremities; Sensory intact  PULMONARY: Clear to percussion bilaterally; No rales, rhonchi, wheezing, or rubs  CARDIOVASCULAR: Regular rate and rhythm; No murmurs, rubs, or gallops  GI: Soft, Nontender, Nondistended; Bowel sounds present  EXTREMITIES:  2+ Peripheral Pulses, No clubbing, cyanosis, or edema  LYMPH: No lymphadenopathy noted  SKIN: No rashes or lesions    Consultant(s) Notes Reviewed:  [x ] YES  [ ] NO    Discussed with Consultants/Other Providers [ x] YES     LABS                          9.6    16.10 )-----------( 278      ( 02 Oct 2021 05:56 )             29.2     10-02    138  |  98  |  41<H>  ----------------------------<  89  3.4<L>   |  23  |  1.2    Ca    8.7      02 Oct 2021 05:56  Mg     1.9     10-02      RADIOLOGY & ADDITIONAL TESTS:  - KUB for 10/3 pending    MEDICATIONS  (STANDING):  acetaminophen   Tablet .. 650 milliGRAM(s) Oral once  allopurinol 300 milliGRAM(s) Oral at bedtime  ATENolol  Tablet 50 milliGRAM(s) Oral daily  atorvastatin 80 milliGRAM(s) Oral at bedtime  budesonide 160 MICROgram(s)/formoterol 4.5 MICROgram(s) Inhaler 2 Puff(s) Inhalation two times a day  buPROPion XL (24-Hour) . 150 milliGRAM(s) Oral daily  cholecalciferol 400 Unit(s) Oral daily  colchicine 0.6 milliGRAM(s) Oral daily  FLUoxetine 20 milliGRAM(s) Oral two times a day  fluticasone propionate 50 MICROgram(s)/spray Nasal Spray 1 Spray(s) Both Nostrils two times a day  folic acid 1 milliGRAM(s) Oral daily  furosemide    Tablet 20 milliGRAM(s) Oral daily  heparin   Injectable 5000 Unit(s) SubCutaneous every 8 hours  hyoscyamine SL 0.125 milliGRAM(s) SubLingual daily  influenza   Vaccine 0.5 milliLiter(s) IntraMuscular once  lactated ringers. 1000 milliLiter(s) (75 mL/Hr) IV Continuous <Continuous>  lactulose Syrup 10 Gram(s) Oral every 6 hours  lidocaine   4% Patch 1 Patch Transdermal daily  multivitamin 1 Tablet(s) Oral daily  NIFEdipine XL 60 milliGRAM(s) Oral daily  ondansetron Injectable 4 milliGRAM(s) IV Push once  polyethylene glycol 3350 17 Gram(s) Oral two times a day  predniSONE   Tablet 10 milliGRAM(s) Oral two times a day  primidone 50 milliGRAM(s) Oral at bedtime  promethazine 50 milliGRAM(s) Oral daily  senna 2 Tablet(s) Oral two times a day  sildenafil (REVATIO) 20 milliGRAM(s) Oral three times a day  spironolactone 50 milliGRAM(s) Oral daily  tamsulosin 0.4 milliGRAM(s) Oral at bedtime  traZODone 100 milliGRAM(s) Oral at bedtime  valACYclovir 500 milliGRAM(s) Oral at bedtime    MEDICATIONS  (PRN):  magnesium hydroxide Suspension 30 milliLiter(s) Oral daily PRN Constipation

## 2021-10-04 LAB
ALBUMIN SERPL ELPH-MCNC: 3.5 G/DL — SIGNIFICANT CHANGE UP (ref 3.5–5.2)
ALBUMIN SERPL ELPH-MCNC: 3.6 G/DL — SIGNIFICANT CHANGE UP (ref 3.5–5.2)
ALBUMIN SERPL ELPH-MCNC: 3.6 G/DL — SIGNIFICANT CHANGE UP (ref 3.5–5.2)
ALP SERPL-CCNC: 79 U/L — SIGNIFICANT CHANGE UP (ref 30–115)
ALP SERPL-CCNC: 81 U/L — SIGNIFICANT CHANGE UP (ref 30–115)
ALP SERPL-CCNC: 82 U/L — SIGNIFICANT CHANGE UP (ref 30–115)
ALT FLD-CCNC: 9 U/L — SIGNIFICANT CHANGE UP (ref 0–41)
ANION GAP SERPL CALC-SCNC: 15 MMOL/L — HIGH (ref 7–14)
ANION GAP SERPL CALC-SCNC: 15 MMOL/L — HIGH (ref 7–14)
ANION GAP SERPL CALC-SCNC: 17 MMOL/L — HIGH (ref 7–14)
ANION GAP SERPL CALC-SCNC: 18 MMOL/L — HIGH (ref 7–14)
APTT BLD: 26.6 SEC — LOW (ref 27–39.2)
AST SERPL-CCNC: 12 U/L — SIGNIFICANT CHANGE UP (ref 0–41)
AST SERPL-CCNC: 13 U/L — SIGNIFICANT CHANGE UP (ref 0–41)
AST SERPL-CCNC: 13 U/L — SIGNIFICANT CHANGE UP (ref 0–41)
BASOPHILS # BLD AUTO: 0.02 K/UL — SIGNIFICANT CHANGE UP (ref 0–0.2)
BASOPHILS NFR BLD AUTO: 0.2 % — SIGNIFICANT CHANGE UP (ref 0–1)
BILIRUB SERPL-MCNC: 0.4 MG/DL — SIGNIFICANT CHANGE UP (ref 0.2–1.2)
BILIRUB SERPL-MCNC: 0.5 MG/DL — SIGNIFICANT CHANGE UP (ref 0.2–1.2)
BILIRUB SERPL-MCNC: 0.5 MG/DL — SIGNIFICANT CHANGE UP (ref 0.2–1.2)
BUN SERPL-MCNC: 23 MG/DL — HIGH (ref 10–20)
BUN SERPL-MCNC: 23 MG/DL — HIGH (ref 10–20)
BUN SERPL-MCNC: 25 MG/DL — HIGH (ref 10–20)
BUN SERPL-MCNC: 26 MG/DL — HIGH (ref 10–20)
CALCIUM SERPL-MCNC: 8.3 MG/DL — LOW (ref 8.5–10.1)
CALCIUM SERPL-MCNC: 8.5 MG/DL — SIGNIFICANT CHANGE UP (ref 8.5–10.1)
CALCIUM SERPL-MCNC: 8.5 MG/DL — SIGNIFICANT CHANGE UP (ref 8.5–10.1)
CALCIUM SERPL-MCNC: 8.6 MG/DL — SIGNIFICANT CHANGE UP (ref 8.5–10.1)
CHLORIDE SERPL-SCNC: 100 MMOL/L — SIGNIFICANT CHANGE UP (ref 98–110)
CHLORIDE SERPL-SCNC: 100 MMOL/L — SIGNIFICANT CHANGE UP (ref 98–110)
CHLORIDE SERPL-SCNC: 102 MMOL/L — SIGNIFICANT CHANGE UP (ref 98–110)
CHLORIDE SERPL-SCNC: 103 MMOL/L — SIGNIFICANT CHANGE UP (ref 98–110)
CO2 SERPL-SCNC: 18 MMOL/L — SIGNIFICANT CHANGE UP (ref 17–32)
CO2 SERPL-SCNC: 20 MMOL/L — SIGNIFICANT CHANGE UP (ref 17–32)
CO2 SERPL-SCNC: 20 MMOL/L — SIGNIFICANT CHANGE UP (ref 17–32)
CO2 SERPL-SCNC: 21 MMOL/L — SIGNIFICANT CHANGE UP (ref 17–32)
CREAT SERPL-MCNC: 1.2 MG/DL — SIGNIFICANT CHANGE UP (ref 0.7–1.5)
CREAT SERPL-MCNC: 1.3 MG/DL — SIGNIFICANT CHANGE UP (ref 0.7–1.5)
EOSINOPHIL # BLD AUTO: 0.07 K/UL — SIGNIFICANT CHANGE UP (ref 0–0.7)
EOSINOPHIL NFR BLD AUTO: 0.7 % — SIGNIFICANT CHANGE UP (ref 0–8)
GLUCOSE SERPL-MCNC: 103 MG/DL — HIGH (ref 70–99)
GLUCOSE SERPL-MCNC: 126 MG/DL — HIGH (ref 70–99)
GLUCOSE SERPL-MCNC: 130 MG/DL — HIGH (ref 70–99)
GLUCOSE SERPL-MCNC: 94 MG/DL — SIGNIFICANT CHANGE UP (ref 70–99)
HCT VFR BLD CALC: 31.1 % — LOW (ref 37–47)
HGB BLD-MCNC: 10 G/DL — LOW (ref 12–16)
IMM GRANULOCYTES NFR BLD AUTO: 0.8 % — HIGH (ref 0.1–0.3)
INR BLD: 1.2 RATIO — SIGNIFICANT CHANGE UP (ref 0.65–1.3)
LYMPHOCYTES # BLD AUTO: 1.14 K/UL — LOW (ref 1.2–3.4)
LYMPHOCYTES # BLD AUTO: 10.7 % — LOW (ref 20.5–51.1)
MAGNESIUM SERPL-MCNC: 1.9 MG/DL — SIGNIFICANT CHANGE UP (ref 1.8–2.4)
MCHC RBC-ENTMCNC: 32.2 G/DL — SIGNIFICANT CHANGE UP (ref 32–37)
MCHC RBC-ENTMCNC: 35.2 PG — HIGH (ref 27–31)
MCV RBC AUTO: 109.5 FL — HIGH (ref 81–99)
MONOCYTES # BLD AUTO: 0.93 K/UL — HIGH (ref 0.1–0.6)
MONOCYTES NFR BLD AUTO: 8.8 % — SIGNIFICANT CHANGE UP (ref 1.7–9.3)
NEUTROPHILS # BLD AUTO: 8.37 K/UL — HIGH (ref 1.4–6.5)
NEUTROPHILS NFR BLD AUTO: 78.8 % — HIGH (ref 42.2–75.2)
NRBC # BLD: 0 /100 WBCS — SIGNIFICANT CHANGE UP (ref 0–0)
PLATELET # BLD AUTO: 177 K/UL — SIGNIFICANT CHANGE UP (ref 130–400)
POTASSIUM SERPL-MCNC: 2.3 MMOL/L — CRITICAL LOW (ref 3.5–5)
POTASSIUM SERPL-MCNC: 2.5 MMOL/L — CRITICAL LOW (ref 3.5–5)
POTASSIUM SERPL-MCNC: 3.3 MMOL/L — LOW (ref 3.5–5)
POTASSIUM SERPL-MCNC: 3.5 MMOL/L — SIGNIFICANT CHANGE UP (ref 3.5–5)
POTASSIUM SERPL-SCNC: 2.3 MMOL/L — CRITICAL LOW (ref 3.5–5)
POTASSIUM SERPL-SCNC: 2.5 MMOL/L — CRITICAL LOW (ref 3.5–5)
POTASSIUM SERPL-SCNC: 3.3 MMOL/L — LOW (ref 3.5–5)
POTASSIUM SERPL-SCNC: 3.5 MMOL/L — SIGNIFICANT CHANGE UP (ref 3.5–5)
PROT SERPL-MCNC: 5.2 G/DL — LOW (ref 6–8)
PROT SERPL-MCNC: 5.3 G/DL — LOW (ref 6–8)
PROT SERPL-MCNC: 5.4 G/DL — LOW (ref 6–8)
PROTHROM AB SERPL-ACNC: 13.8 SEC — HIGH (ref 9.95–12.87)
RBC # BLD: 2.84 M/UL — LOW (ref 4.2–5.4)
RBC # FLD: 14.1 % — SIGNIFICANT CHANGE UP (ref 11.5–14.5)
SODIUM SERPL-SCNC: 135 MMOL/L — SIGNIFICANT CHANGE UP (ref 135–146)
SODIUM SERPL-SCNC: 137 MMOL/L — SIGNIFICANT CHANGE UP (ref 135–146)
SODIUM SERPL-SCNC: 138 MMOL/L — SIGNIFICANT CHANGE UP (ref 135–146)
SODIUM SERPL-SCNC: 139 MMOL/L — SIGNIFICANT CHANGE UP (ref 135–146)
TROPONIN T SERPL-MCNC: <0.01 NG/ML — SIGNIFICANT CHANGE UP
WBC # BLD: 10.62 K/UL — SIGNIFICANT CHANGE UP (ref 4.8–10.8)
WBC # FLD AUTO: 10.62 K/UL — SIGNIFICANT CHANGE UP (ref 4.8–10.8)

## 2021-10-04 PROCEDURE — 99233 SBSQ HOSP IP/OBS HIGH 50: CPT

## 2021-10-04 PROCEDURE — 71275 CT ANGIOGRAPHY CHEST: CPT | Mod: 26

## 2021-10-04 PROCEDURE — 93010 ELECTROCARDIOGRAM REPORT: CPT

## 2021-10-04 PROCEDURE — 74177 CT ABD & PELVIS W/CONTRAST: CPT | Mod: 26

## 2021-10-04 RX ORDER — IOHEXOL 300 MG/ML
30 INJECTION, SOLUTION INTRAVENOUS ONCE
Refills: 0 | Status: COMPLETED | OUTPATIENT
Start: 2021-10-04 | End: 2021-10-04

## 2021-10-04 RX ORDER — POTASSIUM CHLORIDE 20 MEQ
20 PACKET (EA) ORAL ONCE
Refills: 0 | Status: COMPLETED | OUTPATIENT
Start: 2021-10-04 | End: 2021-10-04

## 2021-10-04 RX ORDER — POTASSIUM CHLORIDE 20 MEQ
20 PACKET (EA) ORAL
Refills: 0 | Status: COMPLETED | OUTPATIENT
Start: 2021-10-04 | End: 2021-10-05

## 2021-10-04 RX ORDER — HEPARIN SODIUM 5000 [USP'U]/ML
1200 INJECTION INTRAVENOUS; SUBCUTANEOUS
Qty: 25000 | Refills: 0 | Status: DISCONTINUED | OUTPATIENT
Start: 2021-10-04 | End: 2021-10-05

## 2021-10-04 RX ORDER — POTASSIUM CHLORIDE 20 MEQ
40 PACKET (EA) ORAL EVERY 4 HOURS
Refills: 0 | Status: DISCONTINUED | OUTPATIENT
Start: 2021-10-04 | End: 2021-10-04

## 2021-10-04 RX ORDER — POTASSIUM CHLORIDE 20 MEQ
40 PACKET (EA) ORAL EVERY 4 HOURS
Refills: 0 | Status: COMPLETED | OUTPATIENT
Start: 2021-10-04 | End: 2021-10-04

## 2021-10-04 RX ADMIN — TAMSULOSIN HYDROCHLORIDE 0.4 MILLIGRAM(S): 0.4 CAPSULE ORAL at 22:02

## 2021-10-04 RX ADMIN — Medication 1 SPRAY(S): at 17:18

## 2021-10-04 RX ADMIN — LIDOCAINE 1 PATCH: 4 CREAM TOPICAL at 20:10

## 2021-10-04 RX ADMIN — IOHEXOL 30 MILLILITER(S): 300 INJECTION, SOLUTION INTRAVENOUS at 11:12

## 2021-10-04 RX ADMIN — Medication 300 MILLIGRAM(S): at 22:01

## 2021-10-04 RX ADMIN — SODIUM CHLORIDE 75 MILLILITER(S): 9 INJECTION, SOLUTION INTRAVENOUS at 21:59

## 2021-10-04 RX ADMIN — Medication 10 MILLIGRAM(S): at 06:29

## 2021-10-04 RX ADMIN — HEPARIN SODIUM 5000 UNIT(S): 5000 INJECTION INTRAVENOUS; SUBCUTANEOUS at 06:28

## 2021-10-04 RX ADMIN — BUDESONIDE AND FORMOTEROL FUMARATE DIHYDRATE 2 PUFF(S): 160; 4.5 AEROSOL RESPIRATORY (INHALATION) at 20:33

## 2021-10-04 RX ADMIN — Medication 10 MILLIGRAM(S): at 17:17

## 2021-10-04 RX ADMIN — Medication 40 MILLIEQUIVALENT(S): at 16:08

## 2021-10-04 RX ADMIN — HEPARIN SODIUM 5000 UNIT(S): 5000 INJECTION INTRAVENOUS; SUBCUTANEOUS at 16:06

## 2021-10-04 RX ADMIN — SPIRONOLACTONE 50 MILLIGRAM(S): 25 TABLET, FILM COATED ORAL at 06:27

## 2021-10-04 RX ADMIN — Medication 40 MILLIEQUIVALENT(S): at 12:00

## 2021-10-04 RX ADMIN — PRIMIDONE 50 MILLIGRAM(S): 250 TABLET ORAL at 22:01

## 2021-10-04 RX ADMIN — VALACYCLOVIR 500 MILLIGRAM(S): 500 TABLET, FILM COATED ORAL at 22:02

## 2021-10-04 RX ADMIN — ATENOLOL 50 MILLIGRAM(S): 25 TABLET ORAL at 06:27

## 2021-10-04 RX ADMIN — Medication 20 MILLIGRAM(S): at 06:27

## 2021-10-04 RX ADMIN — ATORVASTATIN CALCIUM 80 MILLIGRAM(S): 80 TABLET, FILM COATED ORAL at 22:01

## 2021-10-04 RX ADMIN — Medication 50 MILLIEQUIVALENT(S): at 22:00

## 2021-10-04 RX ADMIN — Medication 100 MILLIGRAM(S): at 22:02

## 2021-10-04 RX ADMIN — HEPARIN SODIUM 12 UNIT(S)/HR: 5000 INJECTION INTRAVENOUS; SUBCUTANEOUS at 21:58

## 2021-10-04 RX ADMIN — Medication 50 MILLIEQUIVALENT(S): at 12:00

## 2021-10-04 RX ADMIN — Medication 60 MILLIGRAM(S): at 06:27

## 2021-10-04 RX ADMIN — Medication 1 SPRAY(S): at 06:28

## 2021-10-04 RX ADMIN — Medication 20 MILLIGRAM(S): at 22:01

## 2021-10-04 RX ADMIN — LIDOCAINE 1 PATCH: 4 CREAM TOPICAL at 16:07

## 2021-10-04 NOTE — PROGRESS NOTE ADULT - SUBJECTIVE AND OBJECTIVE BOX
BERNARD SPEARS  71y  Female      Patient is a 71y old  Female who presents with a chief complaint of left ankle fx (03 Oct 2021 10:38)      INTERVAL HPI/OVERNIGHT EVENTS: Pt was seen and evaluated at bedside. Pt reports feeling ok. She severe abdominal pain yesterday late afternoon which she rated as a 9/10. Reports feeling better after receiving morphine.         FAMILY HISTORY:  Family history of bladder cancer (Mother)    Family history of Alzheimer&#x27;s disease (Father)      T(C): 36 (10-04-21 @ 04:50), Max: 36.4 (10-03-21 @ 20:51)  HR: 69 (10-04-21 @ 04:50) (69 - 88)  BP: 111/59 (10-04-21 @ 04:50) (111/59 - 119/62)  RR: 18 (10-04-21 @ 04:50) (18 - 18)  Wt(kg): --Vital Signs Last 24 Hrs  T(C): 36 (04 Oct 2021 04:50), Max: 36.4 (03 Oct 2021 20:51)  T(F): 96.8 (04 Oct 2021 04:50), Max: 97.6 (03 Oct 2021 20:51)  HR: 69 (04 Oct 2021 04:50) (69 - 88)  BP: 111/59 (04 Oct 2021 04:50) (111/59 - 119/62)  RR: 18 (04 Oct 2021 04:50) (18 - 18)    adhesives (Pruritus; Rash)  No Known Drug Allergies      PHYSICAL EXAM:  GENERAL: NAD, well-groomed, well-developed  HEAD:  Atraumatic, Normocephalic  EYES: EOMI, PERRLA, conjunctiva and sclera clear  CHEST/LUNG: Clear to percussion bilaterally; No rales, rhonchi, wheezing, or rubs  HEART: Regular rate and rhythm; No murmurs, rubs, or gallops  ABDOMEN: Firm, Nontender, Distended; Bowel sounds present        LABS:                        10.0   10.62 )-----------( 177      ( 04 Oct 2021 05:46 )             31.1   10-04    138  |  103  |  26<H>  ----------------------------<  103<H>  2.5<LL>   |  20  |  1.2    Ca    8.5      04 Oct 2021 05:46  Mg     1.9     10-04      C Diff by PCR Result: Negative (10.03.21 @ 10:50)       EXAM:  XR KUB 1 VIEW            PROCEDURE DATE:  10/03/2021            INTERPRETATION:  Clinical History / Reason for exam: Abdominal pain  Single image  Comparison 10/1/2021  There is mild gaseous distention of the colon.  The previous gaseous distention of small bowel has decreased.  No free air is seen.    IMPRESSION: Decreased small bowel distention. Mild gaseous distention of the colon      MEDICATIONS  (STANDING):  acetaminophen   Tablet .. 650 milliGRAM(s) Oral once  allopurinol 300 milliGRAM(s) Oral at bedtime  ATENolol  Tablet 50 milliGRAM(s) Oral daily  atorvastatin 80 milliGRAM(s) Oral at bedtime  budesonide 160 MICROgram(s)/formoterol 4.5 MICROgram(s) Inhaler 2 Puff(s) Inhalation two times a day  buPROPion XL (24-Hour) . 150 milliGRAM(s) Oral daily  cholecalciferol 400 Unit(s) Oral daily  colchicine 0.6 milliGRAM(s) Oral daily  FLUoxetine 20 milliGRAM(s) Oral two times a day  fluticasone propionate 50 MICROgram(s)/spray Nasal Spray 1 Spray(s) Both Nostrils two times a day  folic acid 1 milliGRAM(s) Oral daily  furosemide    Tablet 20 milliGRAM(s) Oral daily  heparin   Injectable 5000 Unit(s) SubCutaneous every 8 hours  hyoscyamine SL 0.125 milliGRAM(s) SubLingual daily  influenza   Vaccine 0.5 milliLiter(s) IntraMuscular once  iohexol 300 mG (iodine)/mL Oral Solution 30 milliLiter(s) Oral once  lactated ringers. 1000 milliLiter(s) (75 mL/Hr) IV Continuous <Continuous>  lidocaine   4% Patch 1 Patch Transdermal daily  multivitamin 1 Tablet(s) Oral daily  NIFEdipine XL 60 milliGRAM(s) Oral daily  ondansetron Injectable 4 milliGRAM(s) IV Push once  potassium chloride    Tablet ER 40 milliEquivalent(s) Oral every 4 hours  potassium chloride  20 mEq/100 mL IVPB 20 milliEquivalent(s) IV Intermittent once  predniSONE   Tablet 10 milliGRAM(s) Oral two times a day  primidone 50 milliGRAM(s) Oral at bedtime  promethazine 50 milliGRAM(s) Oral daily  sildenafil (REVATIO) 20 milliGRAM(s) Oral three times a day  spironolactone 50 milliGRAM(s) Oral daily  tamsulosin 0.4 milliGRAM(s) Oral at bedtime  traZODone 100 milliGRAM(s) Oral at bedtime  valACYclovir 500 milliGRAM(s) Oral at bedtime    MEDICATIONS  (PRN):  magnesium hydroxide Suspension 30 milliLiter(s) Oral daily PRN Constipation

## 2021-10-04 NOTE — PROGRESS NOTE ADULT - ASSESSMENT
70 yo F with PMHx of COPD, Asthma uses home oxygen PRN, chronic back pain, cervical radiculopathy, Pulmonary Hypertension, Gout, DLD, HTN, CKD 3, recent L ankle fx presents for ankle fx repair. Still very distended and symptomatic    # Hypokalemia  - K+ -> 3.4 ->2.5  - EKG ordered  - repleting with potassium chloride  - repeat labs ordered    # Constipation  # Rule out obstruction  # Leukocytosis  - WBC 19k, likely reactive-->18k-->16k -> 10K (norm)  - KUB showed dilated loops but indeterminant study  - serial abdominal exams shows improvement in distension and tightness  - C.diff neg  - on lactulose syrup 10g q6h, miralax 17g bid, senna 2 tabs bid  - s/p NGT for 24hrs  - having small frequent liquid stools.   - CT abd/pelvis w/ oral contrast w/ IV contrast ordered      # CASSI, RESOLVED  - sCr 1.7->1.6-->1.2 (x2)  - likely pre-renal 2/2 to decreased po intake in setting of possible obstruction  - continue mIVF @ 75cc/hr  - monitor on serial labs      #L medial Malleolar Fx, Distal Fibular Fx  - Had initially experienced fall earlier in September, presented to hospital and was found to have L bimalleolar ankle fx s/p closed reduction  - On follow up as outpt, found to have persistent fx and was scheduled for repair  - Was unable to obtain Pulmonary, Cardiac clearance due to physical dysfunction  - s/p cardiac/pulm/medicine risk stratification   - echo ordered - EF 59% and mild PH   - Ortho following s/p ORIF, POD #7  - plan for dc to rehab   - F/U with Dr. Clemons in 2 weeks. Please discharge patient with 6 total weeks of aspirin 325mg for dvt ppx  stable in splint  dc planning    #Urinary Retention   - post op retention in the setting of immobility  - madrid catheter in place, TOV failed x4  - c/w flomax  - will need Urology follow up    #Chronic Back Pain  #Hx of Cervical Radiculopathy  - Sees pain management, Dr. Mayers  - C/w  Percocet PRN ( no longer on Fentanyl patch )     #Gout  - C/w Allopurinol    #Pulmonary HTN  - C/w Sildenafil    #HTN  - C/w Atenolol, Nifedipine    #DLD  - C/w Atorvastatin    #COPD, Asthma on home O2 2L  #MARLENI  - C/w Symbicort    #Anxiety  - C/w Wellbutrin  - Valium PRN    #CKD 3  - Stable, sees Dr. Mcarthur    Heparin sq     #Progress Note Handoff:  Pending (specify): obstruction resolution  Family discussion: d/w patient   Disposition: [facility] STR is available. 24-48hrs tentatively

## 2021-10-05 LAB
ANION GAP SERPL CALC-SCNC: 13 MMOL/L — SIGNIFICANT CHANGE UP (ref 7–14)
APTT BLD: 152 SEC — CRITICAL HIGH (ref 27–39.2)
APTT BLD: 41.8 SEC — HIGH (ref 27–39.2)
BASOPHILS # BLD AUTO: 0.02 K/UL — SIGNIFICANT CHANGE UP (ref 0–0.2)
BASOPHILS NFR BLD AUTO: 0.1 % — SIGNIFICANT CHANGE UP (ref 0–1)
BUN SERPL-MCNC: 22 MG/DL — HIGH (ref 10–20)
CALCIUM SERPL-MCNC: 8.2 MG/DL — LOW (ref 8.5–10.1)
CHLORIDE SERPL-SCNC: 106 MMOL/L — SIGNIFICANT CHANGE UP (ref 98–110)
CO2 SERPL-SCNC: 17 MMOL/L — SIGNIFICANT CHANGE UP (ref 17–32)
CREAT SERPL-MCNC: 1.3 MG/DL — SIGNIFICANT CHANGE UP (ref 0.7–1.5)
EOSINOPHIL # BLD AUTO: 0.08 K/UL — SIGNIFICANT CHANGE UP (ref 0–0.7)
EOSINOPHIL NFR BLD AUTO: 0.5 % — SIGNIFICANT CHANGE UP (ref 0–8)
GLUCOSE SERPL-MCNC: 140 MG/DL — HIGH (ref 70–99)
HCT VFR BLD CALC: 28.5 % — LOW (ref 37–47)
HGB BLD-MCNC: 9.4 G/DL — LOW (ref 12–16)
IMM GRANULOCYTES NFR BLD AUTO: 0.9 % — HIGH (ref 0.1–0.3)
LYMPHOCYTES # BLD AUTO: 1.38 K/UL — SIGNIFICANT CHANGE UP (ref 1.2–3.4)
LYMPHOCYTES # BLD AUTO: 8.6 % — LOW (ref 20.5–51.1)
MAGNESIUM SERPL-MCNC: 1.8 MG/DL — SIGNIFICANT CHANGE UP (ref 1.8–2.4)
MCHC RBC-ENTMCNC: 33 G/DL — SIGNIFICANT CHANGE UP (ref 32–37)
MCHC RBC-ENTMCNC: 35.7 PG — HIGH (ref 27–31)
MCV RBC AUTO: 108.4 FL — HIGH (ref 81–99)
MONOCYTES # BLD AUTO: 1.36 K/UL — HIGH (ref 0.1–0.6)
MONOCYTES NFR BLD AUTO: 8.5 % — SIGNIFICANT CHANGE UP (ref 1.7–9.3)
NEUTROPHILS # BLD AUTO: 12.98 K/UL — HIGH (ref 1.4–6.5)
NEUTROPHILS NFR BLD AUTO: 81.4 % — HIGH (ref 42.2–75.2)
NRBC # BLD: 0 /100 WBCS — SIGNIFICANT CHANGE UP (ref 0–0)
PLATELET # BLD AUTO: 264 K/UL — SIGNIFICANT CHANGE UP (ref 130–400)
POTASSIUM SERPL-MCNC: 4 MMOL/L — SIGNIFICANT CHANGE UP (ref 3.5–5)
POTASSIUM SERPL-SCNC: 4 MMOL/L — SIGNIFICANT CHANGE UP (ref 3.5–5)
RBC # BLD: 2.63 M/UL — LOW (ref 4.2–5.4)
RBC # FLD: 14.6 % — HIGH (ref 11.5–14.5)
SODIUM SERPL-SCNC: 136 MMOL/L — SIGNIFICANT CHANGE UP (ref 135–146)
WBC # BLD: 15.97 K/UL — HIGH (ref 4.8–10.8)
WBC # FLD AUTO: 15.97 K/UL — HIGH (ref 4.8–10.8)

## 2021-10-05 PROCEDURE — 99233 SBSQ HOSP IP/OBS HIGH 50: CPT

## 2021-10-05 PROCEDURE — 93970 EXTREMITY STUDY: CPT | Mod: 26

## 2021-10-05 RX ORDER — HEPARIN SODIUM 5000 [USP'U]/ML
1200 INJECTION INTRAVENOUS; SUBCUTANEOUS
Qty: 25000 | Refills: 0 | Status: DISCONTINUED | OUTPATIENT
Start: 2021-10-05 | End: 2021-10-05

## 2021-10-05 RX ORDER — ENOXAPARIN SODIUM 100 MG/ML
80 INJECTION SUBCUTANEOUS
Refills: 0 | Status: DISCONTINUED | OUTPATIENT
Start: 2021-10-05 | End: 2021-10-05

## 2021-10-05 RX ORDER — ALPRAZOLAM 0.25 MG
0.5 TABLET ORAL ONCE
Refills: 0 | Status: DISCONTINUED | OUTPATIENT
Start: 2021-10-05 | End: 2021-10-07

## 2021-10-05 RX ORDER — LANOLIN ALCOHOL/MO/W.PET/CERES
5 CREAM (GRAM) TOPICAL AT BEDTIME
Refills: 0 | Status: DISCONTINUED | OUTPATIENT
Start: 2021-10-05 | End: 2021-10-14

## 2021-10-05 RX ORDER — APIXABAN 2.5 MG/1
10 TABLET, FILM COATED ORAL EVERY 12 HOURS
Refills: 0 | Status: COMPLETED | OUTPATIENT
Start: 2021-10-05 | End: 2021-10-11

## 2021-10-05 RX ADMIN — Medication 300 MILLIGRAM(S): at 21:59

## 2021-10-05 RX ADMIN — Medication 0.6 MILLIGRAM(S): at 12:43

## 2021-10-05 RX ADMIN — Medication 1 TABLET(S): at 16:38

## 2021-10-05 RX ADMIN — APIXABAN 10 MILLIGRAM(S): 2.5 TABLET, FILM COATED ORAL at 23:25

## 2021-10-05 RX ADMIN — Medication 60 MILLIGRAM(S): at 05:48

## 2021-10-05 RX ADMIN — Medication 0.12 MILLIGRAM(S): at 12:43

## 2021-10-05 RX ADMIN — APIXABAN 10 MILLIGRAM(S): 2.5 TABLET, FILM COATED ORAL at 12:36

## 2021-10-05 RX ADMIN — Medication 400 UNIT(S): at 17:11

## 2021-10-05 RX ADMIN — Medication 20 MILLIGRAM(S): at 05:49

## 2021-10-05 RX ADMIN — ONDANSETRON 4 MILLIGRAM(S): 8 TABLET, FILM COATED ORAL at 10:52

## 2021-10-05 RX ADMIN — Medication 20 MILLIGRAM(S): at 21:59

## 2021-10-05 RX ADMIN — SODIUM CHLORIDE 75 MILLILITER(S): 9 INJECTION, SOLUTION INTRAVENOUS at 23:26

## 2021-10-05 RX ADMIN — Medication 1 MILLIGRAM(S): at 16:38

## 2021-10-05 RX ADMIN — VALACYCLOVIR 500 MILLIGRAM(S): 500 TABLET, FILM COATED ORAL at 22:00

## 2021-10-05 RX ADMIN — Medication 20 MILLIGRAM(S): at 05:47

## 2021-10-05 RX ADMIN — Medication 100 MILLIGRAM(S): at 22:00

## 2021-10-05 RX ADMIN — Medication 5 MILLIGRAM(S): at 01:01

## 2021-10-05 RX ADMIN — LIDOCAINE 1 PATCH: 4 CREAM TOPICAL at 04:38

## 2021-10-05 RX ADMIN — Medication 50 MILLIGRAM(S): at 17:09

## 2021-10-05 RX ADMIN — Medication 20 MILLIGRAM(S): at 17:11

## 2021-10-05 RX ADMIN — Medication 50 MILLIEQUIVALENT(S): at 01:01

## 2021-10-05 RX ADMIN — Medication 10 MILLIGRAM(S): at 05:48

## 2021-10-05 RX ADMIN — SPIRONOLACTONE 50 MILLIGRAM(S): 25 TABLET, FILM COATED ORAL at 05:48

## 2021-10-05 RX ADMIN — Medication 20 MILLIGRAM(S): at 17:10

## 2021-10-05 RX ADMIN — BUDESONIDE AND FORMOTEROL FUMARATE DIHYDRATE 2 PUFF(S): 160; 4.5 AEROSOL RESPIRATORY (INHALATION) at 20:25

## 2021-10-05 RX ADMIN — TAMSULOSIN HYDROCHLORIDE 0.4 MILLIGRAM(S): 0.4 CAPSULE ORAL at 22:00

## 2021-10-05 RX ADMIN — ATORVASTATIN CALCIUM 80 MILLIGRAM(S): 80 TABLET, FILM COATED ORAL at 21:59

## 2021-10-05 RX ADMIN — Medication 1 SPRAY(S): at 17:16

## 2021-10-05 RX ADMIN — PRIMIDONE 50 MILLIGRAM(S): 250 TABLET ORAL at 21:59

## 2021-10-05 RX ADMIN — Medication 10 MILLIGRAM(S): at 17:14

## 2021-10-05 NOTE — PROGRESS NOTE ADULT - SUBJECTIVE AND OBJECTIVE BOX
BERNARD SPEARS  71y  Female      Patient is a 71y old  Female who presents with a chief complaint of left ankle fx (04 Oct 2021 09:46)      INTERVAL HPI/OVERNIGHT EVENTS: Pt was seen and evaluated at bedside. HOD 14 Pt reports feeling fine and feels that she had a bowel movement. Pt is hungry and would like to advance her diet. Pt denies any chest pain or shortness of breath. Patient denies any n/v/HA.         FAMILY HISTORY:  Family history of bladder cancer (Mother)    Family history of Alzheimer&#x27;s disease (Father)      T(C): 37.2 (10-05-21 @ 05:04), Max: 37.2 (10-04-21 @ 21:15)  HR: 65 (10-05-21 @ 05:04) (61 - 65)  BP: 110/55 (10-05-21 @ 05:04) (103/51 - 110/55)  RR: 18 (10-05-21 @ 05:04) (18 - 19)  Wt(kg): --Vital Signs Last 24 Hrs  T(C): 37.2 (05 Oct 2021 05:04), Max: 37.2 (04 Oct 2021 21:15)  T(F): 98.9 (05 Oct 2021 05:04), Max: 98.9 (04 Oct 2021 21:15)  HR: 65 (05 Oct 2021 05:04) (61 - 65)  BP: 110/55 (05 Oct 2021 05:04) (103/51 - 110/55)  RR: 18 (05 Oct 2021 05:04) (18 - 19)  adhesives (Pruritus; Rash)  No Known Drug Allergies      PHYSICAL EXAM:  GENERAL: NAD, well-groomed, well-developed  HEAD:  Atraumatic, Normocephalic  CHEST/LUNG: Clear to percussion bilaterally; No rales, rhonchi, wheezing, or rubs  HEART: Regular rate and rhythm; No murmurs, rubs, or gallops  ABDOMEN: Firm, Nontender, distended; Bowel sounds present      LABS:  CARDIAC MARKERS ( 04 Oct 2021 21:53 )  x     / <0.01 ng/mL / x     / x     / x                              9.4    15.97 )-----------( 264      ( 05 Oct 2021 04:30 )             28.5   10-05    136  |  106  |  22<H>  ----------------------------<  140<H>  4.0   |  17  |  1.3    Ca    8.2<L>      05 Oct 2021 04:30  Mg     1.8     10-05    TPro  5.4<L>  /  Alb  3.6  /  TBili  0.5  /  DBili  x   /  AST  12  /  ALT  9   /  AlkPhos  81  10-04        RADIOLOGY & ADDITIONAL TESTS:  EXAM:  CT ABDOMEN AND PELVIS OC IC            PROCEDURE DATE:  10/04/2021      IMPRESSION:    1. Multiple dilated loops of large bowel, without definite evidence of mechanical obstruction, overall unchanged since October 3, 2021; no evidence of small bowel obstruction.    2. Suggestion of filling defect within partially imaged left lower lobe pulmonary artery branches, possibly reflecting pulmonary emboli; further evaluation with dedicated PE protocol CT is suggested.    EXAM:  CT ANGIO CHEST PULM ART [facility]            PROCEDURE DATE:  10/04/2021      IMPRESSION:    Acute pulmonary emboli within the distal left main pulmonary artery, extending to the left lower lobe segmental and subsegmental branches.    No CTA evidence of right heart strain.      MEDICATIONS  (STANDING):  acetaminophen   Tablet .. 650 milliGRAM(s) Oral once  allopurinol 300 milliGRAM(s) Oral at bedtime  apixaban 10 milliGRAM(s) Oral every 12 hours  ATENolol  Tablet 50 milliGRAM(s) Oral daily  atorvastatin 80 milliGRAM(s) Oral at bedtime  budesonide 160 MICROgram(s)/formoterol 4.5 MICROgram(s) Inhaler 2 Puff(s) Inhalation two times a day  cholecalciferol 400 Unit(s) Oral daily  colchicine 0.6 milliGRAM(s) Oral daily  FLUoxetine 20 milliGRAM(s) Oral two times a day  fluticasone propionate 50 MICROgram(s)/spray Nasal Spray 1 Spray(s) Both Nostrils two times a day  folic acid 1 milliGRAM(s) Oral daily  furosemide    Tablet 20 milliGRAM(s) Oral daily  hyoscyamine SL 0.125 milliGRAM(s) SubLingual daily  influenza   Vaccine 0.5 milliLiter(s) IntraMuscular once  lactated ringers. 1000 milliLiter(s) (75 mL/Hr) IV Continuous <Continuous>  lidocaine   4% Patch 1 Patch Transdermal daily  LORazepam   Injectable 1 milliGRAM(s) IV Push once  multivitamin 1 Tablet(s) Oral daily  NIFEdipine XL 60 milliGRAM(s) Oral daily  ondansetron Injectable 4 milliGRAM(s) IV Push once  predniSONE   Tablet 10 milliGRAM(s) Oral two times a day  primidone 50 milliGRAM(s) Oral at bedtime  promethazine 50 milliGRAM(s) Oral daily  sildenafil (REVATIO) 20 milliGRAM(s) Oral three times a day  spironolactone 50 milliGRAM(s) Oral daily  tamsulosin 0.4 milliGRAM(s) Oral at bedtime  traZODone 100 milliGRAM(s) Oral at bedtime  valACYclovir 500 milliGRAM(s) Oral at bedtime    MEDICATIONS  (PRN):  magnesium hydroxide Suspension 30 milliLiter(s) Oral daily PRN Constipation  melatonin 5 milliGRAM(s) Oral at bedtime PRN Insomnia

## 2021-10-05 NOTE — PROGRESS NOTE ADULT - SUBJECTIVE AND OBJECTIVE BOX
BERNARD SPEARS  71y  Female      Patient is a 71y old  Female who presents with a chief complaint of left ankle fx (05 Oct 2021 10:23)      INTERVAL HPI/OVERNIGHT EVENTS: patient was incidentally found to have a pulmonary vascular filling defect that was captured on abd/pelvis CT. Subsequent CT angio chest confirmed PE. Started on heparin gtt w/ PE protocol. Endorses baseline dyspneic sensation but satting well. Endorsing anxiety.       REVIEW OF SYSTEMS:  CONSTITUTIONAL: No fever, weight loss, or fatigue  CARDIOVASCULAR: No chest pain, palpitations, dizziness, or leg swelling  GASTROINTESTINAL: No abdominal or epigastric pain. No nausea, vomiting, or hematemesis; No diarrhea or constipation. No melena or hematochezia.  GENITOURINARY: No dysuria, frequency, hematuria, or incontinence  NEUROLOGICAL: No headaches, memory loss, loss of strength, numbness, or tremors  SKIN: No itching, burning, rashes, or lesions   MUSCULOSKELETAL: No joint pain or swelling; No muscle, back, or extremity pain  All other review of systems negative    VITALS  T(C): 37.2 (10-05-21 @ 05:04), Max: 37.2 (10-04-21 @ 21:15)  HR: 68 (10-05-21 @ 10:45) (61 - 68)  BP: 118/59 (10-05-21 @ 10:45) (103/51 - 118/59)  RR: 20 (10-05-21 @ 10:45) (18 - 20)  SpO2: --  Wt(kg): --Vital Signs Last 24 Hrs  T(C): 37.2 (05 Oct 2021 05:04), Max: 37.2 (04 Oct 2021 21:15)  T(F): 98.9 (05 Oct 2021 05:04), Max: 98.9 (04 Oct 2021 21:15)  HR: 68 (05 Oct 2021 10:45) (61 - 68)  BP: 118/59 (05 Oct 2021 10:45) (103/51 - 118/59)  BP(mean): --  RR: 20 (05 Oct 2021 10:45) (18 - 20)  SpO2: --      10-04-21 @ 07:01  -  10-05-21 @ 07:00  --------------------------------------------------------  IN: 1025 mL / OUT: 700 mL / NET: 325 mL    10-05-21 @ 07:01  -  10-05-21 @ 13:15  --------------------------------------------------------  IN: 237 mL / OUT: 0 mL / NET: 237 mL        PHYSICAL EXAM:  GENERAL: elderly M, NAD, non toxic  EYES: anicteric sclera, non injected conjunctiva, EOMI  ENT: oropharynx clear, MMM, fair dentition  PSYCH: no agitation, baseline mentation  NERVOUS SYSTEM:  Alert & Oriented X3, Motor Strength 5/5 B/L upper and lower extremities; Sensory intact  PULMONARY: Clear to percussion bilaterally; No rales, rhonchi, wheezing, or rubs  CARDIOVASCULAR: Regular rate and rhythm; No murmurs, rubs, or gallops  GI: tense, distended; Bowel sounds present  EXTREMITIES:  2+ Peripheral Pulses, No clubbing, cyanosis, or edema  LYMPH: No lymphadenopathy noted  SKIN: No rashes or lesions    Consultant(s) Notes Reviewed:  [x ] YES  [ ] NO    Discussed with Consultants/Other Providers [ x] YES     LABS                          9.4    15.97 )-----------( 264      ( 05 Oct 2021 04:30 )             28.5     10-05    136  |  106  |  22<H>  ----------------------------<  140<H>  4.0   |  17  |  1.3    Ca    8.2<L>      05 Oct 2021 04:30  Mg     1.8     10-05    TPro  5.4<L>  /  Alb  3.6  /  TBili  0.5  /  DBili  x   /  AST  12  /  ALT  9   /  AlkPhos  81  10-04  PT/INR - ( 04 Oct 2021 21:00 )   PT: 13.80 sec;   INR: 1.20 ratio    PTT - ( 05 Oct 2021 12:29 )  PTT:41.8 sec      CARDIAC MARKERS ( 04 Oct 2021 21:53 )  x     / <0.01 ng/mL / x     / x     / x          RADIOLOGY & ADDITIONAL TESTS:  VA Duplex Lower Ext Vein Scan, Bilat (10.05.21 @ 10:27) >  IMPRESSION:  No evidence of deep venous thrombosis in either lower extremity.    CT Angio Chest PE Protocol w/ IV Cont (10.04.21 @ 19:12) >  IMPRESSION:  Acute pulmonary emboli within the distal left main pulmonary artery, extending to the left lower lobe segmental and subsegmental branches.  No CTA evidence of right heart strain.      CT Abdomen and Pelvis w/ Oral Cont and w/ IV Cont (10.04.21 @ 13:58) >  IMPRESSION:  1. Multiple dilated loops of large bowel, without definite evidence of mechanical obstruction, overall unchanged since October 3, 2021; no evidence of small bowel obstruction.  2. Suggestion of filling defect within partially imaged left lower lobe pulmonary artery branches, possibly reflecting pulmonary emboli; further evaluation with dedicated PE protocol CT is suggested.    MEDICATIONS  (STANDING):  acetaminophen   Tablet .. 650 milliGRAM(s) Oral once  allopurinol 300 milliGRAM(s) Oral at bedtime  ALPRAZolam 0.5 milliGRAM(s) Oral once  apixaban 10 milliGRAM(s) Oral every 12 hours  ATENolol  Tablet 50 milliGRAM(s) Oral daily  atorvastatin 80 milliGRAM(s) Oral at bedtime  budesonide 160 MICROgram(s)/formoterol 4.5 MICROgram(s) Inhaler 2 Puff(s) Inhalation two times a day  cholecalciferol 400 Unit(s) Oral daily  colchicine 0.6 milliGRAM(s) Oral daily  FLUoxetine 20 milliGRAM(s) Oral two times a day  fluticasone propionate 50 MICROgram(s)/spray Nasal Spray 1 Spray(s) Both Nostrils two times a day  folic acid 1 milliGRAM(s) Oral daily  furosemide    Tablet 20 milliGRAM(s) Oral daily  hyoscyamine SL 0.125 milliGRAM(s) SubLingual daily  influenza   Vaccine 0.5 milliLiter(s) IntraMuscular once  lactated ringers. 1000 milliLiter(s) (75 mL/Hr) IV Continuous <Continuous>  lidocaine   4% Patch 1 Patch Transdermal daily  LORazepam   Injectable 1 milliGRAM(s) IV Push once  multivitamin 1 Tablet(s) Oral daily  NIFEdipine XL 60 milliGRAM(s) Oral daily  predniSONE   Tablet 10 milliGRAM(s) Oral two times a day  primidone 50 milliGRAM(s) Oral at bedtime  promethazine 50 milliGRAM(s) Oral daily  sildenafil (REVATIO) 20 milliGRAM(s) Oral three times a day  spironolactone 50 milliGRAM(s) Oral daily  tamsulosin 0.4 milliGRAM(s) Oral at bedtime  traZODone 100 milliGRAM(s) Oral at bedtime  valACYclovir 500 milliGRAM(s) Oral at bedtime    MEDICATIONS  (PRN):  magnesium hydroxide Suspension 30 milliLiter(s) Oral daily PRN Constipation  melatonin 5 milliGRAM(s) Oral at bedtime PRN Insomnia

## 2021-10-05 NOTE — PROGRESS NOTE ADULT - ASSESSMENT
72 yo F with PMHx of COPD, Asthma uses home oxygen PRN, chronic back pain, cervical radiculopathy, Pulmonary Hypertension, Gout, DLD, HTN, CKD 3, recent L ankle fx presents for ankle fx repair. Still very distended and symptomatic.      # Acute Pulmonary embolism  - incidentally captured on CT abd/pelvis  - confirmed with CTA of the lungs yesterday evening. no evidence of right heart strain  - satting well on RA, HDS  - subsequentially started on heparing gtt-PE protocol. will transition to therapeutic Apixaban 10mg bid    # Constipation  # Rule out obstruction  # Leukocytosis  - WBC 19k, likely reactive-->18k-->16k-->10k-->15k  - KUB showed dilated loops but indeterminant study  - serial abdominal exams shows improvement in distension and tightness  - on lactulose syrup 10g q6h, miralax 17g bid, senna 2 tabs bid  - s/p NGT for 24hrs  - having small frequent liquid stools. unsure if post obstruction diarrhea vs C.diff?  - obtaining C.diff PCR and placing on contact isolation  - STAT CT abdomen/pelvis with IV and oral contrast without evidence of obstruction    # Hypokalemia, RESOLVED  -2.5, repeat 2.3  - 80mEq KCl or 60mEq IV-->4.0  - serial repeat    # CASSI, RESOLVED  - sCr 1.7->1.6-->1.2  - likely pre-renal 2/2 to decreased po intake in setting of possible obstruction  - continue mIVF @ 75cc/hr  - monitor on serial labs      #L medial Malleolar Fx, Distal Fibular Fx  - Had initially experienced fall earlier in September, presented to hospital and was found to have L bimalleolar ankle fx s/p closed reduction  - On follow up as outpt, found to have persistent fx and was scheduled for repair  - Was unable to obtain Pulmonary, Cardiac clearance due to physical dysfunction  - s/p cardiac/pulm/medicine risk stratification   - echo ordered - EF 59% and mild PH   - Ortho following s/p ORIF, POD #7  - plan for dc to rehab   - F/U with Dr. Clemons in 2 weeks. Please discharge patient with 6 total weeks of aspirin 325mg for dvt ppx  stable in splint  dc planning    #Urinary Retention   - post op retention in the setting of immobility  - madrid catheter in place, TOV failed x3  - started flomax. will repeat TOV again   - will need Urology follow up    #Chronic Back Pain  #Hx of Cervical Radiculopathy  - Sees pain management, Dr. Mayers  - C/w  Percocet PRN ( no longer on Fentanyl patch )     #Gout  - C/w Allopurinol    #Pulmonary HTN  - C/w Sildenafil    #HTN  - C/w Atenolol, Nifedipine    #DLD  - C/w Atorvastatin    #COPD, Asthma on home O2 2L  #MARLENI  - C/w Symbicort    #Anxiety  - C/w Wellbutrin  - Valium PRN    #CKD 3  - Stable, sees Dr. Mcarthur    Heparin sq     #Progress Note Handoff:  Pending (specify): obstruction resolution  Family discussion: d/w patient   Disposition: [facility] STR is available. 24-48hrs tentatively .

## 2021-10-05 NOTE — PROGRESS NOTE ADULT - ASSESSMENT
72 yo F with PMHx of COPD, Asthma uses home oxygen PRN, chronic back pain, cervical radiculopathy, Pulmonary Hypertension, Gout, DLD, HTN, CKD 3, recent L ankle fx presents for ankle fx repair. Still very distended and symptomatic.    #Pulmonary Embolism   - CT of abdomen showing filling defect within partially imaged left lower lobe pulmonary artery branches  - CT angio : Acute pulmonary emboli within the distal left main pulmonary artery, extending to the left lower lobe segmental and subsegmental branches. No CTA evidence of right heart strain.  - pt is immobile and bed bound since post-op, denies any SOB or CP  - Pt started on heparin drip @12 mL/hour since last night  - morning labs showing PTT 152s, heparin put on hold and d/c  - pt to start Eliquis 10mg q12  - continue monitoring for clinical symptoms      # Constipation  # Rule out obstruction  # Leukocytosis  - WBC 19k, likely reactive-->18k-->16k-->10k -->16k  - KUB showed dilated loops but indeterminant study  - serial abdominal exams shows improvement in distension and tightness  - C.diff neg  - - CT abd/pelvis w/ oral contrast w/ IV contrast: Multiple dilated loops of large bowel, without definite evidence of mechanical obstruction, overall unchanged since October 3, 2021; no evidence of small bowel obstruction. Suggestion of filling defect within partially imaged left lower lobe pulmonary artery branches.  - on lactulose syrup 10g q6h, miralax 17g bid, senna 2 tabs bid  - s/p NGT for 24hrs  - having small frequent liquid stools  - Pt currently on clear liquid diet, will advance to full liquid and reassess    # Hypokalemia, RESOLVED  -2.5, repeat 2.3 --> 3.3 -> 3.5 -> 4.0  - repleted w/ 80mEq KCl or 60mEq IV  - serial repeat    # CASSI, RESOLVED  - sCr 1.7->1.6-->1.2  - likely pre-renal 2/2 to decreased po intake in setting of possible obstruction  - continue mIVF @ 75cc/hr  - monitor on serial labs      #L medial Malleolar Fx, Distal Fibular Fx  - Had initially experienced fall earlier in September, presented to hospital and was found to have L bimalleolar ankle fx s/p closed reduction  - On follow up as outpt, found to have persistent fx and was scheduled for repair  - Was unable to obtain Pulmonary, Cardiac clearance due to physical dysfunction  - s/p cardiac/pulm/medicine risk stratification   - echo ordered - EF 59% and mild PH   - Ortho following s/p ORIF, POD #7  - plan for dc to rehab   - F/U with Dr. Clemons in 2 weeks. Please discharge patient with 6 total weeks of aspirin 325mg for dvt ppx  stable in splint  dc planning    #Urinary Retention   - post op retention in the setting of immobility  - madrid catheter in place, TOV failed x3  - started flomax. will repeat TOV again   - will need Urology follow up    #Chronic Back Pain  #Hx of Cervical Radiculopathy  - Sees pain management, Dr. Mayers  - C/w  Percocet PRN ( no longer on Fentanyl patch )     #Gout  - C/w Allopurinol    #Pulmonary HTN  - C/w Sildenafil    #HTN  - C/w Atenolol, Nifedipine    #DLD  - C/w Atorvastatin    #COPD, Asthma on home O2 2L  #MARLENI  - C/w Symbicort    #Anxiety  - C/w Wellbutrin  - Valium PRN    #CKD 3  - Stable, sees Dr. Mcarthur    Heparin sq     #Progress Note Handoff:  Pending (specify): obstruction resolution  Family discussion: d/w patient   Disposition: [facility] STR is available. 24-48hrs tentatively .

## 2021-10-06 LAB
ANION GAP SERPL CALC-SCNC: 13 MMOL/L — SIGNIFICANT CHANGE UP (ref 7–14)
APTT BLD: 28.3 SEC — SIGNIFICANT CHANGE UP (ref 27–39.2)
BASOPHILS # BLD AUTO: 0.02 K/UL — SIGNIFICANT CHANGE UP (ref 0–0.2)
BASOPHILS NFR BLD AUTO: 0.1 % — SIGNIFICANT CHANGE UP (ref 0–1)
BUN SERPL-MCNC: 18 MG/DL — SIGNIFICANT CHANGE UP (ref 10–20)
CALCIUM SERPL-MCNC: 8.2 MG/DL — LOW (ref 8.5–10.1)
CHLORIDE SERPL-SCNC: 108 MMOL/L — SIGNIFICANT CHANGE UP (ref 98–110)
CO2 SERPL-SCNC: 20 MMOL/L — SIGNIFICANT CHANGE UP (ref 17–32)
CREAT SERPL-MCNC: 1.1 MG/DL — SIGNIFICANT CHANGE UP (ref 0.7–1.5)
EOSINOPHIL # BLD AUTO: 0.1 K/UL — SIGNIFICANT CHANGE UP (ref 0–0.7)
EOSINOPHIL NFR BLD AUTO: 0.7 % — SIGNIFICANT CHANGE UP (ref 0–8)
GLUCOSE SERPL-MCNC: 121 MG/DL — HIGH (ref 70–99)
HCT VFR BLD CALC: 27 % — LOW (ref 37–47)
HGB BLD-MCNC: 8.9 G/DL — LOW (ref 12–16)
IMM GRANULOCYTES NFR BLD AUTO: 1 % — HIGH (ref 0.1–0.3)
LYMPHOCYTES # BLD AUTO: 1.31 K/UL — SIGNIFICANT CHANGE UP (ref 1.2–3.4)
LYMPHOCYTES # BLD AUTO: 9.7 % — LOW (ref 20.5–51.1)
MAGNESIUM SERPL-MCNC: 1.7 MG/DL — LOW (ref 1.8–2.4)
MCHC RBC-ENTMCNC: 33 G/DL — SIGNIFICANT CHANGE UP (ref 32–37)
MCHC RBC-ENTMCNC: 36.2 PG — HIGH (ref 27–31)
MCV RBC AUTO: 109.8 FL — HIGH (ref 81–99)
MONOCYTES # BLD AUTO: 1.1 K/UL — HIGH (ref 0.1–0.6)
MONOCYTES NFR BLD AUTO: 8.1 % — SIGNIFICANT CHANGE UP (ref 1.7–9.3)
NEUTROPHILS # BLD AUTO: 10.91 K/UL — HIGH (ref 1.4–6.5)
NEUTROPHILS NFR BLD AUTO: 80.4 % — HIGH (ref 42.2–75.2)
NRBC # BLD: 0 /100 WBCS — SIGNIFICANT CHANGE UP (ref 0–0)
PLATELET # BLD AUTO: 245 K/UL — SIGNIFICANT CHANGE UP (ref 130–400)
POTASSIUM SERPL-MCNC: 3.5 MMOL/L — SIGNIFICANT CHANGE UP (ref 3.5–5)
POTASSIUM SERPL-SCNC: 3.5 MMOL/L — SIGNIFICANT CHANGE UP (ref 3.5–5)
RBC # BLD: 2.46 M/UL — LOW (ref 4.2–5.4)
RBC # FLD: 14.7 % — HIGH (ref 11.5–14.5)
SODIUM SERPL-SCNC: 141 MMOL/L — SIGNIFICANT CHANGE UP (ref 135–146)
WBC # BLD: 13.57 K/UL — HIGH (ref 4.8–10.8)
WBC # FLD AUTO: 13.57 K/UL — HIGH (ref 4.8–10.8)

## 2021-10-06 PROCEDURE — 99233 SBSQ HOSP IP/OBS HIGH 50: CPT

## 2021-10-06 RX ORDER — SIMETHICONE 80 MG/1
80 TABLET, CHEWABLE ORAL DAILY
Refills: 0 | Status: DISCONTINUED | OUTPATIENT
Start: 2021-10-06 | End: 2021-10-14

## 2021-10-06 RX ADMIN — ATENOLOL 50 MILLIGRAM(S): 25 TABLET ORAL at 08:15

## 2021-10-06 RX ADMIN — SIMETHICONE 80 MILLIGRAM(S): 80 TABLET, CHEWABLE ORAL at 03:36

## 2021-10-06 RX ADMIN — LIDOCAINE 1 PATCH: 4 CREAM TOPICAL at 11:15

## 2021-10-06 RX ADMIN — APIXABAN 10 MILLIGRAM(S): 2.5 TABLET, FILM COATED ORAL at 11:16

## 2021-10-06 RX ADMIN — LIDOCAINE 1 PATCH: 4 CREAM TOPICAL at 19:21

## 2021-10-06 RX ADMIN — Medication 10 MILLIGRAM(S): at 05:51

## 2021-10-06 RX ADMIN — APIXABAN 10 MILLIGRAM(S): 2.5 TABLET, FILM COATED ORAL at 23:28

## 2021-10-06 RX ADMIN — Medication 1 TABLET(S): at 11:16

## 2021-10-06 RX ADMIN — Medication 400 UNIT(S): at 11:17

## 2021-10-06 RX ADMIN — VALACYCLOVIR 500 MILLIGRAM(S): 500 TABLET, FILM COATED ORAL at 21:34

## 2021-10-06 RX ADMIN — Medication 1 SPRAY(S): at 17:20

## 2021-10-06 RX ADMIN — Medication 50 MILLIGRAM(S): at 11:16

## 2021-10-06 RX ADMIN — Medication 20 MILLIGRAM(S): at 13:07

## 2021-10-06 RX ADMIN — Medication 100 MILLIGRAM(S): at 21:34

## 2021-10-06 RX ADMIN — Medication 20 MILLIGRAM(S): at 21:34

## 2021-10-06 RX ADMIN — Medication 20 MILLIGRAM(S): at 05:51

## 2021-10-06 RX ADMIN — Medication 0.12 MILLIGRAM(S): at 11:17

## 2021-10-06 RX ADMIN — ATORVASTATIN CALCIUM 80 MILLIGRAM(S): 80 TABLET, FILM COATED ORAL at 21:34

## 2021-10-06 RX ADMIN — Medication 10 MILLIGRAM(S): at 17:21

## 2021-10-06 RX ADMIN — Medication 60 MILLIGRAM(S): at 05:51

## 2021-10-06 RX ADMIN — Medication 300 MILLIGRAM(S): at 21:34

## 2021-10-06 RX ADMIN — Medication 1 MILLIGRAM(S): at 11:16

## 2021-10-06 RX ADMIN — PRIMIDONE 50 MILLIGRAM(S): 250 TABLET ORAL at 21:34

## 2021-10-06 RX ADMIN — BUDESONIDE AND FORMOTEROL FUMARATE DIHYDRATE 2 PUFF(S): 160; 4.5 AEROSOL RESPIRATORY (INHALATION) at 08:15

## 2021-10-06 RX ADMIN — SPIRONOLACTONE 50 MILLIGRAM(S): 25 TABLET, FILM COATED ORAL at 05:51

## 2021-10-06 RX ADMIN — Medication 1 SPRAY(S): at 05:50

## 2021-10-06 RX ADMIN — TAMSULOSIN HYDROCHLORIDE 0.4 MILLIGRAM(S): 0.4 CAPSULE ORAL at 21:34

## 2021-10-06 RX ADMIN — Medication 0.6 MILLIGRAM(S): at 11:16

## 2021-10-06 RX ADMIN — Medication 20 MILLIGRAM(S): at 17:21

## 2021-10-06 RX ADMIN — Medication 20 MILLIGRAM(S): at 05:52

## 2021-10-06 RX ADMIN — Medication 20 MILLIGRAM(S): at 05:50

## 2021-10-06 RX ADMIN — Medication 5 MILLIGRAM(S): at 21:37

## 2021-10-06 NOTE — PROGRESS NOTE ADULT - ASSESSMENT
70 yo F with PMHx of COPD, Asthma uses home oxygen PRN, chronic back pain, cervical radiculopathy, Pulmonary Hypertension, Gout, DLD, HTN, CKD 3, recent L ankle fx presents for ankle fx repair.     # Acute Pulmonary embolism  - incidentally captured on CT abd/pelvis  - confirmed with CTA Chest, no evidence of right heart strain  - satting well on RA, HDS  - started on Eliquis     # Constipation  #Abdominal Distention c/f Ileus   - CT A/P without obstruction   - serial abdominal exams shows improvement in distension and tightness  - on lactulose syrup 10g q6h, miralax 17g bid, senna 2 tabs bid  - s/p NGT for 24hrs  - having small frequent liquid stools    # Hypokalemia, RESOLVED    # CASSI, RESOLVED  - likely pre-renal 2/2 to decreased po intake in setting of possible obstruction    #L medial Malleolar Fx, Distal Fibular Fx s/p ORIF   - plan for dc to rehab   - F/U with Dr. Clemons in 2 weeks. Please discharge patient with 6 total weeks of aspirin 325mg for dvt ppx  stable in splint  dc planning    #Urinary Retention   - post op retention in the setting of immobility/abdominal distention/constipation  - madrid catheter in place, TOV failed x3  - will need Urology follow up    #Chronic Back Pain  #Hx of Cervical Radiculopathy  - Sees pain management, Dr. Mayers  - C/w  Percocet PRN ( no longer on Fentanyl patch )     #Gout  - C/w Allopurinol    #Pulmonary HTN  - C/w Sildenafil    #HTN  - C/w Atenolol, Nifedipine    #DLD  - C/w Atorvastatin    #COPD, Asthma on home O2 2L  #AMRLENI  - C/w Symbicort    #Anxiety  - C/w Wellbutrin  - Valium PRN    #CKD 3  - Stable, sees Dr. Mcarthur    Heparin sq     #Progress Note Handoff:  Pending (specify): PO intake today, dc tomorrow   Family discussion: d/w patient   Disposition: NH placement       Pinky Padron DO

## 2021-10-06 NOTE — PROGRESS NOTE ADULT - SUBJECTIVE AND OBJECTIVE BOX
BERNARD SPEARS  71y, Female  Allergy: adhesives (Pruritus; Rash)  No Known Drug Allergies    Hospital Day: 15d    Patient seen and examined earlier today. Feeling well, tolerated soft food today w/o N/V.     PMH/PSH:  PAST MEDICAL & SURGICAL HISTORY:  History of neck pain    Spinal stenosis of lumbar region with radiculopathy    Anxiety    Depression    Osteoarthritis    H/O osteoporosis    History of pulmonary hypertension    H/O pulmonary hypertension    COPD (chronic obstructive pulmonary disease)    Hyperlipidemia    H/O total knee replacement, right    Failed spinal cord stimulator        LAST 24-Hr EVENTS:    VITALS:  T(F): 97.3 (10-06-21 @ 13:32), Max: 97.8 (10-05-21 @ 20:55)  HR: 64 (10-06-21 @ 13:32)  BP: 96/51 (10-06-21 @ 13:32) (96/51 - 110/56)  RR: 18 (10-06-21 @ 13:32)  SpO2: --        TESTS & MEASUREMENTS:  Weight (Kg): 81.1 (10-04-21 @ 21:54)      10-04-21 @ 07:01  -  10-05-21 @ 07:00  --------------------------------------------------------  IN: 1025 mL / OUT: 700 mL / NET: 325 mL    10-05-21 @ 07:01  -  10-06-21 @ 07:00  --------------------------------------------------------  IN: 1141 mL / OUT: 800 mL / NET: 341 mL    10-06-21 @ 07:01  -  10-06-21 @ 17:46  --------------------------------------------------------  IN: 825 mL / OUT: 500 mL / NET: 325 mL                            8.9    13.57 )-----------( 245      ( 06 Oct 2021 06:54 )             27.0     PT/INR - ( 04 Oct 2021 21:00 )   PT: 13.80 sec;   INR: 1.20 ratio         PTT - ( 05 Oct 2021 22:55 )  PTT:28.3 sec  10-06    141  |  108  |  18  ----------------------------<  121<H>  3.5   |  20  |  1.1    Ca    8.2<L>      06 Oct 2021 06:54  Mg     1.7     10-06    TPro  5.4<L>  /  Alb  3.6  /  TBili  0.5  /  DBili  x   /  AST  12  /  ALT  9   /  AlkPhos  81  10-04    LIVER FUNCTIONS - ( 04 Oct 2021 21:53 )  Alb: 3.6 g/dL / Pro: 5.4 g/dL / ALK PHOS: 81 U/L / ALT: 9 U/L / AST: 12 U/L / GGT: x           CARDIAC MARKERS ( 04 Oct 2021 21:53 )  x     / <0.01 ng/mL / x     / x     / x                          RADIOLOGY, ECG, & ADDITIONAL TESTS:      RECENT DIAGNOSTIC ORDERS:  Diet, Dysphagia 2 Mechanical Soft-Nectar Consistency Fluid (10-06-21 @ 11:05)      MEDICATIONS:  MEDICATIONS  (STANDING):  acetaminophen   Tablet .. 650 milliGRAM(s) Oral once  allopurinol 300 milliGRAM(s) Oral at bedtime  ALPRAZolam 0.5 milliGRAM(s) Oral once  apixaban 10 milliGRAM(s) Oral every 12 hours  ATENolol  Tablet 50 milliGRAM(s) Oral daily  atorvastatin 80 milliGRAM(s) Oral at bedtime  budesonide 160 MICROgram(s)/formoterol 4.5 MICROgram(s) Inhaler 2 Puff(s) Inhalation two times a day  cholecalciferol 400 Unit(s) Oral daily  colchicine 0.6 milliGRAM(s) Oral daily  FLUoxetine 20 milliGRAM(s) Oral two times a day  fluticasone propionate 50 MICROgram(s)/spray Nasal Spray 1 Spray(s) Both Nostrils two times a day  folic acid 1 milliGRAM(s) Oral daily  furosemide    Tablet 20 milliGRAM(s) Oral daily  hyoscyamine SL 0.125 milliGRAM(s) SubLingual daily  influenza   Vaccine 0.5 milliLiter(s) IntraMuscular once  lactated ringers. 1000 milliLiter(s) (75 mL/Hr) IV Continuous <Continuous>  lidocaine   4% Patch 1 Patch Transdermal daily  LORazepam   Injectable 1 milliGRAM(s) IV Push once  multivitamin 1 Tablet(s) Oral daily  NIFEdipine XL 60 milliGRAM(s) Oral daily  predniSONE   Tablet 10 milliGRAM(s) Oral two times a day  primidone 50 milliGRAM(s) Oral at bedtime  promethazine 50 milliGRAM(s) Oral daily  sildenafil (REVATIO) 20 milliGRAM(s) Oral three times a day  spironolactone 50 milliGRAM(s) Oral daily  tamsulosin 0.4 milliGRAM(s) Oral at bedtime  traZODone 100 milliGRAM(s) Oral at bedtime  valACYclovir 500 milliGRAM(s) Oral at bedtime    MEDICATIONS  (PRN):  magnesium hydroxide Suspension 30 milliLiter(s) Oral daily PRN Constipation  melatonin 5 milliGRAM(s) Oral at bedtime PRN Insomnia  simethicone 80 milliGRAM(s) Chew daily PRN Indigestion      HOME MEDICATIONS:  Advair Diskus 500 mcg-50 mcg inhalation powder (09-22)  allopurinol 300 mg oral tablet (09-22)  atenolol 50 mg oral tablet (09-22)  colchicine 0.6 mg oral capsule (09-22)  fentanyl topical 25 mcg/hr transdermal film, extended release (obsolete) (09-22)  FLUoxetine 20 mg oral capsule (09-22)  fluticasone 50 mcg/inh inhalation powder (09-22)  folic acid 1 mg oral tablet (09-22)  Fosamax 70 mg oral tablet (09-22)  furosemide 20 mg oral tablet (09-22)  hyoscyamine 0.125 mg oral tablet (09-22)  Lidoderm 5% topical film (09-22)  Lipitor 80 mg oral tablet (09-22)  magnesium carbonate 250 mg oral capsule (09-22)  oxycodone-acetaminophen 5 mg-325 mg oral tablet (09-22)  predniSONE 10 mg oral tablet (09-22)  primidone 50 mg oral tablet (09-22)  Procardia XL 60 mg oral tablet, extended release (09-22)  promethazine 50 mg oral tablet (09-22)  sildenafil 20 mg oral tablet (09-22)  spironolactone 50 mg oral tablet (09-22)  traZODone 100 mg oral tablet (09-22)  Valium 10 mg oral tablet (09-22)  Valtrex 500 mg oral tablet (09-22)  Vitamin D3 400 intl units (10 mcg) oral tablet (09-22)  Wellbutrin (09-22)      PHYSICAL EXAM:  GENERAL: elderly M, NAD, non toxic  EYES: anicteric sclera, non injected conjunctiva, EOMI  ENT: oropharynx clear, MMM, fair dentition  PSYCH: no agitation, baseline mentation  NERVOUS SYSTEM:  Alert & Oriented X3  PULMONARY: Clear to auscultation bilaterally; No rales, rhonchi, wheezing, or rubs  CARDIOVASCULAR: Regular rate and rhythm; No murmurs, rubs, or gallops  GI: soft distended; Bowel sounds present  EXTREMITIES:  2+ Peripheral Pulses, No clubbing, cyanosis, or edema  LYMPH: No lymphadenopathy noted  SKIN: No rashes or lesions

## 2021-10-06 NOTE — PROGRESS NOTE ADULT - SUBJECTIVE AND OBJECTIVE BOX
BERNARD SPEARS  71y  Female      Patient is a 71y old  Female who presents with a chief complaint of left ankle fx (05 Oct 2021 13:14)      INTERVAL HPI/OVERNIGHT EVENTS: Pt was seen and evaluated at bedside. Pt reports feeling good and having watery bowel movements. Pt will like to advance her diet to foods. No acute overnight events. Pt denies any SOB/CP/n/v.       FAMILY HISTORY:  Family history of bladder cancer (Mother)    Family history of Alzheimer&#x27;s disease (Father)      T(C): 36.2 (10-06-21 @ 05:19), Max: 36.6 (10-05-21 @ 20:55)  HR: 69 (10-06-21 @ 05:19) (68 - 69)  BP: 105/53 (10-06-21 @ 05:19) (105/53 - 118/59)  RR: 16 (10-06-21 @ 05:19) (16 - 20)  Wt(kg): --Vital Signs Last 24 Hrs  T(C): 36.2 (06 Oct 2021 05:19), Max: 36.6 (05 Oct 2021 20:55)  T(F): 97.2 (06 Oct 2021 05:19), Max: 97.8 (05 Oct 2021 20:55)  HR: 69 (06 Oct 2021 05:19) (68 - 69)  BP: 105/53 (06 Oct 2021 05:19) (105/53 - 118/59)  RR: 16 (06 Oct 2021 05:19) (16 - 20)  adhesives (Pruritus; Rash)  No Known Drug Allergies      PHYSICAL EXAM:  GENERAL: NAD, well-groomed, well-developed  HEAD:  Atraumatic, Normocephalic  CHEST/LUNG: Clear to percussion bilaterally; No rales, rhonchi, wheezing, or rubs  HEART: Regular rate and rhythm; No murmurs, rubs, or gallops  ABDOMEN: tense, distended; Bowel sounds present      LABS:                        8.9    13.57 )-----------( 245      ( 06 Oct 2021 06:54 )             27.0       10-06    141  |  108  |  18  ----------------------------<  121<H>  3.5   |  20  |  1.1    Ca    8.2<L>      06 Oct 2021 06:54  Mg     1.7     10-06    TPro  5.4<L>  /  Alb  3.6  /  TBili  0.5  /  DBili  x   /  AST  12  /  ALT  9   /  AlkPhos  81  10-04      RADIOLOGY & ADDITIONAL TESTS:  VA Duplex Lower Ext Vein Scan, Bilat (10.05.21 @ 10:27) >  IMPRESSION:  No evidence of deep venous thrombosis in either lower extremity.    CT Angio Chest PE Protocol w/ IV Cont (10.04.21 @ 19:12) >  IMPRESSION:  Acute pulmonary emboli within the distal left main pulmonary artery, extending to the left lower lobe segmental and subsegmental branches.  No CTA evidence of right heart strain.      CT Abdomen and Pelvis w/ Oral Cont and w/ IV Cont (10.04.21 @ 13:58) >  IMPRESSION:  1. Multiple dilated loops of large bowel, without definite evidence of mechanical obstruction, overall unchanged since October 3, 2021; no evidence of small bowel obstruction.  2. Suggestion of filling defect within partially imaged left lower lobe pulmonary artery branches, possibly reflecting pulmonary emboli; further evaluation with dedicated PE protocol CT is suggested.        MEDICATIONS  (STANDING):  acetaminophen   Tablet .. 650 milliGRAM(s) Oral once  allopurinol 300 milliGRAM(s) Oral at bedtime  ALPRAZolam 0.5 milliGRAM(s) Oral once  apixaban 10 milliGRAM(s) Oral every 12 hours  ATENolol  Tablet 50 milliGRAM(s) Oral daily  atorvastatin 80 milliGRAM(s) Oral at bedtime  budesonide 160 MICROgram(s)/formoterol 4.5 MICROgram(s) Inhaler 2 Puff(s) Inhalation two times a day  cholecalciferol 400 Unit(s) Oral daily  colchicine 0.6 milliGRAM(s) Oral daily  FLUoxetine 20 milliGRAM(s) Oral two times a day  fluticasone propionate 50 MICROgram(s)/spray Nasal Spray 1 Spray(s) Both Nostrils two times a day  folic acid 1 milliGRAM(s) Oral daily  furosemide    Tablet 20 milliGRAM(s) Oral daily  hyoscyamine SL 0.125 milliGRAM(s) SubLingual daily  influenza   Vaccine 0.5 milliLiter(s) IntraMuscular once  lactated ringers. 1000 milliLiter(s) (75 mL/Hr) IV Continuous <Continuous>  lidocaine   4% Patch 1 Patch Transdermal daily  LORazepam   Injectable 1 milliGRAM(s) IV Push once  multivitamin 1 Tablet(s) Oral daily  NIFEdipine XL 60 milliGRAM(s) Oral daily  predniSONE   Tablet 10 milliGRAM(s) Oral two times a day  primidone 50 milliGRAM(s) Oral at bedtime  promethazine 50 milliGRAM(s) Oral daily  sildenafil (REVATIO) 20 milliGRAM(s) Oral three times a day  spironolactone 50 milliGRAM(s) Oral daily  tamsulosin 0.4 milliGRAM(s) Oral at bedtime  traZODone 100 milliGRAM(s) Oral at bedtime  valACYclovir 500 milliGRAM(s) Oral at bedtime    MEDICATIONS  (PRN):  magnesium hydroxide Suspension 30 milliLiter(s) Oral daily PRN Constipation  melatonin 5 milliGRAM(s) Oral at bedtime PRN Insomnia  simethicone 80 milliGRAM(s) Chew daily PRN Indigestion

## 2021-10-06 NOTE — PROGRESS NOTE ADULT - ASSESSMENT
70 yo F with PMHx of COPD, Asthma uses home oxygen PRN, chronic back pain, cervical radiculopathy, Pulmonary Hypertension, Gout, DLD, HTN, CKD 3, recent L ankle fx presents for ankle fx repair. Still very distended and symptomatic.    # Acute Pulmonary embolism  - incidentally captured on CT abd/pelvis  - confirmed with CTA of the lungs yesterday evening. No evidence of right heart strain  - duplex negative  - subsequentially started on heparing gtt-PE protocol has been transitioned to therapeutic Apixaban 10mg BID    # Constipation  # Rule out obstruction  # Leukocytosis  - WBC 19k, likely reactive-->18k-->16k-->10k-->15k  - c. diff negative  - KUB showed dilated loops but indeterminant study  - serial abdominal exams shows improvement in distension and tightness  - on lactulose syrup 10g q6h, miralax 17g bid, senna 2 tabs bid  - s/p NGT for 24hrs  - CT abdomen/pelvis with IV and oral contrast without evidence of obstruction  - will transition diet to soft today and monitor her tolerance  - will call PT to get her out of bed (try to initiate BM)    # Hypokalemia, RESOLVED  -2.5, repeat 2.3  - 80mEq KCl or 60mEq IV-->4.0  - serial repeat    # CASSI, RESOLVED  - sCr 1.7->1.6-->1.2  - likely pre-renal 2/2 to decreased po intake in setting of possible obstruction  - continue mIVF @ 75cc/hr  - monitor on serial labs      #L medial Malleolar Fx, Distal Fibular Fx  - Had initially experienced fall earlier in September, presented to hospital and was found to have L bimalleolar ankle fx s/p closed reduction  - On follow up as outpt, found to have persistent fx and was scheduled for repair  - Was unable to obtain Pulmonary, Cardiac clearance due to physical dysfunction  - s/p cardiac/pulm/medicine risk stratification   - echo ordered - EF 59% and mild PH   - Ortho following s/p ORIF, POD #7  - plan for dc to rehab   - F/U with Dr. Clemons in 2 weeks. Please discharge patient with 6 total weeks of aspirin 325mg for dvt ppx  stable in splint  dc planning    #Urinary Retention   - post op retention in the setting of immobility  - madrid catheter in place, TOV failed x4  - c/q flomax  - will need Urology follow up    #Chronic Back Pain  #Hx of Cervical Radiculopathy  - Sees pain management, Dr. Mayers  - C/w  Percocet PRN ( no longer on Fentanyl patch )     #Gout  - C/w Allopurinol    #Pulmonary HTN  - C/w Sildenafil    #HTN  - C/w Atenolol, Nifedipine    #DLD  - C/w Atorvastatin    #COPD, Asthma on home O2 2L  #MARLENI  - C/w Symbicort    #Anxiety  - C/w Wellbutrin  - Valium PRN    #CKD 3  - Stable, sees Dr. Mcarthur    Heparin sq     #Progress Note Handoff:  Pending (specify): obstruction resolution  Family discussion: d/w patient   Disposition: [facility] STR is available. 24-48hrs tentatively

## 2021-10-07 LAB
ANION GAP SERPL CALC-SCNC: 11 MMOL/L — SIGNIFICANT CHANGE UP (ref 7–14)
BASOPHILS # BLD AUTO: 0.02 K/UL — SIGNIFICANT CHANGE UP (ref 0–0.2)
BASOPHILS NFR BLD AUTO: 0.2 % — SIGNIFICANT CHANGE UP (ref 0–1)
BUN SERPL-MCNC: 17 MG/DL — SIGNIFICANT CHANGE UP (ref 10–20)
CALCIUM SERPL-MCNC: 8.2 MG/DL — LOW (ref 8.5–10.1)
CHLORIDE SERPL-SCNC: 108 MMOL/L — SIGNIFICANT CHANGE UP (ref 98–110)
CO2 SERPL-SCNC: 21 MMOL/L — SIGNIFICANT CHANGE UP (ref 17–32)
CREAT SERPL-MCNC: 1 MG/DL — SIGNIFICANT CHANGE UP (ref 0.7–1.5)
EOSINOPHIL # BLD AUTO: 0.04 K/UL — SIGNIFICANT CHANGE UP (ref 0–0.7)
EOSINOPHIL NFR BLD AUTO: 0.3 % — SIGNIFICANT CHANGE UP (ref 0–8)
GLUCOSE SERPL-MCNC: 120 MG/DL — HIGH (ref 70–99)
HCT VFR BLD CALC: 29.4 % — LOW (ref 37–47)
HGB BLD-MCNC: 9.7 G/DL — LOW (ref 12–16)
IMM GRANULOCYTES NFR BLD AUTO: 0.8 % — HIGH (ref 0.1–0.3)
LYMPHOCYTES # BLD AUTO: 1.23 K/UL — SIGNIFICANT CHANGE UP (ref 1.2–3.4)
LYMPHOCYTES # BLD AUTO: 9.4 % — LOW (ref 20.5–51.1)
MAGNESIUM SERPL-MCNC: 1.7 MG/DL — LOW (ref 1.8–2.4)
MCHC RBC-ENTMCNC: 33 G/DL — SIGNIFICANT CHANGE UP (ref 32–37)
MCHC RBC-ENTMCNC: 36.2 PG — HIGH (ref 27–31)
MCV RBC AUTO: 109.7 FL — HIGH (ref 81–99)
MONOCYTES # BLD AUTO: 0.86 K/UL — HIGH (ref 0.1–0.6)
MONOCYTES NFR BLD AUTO: 6.5 % — SIGNIFICANT CHANGE UP (ref 1.7–9.3)
NEUTROPHILS # BLD AUTO: 10.88 K/UL — HIGH (ref 1.4–6.5)
NEUTROPHILS NFR BLD AUTO: 82.8 % — HIGH (ref 42.2–75.2)
NRBC # BLD: 0 /100 WBCS — SIGNIFICANT CHANGE UP (ref 0–0)
PLATELET # BLD AUTO: 243 K/UL — SIGNIFICANT CHANGE UP (ref 130–400)
POTASSIUM SERPL-MCNC: 3.7 MMOL/L — SIGNIFICANT CHANGE UP (ref 3.5–5)
POTASSIUM SERPL-SCNC: 3.7 MMOL/L — SIGNIFICANT CHANGE UP (ref 3.5–5)
RBC # BLD: 2.68 M/UL — LOW (ref 4.2–5.4)
RBC # FLD: 14.6 % — HIGH (ref 11.5–14.5)
SODIUM SERPL-SCNC: 140 MMOL/L — SIGNIFICANT CHANGE UP (ref 135–146)
WBC # BLD: 13.13 K/UL — HIGH (ref 4.8–10.8)
WBC # FLD AUTO: 13.13 K/UL — HIGH (ref 4.8–10.8)

## 2021-10-07 PROCEDURE — 99233 SBSQ HOSP IP/OBS HIGH 50: CPT

## 2021-10-07 PROCEDURE — 73610 X-RAY EXAM OF ANKLE: CPT | Mod: 26,LT

## 2021-10-07 RX ORDER — ACETAMINOPHEN 500 MG
650 TABLET ORAL EVERY 6 HOURS
Refills: 0 | Status: DISCONTINUED | OUTPATIENT
Start: 2021-10-07 | End: 2021-10-14

## 2021-10-07 RX ORDER — ACETAMINOPHEN 500 MG
1000 TABLET ORAL ONCE
Refills: 0 | Status: COMPLETED | OUTPATIENT
Start: 2021-10-07 | End: 2021-10-07

## 2021-10-07 RX ORDER — DIAZEPAM 5 MG
10 TABLET ORAL ONCE
Refills: 0 | Status: DISCONTINUED | OUTPATIENT
Start: 2021-10-07 | End: 2021-10-07

## 2021-10-07 RX ADMIN — LIDOCAINE 1 PATCH: 4 CREAM TOPICAL at 21:49

## 2021-10-07 RX ADMIN — APIXABAN 10 MILLIGRAM(S): 2.5 TABLET, FILM COATED ORAL at 11:43

## 2021-10-07 RX ADMIN — Medication 60 MILLIGRAM(S): at 05:39

## 2021-10-07 RX ADMIN — Medication 10 MILLIGRAM(S): at 17:38

## 2021-10-07 RX ADMIN — Medication 1 SPRAY(S): at 05:39

## 2021-10-07 RX ADMIN — Medication 1000 MILLIGRAM(S): at 14:15

## 2021-10-07 RX ADMIN — Medication 1 MILLIGRAM(S): at 11:42

## 2021-10-07 RX ADMIN — Medication 20 MILLIGRAM(S): at 13:41

## 2021-10-07 RX ADMIN — Medication 1 SPRAY(S): at 17:36

## 2021-10-07 RX ADMIN — Medication 1 TABLET(S): at 11:43

## 2021-10-07 RX ADMIN — APIXABAN 10 MILLIGRAM(S): 2.5 TABLET, FILM COATED ORAL at 22:29

## 2021-10-07 RX ADMIN — Medication 300 MILLIGRAM(S): at 22:28

## 2021-10-07 RX ADMIN — Medication 0.12 MILLIGRAM(S): at 11:47

## 2021-10-07 RX ADMIN — Medication 20 MILLIGRAM(S): at 05:38

## 2021-10-07 RX ADMIN — Medication 10 MILLIGRAM(S): at 05:39

## 2021-10-07 RX ADMIN — ATENOLOL 50 MILLIGRAM(S): 25 TABLET ORAL at 22:29

## 2021-10-07 RX ADMIN — ATORVASTATIN CALCIUM 80 MILLIGRAM(S): 80 TABLET, FILM COATED ORAL at 22:29

## 2021-10-07 RX ADMIN — Medication 20 MILLIGRAM(S): at 05:39

## 2021-10-07 RX ADMIN — Medication 650 MILLIGRAM(S): at 23:15

## 2021-10-07 RX ADMIN — Medication 20 MILLIGRAM(S): at 05:40

## 2021-10-07 RX ADMIN — Medication 400 UNIT(S): at 11:43

## 2021-10-07 RX ADMIN — BUDESONIDE AND FORMOTEROL FUMARATE DIHYDRATE 2 PUFF(S): 160; 4.5 AEROSOL RESPIRATORY (INHALATION) at 11:44

## 2021-10-07 RX ADMIN — Medication 50 MILLIGRAM(S): at 11:42

## 2021-10-07 RX ADMIN — SPIRONOLACTONE 50 MILLIGRAM(S): 25 TABLET, FILM COATED ORAL at 05:40

## 2021-10-07 RX ADMIN — Medication 10 MILLIGRAM(S): at 11:45

## 2021-10-07 RX ADMIN — Medication 0.6 MILLIGRAM(S): at 11:43

## 2021-10-07 RX ADMIN — Medication 20 MILLIGRAM(S): at 17:37

## 2021-10-07 RX ADMIN — LIDOCAINE 1 PATCH: 4 CREAM TOPICAL at 11:47

## 2021-10-07 RX ADMIN — Medication 1000 MILLIGRAM(S): at 13:42

## 2021-10-07 RX ADMIN — LIDOCAINE 1 PATCH: 4 CREAM TOPICAL at 00:29

## 2021-10-07 NOTE — PROGRESS NOTE ADULT - SUBJECTIVE AND OBJECTIVE BOX
BERNARD SPEARS  71y, Female  Allergy: adhesives (Pruritus; Rash)  No Known Drug Allergies    Hospital Day: 16d    Patient seen and examined earlier today. Feeling well, tolerating food w/o N/V.     PMH/PSH:  PAST MEDICAL & SURGICAL HISTORY:  History of neck pain    Spinal stenosis of lumbar region with radiculopathy    Anxiety    Depression    Osteoarthritis    H/O osteoporosis    History of pulmonary hypertension    H/O pulmonary hypertension    COPD (chronic obstructive pulmonary disease)    Hyperlipidemia    H/O total knee replacement, right    Failed spinal cord stimulator        LAST 24-Hr EVENTS:    VITALS:  T(F): 99.5 (10-07-21 @ 13:20), Max: 99.5 (10-07-21 @ 13:20)  HR: 73 (10-07-21 @ 13:20)  BP: 103/47 (10-07-21 @ 13:20) (100/52 - 117/56)  RR: 18 (10-07-21 @ 13:20)  SpO2: --        TESTS & MEASUREMENTS:  Weight (Kg):       10-05-21 @ 07:01  -  10-06-21 @ 07:00  --------------------------------------------------------  IN: 1141 mL / OUT: 800 mL / NET: 341 mL    10-06-21 @ 07:01  -  10-07-21 @ 07:00  --------------------------------------------------------  IN: 1179 mL / OUT: 725 mL / NET: 454 mL    10-07-21 @ 07:01  -  10-07-21 @ 16:56  --------------------------------------------------------  IN: 0 mL / OUT: 1500 mL / NET: -1500 mL                            9.7    13.13 )-----------( 243      ( 07 Oct 2021 05:30 )             29.4     PTT - ( 05 Oct 2021 22:55 )  PTT:28.3 sec  10-07    140  |  108  |  17  ----------------------------<  120<H>  3.7   |  21  |  1.0    Ca    8.2<L>      07 Oct 2021 05:30  Mg     1.7     10-07                            RADIOLOGY, ECG, & ADDITIONAL TESTS:      RECENT DIAGNOSTIC ORDERS:      MEDICATIONS:  MEDICATIONS  (STANDING):  acetaminophen   Tablet .. 650 milliGRAM(s) Oral once  allopurinol 300 milliGRAM(s) Oral at bedtime  ALPRAZolam 0.5 milliGRAM(s) Oral once  apixaban 10 milliGRAM(s) Oral every 12 hours  ATENolol  Tablet 50 milliGRAM(s) Oral daily  atorvastatin 80 milliGRAM(s) Oral at bedtime  budesonide 160 MICROgram(s)/formoterol 4.5 MICROgram(s) Inhaler 2 Puff(s) Inhalation two times a day  cholecalciferol 400 Unit(s) Oral daily  colchicine 0.6 milliGRAM(s) Oral daily  FLUoxetine 20 milliGRAM(s) Oral two times a day  fluticasone propionate 50 MICROgram(s)/spray Nasal Spray 1 Spray(s) Both Nostrils two times a day  folic acid 1 milliGRAM(s) Oral daily  furosemide    Tablet 20 milliGRAM(s) Oral daily  hyoscyamine SL 0.125 milliGRAM(s) SubLingual daily  influenza   Vaccine 0.5 milliLiter(s) IntraMuscular once  lidocaine   4% Patch 1 Patch Transdermal daily  LORazepam   Injectable 1 milliGRAM(s) IV Push once  multivitamin 1 Tablet(s) Oral daily  NIFEdipine XL 60 milliGRAM(s) Oral daily  predniSONE   Tablet 10 milliGRAM(s) Oral two times a day  primidone 50 milliGRAM(s) Oral at bedtime  promethazine 50 milliGRAM(s) Oral daily  sildenafil (REVATIO) 20 milliGRAM(s) Oral three times a day  spironolactone 50 milliGRAM(s) Oral daily  tamsulosin 0.4 milliGRAM(s) Oral at bedtime  traZODone 100 milliGRAM(s) Oral at bedtime  valACYclovir 500 milliGRAM(s) Oral at bedtime    MEDICATIONS  (PRN):  magnesium hydroxide Suspension 30 milliLiter(s) Oral daily PRN Constipation  melatonin 5 milliGRAM(s) Oral at bedtime PRN Insomnia  simethicone 80 milliGRAM(s) Chew daily PRN Indigestion      HOME MEDICATIONS:  Advair Diskus 500 mcg-50 mcg inhalation powder (09-22)  allopurinol 300 mg oral tablet (09-22)  atenolol 50 mg oral tablet (09-22)  colchicine 0.6 mg oral capsule (09-22)  fentanyl topical 25 mcg/hr transdermal film, extended release (obsolete) (09-22)  FLUoxetine 20 mg oral capsule (09-22)  fluticasone 50 mcg/inh inhalation powder (09-22)  folic acid 1 mg oral tablet (09-22)  Fosamax 70 mg oral tablet (09-22)  furosemide 20 mg oral tablet (09-22)  hyoscyamine 0.125 mg oral tablet (09-22)  Lidoderm 5% topical film (09-22)  Lipitor 80 mg oral tablet (09-22)  magnesium carbonate 250 mg oral capsule (09-22)  oxycodone-acetaminophen 5 mg-325 mg oral tablet (09-22)  predniSONE 10 mg oral tablet (09-22)  primidone 50 mg oral tablet (09-22)  Procardia XL 60 mg oral tablet, extended release (09-22)  promethazine 50 mg oral tablet (09-22)  sildenafil 20 mg oral tablet (09-22)  spironolactone 50 mg oral tablet (09-22)  traZODone 100 mg oral tablet (09-22)  Valium 10 mg oral tablet (09-22)  Valtrex 500 mg oral tablet (09-22)  Vitamin D3 400 intl units (10 mcg) oral tablet (09-22)  Wellbutrin (09-22)      PHYSICAL EXAM:  GENERAL: elderly M, NAD, non toxic  EYES: anicteric sclera, non injected conjunctiva, EOMI  ENT: oropharynx clear, MMM, fair dentition  PSYCH: no agitation, baseline mentation  NERVOUS SYSTEM:  Alert & Oriented X3  PULMONARY: Clear to auscultation bilaterally; No rales, rhonchi, wheezing, or rubs  CARDIOVASCULAR: Regular rate and rhythm; No murmurs, rubs, or gallops  GI: soft distended; Bowel sounds present  EXTREMITIES:  2+ Peripheral Pulses, No clubbing, cyanosis, or edema  LYMPH: No lymphadenopathy noted  SKIN: No rashes or lesions

## 2021-10-07 NOTE — PROGRESS NOTE ADULT - ASSESSMENT
70 yo F with PMHx of COPD, Asthma uses home oxygen PRN, chronic back pain, cervical radiculopathy, Pulmonary Hypertension, Gout, DLD, HTN, CKD 3, recent L ankle fx presents for ankle fx repair.     # Acute Pulmonary embolism  - incidentally captured on CT abd/pelvis  - confirmed with CTA Chest, no evidence of right heart strain  - satting well on RA, HDS  - started on Eliquis     # Constipation  #Abdominal Distention c/f Ileus   - CT A/P without obstruction   - serial abdominal exams shows improvement in distension and tightness  - on lactulose syrup 10g q6h, miralax 17g bid, senna 2 tabs bid  - s/p NGT for 24hrs  - having small frequent liquid stools    # Hypokalemia, RESOLVED    # CASSI, RESOLVED  - likely pre-renal 2/2 to decreased po intake in setting of possible obstruction    #L medial Malleolar Fx, Distal Fibular Fx s/p ORIF   - plan for dc to rehab   - Dr. Clemons following. Please discharge patient with 6 total weeks of aspirin 325mg for dvt ppx  stable in splint  dc planning    #Urinary Retention   - post op retention in the setting of immobility/abdominal distention/constipation  - madrid catheter in place, TOV failed x3  - will need Urology follow up    #Chronic Back Pain  #Hx of Cervical Radiculopathy  - Sees pain management, Dr. Mayers  - C/w  Percocet PRN ( no longer on Fentanyl patch )     #Gout  - C/w Allopurinol    #Pulmonary HTN  - C/w Sildenafil    #HTN  - C/w Atenolol, Nifedipine    #DLD  - C/w Atorvastatin    #COPD, Asthma on home O2 2L  #MARLENI  - C/w Symbicort    #Anxiety  - C/w Wellbutrin  - Valium PRN    #CKD 3  - Stable, sees Dr. Mcarthur    Heparin sq     #Progress Note Handoff:  Pending (specify):pending authorization  Family discussion: d/w patient   Disposition: NH placement       Pinky Padron DO

## 2021-10-07 NOTE — PROGRESS NOTE ADULT - ASSESSMENT
Orthopaedic Surgery Progress Note    Patient is POD14 s/p left bimalleolar ankle fracture s/p ORIF. S&E at bedside this AM. Patient was incidentally found to have pulmonary embolism on CT chest/abdomen, started on eliquis. Patient maritzaie doing well, currently admitted to rehab unit. Pain well-controlled.      T(C): 36.2 (10-07-21 @ 04:57), Max: 36.3 (10-06-21 @ 13:32)  HR: 66 (10-07-21 @ 04:57) (63 - 66)  BP: 117/56 (10-07-21 @ 04:57) (96/51 - 117/56)  RR: 18 (10-07-21 @ 04:57) (18 - 18)  SpO2: --    P/E:  AOx3, NAD  Nonlabored breathing    LLE  Splint in place, c/d/i  Sensory grossly intact  Firing EHL/FHL  Foot WWP    Labs                        9.7    13.13 )-----------( 243      ( 07 Oct 2021 05:30 )             29.4     10-07    140  |  108  |  17  ----------------------------<  120<H>  3.7   |  21  |  1.0    Ca    8.2<L>      07 Oct 2021 05:30  Mg     1.7     10-07        PTT - ( 05 Oct 2021 22:55 )  PTT:28.3 sec    A/P:   71F who is now POD14 s/p left bimalleolar ankle fracture s/p ORIF. Incidentally found to have PE on CT chest/abdomen on 10/4 now on eliquis.    Please obtain new left ankle XR  CAM boot to be ordered for left ankle  Splint to be removed and wound checked after obtaining CAM boot  Weightbearing: NWB LLE  DVT ppx: SCD, Eliquis per primary  Pain control  Incentive spirometer  Home meds  PT/OT/Rehab  Dispo: currently admitted to Rehab

## 2021-10-07 NOTE — PROGRESS NOTE ADULT - ASSESSMENT
. 72 yo F with PMHx of COPD, Asthma uses home oxygen PRN, chronic back pain, cervical radiculopathy, Pulmonary Hypertension, Gout, DLD, HTN, CKD 3, recent L ankle fx presents for ankle fx repair.     #L medial Malleolar Fx, Distal Fibular Fx s/p ORIF   - plan for dc to rehab   - Xray of ankle pendig  - pt missed f/u with Dr. Clemons in 2 weeks, will see her inpt    # Acute Pulmonary embolism  - incidentally captured on CT abd/pelvis  - confirmed with CTA Chest, no evidence of right heart strain  - satting well on RA, HDS  - c/w Eliquis 10mg    # Constipation  #Abdominal Distention c/f Ileus   - CT A/P without obstruction   - serial abdominal exams shows improvement in distension and tightness  - on lactulose syrup 10g q6h, miralax 17g bid, senna 2 tabs bid  - s/p NGT for 24hrs  - having small frequent liquid stools  - currently on soft diet    # Hypokalemia, RESOLVED    # CASSI, RESOLVED  - likely pre-renal 2/2 to decreased po intake in setting of possible obstruction    #L medial Malleolar Fx, Distal Fibular Fx s/p ORIF   - plan for dc to rehab   - F/U with Dr. Clemons in 2 weeks. Please discharge patient with 6 total weeks of aspirin 325mg for dvt ppx  stable in splint  dc planning    #Urinary Retention   - post op retention in the setting of immobility/abdominal distention/constipation  - madrid catheter in place, TOV failed x4  - will need Urology follow up    #Chronic Back Pain  #Hx of Cervical Radiculopathy  - Sees pain management, Dr. Mayers  - C/w  Percocet PRN ( no longer on Fentanyl patch )     #Gout  - C/w Allopurinol    #Pulmonary HTN  - C/w Sildenafil    #HTN  - C/w Atenolol, Nifedipine    #DLD  - C/w Atorvastatin    #COPD, Asthma on home O2 2L  #MARLENI  - C/w Symbicort    #Anxiety  - C/w Wellbutrin  - Valium PRN    #CKD 3  - Stable, sees Dr. Mcarthur    Heparin sq     #Progress Note Handoff:  Pending (specify): PO intake today, dc tomorrow   Family discussion: d/w patient   Disposition: NH placement  70 yo F with PMHx of COPD, Asthma uses home oxygen PRN, chronic back pain, cervical radiculopathy, Pulmonary Hypertension, Gout, DLD, HTN, CKD 3, recent L ankle fx presents for ankle fx repair.     #L medial Malleolar Fx, Distal Fibular Fx s/p ORIF   - plan for dc to rehab   - Xray of ankle pending  - pt missed f/u with Dr. Clemons in 2 weeks, will see her inpt    # Acute Pulmonary embolism  - incidentally captured on CT abd/pelvis  - confirmed with CTA Chest, no evidence of right heart strain  - satting well on RA, HDS  - c/w Eliquis 10mg    # Constipation  #Abdominal Distention c/f Ileus   - CT A/P without obstruction   - serial abdominal exams shows improvement in distension and tightness  - on lactulose syrup 10g q6h, miralax 17g bid, senna 2 tabs bid  - s/p NGT for 24hrs  - having small frequent liquid stools  - currently on soft diet    # Hypokalemia, RESOLVED    # CASSI, RESOLVED  - likely pre-renal 2/2 to decreased po intake in setting of possible obstruction    #L medial Malleolar Fx, Distal Fibular Fx s/p ORIF   - plan for dc to rehab   - F/U with Dr. Clemons in 2 weeks. Please discharge patient with 6 total weeks of aspirin 325mg for dvt ppx  stable in splint  dc planning    #Urinary Retention   - post op retention in the setting of immobility/abdominal distention/constipation  - madrid catheter in place, TOV failed x4  - will need Urology follow up    #Chronic Back Pain  #Hx of Cervical Radiculopathy  - Sees pain management, Dr. Mayers  - C/w  Percocet PRN ( no longer on Fentanyl patch )     #Gout  - C/w Allopurinol    #Pulmonary HTN  - C/w Sildenafil    #HTN  - C/w Atenolol, Nifedipine    #DLD  - C/w Atorvastatin    #COPD, Asthma on home O2 2L  #MARLENI  - C/w Symbicort    #Anxiety  - C/w Wellbutrin  - Valium PRN    #CKD 3  - Stable, sees Dr. Mcarthur    Heparin sq     #Progress Note Handoff:  Pending (specify): PO intake today, dc today  Family discussion: d/w patient   Disposition: NH placement

## 2021-10-07 NOTE — PROGRESS NOTE ADULT - SUBJECTIVE AND OBJECTIVE BOX
BERNARD SPEARS  71y  Female      Patient is a 71y old  Female who presents with a chief complaint of left ankle fx (07 Oct 2021 07:16)      INTERVAL HPI/OVERNIGHT EVENTS: Pt was seen and evaluated at bedside. No overnight events. Pt reports feeling good, much better than before. Pt was able to tolerate her soft diet yesterday and would like to advance to real food today. Pt reports having watery diarrhea after eating. Denies any nausea or vomiting.         FAMILY HISTORY:  Family history of bladder cancer (Mother)    Family history of Alzheimer&#x27;s disease (Father)      T(C): 36.2 (10-07-21 @ 04:57), Max: 36.3 (10-06-21 @ 13:32)  HR: 66 (10-07-21 @ 04:57) (63 - 66)  BP: 117/56 (10-07-21 @ 04:57) (96/51 - 117/56)  RR: 18 (10-07-21 @ 04:57) (18 - 18)  Wt(kg): --Vital Signs Last 24 Hrs  T(C): 36.2 (07 Oct 2021 04:57), Max: 36.3 (06 Oct 2021 13:32)  T(F): 97.1 (07 Oct 2021 04:57), Max: 97.3 (06 Oct 2021 13:32)  HR: 66 (07 Oct 2021 04:57) (63 - 66)  BP: 117/56 (07 Oct 2021 04:57) (96/51 - 117/56)  RR: 18 (07 Oct 2021 04:57) (18 - 18)    Allergies:  adhesives (Pruritus; Rash)  No Known Drug Allergies      PHYSICAL EXAM:  GENERAL: NAD, well-groomed, well-developed  HEAD:  Atraumatic, Normocephalic  CHEST/LUNG: Clear to percussion bilaterally; No rales, rhonchi, wheezing, or rubs  HEART: Regular rate and rhythm; No murmurs, rubs, or gallops  ABDOMEN: Soft, Nontender, distended; Bowel sounds present      LABS:                        9.7    13.13 )-----------( 243      ( 07 Oct 2021 05:30 )             29.4   10-07    140  |  108  |  17  ----------------------------<  120<H>  3.7   |  21  |  1.0    Ca    8.2<L>      07 Oct 2021 05:30  Mg     1.7     10-07        RADIOLOGY & ADDITIONAL TESTS:    XRAY ankle pending      MEDICATIONS  (STANDING):  acetaminophen   Tablet .. 650 milliGRAM(s) Oral once  allopurinol 300 milliGRAM(s) Oral at bedtime  ALPRAZolam 0.5 milliGRAM(s) Oral once  apixaban 10 milliGRAM(s) Oral every 12 hours  ATENolol  Tablet 50 milliGRAM(s) Oral daily  atorvastatin 80 milliGRAM(s) Oral at bedtime  budesonide 160 MICROgram(s)/formoterol 4.5 MICROgram(s) Inhaler 2 Puff(s) Inhalation two times a day  cholecalciferol 400 Unit(s) Oral daily  colchicine 0.6 milliGRAM(s) Oral daily  FLUoxetine 20 milliGRAM(s) Oral two times a day  fluticasone propionate 50 MICROgram(s)/spray Nasal Spray 1 Spray(s) Both Nostrils two times a day  folic acid 1 milliGRAM(s) Oral daily  furosemide    Tablet 20 milliGRAM(s) Oral daily  hyoscyamine SL 0.125 milliGRAM(s) SubLingual daily  influenza   Vaccine 0.5 milliLiter(s) IntraMuscular once  lidocaine   4% Patch 1 Patch Transdermal daily  LORazepam   Injectable 1 milliGRAM(s) IV Push once  multivitamin 1 Tablet(s) Oral daily  NIFEdipine XL 60 milliGRAM(s) Oral daily  predniSONE   Tablet 10 milliGRAM(s) Oral two times a day  primidone 50 milliGRAM(s) Oral at bedtime  promethazine 50 milliGRAM(s) Oral daily  sildenafil (REVATIO) 20 milliGRAM(s) Oral three times a day  spironolactone 50 milliGRAM(s) Oral daily  tamsulosin 0.4 milliGRAM(s) Oral at bedtime  traZODone 100 milliGRAM(s) Oral at bedtime  valACYclovir 500 milliGRAM(s) Oral at bedtime    MEDICATIONS  (PRN):  magnesium hydroxide Suspension 30 milliLiter(s) Oral daily PRN Constipation  melatonin 5 milliGRAM(s) Oral at bedtime PRN Insomnia  simethicone 80 milliGRAM(s) Chew daily PRN Indigestion

## 2021-10-08 LAB
ANION GAP SERPL CALC-SCNC: 13 MMOL/L — SIGNIFICANT CHANGE UP (ref 7–14)
BASOPHILS # BLD AUTO: 0.01 K/UL — SIGNIFICANT CHANGE UP (ref 0–0.2)
BASOPHILS NFR BLD AUTO: 0.1 % — SIGNIFICANT CHANGE UP (ref 0–1)
BUN SERPL-MCNC: 22 MG/DL — HIGH (ref 10–20)
CALCIUM SERPL-MCNC: 7.8 MG/DL — LOW (ref 8.5–10.1)
CHLORIDE SERPL-SCNC: 105 MMOL/L — SIGNIFICANT CHANGE UP (ref 98–110)
CO2 SERPL-SCNC: 19 MMOL/L — SIGNIFICANT CHANGE UP (ref 17–32)
CREAT SERPL-MCNC: 1.1 MG/DL — SIGNIFICANT CHANGE UP (ref 0.7–1.5)
EOSINOPHIL # BLD AUTO: 0.03 K/UL — SIGNIFICANT CHANGE UP (ref 0–0.7)
EOSINOPHIL NFR BLD AUTO: 0.2 % — SIGNIFICANT CHANGE UP (ref 0–8)
GLUCOSE SERPL-MCNC: 111 MG/DL — HIGH (ref 70–99)
HCT VFR BLD CALC: 28.6 % — LOW (ref 37–47)
HGB BLD-MCNC: 9.3 G/DL — LOW (ref 12–16)
IMM GRANULOCYTES NFR BLD AUTO: 0.9 % — HIGH (ref 0.1–0.3)
LYMPHOCYTES # BLD AUTO: 0.83 K/UL — LOW (ref 1.2–3.4)
LYMPHOCYTES # BLD AUTO: 6.5 % — LOW (ref 20.5–51.1)
MAGNESIUM SERPL-MCNC: 1.6 MG/DL — LOW (ref 1.8–2.4)
MCHC RBC-ENTMCNC: 32.5 G/DL — SIGNIFICANT CHANGE UP (ref 32–37)
MCHC RBC-ENTMCNC: 35.6 PG — HIGH (ref 27–31)
MCV RBC AUTO: 109.6 FL — HIGH (ref 81–99)
MONOCYTES # BLD AUTO: 0.81 K/UL — HIGH (ref 0.1–0.6)
MONOCYTES NFR BLD AUTO: 6.3 % — SIGNIFICANT CHANGE UP (ref 1.7–9.3)
NEUTROPHILS # BLD AUTO: 11 K/UL — HIGH (ref 1.4–6.5)
NEUTROPHILS NFR BLD AUTO: 86 % — HIGH (ref 42.2–75.2)
NRBC # BLD: 0 /100 WBCS — SIGNIFICANT CHANGE UP (ref 0–0)
PLATELET # BLD AUTO: 238 K/UL — SIGNIFICANT CHANGE UP (ref 130–400)
POTASSIUM SERPL-MCNC: 3.3 MMOL/L — LOW (ref 3.5–5)
POTASSIUM SERPL-SCNC: 3.3 MMOL/L — LOW (ref 3.5–5)
RBC # BLD: 2.61 M/UL — LOW (ref 4.2–5.4)
RBC # FLD: 14.5 % — SIGNIFICANT CHANGE UP (ref 11.5–14.5)
SODIUM SERPL-SCNC: 137 MMOL/L — SIGNIFICANT CHANGE UP (ref 135–146)
WBC # BLD: 12.79 K/UL — HIGH (ref 4.8–10.8)
WBC # FLD AUTO: 12.79 K/UL — HIGH (ref 4.8–10.8)

## 2021-10-08 PROCEDURE — 99232 SBSQ HOSP IP/OBS MODERATE 35: CPT

## 2021-10-08 RX ORDER — DIAZEPAM 5 MG
10 TABLET ORAL EVERY 8 HOURS
Refills: 0 | Status: DISCONTINUED | OUTPATIENT
Start: 2021-10-08 | End: 2021-10-14

## 2021-10-08 RX ORDER — MAGNESIUM SULFATE 500 MG/ML
2 VIAL (ML) INJECTION
Refills: 0 | Status: COMPLETED | OUTPATIENT
Start: 2021-10-08 | End: 2021-10-08

## 2021-10-08 RX ORDER — POTASSIUM CHLORIDE 20 MEQ
40 PACKET (EA) ORAL EVERY 4 HOURS
Refills: 0 | Status: COMPLETED | OUTPATIENT
Start: 2021-10-08 | End: 2021-10-08

## 2021-10-08 RX ADMIN — Medication 20 MILLIGRAM(S): at 21:07

## 2021-10-08 RX ADMIN — BUDESONIDE AND FORMOTEROL FUMARATE DIHYDRATE 2 PUFF(S): 160; 4.5 AEROSOL RESPIRATORY (INHALATION) at 12:38

## 2021-10-08 RX ADMIN — Medication 100 MILLIGRAM(S): at 21:06

## 2021-10-08 RX ADMIN — Medication 25 GRAM(S): at 10:24

## 2021-10-08 RX ADMIN — Medication 300 MILLIGRAM(S): at 21:07

## 2021-10-08 RX ADMIN — ATORVASTATIN CALCIUM 80 MILLIGRAM(S): 80 TABLET, FILM COATED ORAL at 21:07

## 2021-10-08 RX ADMIN — Medication 10 MILLIGRAM(S): at 17:48

## 2021-10-08 RX ADMIN — Medication 1 SPRAY(S): at 17:47

## 2021-10-08 RX ADMIN — Medication 50 MILLIGRAM(S): at 12:34

## 2021-10-08 RX ADMIN — TAMSULOSIN HYDROCHLORIDE 0.4 MILLIGRAM(S): 0.4 CAPSULE ORAL at 21:06

## 2021-10-08 RX ADMIN — VALACYCLOVIR 500 MILLIGRAM(S): 500 TABLET, FILM COATED ORAL at 06:19

## 2021-10-08 RX ADMIN — Medication 100 MILLIGRAM(S): at 06:19

## 2021-10-08 RX ADMIN — LIDOCAINE 1 PATCH: 4 CREAM TOPICAL at 19:30

## 2021-10-08 RX ADMIN — Medication 0.12 MILLIGRAM(S): at 12:34

## 2021-10-08 RX ADMIN — Medication 1 TABLET(S): at 12:32

## 2021-10-08 RX ADMIN — Medication 0.6 MILLIGRAM(S): at 12:32

## 2021-10-08 RX ADMIN — Medication 40 MILLIEQUIVALENT(S): at 15:46

## 2021-10-08 RX ADMIN — TAMSULOSIN HYDROCHLORIDE 0.4 MILLIGRAM(S): 0.4 CAPSULE ORAL at 06:19

## 2021-10-08 RX ADMIN — Medication 40 MILLIEQUIVALENT(S): at 10:24

## 2021-10-08 RX ADMIN — Medication 400 UNIT(S): at 15:46

## 2021-10-08 RX ADMIN — APIXABAN 10 MILLIGRAM(S): 2.5 TABLET, FILM COATED ORAL at 23:29

## 2021-10-08 RX ADMIN — Medication 20 MILLIGRAM(S): at 06:21

## 2021-10-08 RX ADMIN — VALACYCLOVIR 500 MILLIGRAM(S): 500 TABLET, FILM COATED ORAL at 21:07

## 2021-10-08 RX ADMIN — Medication 20 MILLIGRAM(S): at 10:24

## 2021-10-08 RX ADMIN — Medication 650 MILLIGRAM(S): at 21:40

## 2021-10-08 RX ADMIN — Medication 10 MILLIGRAM(S): at 06:21

## 2021-10-08 RX ADMIN — Medication 20 MILLIGRAM(S): at 06:19

## 2021-10-08 RX ADMIN — Medication 20 MILLIGRAM(S): at 15:45

## 2021-10-08 RX ADMIN — Medication 10 MILLIGRAM(S): at 12:32

## 2021-10-08 RX ADMIN — Medication 20 MILLIGRAM(S): at 17:48

## 2021-10-08 RX ADMIN — Medication 1 SPRAY(S): at 10:25

## 2021-10-08 RX ADMIN — SPIRONOLACTONE 50 MILLIGRAM(S): 25 TABLET, FILM COATED ORAL at 06:21

## 2021-10-08 RX ADMIN — Medication 60 MILLIGRAM(S): at 06:21

## 2021-10-08 RX ADMIN — Medication 1 MILLIGRAM(S): at 12:34

## 2021-10-08 RX ADMIN — APIXABAN 10 MILLIGRAM(S): 2.5 TABLET, FILM COATED ORAL at 10:26

## 2021-10-08 RX ADMIN — Medication 650 MILLIGRAM(S): at 12:40

## 2021-10-08 RX ADMIN — LIDOCAINE 1 PATCH: 4 CREAM TOPICAL at 12:34

## 2021-10-08 RX ADMIN — PRIMIDONE 50 MILLIGRAM(S): 250 TABLET ORAL at 06:19

## 2021-10-08 RX ADMIN — Medication 25 GRAM(S): at 12:35

## 2021-10-08 RX ADMIN — PRIMIDONE 50 MILLIGRAM(S): 250 TABLET ORAL at 21:07

## 2021-10-08 RX ADMIN — Medication 650 MILLIGRAM(S): at 21:09

## 2021-10-08 NOTE — DIETITIAN INITIAL EVALUATION ADULT. - ORAL INTAKE PTA/DIET HISTORY
72 yo female states good appetite and po intake PTA. Usually ate a few small meals throughout the day. Taking Vitamin D, Magnesium, Vitamin B, Folic acid. Reports  lbs and feels they may have lost weight since admission d/t various procedures. 72 yo female states good appetite and po intake PTA. Usually ate a few small meals throughout the day. Taking Vitamin D, Magnesium, Vitamin B, Folic acid. Reports  lbs and feels they may have lost weight since admission d/t various procedures/being NPO.

## 2021-10-08 NOTE — DIETITIAN INITIAL EVALUATION ADULT. - OTHER INFO
Ntr hx cont:  Patient with good appetite and po intake today. Last night patient stated they had diarrhea, but felt better after eating crackers. Patient noticed diarrhea takes place a few hours after eating. LBM per patient last night.     Pertinent Medical Information:    72 yo female with L medical malleolar Fx, Distal Fibular Fx s/p ORIF, Acute Pulmonary embolism, Constipation - now with frequent liquid stools,  Potassium 3.3 - replete with KCl, CASSI - resolved. HTN, DLD - managed well with medication. CKD 3 - Stable.

## 2021-10-08 NOTE — DIETITIAN INITIAL EVALUATION ADULT. - PERTINENT LABORATORY DATA
10/8: H/H 9.3/28.6 (L), Potassium 3.3 (L), BUN 22 (H), Gluc 111 (H), Ca 7.8 (L), eGFR 50 - 58 (L), Magnesium 1.6 (L)

## 2021-10-08 NOTE — PROGRESS NOTE ADULT - ASSESSMENT
70 yo F with PMHx of COPD, Asthma uses home oxygen PRN, chronic back pain, cervical radiculopathy, Pulmonary Hypertension, Gout, DLD, HTN, CKD 3, recent L ankle fx presents for ankle fx repair.     # Acute Pulmonary embolism  - incidentally captured on CT abd/pelvis  - confirmed with CTA Chest, no evidence of right heart strain  - satting well on RA, HDS  - started on Eliquis     # Constipation  #Abdominal Distention c/f Ileus   - CT A/P without obstruction   - serial abdominal exams shows improvement in distension and tightness  - on lactulose syrup 10g q6h, miralax 17g bid, senna 2 tabs bid  - s/p NGT for 24hrs  - having small frequent liquid stools    # Hypokalemia, RESOLVED    # CASSI, RESOLVED  - likely pre-renal 2/2 to decreased po intake in setting of possible obstruction    #L medial Malleolar Fx, Distal Fibular Fx s/p ORIF   - plan for dc to rehab   - Dr. Clemons following. Please discharge patient with 6 total weeks of aspirin 325mg for dvt ppx  stable in CAM boot, dc planning    #Urinary Retention   - post op retention in the setting of immobility/abdominal distention/constipation  - madrid catheter in place, TOV failed x3  - will need Urology follow up    #Chronic Back Pain  #Hx of Cervical Radiculopathy  - Sees pain management, Dr. Mayers  - C/w  Percocet PRN ( no longer on Fentanyl patch )     #Gout  - C/w Allopurinol    #Pulmonary HTN  - C/w Sildenafil    #HTN  - C/w Atenolol, Nifedipine    #DLD  - C/w Atorvastatin    #COPD, Asthma on home O2 2L  #MARLENI  - C/w Symbicort    #Anxiety  - C/w Wellbutrin  - Valium PRN    #CKD 3  - Stable, sees Dr. cMarthur    Heparin sq     #Progress Note Handoff:  Pending (specify):pending authorization  Family discussion: d/w patient   Disposition: NH placement       Pinky Padron DO

## 2021-10-08 NOTE — PROGRESS NOTE ADULT - ASSESSMENT
72 yo F with PMHx of COPD, Asthma uses home oxygen PRN, chronic back pain, cervical radiculopathy, Pulmonary Hypertension, Gout, DLD, HTN, CKD 3, recent L ankle fx presents for ankle fx repair.     #L medial Malleolar Fx, Distal Fibular Fx s/p ORIF   - plan for dc to rehab   - Xray of ankle showing no evidence of hardware complication  - CAM boot will be placed  - pt to f/u with Dr. Clemons in 1 month    # Acute Pulmonary embolism  - incidentally captured on CT abd/pelvis  - confirmed with CTA Chest, no evidence of right heart strain  - satting well on RA, HDS  - c/w Eliquis 10mg    # Constipation  #Abdominal Distention c/f Ileus   - CT A/P without obstruction   - serial abdominal exams shows improvement in distension and tightness  - on lactulose syrup 10g q6h, miralax 17g bid, senna 2 tabs bid  - s/p NGT for 24hrs  - having small frequent liquid stools  - currently on soft diet    # Hypokalemia  - K 3.3 10/8  - replete with 80mEq KCl    # CASSI, RESOLVED  - likely pre-renal 2/2 to decreased po intake in setting of possible obstruction    #Urinary Retention   - post op retention in the setting of immobility/abdominal distention/constipation  - madrid catheter in place, TOV failed x4  - will need Urology follow up    #Chronic Back Pain  #Hx of Cervical Radiculopathy  - Sees pain management, Dr. Mayers  - C/w  Percocet PRN ( no longer on Fentanyl patch )     #Gout  - C/w Allopurinol    #Pulmonary HTN  - C/w Sildenafil    #HTN  - C/w Atenolol, Nifedipine    #DLD  - C/w Atorvastatin    #COPD, Asthma on home O2 2L  #MARLENI  - C/w Symbicort    #Anxiety  - C/w Wellbutrin  - Valium PRN    #CKD 3  - Stable, sees Dr. Mcarthur    Heparin sq     #Progress Note Handoff:  Pending (specify): PO intake today, dc today  Family discussion: d/w patient   Disposition: NH placement

## 2021-10-08 NOTE — PROGRESS NOTE ADULT - SUBJECTIVE AND OBJECTIVE BOX
BERNARD SPEARS  71y, Female  Allergy: adhesives (Pruritus; Rash)  No Known Drug Allergies    Hospital Day: 17d    Patient seen and examined earlier today. Feeling well, no complaints, willing to work with PT.     PMH/PSH:  PAST MEDICAL & SURGICAL HISTORY:  History of neck pain    Spinal stenosis of lumbar region with radiculopathy    Anxiety    Depression    Osteoarthritis    H/O osteoporosis    History of pulmonary hypertension    H/O pulmonary hypertension    COPD (chronic obstructive pulmonary disease)    Hyperlipidemia    H/O total knee replacement, right    Failed spinal cord stimulator        LAST 24-Hr EVENTS:    VITALS:  T(F): 97.6 (10-08-21 @ 13:21), Max: 98.4 (10-07-21 @ 20:31)  HR: 68 (10-08-21 @ 13:21)  BP: 115/57 (10-08-21 @ 13:21) (104/54 - 115/57)  RR: 18 (10-08-21 @ 13:21)  SpO2: 96% (10-07-21 @ 20:31)        TESTS & MEASUREMENTS:  Weight (Kg):   BMI (kg/m2): 36.1 (10-08)    10-06-21 @ 07:01  -  10-07-21 @ 07:00  --------------------------------------------------------  IN: 1179 mL / OUT: 725 mL / NET: 454 mL    10-07-21 @ 07:01  -  10-08-21 @ 07:00  --------------------------------------------------------  IN: 0 mL / OUT: 2100 mL / NET: -2100 mL                            9.3    12.79 )-----------( 238      ( 08 Oct 2021 06:00 )             28.6       10-08    137  |  105  |  22<H>  ----------------------------<  111<H>  3.3<L>   |  19  |  1.1    Ca    7.8<L>      08 Oct 2021 06:00  Mg     1.6     10-08                        COVID-19 PCR: NotDetec (10-07-21 @ 18:29)      RADIOLOGY, ECG, & ADDITIONAL TESTS:      RECENT DIAGNOSTIC ORDERS:  Diet, DASH/TLC:   Sodium & Cholesterol Restricted  Consistent Carbohydrate Evening Snack (10-08-21 @ 09:13)      MEDICATIONS:  MEDICATIONS  (STANDING):  allopurinol 300 milliGRAM(s) Oral at bedtime  apixaban 10 milliGRAM(s) Oral every 12 hours  ATENolol  Tablet 50 milliGRAM(s) Oral daily  atorvastatin 80 milliGRAM(s) Oral at bedtime  budesonide 160 MICROgram(s)/formoterol 4.5 MICROgram(s) Inhaler 2 Puff(s) Inhalation two times a day  cholecalciferol 400 Unit(s) Oral daily  colchicine 0.6 milliGRAM(s) Oral daily  FLUoxetine 20 milliGRAM(s) Oral two times a day  fluticasone propionate 50 MICROgram(s)/spray Nasal Spray 1 Spray(s) Both Nostrils two times a day  folic acid 1 milliGRAM(s) Oral daily  furosemide    Tablet 20 milliGRAM(s) Oral daily  hyoscyamine SL 0.125 milliGRAM(s) SubLingual daily  influenza   Vaccine 0.5 milliLiter(s) IntraMuscular once  lidocaine   4% Patch 1 Patch Transdermal daily  multivitamin 1 Tablet(s) Oral daily  NIFEdipine XL 60 milliGRAM(s) Oral daily  predniSONE   Tablet 10 milliGRAM(s) Oral two times a day  primidone 50 milliGRAM(s) Oral at bedtime  promethazine 50 milliGRAM(s) Oral daily  sildenafil (REVATIO) 20 milliGRAM(s) Oral three times a day  spironolactone 50 milliGRAM(s) Oral daily  tamsulosin 0.4 milliGRAM(s) Oral at bedtime  traZODone 100 milliGRAM(s) Oral at bedtime  valACYclovir 500 milliGRAM(s) Oral at bedtime    MEDICATIONS  (PRN):  acetaminophen   Tablet .. 650 milliGRAM(s) Oral every 6 hours PRN Moderate Pain (4 - 6)  diazepam    Tablet 10 milliGRAM(s) Oral every 8 hours PRN anxiety  magnesium hydroxide Suspension 30 milliLiter(s) Oral daily PRN Constipation  melatonin 5 milliGRAM(s) Oral at bedtime PRN Insomnia  simethicone 80 milliGRAM(s) Chew daily PRN Indigestion      HOME MEDICATIONS:  Advair Diskus 500 mcg-50 mcg inhalation powder (09-22)  allopurinol 300 mg oral tablet (09-22)  atenolol 50 mg oral tablet (09-22)  colchicine 0.6 mg oral capsule (09-22)  fentanyl topical 25 mcg/hr transdermal film, extended release (obsolete) (09-22)  FLUoxetine 20 mg oral capsule (09-22)  fluticasone 50 mcg/inh inhalation powder (09-22)  folic acid 1 mg oral tablet (09-22)  Fosamax 70 mg oral tablet (09-22)  furosemide 20 mg oral tablet (09-22)  hyoscyamine 0.125 mg oral tablet (09-22)  Lidoderm 5% topical film (09-22)  Lipitor 80 mg oral tablet (09-22)  magnesium carbonate 250 mg oral capsule (09-22)  oxycodone-acetaminophen 5 mg-325 mg oral tablet (09-22)  predniSONE 10 mg oral tablet (09-22)  primidone 50 mg oral tablet (09-22)  Procardia XL 60 mg oral tablet, extended release (09-22)  promethazine 50 mg oral tablet (09-22)  sildenafil 20 mg oral tablet (09-22)  spironolactone 50 mg oral tablet (09-22)  traZODone 100 mg oral tablet (09-22)  Valium 10 mg oral tablet (09-22)  Valtrex 500 mg oral tablet (09-22)  Vitamin D3 400 intl units (10 mcg) oral tablet (09-22)  Wellbutrin (09-22)      PHYSICAL EXAM:  GENERAL: elderly F, NAD, non toxic  EYES: anicteric sclera, non injected conjunctiva, EOMI  ENT: oropharynx clear, MMM, fair dentition  PSYCH: no agitation, baseline mentation  NERVOUS SYSTEM:  Alert & Oriented X3  PULMONARY: Clear to auscultation bilaterally; No rales, rhonchi, wheezing, or rubs  CARDIOVASCULAR: Regular rate and rhythm; No murmurs, rubs, or gallops  GI: soft distended; Bowel sounds present  EXTREMITIES:  2+ Peripheral Pulses, No clubbing, cyanosis, or edema  LYMPH: No lymphadenopathy noted  SKIN: No rashes or lesions

## 2021-10-08 NOTE — PROGRESS NOTE ADULT - SUBJECTIVE AND OBJECTIVE BOX
HPI:  70 yo F with PMHx of COPD, Asthma uses home oxygen O2 NC 2L, chronic back pain, cervical radiculopathy, Pulmonary Hypertension, hx of [place] palsy, Gout, CKD 3, DLD, HTN, recent L ankle fx presents for ankle fx repair. Pt was reportedly scheduled for repair of ankle with Dr. Clemons but pt was unable to obtain appointments for Pulmonary (Dr. Evans) and Cardiac (Dr. Flood) clearance due to difficulties getting to appointments, inability to afford ambulette service and therefore, procedure canceled. Was advised by Dr. Clemons to report to hospital to obtain clearance and proceed with procedure.   Pt states she has been having shortness of breath for years, often has difficulties getting around due to shortness of breath, also has chronic pains in neck, back, RUE.  In the ED, T 98.9, /52, HR 79, RR 18, SpO2 96% on O2 NC 2L.  (21 Sep 2021 18:26)    Currently admitted to medicine with the primary diagnosis of Fracture, ankle       Today is hospital day 17d.     INTERVAL HPI / OVERNIGHT EVENTS:  Patient was examined and seen at bedside. This morning she is resting comfortably in bed and reports no new issues or overnight events.     ROS: Otherwise unremarkable     PAST MEDICAL & SURGICAL HISTORY  History of neck pain    Spinal stenosis of lumbar region with radiculopathy    Anxiety    Depression    Osteoarthritis    H/O osteoporosis    History of pulmonary hypertension    H/O pulmonary hypertension    COPD (chronic obstructive pulmonary disease)    Hyperlipidemia    H/O total knee replacement, right    Failed spinal cord stimulator      ALLERGIES  adhesives (Pruritus; Rash)  No Known Drug Allergies    MEDICATIONS  STANDING MEDICATIONS  allopurinol 300 milliGRAM(s) Oral at bedtime  apixaban 10 milliGRAM(s) Oral every 12 hours  ATENolol  Tablet 50 milliGRAM(s) Oral daily  atorvastatin 80 milliGRAM(s) Oral at bedtime  budesonide 160 MICROgram(s)/formoterol 4.5 MICROgram(s) Inhaler 2 Puff(s) Inhalation two times a day  cholecalciferol 400 Unit(s) Oral daily  colchicine 0.6 milliGRAM(s) Oral daily  FLUoxetine 20 milliGRAM(s) Oral two times a day  fluticasone propionate 50 MICROgram(s)/spray Nasal Spray 1 Spray(s) Both Nostrils two times a day  folic acid 1 milliGRAM(s) Oral daily  furosemide    Tablet 20 milliGRAM(s) Oral daily  hyoscyamine SL 0.125 milliGRAM(s) SubLingual daily  influenza   Vaccine 0.5 milliLiter(s) IntraMuscular once  lidocaine   4% Patch 1 Patch Transdermal daily  magnesium sulfate  IVPB 2 Gram(s) IV Intermittent every 2 hours  multivitamin 1 Tablet(s) Oral daily  NIFEdipine XL 60 milliGRAM(s) Oral daily  potassium chloride    Tablet ER 40 milliEquivalent(s) Oral every 4 hours  predniSONE   Tablet 10 milliGRAM(s) Oral two times a day  primidone 50 milliGRAM(s) Oral at bedtime  promethazine 50 milliGRAM(s) Oral daily  sildenafil (REVATIO) 20 milliGRAM(s) Oral three times a day  spironolactone 50 milliGRAM(s) Oral daily  tamsulosin 0.4 milliGRAM(s) Oral at bedtime  traZODone 100 milliGRAM(s) Oral at bedtime  valACYclovir 500 milliGRAM(s) Oral at bedtime    PRN MEDICATIONS  acetaminophen   Tablet .. 650 milliGRAM(s) Oral every 6 hours PRN  magnesium hydroxide Suspension 30 milliLiter(s) Oral daily PRN  melatonin 5 milliGRAM(s) Oral at bedtime PRN  simethicone 80 milliGRAM(s) Chew daily PRN    VITALS:  T(F): 97.3  HR: 65  BP: 113/55  RR: 18  SpO2: 96%    PHYSICAL EXAM  GEN: NAD, Resting comfortably in bed  PULM: Clear to auscultation bilaterally, No wheezes  CVS: Regular rate and rhythm, S1-S2, no murmurs  ABD: Soft, non-tender, distended  EXT: No edema  NEURO: A&Ox3, no focal deficits    LABS                        9.3    12.79 )-----------( 238      ( 08 Oct 2021 06:00 )             28.6     10-08    137  |  105  |  22<H>  ----------------------------<  111<H>  3.3<L>   |  19  |  1.1    Ca    7.8<L>      08 Oct 2021 06:00  Mg     1.6     10-08        RADIOLOGY  EXAM:  XR ANKLE COMP MIN 3 VIEWS LT            PROCEDURE DATE:  10/07/2021            INTERPRETATION:  INDICATION: Postoperative evaluation.    TECHNIQUE: 3 views of the left ankle were obtained.    COMPARISON: Left ankle radiographs dating back to9/3/2021    FINDINGS/  IMPRESSION:    Overlying fiberglass cast obscures fine bony detail. Interval repair of previously described bimalleolar fractures with lateral plate and screw fixation of the medial malleolus and intramedullary nail and screw fixation of the lateral malleolus, in near anatomic alignment. No evidence of hardware complication.      MEDICATIONS  (STANDING):  allopurinol 300 milliGRAM(s) Oral at bedtime  apixaban 10 milliGRAM(s) Oral every 12 hours  ATENolol  Tablet 50 milliGRAM(s) Oral daily  atorvastatin 80 milliGRAM(s) Oral at bedtime  budesonide 160 MICROgram(s)/formoterol 4.5 MICROgram(s) Inhaler 2 Puff(s) Inhalation two times a day  cholecalciferol 400 Unit(s) Oral daily  colchicine 0.6 milliGRAM(s) Oral daily  FLUoxetine 20 milliGRAM(s) Oral two times a day  fluticasone propionate 50 MICROgram(s)/spray Nasal Spray 1 Spray(s) Both Nostrils two times a day  folic acid 1 milliGRAM(s) Oral daily  furosemide    Tablet 20 milliGRAM(s) Oral daily  hyoscyamine SL 0.125 milliGRAM(s) SubLingual daily  influenza   Vaccine 0.5 milliLiter(s) IntraMuscular once  lidocaine   4% Patch 1 Patch Transdermal daily  magnesium sulfate  IVPB 2 Gram(s) IV Intermittent every 2 hours  multivitamin 1 Tablet(s) Oral daily  NIFEdipine XL 60 milliGRAM(s) Oral daily  potassium chloride    Tablet ER 40 milliEquivalent(s) Oral every 4 hours  predniSONE   Tablet 10 milliGRAM(s) Oral two times a day  primidone 50 milliGRAM(s) Oral at bedtime  promethazine 50 milliGRAM(s) Oral daily  sildenafil (REVATIO) 20 milliGRAM(s) Oral three times a day  spironolactone 50 milliGRAM(s) Oral daily  tamsulosin 0.4 milliGRAM(s) Oral at bedtime  traZODone 100 milliGRAM(s) Oral at bedtime  valACYclovir 500 milliGRAM(s) Oral at bedtime    MEDICATIONS  (PRN):  acetaminophen   Tablet .. 650 milliGRAM(s) Oral every 6 hours PRN Moderate Pain (4 - 6)  magnesium hydroxide Suspension 30 milliLiter(s) Oral daily PRN Constipation  melatonin 5 milliGRAM(s) Oral at bedtime PRN Insomnia  simethicone 80 milliGRAM(s) Chew daily PRN Indigestion

## 2021-10-08 NOTE — DIETITIAN INITIAL EVALUATION ADULT. - PERTINENT MEDS FT
MEDICATIONS  (STANDING):  ATENolol  Tablet 50 milliGRAM(s) Oral daily  atorvastatin 80 milliGRAM(s) Oral at bedtime  cholecalciferol 400 Unit(s) Oral daily  folic acid 1 milliGRAM(s) Oral daily  furosemide    Tablet 20 milliGRAM(s) Oral daily  hyoscyamine SL 0.125 milliGRAM(s) SubLingual daily  influenza   Vaccine 0.5 milliLiter(s) IntraMuscular once  lidocaine   4% Patch 1 Patch Transdermal daily  magnesium sulfate  IVPB 2 Gram(s) IV Intermittent every 2 hours  multivitamin 1 Tablet(s) Oral daily  NIFEdipine XL 60 milliGRAM(s) Oral daily  potassium chloride    Tablet ER 40 milliEquivalent(s) Oral every 4 hours  predniSONE   Tablet 10 milliGRAM(s) Oral two times a day  primidone 50 milliGRAM(s) Oral at bedtime  promethazine 50 milliGRAM(s) Oral daily  sildenafil (REVATIO) 20 milliGRAM(s) Oral three times a day  spironolactone 50 milliGRAM(s) Oral daily    MEDICATIONS  (PRN):  magnesium hydroxide Suspension 30 milliLiter(s) Oral daily PRN Constipation  simethicone 80 milliGRAM(s) Chew daily PRN Indigestion

## 2021-10-08 NOTE — PROGRESS NOTE ADULT - ASSESSMENT
ORTHO PROGRESS NOTE     POD15 s/p left bimalleolar ankle fracture s/p ORIF    Patient seen and examined at bedside . The patient is awake and alert in NAD. No complaints of chest pain, SOB, N/V.    MEDICATIONS  (STANDING):  allopurinol 300 milliGRAM(s) Oral at bedtime  apixaban 10 milliGRAM(s) Oral every 12 hours  ATENolol  Tablet 50 milliGRAM(s) Oral daily  atorvastatin 80 milliGRAM(s) Oral at bedtime  budesonide 160 MICROgram(s)/formoterol 4.5 MICROgram(s) Inhaler 2 Puff(s) Inhalation two times a day  cholecalciferol 400 Unit(s) Oral daily  colchicine 0.6 milliGRAM(s) Oral daily  FLUoxetine 20 milliGRAM(s) Oral two times a day  fluticasone propionate 50 MICROgram(s)/spray Nasal Spray 1 Spray(s) Both Nostrils two times a day  folic acid 1 milliGRAM(s) Oral daily  furosemide    Tablet 20 milliGRAM(s) Oral daily  hyoscyamine SL 0.125 milliGRAM(s) SubLingual daily  influenza   Vaccine 0.5 milliLiter(s) IntraMuscular once  lidocaine   4% Patch 1 Patch Transdermal daily  magnesium sulfate  IVPB 2 Gram(s) IV Intermittent every 2 hours  multivitamin 1 Tablet(s) Oral daily  NIFEdipine XL 60 milliGRAM(s) Oral daily  potassium chloride    Tablet ER 40 milliEquivalent(s) Oral every 4 hours  predniSONE   Tablet 10 milliGRAM(s) Oral two times a day  primidone 50 milliGRAM(s) Oral at bedtime  promethazine 50 milliGRAM(s) Oral daily  sildenafil (REVATIO) 20 milliGRAM(s) Oral three times a day  spironolactone 50 milliGRAM(s) Oral daily  tamsulosin 0.4 milliGRAM(s) Oral at bedtime  traZODone 100 milliGRAM(s) Oral at bedtime  valACYclovir 500 milliGRAM(s) Oral at bedtime    MEDICATIONS  (PRN):  acetaminophen   Tablet .. 650 milliGRAM(s) Oral every 6 hours PRN Moderate Pain (4 - 6)  magnesium hydroxide Suspension 30 milliLiter(s) Oral daily PRN Constipation  melatonin 5 milliGRAM(s) Oral at bedtime PRN Insomnia  simethicone 80 milliGRAM(s) Chew daily PRN Indigestion      Vital Signs Last 24 Hrs  T(C): 36.3 (08 Oct 2021 04:57), Max: 37.5 (07 Oct 2021 13:20)  T(F): 97.3 (08 Oct 2021 04:57), Max: 99.5 (07 Oct 2021 13:20)  HR: 65 (08 Oct 2021 04:57) (65 - 73)  BP: 113/55 (08 Oct 2021 04:57) (103/47 - 113/55)  BP(mean): --  RR: 18 (08 Oct 2021 04:57) (18 - 20)  SpO2: 96% (07 Oct 2021 20:31) (96% - 96%)    PE:   Dressing/splint C/D/I   Compartments soft and compressible  Motor intact distally  SILT distally  CR<2sec                               9.3    12.79 )-----------( 238      ( 08 Oct 2021 06:00 )             28.6     10-08    137  |  105  |  22<H>  ----------------------------<  111<H>  3.3<L>   |  19  |  1.1    Ca    7.8<L>      08 Oct 2021 06:00  Mg     1.6     10-08            10-07-21 @ 07:01  -  10-08-21 @ 07:00  --------------------------------------------------------  IN: 0 mL / OUT: 2100 mL / NET: -2100 mL          A/P:   71F who is now POD15 s/p left bimalleolar ankle fracture s/p ORIF. Incidentally found to have PE on CT chest/abdomen on 10/4 now on eliquis.    Splint and sutures removed, CAM boot applied  Weightbearing: NWB LLE  DVT ppx: SCD, Eliquis per primary  Pain control  Incentive spirometer  Home meds  PT/OT/Rehab  Dispo: currently admitted to Rehab. F/U with Dr. Clemons in 1 month

## 2021-10-08 NOTE — DIETITIAN INITIAL EVALUATION ADULT. - ADD RECOMMEND
1) Continue Current diet order. Patient is at low nutritional risk at this time. RD to f/u in 7-10 days

## 2021-10-08 NOTE — DIETITIAN INITIAL EVALUATION ADULT. - OTHER CALCULATIONS
per RD bedscale weight 83.2 kg; energy 1252 x (1.2 AF) = 1502 kcal/day; 100 - 108 gm/day (1.2 - 1.3 gm/kg); 1664 - 2080 ml/day (20 - 25 ml/kg)

## 2021-10-08 NOTE — DIETITIAN INITIAL EVALUATION ADULT. - PHYSCIAL ASSESSMENT
Appearance: alert and oriented x 4  GI: loose stools - LBM last night  Oral: diet upgraded to regular texture and thin liquids  Skin: Surgical incision - ankle, right arm 2+ edema per flowsheet; no pressure injuries obese

## 2021-10-09 LAB
ANION GAP SERPL CALC-SCNC: 17 MMOL/L — HIGH (ref 7–14)
BASOPHILS # BLD AUTO: 0.01 K/UL — SIGNIFICANT CHANGE UP (ref 0–0.2)
BASOPHILS NFR BLD AUTO: 0.1 % — SIGNIFICANT CHANGE UP (ref 0–1)
BUN SERPL-MCNC: 22 MG/DL — HIGH (ref 10–20)
CALCIUM SERPL-MCNC: 8 MG/DL — LOW (ref 8.5–10.1)
CHLORIDE SERPL-SCNC: 106 MMOL/L — SIGNIFICANT CHANGE UP (ref 98–110)
CO2 SERPL-SCNC: 16 MMOL/L — LOW (ref 17–32)
CREAT SERPL-MCNC: 0.9 MG/DL — SIGNIFICANT CHANGE UP (ref 0.7–1.5)
EOSINOPHIL # BLD AUTO: 0.05 K/UL — SIGNIFICANT CHANGE UP (ref 0–0.7)
EOSINOPHIL NFR BLD AUTO: 0.5 % — SIGNIFICANT CHANGE UP (ref 0–8)
GLUCOSE SERPL-MCNC: 106 MG/DL — HIGH (ref 70–99)
HCT VFR BLD CALC: 29.8 % — LOW (ref 37–47)
HGB BLD-MCNC: 9.7 G/DL — LOW (ref 12–16)
IMM GRANULOCYTES NFR BLD AUTO: 0.6 % — HIGH (ref 0.1–0.3)
LYMPHOCYTES # BLD AUTO: 0.9 K/UL — LOW (ref 1.2–3.4)
LYMPHOCYTES # BLD AUTO: 8.3 % — LOW (ref 20.5–51.1)
MAGNESIUM SERPL-MCNC: 2.3 MG/DL — SIGNIFICANT CHANGE UP (ref 1.8–2.4)
MCHC RBC-ENTMCNC: 32.6 G/DL — SIGNIFICANT CHANGE UP (ref 32–37)
MCHC RBC-ENTMCNC: 35.5 PG — HIGH (ref 27–31)
MCV RBC AUTO: 109.2 FL — HIGH (ref 81–99)
MONOCYTES # BLD AUTO: 0.92 K/UL — HIGH (ref 0.1–0.6)
MONOCYTES NFR BLD AUTO: 8.5 % — SIGNIFICANT CHANGE UP (ref 1.7–9.3)
NEUTROPHILS # BLD AUTO: 8.92 K/UL — HIGH (ref 1.4–6.5)
NEUTROPHILS NFR BLD AUTO: 82 % — HIGH (ref 42.2–75.2)
NRBC # BLD: 0 /100 WBCS — SIGNIFICANT CHANGE UP (ref 0–0)
PLATELET # BLD AUTO: 244 K/UL — SIGNIFICANT CHANGE UP (ref 130–400)
POTASSIUM SERPL-MCNC: 4.1 MMOL/L — SIGNIFICANT CHANGE UP (ref 3.5–5)
POTASSIUM SERPL-SCNC: 4.1 MMOL/L — SIGNIFICANT CHANGE UP (ref 3.5–5)
RBC # BLD: 2.73 M/UL — LOW (ref 4.2–5.4)
RBC # FLD: 14.8 % — HIGH (ref 11.5–14.5)
SODIUM SERPL-SCNC: 139 MMOL/L — SIGNIFICANT CHANGE UP (ref 135–146)
WBC # BLD: 10.87 K/UL — HIGH (ref 4.8–10.8)
WBC # FLD AUTO: 10.87 K/UL — HIGH (ref 4.8–10.8)

## 2021-10-09 PROCEDURE — 99232 SBSQ HOSP IP/OBS MODERATE 35: CPT

## 2021-10-09 RX ORDER — KETOROLAC TROMETHAMINE 30 MG/ML
15 SYRINGE (ML) INJECTION ONCE
Refills: 0 | Status: DISCONTINUED | OUTPATIENT
Start: 2021-10-09 | End: 2021-10-09

## 2021-10-09 RX ORDER — NYSTATIN CREAM 100000 [USP'U]/G
1 CREAM TOPICAL
Refills: 0 | Status: DISCONTINUED | OUTPATIENT
Start: 2021-10-09 | End: 2021-10-14

## 2021-10-09 RX ORDER — POLYETHYLENE GLYCOL 3350 17 G/17G
17 POWDER, FOR SOLUTION ORAL DAILY
Refills: 0 | Status: DISCONTINUED | OUTPATIENT
Start: 2021-10-09 | End: 2021-10-09

## 2021-10-09 RX ADMIN — LIDOCAINE 1 PATCH: 4 CREAM TOPICAL at 01:09

## 2021-10-09 RX ADMIN — Medication 10 MILLIGRAM(S): at 12:24

## 2021-10-09 RX ADMIN — ATENOLOL 50 MILLIGRAM(S): 25 TABLET ORAL at 06:11

## 2021-10-09 RX ADMIN — BUDESONIDE AND FORMOTEROL FUMARATE DIHYDRATE 2 PUFF(S): 160; 4.5 AEROSOL RESPIRATORY (INHALATION) at 09:20

## 2021-10-09 RX ADMIN — Medication 0.12 MILLIGRAM(S): at 12:24

## 2021-10-09 RX ADMIN — TAMSULOSIN HYDROCHLORIDE 0.4 MILLIGRAM(S): 0.4 CAPSULE ORAL at 22:10

## 2021-10-09 RX ADMIN — Medication 1 SPRAY(S): at 06:14

## 2021-10-09 RX ADMIN — Medication 10 MILLIGRAM(S): at 06:12

## 2021-10-09 RX ADMIN — LIDOCAINE 1 PATCH: 4 CREAM TOPICAL at 19:55

## 2021-10-09 RX ADMIN — ATORVASTATIN CALCIUM 80 MILLIGRAM(S): 80 TABLET, FILM COATED ORAL at 22:10

## 2021-10-09 RX ADMIN — Medication 50 MILLIGRAM(S): at 12:26

## 2021-10-09 RX ADMIN — LIDOCAINE 1 PATCH: 4 CREAM TOPICAL at 12:24

## 2021-10-09 RX ADMIN — Medication 20 MILLIGRAM(S): at 06:11

## 2021-10-09 RX ADMIN — APIXABAN 10 MILLIGRAM(S): 2.5 TABLET, FILM COATED ORAL at 12:24

## 2021-10-09 RX ADMIN — Medication 400 UNIT(S): at 12:25

## 2021-10-09 RX ADMIN — Medication 1 SPRAY(S): at 18:45

## 2021-10-09 RX ADMIN — Medication 1 TABLET(S): at 12:24

## 2021-10-09 RX ADMIN — Medication 20 MILLIGRAM(S): at 14:23

## 2021-10-09 RX ADMIN — Medication 1 MILLIGRAM(S): at 12:24

## 2021-10-09 RX ADMIN — NYSTATIN CREAM 1 APPLICATION(S): 100000 CREAM TOPICAL at 18:44

## 2021-10-09 RX ADMIN — Medication 60 MILLIGRAM(S): at 06:11

## 2021-10-09 RX ADMIN — Medication 650 MILLIGRAM(S): at 18:44

## 2021-10-09 RX ADMIN — Medication 650 MILLIGRAM(S): at 10:15

## 2021-10-09 RX ADMIN — Medication 0.6 MILLIGRAM(S): at 12:24

## 2021-10-09 RX ADMIN — Medication 650 MILLIGRAM(S): at 09:30

## 2021-10-09 RX ADMIN — VALACYCLOVIR 500 MILLIGRAM(S): 500 TABLET, FILM COATED ORAL at 22:11

## 2021-10-09 RX ADMIN — APIXABAN 10 MILLIGRAM(S): 2.5 TABLET, FILM COATED ORAL at 22:10

## 2021-10-09 RX ADMIN — PRIMIDONE 50 MILLIGRAM(S): 250 TABLET ORAL at 22:10

## 2021-10-09 RX ADMIN — Medication 20 MILLIGRAM(S): at 18:44

## 2021-10-09 RX ADMIN — Medication 20 MILLIGRAM(S): at 22:10

## 2021-10-09 RX ADMIN — Medication 15 MILLIGRAM(S): at 23:06

## 2021-10-09 RX ADMIN — SPIRONOLACTONE 50 MILLIGRAM(S): 25 TABLET, FILM COATED ORAL at 06:12

## 2021-10-09 RX ADMIN — Medication 300 MILLIGRAM(S): at 22:10

## 2021-10-09 RX ADMIN — Medication 10 MILLIGRAM(S): at 18:45

## 2021-10-09 RX ADMIN — Medication 100 MILLIGRAM(S): at 22:10

## 2021-10-09 NOTE — PROGRESS NOTE ADULT - ASSESSMENT
72 yo F with PMHx of COPD, Asthma uses home oxygen PRN, chronic back pain, cervical radiculopathy, Pulmonary Hypertension, Gout, DLD, HTN, CKD 3, recent L ankle fx presents for ankle fx repair.     #L medial Malleolar Fx, Distal Fibular Fx s/p ORIF   - plan for dc to rehab   - Xray of ankle showing no evidence of hardware complication  - CAM boot placed, suture removed, splint removed  - pt to f/u with Dr. Clemons in 1 month    # Acute Pulmonary Embolism  - incidentally captured on CT abd/pelvis  - confirmed with CTA Chest, no evidence of right heart strain  - satting well on RA, HDS  - c/w Eliquis 10mg BID, transition to 5 BID after completing initial 7 day course    #Constipation - improving  #Abdominal Distention c/f Ileus   - CT A/P without obstruction   - serial abdominal exams shows improvement in distension and tightness  - Was endorsing watery BM over last several days  - Complaining of constipation today, will restart Miralax   - s/p NGT for 24hrs  - having small frequent liquid stools  - Diet advanced to DASH    # CASSI, RESOLVED  - likely pre-renal 2/2 to decreased po intake in setting of possible obstruction    #Urinary Retention   - post op retention in the setting of immobility/abdominal distention/constipation  - madrid catheter in place, TOV failed x4  - will need Urology follow up    #Chronic Back Pain  #Hx of Cervical Radiculopathy  - Sees pain management, Dr. Mayers  - C/w  Percocet PRN ( no longer on Fentanyl patch )     #Gout  - C/w Allopurinol    #Pulmonary HTN  - C/w Sildenafil    #HTN  - C/w Atenolol, Nifedipine    #DLD  - C/w Atorvastatin    #COPD, Asthma on home O2 2L  #MARLENI  - C/w Symbicort    #Anxiety  - C/w Wellbutrin  - Valium PRN    #CKD 3  - Stable, sees Dr. Mcarthur    Heparin sq     #Progress Note Handoff:  Pending (specify): PO intake today, dc today  Family discussion: d/w patient   Disposition: NH placement

## 2021-10-10 PROCEDURE — 99232 SBSQ HOSP IP/OBS MODERATE 35: CPT

## 2021-10-10 RX ORDER — KETOROLAC TROMETHAMINE 30 MG/ML
15 SYRINGE (ML) INJECTION ONCE
Refills: 0 | Status: DISCONTINUED | OUTPATIENT
Start: 2021-10-10 | End: 2021-10-10

## 2021-10-10 RX ORDER — OXYCODONE AND ACETAMINOPHEN 5; 325 MG/1; MG/1
1 TABLET ORAL EVERY 12 HOURS
Refills: 0 | Status: DISCONTINUED | OUTPATIENT
Start: 2021-10-10 | End: 2021-10-14

## 2021-10-10 RX ADMIN — Medication 100 MILLIGRAM(S): at 21:26

## 2021-10-10 RX ADMIN — LIDOCAINE 1 PATCH: 4 CREAM TOPICAL at 00:32

## 2021-10-10 RX ADMIN — ATENOLOL 50 MILLIGRAM(S): 25 TABLET ORAL at 06:26

## 2021-10-10 RX ADMIN — NYSTATIN CREAM 1 APPLICATION(S): 100000 CREAM TOPICAL at 17:49

## 2021-10-10 RX ADMIN — Medication 60 MILLIGRAM(S): at 06:26

## 2021-10-10 RX ADMIN — Medication 0.6 MILLIGRAM(S): at 11:39

## 2021-10-10 RX ADMIN — Medication 1 TABLET(S): at 11:39

## 2021-10-10 RX ADMIN — Medication 1 MILLIGRAM(S): at 11:39

## 2021-10-10 RX ADMIN — NYSTATIN CREAM 1 APPLICATION(S): 100000 CREAM TOPICAL at 06:27

## 2021-10-10 RX ADMIN — Medication 20 MILLIGRAM(S): at 15:19

## 2021-10-10 RX ADMIN — Medication 20 MILLIGRAM(S): at 21:26

## 2021-10-10 RX ADMIN — APIXABAN 10 MILLIGRAM(S): 2.5 TABLET, FILM COATED ORAL at 23:59

## 2021-10-10 RX ADMIN — Medication 10 MILLIGRAM(S): at 12:15

## 2021-10-10 RX ADMIN — LIDOCAINE 1 PATCH: 4 CREAM TOPICAL at 11:38

## 2021-10-10 RX ADMIN — Medication 20 MILLIGRAM(S): at 06:26

## 2021-10-10 RX ADMIN — Medication 50 MILLIGRAM(S): at 11:38

## 2021-10-10 RX ADMIN — Medication 0.12 MILLIGRAM(S): at 11:38

## 2021-10-10 RX ADMIN — ATORVASTATIN CALCIUM 80 MILLIGRAM(S): 80 TABLET, FILM COATED ORAL at 21:26

## 2021-10-10 RX ADMIN — Medication 1 SPRAY(S): at 17:48

## 2021-10-10 RX ADMIN — Medication 10 MILLIGRAM(S): at 06:26

## 2021-10-10 RX ADMIN — Medication 15 MILLIGRAM(S): at 17:47

## 2021-10-10 RX ADMIN — APIXABAN 10 MILLIGRAM(S): 2.5 TABLET, FILM COATED ORAL at 11:39

## 2021-10-10 RX ADMIN — Medication 400 UNIT(S): at 11:39

## 2021-10-10 RX ADMIN — OXYCODONE AND ACETAMINOPHEN 1 TABLET(S): 5; 325 TABLET ORAL at 13:00

## 2021-10-10 RX ADMIN — Medication 300 MILLIGRAM(S): at 23:59

## 2021-10-10 RX ADMIN — VALACYCLOVIR 500 MILLIGRAM(S): 500 TABLET, FILM COATED ORAL at 21:27

## 2021-10-10 RX ADMIN — PRIMIDONE 50 MILLIGRAM(S): 250 TABLET ORAL at 21:26

## 2021-10-10 RX ADMIN — Medication 20 MILLIGRAM(S): at 17:49

## 2021-10-10 RX ADMIN — OXYCODONE AND ACETAMINOPHEN 1 TABLET(S): 5; 325 TABLET ORAL at 12:16

## 2021-10-10 RX ADMIN — BUDESONIDE AND FORMOTEROL FUMARATE DIHYDRATE 2 PUFF(S): 160; 4.5 AEROSOL RESPIRATORY (INHALATION) at 07:45

## 2021-10-10 RX ADMIN — Medication 10 MILLIGRAM(S): at 17:49

## 2021-10-10 RX ADMIN — SPIRONOLACTONE 50 MILLIGRAM(S): 25 TABLET, FILM COATED ORAL at 06:26

## 2021-10-10 RX ADMIN — TAMSULOSIN HYDROCHLORIDE 0.4 MILLIGRAM(S): 0.4 CAPSULE ORAL at 21:26

## 2021-10-10 RX ADMIN — Medication 1 SPRAY(S): at 06:26

## 2021-10-10 RX ADMIN — Medication 15 MILLIGRAM(S): at 00:32

## 2021-10-10 NOTE — PROGRESS NOTE ADULT - SUBJECTIVE AND OBJECTIVE BOX
BERNARD SPEARS  71y, Female  Allergy: adhesives (Pruritus; Rash)  No Known Drug Allergies    Hospital Day: 19d    Patient seen and examined earlier today. Feeling well, plan for dc tomorrow.     PMH/PSH:  PAST MEDICAL & SURGICAL HISTORY:  History of neck pain    Spinal stenosis of lumbar region with radiculopathy    Anxiety    Depression    Osteoarthritis    H/O osteoporosis    History of pulmonary hypertension    H/O pulmonary hypertension    COPD (chronic obstructive pulmonary disease)    Hyperlipidemia    H/O total knee replacement, right    Failed spinal cord stimulator        LAST 24-Hr EVENTS:    VITALS:  T(F): 98.3 (10-10-21 @ 14:04), Max: 98.3 (10-10-21 @ 14:04)  HR: 65 (10-10-21 @ 15:22)  BP: 94/54 (10-10-21 @ 15:22) (80/52 - 115/59)  RR: 18 (10-10-21 @ 14:04)  SpO2: --        TESTS & MEASUREMENTS:  Weight (Kg):       10-08-21 @ 07:01  -  10-09-21 @ 07:00  --------------------------------------------------------  IN: 0 mL / OUT: 1350 mL / NET: -1350 mL    10-09-21 @ 07:01  -  10-10-21 @ 07:00  --------------------------------------------------------  IN: 478 mL / OUT: 1900 mL / NET: -1422 mL    10-10-21 @ 07:01  -  10-10-21 @ 17:52  --------------------------------------------------------  IN: 0 mL / OUT: 900 mL / NET: -900 mL                            9.7    10.87 )-----------( 244      ( 09 Oct 2021 04:30 )             29.8       10-09    139  |  106  |  22<H>  ----------------------------<  106<H>  4.1   |  16<L>  |  0.9    Ca    8.0<L>      09 Oct 2021 04:30  Mg     2.3     10-09                        COVID-19 PCR: Norberto (10-07-21 @ 18:29)      RADIOLOGY, ECG, & ADDITIONAL TESTS:      RECENT DIAGNOSTIC ORDERS:      MEDICATIONS:  MEDICATIONS  (STANDING):  allopurinol 300 milliGRAM(s) Oral at bedtime  apixaban 10 milliGRAM(s) Oral every 12 hours  ATENolol  Tablet 50 milliGRAM(s) Oral daily  atorvastatin 80 milliGRAM(s) Oral at bedtime  budesonide 160 MICROgram(s)/formoterol 4.5 MICROgram(s) Inhaler 2 Puff(s) Inhalation two times a day  cholecalciferol 400 Unit(s) Oral daily  colchicine 0.6 milliGRAM(s) Oral daily  FLUoxetine 20 milliGRAM(s) Oral two times a day  fluticasone propionate 50 MICROgram(s)/spray Nasal Spray 1 Spray(s) Both Nostrils two times a day  folic acid 1 milliGRAM(s) Oral daily  furosemide    Tablet 20 milliGRAM(s) Oral daily  hyoscyamine SL 0.125 milliGRAM(s) SubLingual daily  influenza   Vaccine 0.5 milliLiter(s) IntraMuscular once  lidocaine   4% Patch 1 Patch Transdermal daily  multivitamin 1 Tablet(s) Oral daily  NIFEdipine XL 60 milliGRAM(s) Oral daily  nystatin Powder 1 Application(s) Topical two times a day  predniSONE   Tablet 10 milliGRAM(s) Oral two times a day  primidone 50 milliGRAM(s) Oral at bedtime  promethazine 50 milliGRAM(s) Oral daily  sildenafil (REVATIO) 20 milliGRAM(s) Oral three times a day  spironolactone 50 milliGRAM(s) Oral daily  tamsulosin 0.4 milliGRAM(s) Oral at bedtime  traZODone 100 milliGRAM(s) Oral at bedtime  valACYclovir 500 milliGRAM(s) Oral at bedtime    MEDICATIONS  (PRN):  acetaminophen   Tablet .. 650 milliGRAM(s) Oral every 6 hours PRN Moderate Pain (4 - 6)  diazepam    Tablet 10 milliGRAM(s) Oral every 8 hours PRN anxiety  magnesium hydroxide Suspension 30 milliLiter(s) Oral daily PRN Constipation  melatonin 5 milliGRAM(s) Oral at bedtime PRN Insomnia  oxycodone    5 mG/acetaminophen 325 mG 1 Tablet(s) Oral every 12 hours PRN Severe Pain (7 - 10)  simethicone 80 milliGRAM(s) Chew daily PRN Indigestion      HOME MEDICATIONS:  Advair Diskus 500 mcg-50 mcg inhalation powder (09-22)  allopurinol 300 mg oral tablet (09-22)  atenolol 50 mg oral tablet (09-22)  colchicine 0.6 mg oral capsule (09-22)  fentanyl topical 25 mcg/hr transdermal film, extended release (obsolete) (09-22)  FLUoxetine 20 mg oral capsule (09-22)  fluticasone 50 mcg/inh inhalation powder (09-22)  folic acid 1 mg oral tablet (09-22)  Fosamax 70 mg oral tablet (09-22)  furosemide 20 mg oral tablet (09-22)  hyoscyamine 0.125 mg oral tablet (09-22)  Lidoderm 5% topical film (09-22)  Lipitor 80 mg oral tablet (09-22)  magnesium carbonate 250 mg oral capsule (09-22)  oxycodone-acetaminophen 5 mg-325 mg oral tablet (09-22)  predniSONE 10 mg oral tablet (09-22)  primidone 50 mg oral tablet (09-22)  Procardia XL 60 mg oral tablet, extended release (09-22)  promethazine 50 mg oral tablet (09-22)  sildenafil 20 mg oral tablet (09-22)  spironolactone 50 mg oral tablet (09-22)  traZODone 100 mg oral tablet (09-22)  Valium 10 mg oral tablet (09-22)  Valtrex 500 mg oral tablet (09-22)  Vitamin D3 400 intl units (10 mcg) oral tablet (09-22)  Wellbutrin (09-22)      PHYSICAL EXAM:  GEN: No acute distress  HEENT: AT/NC PEERLA, EOMI  LUNGS: Clear to auscultation bilaterally  HEART: S1/S2 present  ABD: Soft, non-tender, distended. Bowel sounds present in all 4 quadrants  EXT: No edema, no rashes, no cyanosis  NEURO: AAOX3

## 2021-10-10 NOTE — PROGRESS NOTE ADULT - ASSESSMENT
72 yo F with PMHx of COPD, Asthma uses home oxygen PRN, chronic back pain, cervical radiculopathy, Pulmonary Hypertension, Gout, DLD, HTN, CKD 3, recent L ankle fx presents for ankle fx repair.     # Acute Pulmonary embolism  - incidentally captured on CT abd/pelvis  - confirmed with CTA Chest, no evidence of right heart strain  - satting well on RA, HDS  - started on Eliquis     # Constipation  #Abdominal Distention c/f Ileus   - CT A/P without obstruction   - serial abdominal exams shows improvement in distension and tightness  - s/p NGT for 24hrs  - having normal soft BMs per nursing staff     # Hypokalemia, RESOLVED    # CASSI, RESOLVED  - likely pre-renal 2/2 to decreased po intake in setting of possible obstruction    #L medial Malleolar Fx, Distal Fibular Fx s/p ORIF   - plan for dc to rehab   - Dr. Clemons following. Please discharge patient with 6 total weeks of aspirin 325mg for dvt ppx  stable in CAM boot, dc planning    #Urinary Retention   - post op retention in the setting of immobility/abdominal distention/constipation  - madrid catheter in place, TOV failed x3  - will need Urology follow up    #Chronic Back Pain  #Hx of Cervical Radiculopathy  - Sees pain management, Dr. Mayers  - C/w  Percocet PRN ( no longer on Fentanyl patch )     #Gout  - C/w Allopurinol    #Pulmonary HTN  - C/w Sildenafil    #HTN  - C/w Atenolol, Nifedipine    #DLD  - C/w Atorvastatin    #COPD, Asthma on home O2 2L  #MARLENI  - C/w Symbicort    #Anxiety  - C/w Wellbutrin  - Valium PRN    #CKD 3  - Stable, sees Dr. Mcarthur    Heparin sq     #Progress Note Handoff:  Pending (specify):pending placement   Family discussion: d/w patient   Disposition: NH placement       DO Farida Lozoya

## 2021-10-11 LAB
ALBUMIN SERPL ELPH-MCNC: 3.5 G/DL — SIGNIFICANT CHANGE UP (ref 3.5–5.2)
ALP SERPL-CCNC: 127 U/L — HIGH (ref 30–115)
ALT FLD-CCNC: 22 U/L — SIGNIFICANT CHANGE UP (ref 0–41)
ANION GAP SERPL CALC-SCNC: 11 MMOL/L — SIGNIFICANT CHANGE UP (ref 7–14)
ANION GAP SERPL CALC-SCNC: 16 MMOL/L — HIGH (ref 7–14)
AST SERPL-CCNC: 21 U/L — SIGNIFICANT CHANGE UP (ref 0–41)
BILIRUB SERPL-MCNC: 0.2 MG/DL — SIGNIFICANT CHANGE UP (ref 0.2–1.2)
BUN SERPL-MCNC: 29 MG/DL — HIGH (ref 10–20)
BUN SERPL-MCNC: 33 MG/DL — HIGH (ref 10–20)
CALCIUM SERPL-MCNC: 8.2 MG/DL — LOW (ref 8.5–10.1)
CALCIUM SERPL-MCNC: 8.3 MG/DL — LOW (ref 8.5–10.1)
CHLORIDE SERPL-SCNC: 102 MMOL/L — SIGNIFICANT CHANGE UP (ref 98–110)
CHLORIDE SERPL-SCNC: 106 MMOL/L — SIGNIFICANT CHANGE UP (ref 98–110)
CO2 SERPL-SCNC: 18 MMOL/L — SIGNIFICANT CHANGE UP (ref 17–32)
CO2 SERPL-SCNC: 21 MMOL/L — SIGNIFICANT CHANGE UP (ref 17–32)
CREAT SERPL-MCNC: 1 MG/DL — SIGNIFICANT CHANGE UP (ref 0.7–1.5)
CREAT SERPL-MCNC: 1.4 MG/DL — SIGNIFICANT CHANGE UP (ref 0.7–1.5)
GLUCOSE SERPL-MCNC: 101 MG/DL — HIGH (ref 70–99)
GLUCOSE SERPL-MCNC: 107 MG/DL — HIGH (ref 70–99)
HCT VFR BLD CALC: 30 % — LOW (ref 37–47)
HGB BLD-MCNC: 9.8 G/DL — LOW (ref 12–16)
MAGNESIUM SERPL-MCNC: 1.9 MG/DL — SIGNIFICANT CHANGE UP (ref 1.8–2.4)
MCHC RBC-ENTMCNC: 32.7 G/DL — SIGNIFICANT CHANGE UP (ref 32–37)
MCHC RBC-ENTMCNC: 35.9 PG — HIGH (ref 27–31)
MCV RBC AUTO: 109.9 FL — HIGH (ref 81–99)
NRBC # BLD: 0 /100 WBCS — SIGNIFICANT CHANGE UP (ref 0–0)
PLATELET # BLD AUTO: 242 K/UL — SIGNIFICANT CHANGE UP (ref 130–400)
POTASSIUM SERPL-MCNC: 3.1 MMOL/L — LOW (ref 3.5–5)
POTASSIUM SERPL-MCNC: 4.1 MMOL/L — SIGNIFICANT CHANGE UP (ref 3.5–5)
POTASSIUM SERPL-SCNC: 3.1 MMOL/L — LOW (ref 3.5–5)
POTASSIUM SERPL-SCNC: 4.1 MMOL/L — SIGNIFICANT CHANGE UP (ref 3.5–5)
PROT SERPL-MCNC: 5 G/DL — LOW (ref 6–8)
RBC # BLD: 2.73 M/UL — LOW (ref 4.2–5.4)
RBC # FLD: 14.4 % — SIGNIFICANT CHANGE UP (ref 11.5–14.5)
SODIUM SERPL-SCNC: 134 MMOL/L — LOW (ref 135–146)
SODIUM SERPL-SCNC: 140 MMOL/L — SIGNIFICANT CHANGE UP (ref 135–146)
WBC # BLD: 8.88 K/UL — SIGNIFICANT CHANGE UP (ref 4.8–10.8)
WBC # FLD AUTO: 8.88 K/UL — SIGNIFICANT CHANGE UP (ref 4.8–10.8)

## 2021-10-11 PROCEDURE — 99232 SBSQ HOSP IP/OBS MODERATE 35: CPT

## 2021-10-11 RX ORDER — POTASSIUM CHLORIDE 20 MEQ
20 PACKET (EA) ORAL
Refills: 0 | Status: COMPLETED | OUTPATIENT
Start: 2021-10-11 | End: 2021-10-11

## 2021-10-11 RX ADMIN — Medication 20 MILLIGRAM(S): at 06:21

## 2021-10-11 RX ADMIN — Medication 20 MILLIEQUIVALENT(S): at 10:22

## 2021-10-11 RX ADMIN — Medication 1 SPRAY(S): at 06:24

## 2021-10-11 RX ADMIN — SPIRONOLACTONE 50 MILLIGRAM(S): 25 TABLET, FILM COATED ORAL at 06:21

## 2021-10-11 RX ADMIN — Medication 650 MILLIGRAM(S): at 15:10

## 2021-10-11 RX ADMIN — Medication 100 MILLIGRAM(S): at 21:35

## 2021-10-11 RX ADMIN — TAMSULOSIN HYDROCHLORIDE 0.4 MILLIGRAM(S): 0.4 CAPSULE ORAL at 21:35

## 2021-10-11 RX ADMIN — PRIMIDONE 50 MILLIGRAM(S): 250 TABLET ORAL at 21:36

## 2021-10-11 RX ADMIN — Medication 400 UNIT(S): at 11:50

## 2021-10-11 RX ADMIN — Medication 1 TABLET(S): at 11:50

## 2021-10-11 RX ADMIN — NYSTATIN CREAM 1 APPLICATION(S): 100000 CREAM TOPICAL at 06:25

## 2021-10-11 RX ADMIN — Medication 10 MILLIGRAM(S): at 11:51

## 2021-10-11 RX ADMIN — APIXABAN 10 MILLIGRAM(S): 2.5 TABLET, FILM COATED ORAL at 11:48

## 2021-10-11 RX ADMIN — Medication 1 MILLIGRAM(S): at 11:46

## 2021-10-11 RX ADMIN — Medication 1 SPRAY(S): at 17:41

## 2021-10-11 RX ADMIN — VALACYCLOVIR 500 MILLIGRAM(S): 500 TABLET, FILM COATED ORAL at 23:14

## 2021-10-11 RX ADMIN — BUDESONIDE AND FORMOTEROL FUMARATE DIHYDRATE 2 PUFF(S): 160; 4.5 AEROSOL RESPIRATORY (INHALATION) at 08:13

## 2021-10-11 RX ADMIN — Medication 20 MILLIEQUIVALENT(S): at 14:45

## 2021-10-11 RX ADMIN — LIDOCAINE 1 PATCH: 4 CREAM TOPICAL at 11:49

## 2021-10-11 RX ADMIN — NYSTATIN CREAM 1 APPLICATION(S): 100000 CREAM TOPICAL at 17:42

## 2021-10-11 RX ADMIN — ATENOLOL 50 MILLIGRAM(S): 25 TABLET ORAL at 06:22

## 2021-10-11 RX ADMIN — APIXABAN 10 MILLIGRAM(S): 2.5 TABLET, FILM COATED ORAL at 23:14

## 2021-10-11 RX ADMIN — Medication 10 MILLIGRAM(S): at 06:25

## 2021-10-11 RX ADMIN — Medication 10 MILLIGRAM(S): at 17:42

## 2021-10-11 RX ADMIN — ATORVASTATIN CALCIUM 80 MILLIGRAM(S): 80 TABLET, FILM COATED ORAL at 21:36

## 2021-10-11 RX ADMIN — Medication 650 MILLIGRAM(S): at 17:40

## 2021-10-11 RX ADMIN — Medication 20 MILLIGRAM(S): at 21:35

## 2021-10-11 RX ADMIN — Medication 20 MILLIEQUIVALENT(S): at 11:46

## 2021-10-11 RX ADMIN — BUDESONIDE AND FORMOTEROL FUMARATE DIHYDRATE 2 PUFF(S): 160; 4.5 AEROSOL RESPIRATORY (INHALATION) at 21:37

## 2021-10-11 RX ADMIN — Medication 0.6 MILLIGRAM(S): at 11:47

## 2021-10-11 RX ADMIN — Medication 20 MILLIGRAM(S): at 17:41

## 2021-10-11 RX ADMIN — Medication 20 MILLIEQUIVALENT(S): at 09:47

## 2021-10-11 RX ADMIN — Medication 300 MILLIGRAM(S): at 21:36

## 2021-10-11 RX ADMIN — Medication 60 MILLIGRAM(S): at 06:22

## 2021-10-11 RX ADMIN — Medication 50 MILLIGRAM(S): at 11:50

## 2021-10-11 RX ADMIN — Medication 20 MILLIGRAM(S): at 14:46

## 2021-10-11 RX ADMIN — Medication 0.12 MILLIGRAM(S): at 11:48

## 2021-10-11 NOTE — PROGRESS NOTE ADULT - SUBJECTIVE AND OBJECTIVE BOX
BERNARD SPEARS  71y, Female  Allergy: adhesives (Pruritus; Rash)  No Known Drug Allergies    Hospital Day: 20d    Patient seen and examined earlier today. Feeling well, pending auth.     PMH/PSH:  PAST MEDICAL & SURGICAL HISTORY:  History of neck pain    Spinal stenosis of lumbar region with radiculopathy    Anxiety    Depression    Osteoarthritis    H/O osteoporosis    History of pulmonary hypertension    H/O pulmonary hypertension    COPD (chronic obstructive pulmonary disease)    Hyperlipidemia    H/O total knee replacement, right    Failed spinal cord stimulator        LAST 24-Hr EVENTS:    VITALS:  T(F): 98.8 (10-11-21 @ 11:35), Max: 98.8 (10-11-21 @ 11:35)  HR: 68 (10-11-21 @ 11:35)  BP: 102/50 (10-11-21 @ 11:35) (95/46 - 140/66)  RR: 18 (10-11-21 @ 11:35)  SpO2: --        TESTS & MEASUREMENTS:  Weight (Kg):       10-09-21 @ 07:01  -  10-10-21 @ 07:00  --------------------------------------------------------  IN: 478 mL / OUT: 1900 mL / NET: -1422 mL    10-10-21 @ 07:01  -  10-11-21 @ 07:00  --------------------------------------------------------  IN: 0 mL / OUT: 1600 mL / NET: -1600 mL    10-11-21 @ 07:01  -  10-11-21 @ 16:34  --------------------------------------------------------  IN: 0 mL / OUT: 1600 mL / NET: -1600 mL                            9.8    8.88  )-----------( 242      ( 11 Oct 2021 04:30 )             30.0       10-11    134<L>  |  102  |  29<H>  ----------------------------<  101<H>  3.1<L>   |  21  |  1.0    Ca    8.2<L>      11 Oct 2021 04:30  Mg     1.9     10-11    TPro  5.0<L>  /  Alb  3.5  /  TBili  0.2  /  DBili  x   /  AST  21  /  ALT  22  /  AlkPhos  127<H>  10-11    LIVER FUNCTIONS - ( 11 Oct 2021 04:30 )  Alb: 3.5 g/dL / Pro: 5.0 g/dL / ALK PHOS: 127 U/L / ALT: 22 U/L / AST: 21 U/L / GGT: x                           COVID-19 PCR: NotDetec (10-07-21 @ 18:29)      RADIOLOGY, ECG, & ADDITIONAL TESTS:      RECENT DIAGNOSTIC ORDERS:  Basic Metabolic Panel: 20:00 (10-11-21 @ 08:42)      MEDICATIONS:  MEDICATIONS  (STANDING):  allopurinol 300 milliGRAM(s) Oral at bedtime  apixaban 10 milliGRAM(s) Oral every 12 hours  ATENolol  Tablet 50 milliGRAM(s) Oral daily  atorvastatin 80 milliGRAM(s) Oral at bedtime  budesonide 160 MICROgram(s)/formoterol 4.5 MICROgram(s) Inhaler 2 Puff(s) Inhalation two times a day  cholecalciferol 400 Unit(s) Oral daily  colchicine 0.6 milliGRAM(s) Oral daily  FLUoxetine 20 milliGRAM(s) Oral two times a day  fluticasone propionate 50 MICROgram(s)/spray Nasal Spray 1 Spray(s) Both Nostrils two times a day  folic acid 1 milliGRAM(s) Oral daily  furosemide    Tablet 20 milliGRAM(s) Oral daily  hyoscyamine SL 0.125 milliGRAM(s) SubLingual daily  influenza   Vaccine 0.5 milliLiter(s) IntraMuscular once  lidocaine   4% Patch 1 Patch Transdermal daily  multivitamin 1 Tablet(s) Oral daily  NIFEdipine XL 60 milliGRAM(s) Oral daily  nystatin Powder 1 Application(s) Topical two times a day  predniSONE   Tablet 10 milliGRAM(s) Oral two times a day  primidone 50 milliGRAM(s) Oral at bedtime  promethazine 50 milliGRAM(s) Oral daily  sildenafil (REVATIO) 20 milliGRAM(s) Oral three times a day  spironolactone 50 milliGRAM(s) Oral daily  tamsulosin 0.4 milliGRAM(s) Oral at bedtime  traZODone 100 milliGRAM(s) Oral at bedtime  valACYclovir 500 milliGRAM(s) Oral at bedtime    MEDICATIONS  (PRN):  acetaminophen   Tablet .. 650 milliGRAM(s) Oral every 6 hours PRN Moderate Pain (4 - 6)  diazepam    Tablet 10 milliGRAM(s) Oral every 8 hours PRN anxiety  magnesium hydroxide Suspension 30 milliLiter(s) Oral daily PRN Constipation  melatonin 5 milliGRAM(s) Oral at bedtime PRN Insomnia  oxycodone    5 mG/acetaminophen 325 mG 1 Tablet(s) Oral every 12 hours PRN Severe Pain (7 - 10)  simethicone 80 milliGRAM(s) Chew daily PRN Indigestion      HOME MEDICATIONS:  Advair Diskus 500 mcg-50 mcg inhalation powder (09-22)  allopurinol 300 mg oral tablet (09-22)  atenolol 50 mg oral tablet (09-22)  colchicine 0.6 mg oral capsule (09-22)  fentanyl topical 25 mcg/hr transdermal film, extended release (obsolete) (09-22)  FLUoxetine 20 mg oral capsule (09-22)  fluticasone 50 mcg/inh inhalation powder (09-22)  folic acid 1 mg oral tablet (09-22)  Fosamax 70 mg oral tablet (09-22)  furosemide 20 mg oral tablet (09-22)  hyoscyamine 0.125 mg oral tablet (09-22)  Lidoderm 5% topical film (09-22)  Lipitor 80 mg oral tablet (09-22)  magnesium carbonate 250 mg oral capsule (09-22)  oxycodone-acetaminophen 5 mg-325 mg oral tablet (09-22)  predniSONE 10 mg oral tablet (09-22)  primidone 50 mg oral tablet (09-22)  Procardia XL 60 mg oral tablet, extended release (09-22)  promethazine 50 mg oral tablet (09-22)  sildenafil 20 mg oral tablet (09-22)  spironolactone 50 mg oral tablet (09-22)  traZODone 100 mg oral tablet (09-22)  Valium 10 mg oral tablet (09-22)  Valtrex 500 mg oral tablet (09-22)  Vitamin D3 400 intl units (10 mcg) oral tablet (09-22)  Wellbutrin (09-22)      PHYSICAL EXAM:  GEN: No acute distress  HEENT: AT/NC PEERLA, EOMI  LUNGS: Clear to auscultation bilaterally  HEART: S1/S2 present  ABD: Soft, non-tender, distended. Bowel sounds present in all 4 quadrants  EXT: No edema, no rashes, no cyanosis  NEURO: AAOX3

## 2021-10-11 NOTE — PROGRESS NOTE ADULT - ASSESSMENT
72 yo F with PMHx of COPD, Asthma uses home oxygen PRN, chronic back pain, cervical radiculopathy, Pulmonary Hypertension, Gout, DLD, HTN, CKD 3, recent L ankle fx presents for ankle fx repair.     # Acute Pulmonary embolism  - incidentally captured on CT abd/pelvis  - confirmed with CTA Chest, no evidence of right heart strain  - satting well on RA, HDS  - started on Eliquis     # Constipation  #Abdominal Distention c/f Ileus   - CT A/P without obstruction   - serial abdominal exams shows improvement in distension and tightness  - s/p NGT for 24hrs  - having normal soft BMs per nursing staff     # Hypokalemia, RESOLVED    # CASSI, RESOLVED  - likely pre-renal 2/2 to decreased po intake in setting of possible obstruction    #L medial Malleolar Fx, Distal Fibular Fx s/p ORIF   - plan for dc to rehab   - Dr. Clemons following. Please discharge patient with 6 total weeks of aspirin 325mg for dvt ppx  stable in CAM boot, dc planning    #Urinary Retention   - post op retention in the setting of immobility/abdominal distention/constipation  - madrid catheter in place, TOV failed x3  - will need Urology follow up    #Chronic Back Pain  #Hx of Cervical Radiculopathy  - Sees pain management, Dr. Mayers  - C/w  Percocet PRN ( no longer on Fentanyl patch )     #Gout  - C/w Allopurinol    #Pulmonary HTN  - C/w Sildenafil    #HTN  - C/w Atenolol, Nifedipine    #DLD  - C/w Atorvastatin    #COPD, Asthma on home O2 2L  #MARLENI  - C/w Symbicort    #Anxiety  - C/w Wellbutrin  - Valium PRN    #CKD 3  - Stable, sees Dr. Mcarthur    Heparin sq     #Progress Note Handoff:  Pending (specify):pending placement   Family discussion: d/w patient   Disposition: NH placement       DO Farida Lozoya     70 yo F with PMHx of COPD, Asthma uses home oxygen PRN, chronic back pain, cervical radiculopathy, Pulmonary Hypertension, Gout, DLD, HTN, CKD 3, recent L ankle fx presents for ankle fx repair.     # Acute Pulmonary embolism  - incidentally captured on CT abd/pelvis  - confirmed with CTA Chest, no evidence of right heart strain  - satting well on RA, HDS  - started on Eliquis     # Constipation  #Abdominal Distention c/f Ileus   - CT A/P without obstruction   - serial abdominal exams shows improvement in distension and tightness  - s/p NGT for 24hrs  - having normal soft BMs per nursing staff     # Hypokalemia, RESOLVED    # CASSI, RESOLVED  - likely pre-renal 2/2 to decreased po intake in setting of possible obstruction    #L medial Malleolar Fx, Distal Fibular Fx s/p ORIF   - plan for dc to rehab   - Dr. Clemons following. Please discharge patient with 6 total weeks of aspirin 325mg for dvt ppx  stable in CAM boot, dc planning    #Urinary Retention   - post op retention in the setting of immobility/abdominal distention/constipation  - madrid catheter in place, TOV failed x3  - will need Urology follow up    #Chronic Back Pain  #Hx of Cervical Radiculopathy  - Sees pain management, Dr. Mayers  - C/w  Percocet PRN ( no longer on Fentanyl patch )     #Gout  - C/w Allopurinol    #Pulmonary HTN  - C/w Sildenafil    #HTN  - C/w Atenolol, Nifedipine    #DLD  - C/w Atorvastatin    #COPD, Asthma on home O2 2L  #MARLENI  - C/w Symbicort    #Anxiety  - C/w Wellbutrin  - Valium PRN    #CKD 3  - Stable, sees Dr. Mcarthur    #Medication Reconciliation   - unclear why patient is on prednisone, we need to clarify this       Heparin sq     #Progress Note Handoff:  Pending (specify):pending placement   Family discussion: d/w patient   Disposition: NH placement       DO Farida Lozoya

## 2021-10-12 LAB
ANION GAP SERPL CALC-SCNC: 14 MMOL/L — SIGNIFICANT CHANGE UP (ref 7–14)
BASOPHILS # BLD AUTO: 0.01 K/UL — SIGNIFICANT CHANGE UP (ref 0–0.2)
BASOPHILS NFR BLD AUTO: 0.1 % — SIGNIFICANT CHANGE UP (ref 0–1)
BUN SERPL-MCNC: 30 MG/DL — HIGH (ref 10–20)
CALCIUM SERPL-MCNC: 8.8 MG/DL — SIGNIFICANT CHANGE UP (ref 8.5–10.1)
CHLORIDE SERPL-SCNC: 103 MMOL/L — SIGNIFICANT CHANGE UP (ref 98–110)
CO2 SERPL-SCNC: 19 MMOL/L — SIGNIFICANT CHANGE UP (ref 17–32)
CREAT SERPL-MCNC: 1.2 MG/DL — SIGNIFICANT CHANGE UP (ref 0.7–1.5)
EOSINOPHIL # BLD AUTO: 0.01 K/UL — SIGNIFICANT CHANGE UP (ref 0–0.7)
EOSINOPHIL NFR BLD AUTO: 0.1 % — SIGNIFICANT CHANGE UP (ref 0–8)
GLUCOSE SERPL-MCNC: 165 MG/DL — HIGH (ref 70–99)
HCT VFR BLD CALC: 32.5 % — LOW (ref 37–47)
HGB BLD-MCNC: 10.7 G/DL — LOW (ref 12–16)
IMM GRANULOCYTES NFR BLD AUTO: 0.9 % — HIGH (ref 0.1–0.3)
LYMPHOCYTES # BLD AUTO: 0.65 K/UL — LOW (ref 1.2–3.4)
LYMPHOCYTES # BLD AUTO: 4.6 % — LOW (ref 20.5–51.1)
MAGNESIUM SERPL-MCNC: 1.6 MG/DL — LOW (ref 1.8–2.4)
MCHC RBC-ENTMCNC: 32.9 G/DL — SIGNIFICANT CHANGE UP (ref 32–37)
MCHC RBC-ENTMCNC: 34.9 PG — HIGH (ref 27–31)
MCV RBC AUTO: 105.9 FL — HIGH (ref 81–99)
MONOCYTES # BLD AUTO: 0.73 K/UL — HIGH (ref 0.1–0.6)
MONOCYTES NFR BLD AUTO: 5.1 % — SIGNIFICANT CHANGE UP (ref 1.7–9.3)
NEUTROPHILS # BLD AUTO: 12.67 K/UL — HIGH (ref 1.4–6.5)
NEUTROPHILS NFR BLD AUTO: 89.2 % — HIGH (ref 42.2–75.2)
NRBC # BLD: 0 /100 WBCS — SIGNIFICANT CHANGE UP (ref 0–0)
PLATELET # BLD AUTO: 322 K/UL — SIGNIFICANT CHANGE UP (ref 130–400)
POTASSIUM SERPL-MCNC: 4.2 MMOL/L — SIGNIFICANT CHANGE UP (ref 3.5–5)
POTASSIUM SERPL-SCNC: 4.2 MMOL/L — SIGNIFICANT CHANGE UP (ref 3.5–5)
RBC # BLD: 3.07 M/UL — LOW (ref 4.2–5.4)
RBC # FLD: 14 % — SIGNIFICANT CHANGE UP (ref 11.5–14.5)
SODIUM SERPL-SCNC: 136 MMOL/L — SIGNIFICANT CHANGE UP (ref 135–146)
WBC # BLD: 14.2 K/UL — HIGH (ref 4.8–10.8)
WBC # FLD AUTO: 14.2 K/UL — HIGH (ref 4.8–10.8)

## 2021-10-12 PROCEDURE — 99232 SBSQ HOSP IP/OBS MODERATE 35: CPT

## 2021-10-12 RX ORDER — SODIUM CHLORIDE 9 MG/ML
250 INJECTION INTRAMUSCULAR; INTRAVENOUS; SUBCUTANEOUS ONCE
Refills: 0 | Status: COMPLETED | OUTPATIENT
Start: 2021-10-12 | End: 2021-10-12

## 2021-10-12 RX ORDER — APIXABAN 2.5 MG/1
5 TABLET, FILM COATED ORAL
Refills: 0 | Status: DISCONTINUED | OUTPATIENT
Start: 2021-10-12 | End: 2021-10-14

## 2021-10-12 RX ADMIN — Medication 20 MILLIGRAM(S): at 14:24

## 2021-10-12 RX ADMIN — Medication 1 SPRAY(S): at 05:54

## 2021-10-12 RX ADMIN — LIDOCAINE 1 PATCH: 4 CREAM TOPICAL at 11:57

## 2021-10-12 RX ADMIN — Medication 50 MILLIGRAM(S): at 11:57

## 2021-10-12 RX ADMIN — Medication 0.12 MILLIGRAM(S): at 11:53

## 2021-10-12 RX ADMIN — Medication 60 MILLIGRAM(S): at 05:53

## 2021-10-12 RX ADMIN — BUDESONIDE AND FORMOTEROL FUMARATE DIHYDRATE 2 PUFF(S): 160; 4.5 AEROSOL RESPIRATORY (INHALATION) at 20:30

## 2021-10-12 RX ADMIN — ATORVASTATIN CALCIUM 80 MILLIGRAM(S): 80 TABLET, FILM COATED ORAL at 21:21

## 2021-10-12 RX ADMIN — SPIRONOLACTONE 50 MILLIGRAM(S): 25 TABLET, FILM COATED ORAL at 05:52

## 2021-10-12 RX ADMIN — NYSTATIN CREAM 1 APPLICATION(S): 100000 CREAM TOPICAL at 17:58

## 2021-10-12 RX ADMIN — SODIUM CHLORIDE 500 MILLILITER(S): 9 INJECTION INTRAMUSCULAR; INTRAVENOUS; SUBCUTANEOUS at 01:02

## 2021-10-12 RX ADMIN — Medication 20 MILLIGRAM(S): at 17:57

## 2021-10-12 RX ADMIN — Medication 20 MILLIGRAM(S): at 05:52

## 2021-10-12 RX ADMIN — APIXABAN 5 MILLIGRAM(S): 2.5 TABLET, FILM COATED ORAL at 17:56

## 2021-10-12 RX ADMIN — APIXABAN 5 MILLIGRAM(S): 2.5 TABLET, FILM COATED ORAL at 09:34

## 2021-10-12 RX ADMIN — Medication 1 SPRAY(S): at 17:57

## 2021-10-12 RX ADMIN — Medication 1 TABLET(S): at 11:53

## 2021-10-12 RX ADMIN — ATENOLOL 50 MILLIGRAM(S): 25 TABLET ORAL at 05:52

## 2021-10-12 RX ADMIN — Medication 10 MILLIGRAM(S): at 17:57

## 2021-10-12 RX ADMIN — Medication 650 MILLIGRAM(S): at 17:56

## 2021-10-12 RX ADMIN — VALACYCLOVIR 500 MILLIGRAM(S): 500 TABLET, FILM COATED ORAL at 21:23

## 2021-10-12 RX ADMIN — PRIMIDONE 50 MILLIGRAM(S): 250 TABLET ORAL at 21:21

## 2021-10-12 RX ADMIN — BUDESONIDE AND FORMOTEROL FUMARATE DIHYDRATE 2 PUFF(S): 160; 4.5 AEROSOL RESPIRATORY (INHALATION) at 07:34

## 2021-10-12 RX ADMIN — TAMSULOSIN HYDROCHLORIDE 0.4 MILLIGRAM(S): 0.4 CAPSULE ORAL at 21:22

## 2021-10-12 RX ADMIN — Medication 100 MILLIGRAM(S): at 21:22

## 2021-10-12 RX ADMIN — Medication 20 MILLIGRAM(S): at 21:22

## 2021-10-12 RX ADMIN — LIDOCAINE 1 PATCH: 4 CREAM TOPICAL at 19:28

## 2021-10-12 RX ADMIN — Medication 0.6 MILLIGRAM(S): at 11:54

## 2021-10-12 RX ADMIN — NYSTATIN CREAM 1 APPLICATION(S): 100000 CREAM TOPICAL at 05:54

## 2021-10-12 RX ADMIN — Medication 300 MILLIGRAM(S): at 21:21

## 2021-10-12 RX ADMIN — Medication 5 MILLIGRAM(S): at 21:26

## 2021-10-12 RX ADMIN — Medication 400 UNIT(S): at 11:58

## 2021-10-12 RX ADMIN — Medication 10 MILLIGRAM(S): at 05:52

## 2021-10-12 RX ADMIN — Medication 1 MILLIGRAM(S): at 11:54

## 2021-10-12 NOTE — PROGRESS NOTE ADULT - SUBJECTIVE AND OBJECTIVE BOX
HPI:  70 yo F with PMHx of COPD, Asthma uses home oxygen O2 NC 2L, chronic back pain, cervical radiculopathy, Pulmonary Hypertension, hx of [place] palsy, Gout, CKD 3, DLD, HTN, recent L ankle fx presents for ankle fx repair. Pt was reportedly scheduled for repair of ankle with Dr. Clemons but pt was unable to obtain appointments for Pulmonary (Dr. Evans) and Cardiac (Dr. Flood) clearance due to difficulties getting to appointments, inability to afford ambulette service and therefore, procedure canceled. Was advised by Dr. Clemons to report to hospital to obtain clearance and proceed with procedure.   Pt states she has been having shortness of breath for years, often has difficulties getting around due to shortness of breath, also has chronic pains in neck, back, RUE.  In the ED, T 98.9, /52, HR 79, RR 18, SpO2 96% on O2 NC 2L.  (21 Sep 2021 18:26)    Currently admitted to medicine with the primary diagnosis of Fracture, ankle       Today is hospital day 21d.     INTERVAL HPI / OVERNIGHT EVENTS:  Patient was examined and seen at bedside. This morning she is resting comfortably in bed and reports no new issues or overnight events.     ROS: Otherwise unremarkable     PAST MEDICAL & SURGICAL HISTORY  History of neck pain    Spinal stenosis of lumbar region with radiculopathy    Anxiety    Depression    Osteoarthritis    H/O osteoporosis    History of pulmonary hypertension    H/O pulmonary hypertension    COPD (chronic obstructive pulmonary disease)    Hyperlipidemia    H/O total knee replacement, right    Failed spinal cord stimulator      ALLERGIES  adhesives (Pruritus; Rash)  No Known Drug Allergies    MEDICATIONS  STANDING MEDICATIONS  allopurinol 300 milliGRAM(s) Oral at bedtime  apixaban 5 milliGRAM(s) Oral two times a day  ATENolol  Tablet 50 milliGRAM(s) Oral daily  atorvastatin 80 milliGRAM(s) Oral at bedtime  budesonide 160 MICROgram(s)/formoterol 4.5 MICROgram(s) Inhaler 2 Puff(s) Inhalation two times a day  cholecalciferol 400 Unit(s) Oral daily  colchicine 0.6 milliGRAM(s) Oral daily  FLUoxetine 20 milliGRAM(s) Oral two times a day  fluticasone propionate 50 MICROgram(s)/spray Nasal Spray 1 Spray(s) Both Nostrils two times a day  folic acid 1 milliGRAM(s) Oral daily  furosemide    Tablet 20 milliGRAM(s) Oral daily  hyoscyamine SL 0.125 milliGRAM(s) SubLingual daily  influenza   Vaccine 0.5 milliLiter(s) IntraMuscular once  lidocaine   4% Patch 1 Patch Transdermal daily  multivitamin 1 Tablet(s) Oral daily  NIFEdipine XL 60 milliGRAM(s) Oral daily  nystatin Powder 1 Application(s) Topical two times a day  predniSONE   Tablet 10 milliGRAM(s) Oral two times a day  primidone 50 milliGRAM(s) Oral at bedtime  promethazine 50 milliGRAM(s) Oral daily  sildenafil (REVATIO) 20 milliGRAM(s) Oral three times a day  spironolactone 50 milliGRAM(s) Oral daily  tamsulosin 0.4 milliGRAM(s) Oral at bedtime  traZODone 100 milliGRAM(s) Oral at bedtime  valACYclovir 500 milliGRAM(s) Oral at bedtime    PRN MEDICATIONS  acetaminophen   Tablet .. 650 milliGRAM(s) Oral every 6 hours PRN  diazepam    Tablet 10 milliGRAM(s) Oral every 8 hours PRN  magnesium hydroxide Suspension 30 milliLiter(s) Oral daily PRN  melatonin 5 milliGRAM(s) Oral at bedtime PRN  oxycodone    5 mG/acetaminophen 325 mG 1 Tablet(s) Oral every 12 hours PRN  simethicone 80 milliGRAM(s) Chew daily PRN    VITALS:  T(F): 97.4  HR: 71  BP: 140/74  RR: 18  SpO2: --    PHYSICAL EXAM  GEN: NAD, Resting comfortably in bed  PULM: Clear to auscultation bilaterally, No wheezes  CVS: Regular rate and rhythm, S1-S2, no murmurs  ABD: Soft, non-tender, distended, no guarding  EXT: No edema  NEURO: A&Ox3, no focal deficits    LABS                        9.8    8.88  )-----------( 242      ( 11 Oct 2021 04:30 )             30.0     10-11    140  |  106  |  33<H>  ----------------------------<  107<H>  4.1   |  18  |  1.4    Ca    8.3<L>      11 Oct 2021 20:39  Mg     1.9     10-11    TPro  5.0<L>  /  Alb  3.5  /  TBili  0.2  /  DBili  x   /  AST  21  /  ALT  22  /  AlkPhos  127<H>  10-11                  RADIOLOGY

## 2021-10-12 NOTE — PROGRESS NOTE ADULT - ASSESSMENT
72 yo F with PMHx of COPD, Asthma uses home oxygen PRN, chronic back pain, cervical radiculopathy, Pulmonary Hypertension, Gout, DLD, HTN, CKD 3, recent L ankle fx presents for ankle fx repair.     # Acute Pulmonary embolism  - incidentally captured on CT abd/pelvis  - confirmed with CTA Chest, no evidence of right heart strain  - satting well on RA, HDS  - c/w Eliquis 5mg BID     # Constipation  #Abdominal Distention c/f Ileus   - CT A/P without obstruction   - serial abdominal exams shows improvement in distension and tightness  - s/p NGT for 24hrs  - having normal soft BMs per nursing staff     # Hypokalemia, RESOLVED    # CASSI, RESOLVED  - likely pre-renal 2/2 to decreased po intake in setting of possible obstruction    #L medial Malleolar Fx, Distal Fibular Fx s/p ORIF   - plan for dc to rehab   - Dr. Clemons following. Please discharge patient with 6 total weeks of aspirin 325mg for dvt ppx  stable in CAM boot, dc planning    #Urinary Retention   - post op retention in the setting of immobility/abdominal distention/constipation  - madrid catheter in place, TOV failed x3  - will need Urology follow up    #Chronic Back Pain  #Hx of Cervical Radiculopathy  - Sees pain management, Dr. Mayers  - C/w  Percocet PRN ( no longer on Fentanyl patch )     #Gout  - C/w Allopurinol    #Pulmonary HTN  - C/w Sildenafil    #HTN  - C/w Atenolol, Nifedipine    #DLD  - C/w Atorvastatin    #COPD, Asthma on home O2 2L  #MARLENI  - C/w Symbicort    #Anxiety  - C/w Wellbutrin  - Valium PRN    #CKD 3  - Stable, sees Dr. Mcarthur    #Medication Reconciliation   - spoke to Dr. Reid (PCP), pt on prednisone 10mg for pulmonary HTN and should continue taking it       Heparin sq     #Progress Note Handoff:  Pending (specify):pending placement   Family discussion: d/w patient   Disposition: NH placement

## 2021-10-12 NOTE — PROGRESS NOTE ADULT - ASSESSMENT
72 yo F with PMHx of COPD, Asthma uses home oxygen PRN, chronic back pain, cervical radiculopathy, Pulmonary Hypertension, Gout, DLD, HTN, CKD 3, recent L ankle fx presents for ankle fx repair.     # Acute Pulmonary embolism  - incidentally captured on CT abd/pelvis  - confirmed with CTA Chest, no evidence of right heart strain  - satting well on RA, HDS  - started on Eliquis     # Constipation  #Abdominal Distention c/f Ileus   - CT A/P without obstruction   - serial abdominal exams shows improvement in distension and tightness  - s/p NGT for 24hrs  - having normal soft BMs per nursing staff     # Hypokalemia, RESOLVED    # CASSI, RESOLVED  - likely pre-renal 2/2 to decreased po intake in setting of possible obstruction    #L medial Malleolar Fx, Distal Fibular Fx s/p ORIF   - plan for dc to rehab   - Dr. Clemons following. Please discharge patient with 6 total weeks of aspirin 325mg for dvt ppx  stable in CAM boot, dc planning    #Urinary Retention   - post op retention in the setting of immobility/abdominal distention/constipation  - madrid catheter in place, TOV failed x3  - will need Urology follow up    #Chronic Back Pain  #Hx of Cervical Radiculopathy  - Sees pain management, Dr. Mayers  - C/w  Percocet PRN ( no longer on Fentanyl patch )     #Gout  - C/w Allopurinol    #Pulmonary HTN  - C/w Sildenafil, prednisone     #HTN  - C/w Atenolol, Nifedipine    #DLD  - C/w Atorvastatin    #COPD, Asthma on home O2 2L  #MARLENI  - C/w Symbicort    #Anxiety  - C/w Wellbutrin  - Valium PRN    #CKD 3  - Stable, sees Dr. Mcarthur        Heparin sq     #Progress Note Handoff:  Pending (specify):pending placement   Family discussion: d/w patient   Disposition: NH placement       Pinky Padron DO .

## 2021-10-12 NOTE — PROGRESS NOTE ADULT - SUBJECTIVE AND OBJECTIVE BOX
BERNARD SPEARS  71y, Female  Allergy: adhesives (Pruritus; Rash)  No Known Drug Allergies    Hospital Day: 21d    Patient seen and examined earlier today. Feeling well, no complaints.     PMH/PSH:  PAST MEDICAL & SURGICAL HISTORY:  History of neck pain    Spinal stenosis of lumbar region with radiculopathy    Anxiety    Depression    Osteoarthritis    H/O osteoporosis    History of pulmonary hypertension    H/O pulmonary hypertension    COPD (chronic obstructive pulmonary disease)    Hyperlipidemia    H/O total knee replacement, right    Failed spinal cord stimulator        LAST 24-Hr EVENTS:    VITALS:  T(F): 98.7 (10-12-21 @ 13:39), Max: 98.7 (10-12-21 @ 13:39)  HR: 73 (10-12-21 @ 13:39)  BP: 106/49 (10-12-21 @ 13:39) (103/56 - 140/74)  RR: 18 (10-12-21 @ 13:39)  SpO2: --        TESTS & MEASUREMENTS:  Weight (Kg):       10-10-21 @ 07:01  -  10-11-21 @ 07:00  --------------------------------------------------------  IN: 0 mL / OUT: 1600 mL / NET: -1600 mL    10-11-21 @ 07:01  -  10-12-21 @ 07:00  --------------------------------------------------------  IN: 360 mL / OUT: 2300 mL / NET: -1940 mL    10-12-21 @ 07:01  -  10-12-21 @ 17:26  --------------------------------------------------------  IN: 0 mL / OUT: 1200 mL / NET: -1200 mL                            10.7   14.20 )-----------( 322      ( 12 Oct 2021 11:00 )             32.5       10-12    136  |  103  |  30<H>  ----------------------------<  165<H>  4.2   |  19  |  1.2    Ca    8.8      12 Oct 2021 11:00  Mg     1.6     10-12    TPro  5.0<L>  /  Alb  3.5  /  TBili  0.2  /  DBili  x   /  AST  21  /  ALT  22  /  AlkPhos  127<H>  10-11    LIVER FUNCTIONS - ( 11 Oct 2021 04:30 )  Alb: 3.5 g/dL / Pro: 5.0 g/dL / ALK PHOS: 127 U/L / ALT: 22 U/L / AST: 21 U/L / GGT: x                           COVID-19 PCR: NotDetec (10-07-21 @ 18:29)      RADIOLOGY, ECG, & ADDITIONAL TESTS:      RECENT DIAGNOSTIC ORDERS:  Basic Metabolic Panel: AM Sched. Collection: 13-Oct-2021 04:30 (10-12-21 @ 09:41)  Magnesium, Serum: AM Sched. Collection: 13-Oct-2021 04:30 (10-12-21 @ 09:41)  Complete Blood Count + Automated Diff: AM Sched. Collection: 13-Oct-2021 04:30 (10-12-21 @ 09:41)  Basic Metabolic Panel: Repeat From: 12-Oct-2021 09:41 To: 02-Nov-2021 04:30, Every 1 day(s) (10-12-21 @ 09:41)  Magnesium, Serum: Repeat From: 12-Oct-2021 09:40 To: 02-Nov-2021 04:30, Every 1 day(s) (10-12-21 @ 09:41)  Complete Blood Count + Automated Diff: Repeat From: 12-Oct-2021 09:40 To: 02-Nov-2021 04:30, Every 1 day(s) (10-12-21 @ 09:41)      MEDICATIONS:  MEDICATIONS  (STANDING):  allopurinol 300 milliGRAM(s) Oral at bedtime  apixaban 5 milliGRAM(s) Oral two times a day  ATENolol  Tablet 50 milliGRAM(s) Oral daily  atorvastatin 80 milliGRAM(s) Oral at bedtime  budesonide 160 MICROgram(s)/formoterol 4.5 MICROgram(s) Inhaler 2 Puff(s) Inhalation two times a day  cholecalciferol 400 Unit(s) Oral daily  colchicine 0.6 milliGRAM(s) Oral daily  FLUoxetine 20 milliGRAM(s) Oral two times a day  fluticasone propionate 50 MICROgram(s)/spray Nasal Spray 1 Spray(s) Both Nostrils two times a day  folic acid 1 milliGRAM(s) Oral daily  furosemide    Tablet 20 milliGRAM(s) Oral daily  hyoscyamine SL 0.125 milliGRAM(s) SubLingual daily  influenza   Vaccine 0.5 milliLiter(s) IntraMuscular once  lidocaine   4% Patch 1 Patch Transdermal daily  multivitamin 1 Tablet(s) Oral daily  NIFEdipine XL 60 milliGRAM(s) Oral daily  nystatin Powder 1 Application(s) Topical two times a day  predniSONE   Tablet 10 milliGRAM(s) Oral two times a day  primidone 50 milliGRAM(s) Oral at bedtime  promethazine 50 milliGRAM(s) Oral daily  sildenafil (REVATIO) 20 milliGRAM(s) Oral three times a day  spironolactone 50 milliGRAM(s) Oral daily  tamsulosin 0.4 milliGRAM(s) Oral at bedtime  traZODone 100 milliGRAM(s) Oral at bedtime  valACYclovir 500 milliGRAM(s) Oral at bedtime    MEDICATIONS  (PRN):  acetaminophen   Tablet .. 650 milliGRAM(s) Oral every 6 hours PRN Moderate Pain (4 - 6)  diazepam    Tablet 10 milliGRAM(s) Oral every 8 hours PRN anxiety  magnesium hydroxide Suspension 30 milliLiter(s) Oral daily PRN Constipation  melatonin 5 milliGRAM(s) Oral at bedtime PRN Insomnia  oxycodone    5 mG/acetaminophen 325 mG 1 Tablet(s) Oral every 12 hours PRN Severe Pain (7 - 10)  simethicone 80 milliGRAM(s) Chew daily PRN Indigestion      HOME MEDICATIONS:  Advair Diskus 500 mcg-50 mcg inhalation powder (09-22)  allopurinol 300 mg oral tablet (09-22)  atenolol 50 mg oral tablet (09-22)  colchicine 0.6 mg oral capsule (09-22)  fentanyl topical 25 mcg/hr transdermal film, extended release (obsolete) (09-22)  FLUoxetine 20 mg oral capsule (09-22)  fluticasone 50 mcg/inh inhalation powder (09-22)  folic acid 1 mg oral tablet (09-22)  Fosamax 70 mg oral tablet (09-22)  furosemide 20 mg oral tablet (09-22)  hyoscyamine 0.125 mg oral tablet (09-22)  Lidoderm 5% topical film (09-22)  Lipitor 80 mg oral tablet (09-22)  magnesium carbonate 250 mg oral capsule (09-22)  oxycodone-acetaminophen 5 mg-325 mg oral tablet (09-22)  predniSONE 10 mg oral tablet (09-22)  primidone 50 mg oral tablet (09-22)  Procardia XL 60 mg oral tablet, extended release (09-22)  promethazine 50 mg oral tablet (09-22)  sildenafil 20 mg oral tablet (09-22)  spironolactone 50 mg oral tablet (09-22)  traZODone 100 mg oral tablet (09-22)  Valium 10 mg oral tablet (09-22)  Valtrex 500 mg oral tablet (09-22)  Vitamin D3 400 intl units (10 mcg) oral tablet (09-22)  Wellbutrin (09-22)      PHYSICAL EXAM:  GEN: No acute distress  HEENT: AT/NC PEERLA, EOMI  LUNGS: Clear to auscultation bilaterally  HEART: S1/S2 present  ABD: Soft, non-tender, distended. Bowel sounds present in all 4 quadrants  EXT: No edema, no rashes, no cyanosis  NEURO: AAOX3

## 2021-10-13 LAB
ANION GAP SERPL CALC-SCNC: 12 MMOL/L — SIGNIFICANT CHANGE UP (ref 7–14)
BASOPHILS # BLD AUTO: 0 K/UL — SIGNIFICANT CHANGE UP (ref 0–0.2)
BASOPHILS NFR BLD AUTO: 0 % — SIGNIFICANT CHANGE UP (ref 0–1)
BUN SERPL-MCNC: 32 MG/DL — HIGH (ref 10–20)
CALCIUM SERPL-MCNC: 8.6 MG/DL — SIGNIFICANT CHANGE UP (ref 8.5–10.1)
CHLORIDE SERPL-SCNC: 108 MMOL/L — SIGNIFICANT CHANGE UP (ref 98–110)
CO2 SERPL-SCNC: 20 MMOL/L — SIGNIFICANT CHANGE UP (ref 17–32)
CREAT SERPL-MCNC: 1.2 MG/DL — SIGNIFICANT CHANGE UP (ref 0.7–1.5)
EOSINOPHIL # BLD AUTO: 0.05 K/UL — SIGNIFICANT CHANGE UP (ref 0–0.7)
EOSINOPHIL NFR BLD AUTO: 0.5 % — SIGNIFICANT CHANGE UP (ref 0–8)
GLUCOSE SERPL-MCNC: 133 MG/DL — HIGH (ref 70–99)
HCT VFR BLD CALC: 28.5 % — LOW (ref 37–47)
HGB BLD-MCNC: 9.4 G/DL — LOW (ref 12–16)
IMM GRANULOCYTES NFR BLD AUTO: 0.5 % — HIGH (ref 0.1–0.3)
LYMPHOCYTES # BLD AUTO: 1.02 K/UL — LOW (ref 1.2–3.4)
LYMPHOCYTES # BLD AUTO: 10.7 % — LOW (ref 20.5–51.1)
MAGNESIUM SERPL-MCNC: 1.6 MG/DL — LOW (ref 1.8–2.4)
MCHC RBC-ENTMCNC: 33 G/DL — SIGNIFICANT CHANGE UP (ref 32–37)
MCHC RBC-ENTMCNC: 35.5 PG — HIGH (ref 27–31)
MCV RBC AUTO: 107.5 FL — HIGH (ref 81–99)
MONOCYTES # BLD AUTO: 0.89 K/UL — HIGH (ref 0.1–0.6)
MONOCYTES NFR BLD AUTO: 9.3 % — SIGNIFICANT CHANGE UP (ref 1.7–9.3)
NEUTROPHILS # BLD AUTO: 7.55 K/UL — HIGH (ref 1.4–6.5)
NEUTROPHILS NFR BLD AUTO: 79 % — HIGH (ref 42.2–75.2)
NRBC # BLD: 0 /100 WBCS — SIGNIFICANT CHANGE UP (ref 0–0)
PLATELET # BLD AUTO: 265 K/UL — SIGNIFICANT CHANGE UP (ref 130–400)
POTASSIUM SERPL-MCNC: 3.8 MMOL/L — SIGNIFICANT CHANGE UP (ref 3.5–5)
POTASSIUM SERPL-SCNC: 3.8 MMOL/L — SIGNIFICANT CHANGE UP (ref 3.5–5)
RBC # BLD: 2.65 M/UL — LOW (ref 4.2–5.4)
RBC # FLD: 14.6 % — HIGH (ref 11.5–14.5)
SARS-COV-2 RNA SPEC QL NAA+PROBE: SIGNIFICANT CHANGE UP
SODIUM SERPL-SCNC: 140 MMOL/L — SIGNIFICANT CHANGE UP (ref 135–146)
WBC # BLD: 9.56 K/UL — SIGNIFICANT CHANGE UP (ref 4.8–10.8)
WBC # FLD AUTO: 9.56 K/UL — SIGNIFICANT CHANGE UP (ref 4.8–10.8)

## 2021-10-13 PROCEDURE — 99232 SBSQ HOSP IP/OBS MODERATE 35: CPT

## 2021-10-13 RX ORDER — MAGNESIUM OXIDE 400 MG ORAL TABLET 241.3 MG
400 TABLET ORAL DAILY
Refills: 0 | Status: DISCONTINUED | OUTPATIENT
Start: 2021-10-13 | End: 2021-10-14

## 2021-10-13 RX ORDER — ACETAMINOPHEN 500 MG
2 TABLET ORAL
Qty: 0 | Refills: 0 | DISCHARGE
Start: 2021-10-13

## 2021-10-13 RX ORDER — MAGNESIUM SULFATE 500 MG/ML
2 VIAL (ML) INJECTION ONCE
Refills: 0 | Status: COMPLETED | OUTPATIENT
Start: 2021-10-13 | End: 2021-10-13

## 2021-10-13 RX ORDER — SIMETHICONE 80 MG/1
1 TABLET, CHEWABLE ORAL
Qty: 0 | Refills: 0 | DISCHARGE
Start: 2021-10-13

## 2021-10-13 RX ORDER — APIXABAN 2.5 MG/1
1 TABLET, FILM COATED ORAL
Qty: 0 | Refills: 0 | DISCHARGE
Start: 2021-10-13

## 2021-10-13 RX ORDER — LANOLIN ALCOHOL/MO/W.PET/CERES
1 CREAM (GRAM) TOPICAL
Qty: 0 | Refills: 0 | DISCHARGE
Start: 2021-10-13

## 2021-10-13 RX ORDER — NYSTATIN CREAM 100000 [USP'U]/G
1 CREAM TOPICAL
Qty: 0 | Refills: 0 | DISCHARGE
Start: 2021-10-13

## 2021-10-13 RX ORDER — TAMSULOSIN HYDROCHLORIDE 0.4 MG/1
1 CAPSULE ORAL
Qty: 0 | Refills: 0 | DISCHARGE
Start: 2021-10-13

## 2021-10-13 RX ADMIN — OXYCODONE AND ACETAMINOPHEN 1 TABLET(S): 5; 325 TABLET ORAL at 10:43

## 2021-10-13 RX ADMIN — Medication 20 MILLIGRAM(S): at 05:07

## 2021-10-13 RX ADMIN — NYSTATIN CREAM 1 APPLICATION(S): 100000 CREAM TOPICAL at 05:08

## 2021-10-13 RX ADMIN — Medication 0.12 MILLIGRAM(S): at 11:15

## 2021-10-13 RX ADMIN — MAGNESIUM OXIDE 400 MG ORAL TABLET 400 MILLIGRAM(S): 241.3 TABLET ORAL at 11:10

## 2021-10-13 RX ADMIN — Medication 20 MILLIGRAM(S): at 05:06

## 2021-10-13 RX ADMIN — Medication 400 UNIT(S): at 11:32

## 2021-10-13 RX ADMIN — SIMETHICONE 80 MILLIGRAM(S): 80 TABLET, CHEWABLE ORAL at 05:08

## 2021-10-13 RX ADMIN — TAMSULOSIN HYDROCHLORIDE 0.4 MILLIGRAM(S): 0.4 CAPSULE ORAL at 22:08

## 2021-10-13 RX ADMIN — LIDOCAINE 1 PATCH: 4 CREAM TOPICAL at 00:12

## 2021-10-13 RX ADMIN — Medication 5 MILLIGRAM(S): at 22:09

## 2021-10-13 RX ADMIN — Medication 10 MILLIGRAM(S): at 18:48

## 2021-10-13 RX ADMIN — PRIMIDONE 50 MILLIGRAM(S): 250 TABLET ORAL at 22:08

## 2021-10-13 RX ADMIN — Medication 60 MILLIGRAM(S): at 05:06

## 2021-10-13 RX ADMIN — Medication 50 MILLIGRAM(S): at 11:11

## 2021-10-13 RX ADMIN — Medication 1 SPRAY(S): at 18:44

## 2021-10-13 RX ADMIN — Medication 0.6 MILLIGRAM(S): at 11:11

## 2021-10-13 RX ADMIN — BUDESONIDE AND FORMOTEROL FUMARATE DIHYDRATE 2 PUFF(S): 160; 4.5 AEROSOL RESPIRATORY (INHALATION) at 08:36

## 2021-10-13 RX ADMIN — Medication 20 MILLIGRAM(S): at 18:46

## 2021-10-13 RX ADMIN — Medication 20 MILLIGRAM(S): at 22:08

## 2021-10-13 RX ADMIN — ATORVASTATIN CALCIUM 80 MILLIGRAM(S): 80 TABLET, FILM COATED ORAL at 22:08

## 2021-10-13 RX ADMIN — Medication 1 SPRAY(S): at 05:08

## 2021-10-13 RX ADMIN — Medication 20 MILLIGRAM(S): at 18:50

## 2021-10-13 RX ADMIN — Medication 1 TABLET(S): at 11:12

## 2021-10-13 RX ADMIN — APIXABAN 5 MILLIGRAM(S): 2.5 TABLET, FILM COATED ORAL at 18:43

## 2021-10-13 RX ADMIN — Medication 1 MILLIGRAM(S): at 11:12

## 2021-10-13 RX ADMIN — NYSTATIN CREAM 1 APPLICATION(S): 100000 CREAM TOPICAL at 18:49

## 2021-10-13 RX ADMIN — VALACYCLOVIR 500 MILLIGRAM(S): 500 TABLET, FILM COATED ORAL at 22:09

## 2021-10-13 RX ADMIN — LIDOCAINE 1 PATCH: 4 CREAM TOPICAL at 19:10

## 2021-10-13 RX ADMIN — Medication 300 MILLIGRAM(S): at 22:08

## 2021-10-13 RX ADMIN — SPIRONOLACTONE 50 MILLIGRAM(S): 25 TABLET, FILM COATED ORAL at 05:06

## 2021-10-13 RX ADMIN — LIDOCAINE 1 PATCH: 4 CREAM TOPICAL at 11:13

## 2021-10-13 RX ADMIN — ATENOLOL 50 MILLIGRAM(S): 25 TABLET ORAL at 08:25

## 2021-10-13 RX ADMIN — Medication 100 MILLIGRAM(S): at 22:09

## 2021-10-13 RX ADMIN — APIXABAN 5 MILLIGRAM(S): 2.5 TABLET, FILM COATED ORAL at 05:05

## 2021-10-13 RX ADMIN — Medication 25 GRAM(S): at 16:26

## 2021-10-13 RX ADMIN — Medication 10 MILLIGRAM(S): at 05:07

## 2021-10-13 RX ADMIN — BUDESONIDE AND FORMOTEROL FUMARATE DIHYDRATE 2 PUFF(S): 160; 4.5 AEROSOL RESPIRATORY (INHALATION) at 20:10

## 2021-10-13 RX ADMIN — OXYCODONE AND ACETAMINOPHEN 1 TABLET(S): 5; 325 TABLET ORAL at 22:10

## 2021-10-13 NOTE — PROGRESS NOTE ADULT - REASON FOR ADMISSION
left ankle fx

## 2021-10-13 NOTE — PROGRESS NOTE ADULT - ASSESSMENT
70 yo F with PMHx of COPD, Asthma uses home oxygen PRN, chronic back pain, cervical radiculopathy, Pulmonary Hypertension, Gout, DLD, HTN, CKD 3, recent L ankle fx presents for ankle fx repair.     # Acute Pulmonary embolism  - incidentally captured on CT abd/pelvis  - confirmed with CTA Chest, no evidence of right heart strain  - satting well on RA, HDS  - started on Eliquis     # Constipation -- Resolved  #Abdominal Distention c/f Ileus   - CT A/P without obstruction   - serial abdominal exams shows improvement in distension and tightness  - s/p NGT for 24hrs  - having normal firm BMs per nursing staff     # Hypokalemia, RESOLVED    # CASSI, RESOLVED  - likely pre-renal 2/2 to decreased po intake in setting of possible obstruction    #L medial Malleolar Fx, Distal Fibular Fx s/p ORIF   - plan for dc to rehab   - Dr. Clemons following. Please discharge patient with 6 total weeks of aspirin 325mg for dvt ppx  stable in CAM boot, dc planning    #Urinary Retention   - post op retention in the setting of immobility/abdominal distention/constipation  - madrid catheter in place, TOV failed x4  - will need Urology follow up    #Chronic Back Pain  #Hx of Cervical Radiculopathy  - Sees pain management, Dr. Mayers  - C/w  Percocet PRN ( no longer on Fentanyl patch )     #Gout  - C/w Allopurinol    #Pulmonary HTN  - C/w Sildenafil, prednisone     #HTN  - C/w Atenolol, Nifedipine    #DLD  - C/w Atorvastatin    #COPD, Asthma on home O2 2L  #MARLENI  - C/w Symbicort    #Anxiety  - C/w Wellbutrin  - Valium PRN    #CKD 3  - Stable, sees Dr. Mcarthur        Heparin sq     #Progress Note Handoff:  Pending (specify):pending placement   Family discussion: d/w patient   Disposition: NH placement

## 2021-10-13 NOTE — PROGRESS NOTE ADULT - ATTENDING COMMENTS
72 yo F s/p ORIF L elsie readmitted for PE  c/d/i  xrays unchanged.  hardware in place  -NWB LLE, elevate, ice, pain control, PE tx, OOB, PT, sutures removed.  follow up in 1 month with xray
72 yo F with PMHx of COPD, Asthma uses home oxygen PRN, chronic back pain, cervical radiculopathy, Pulmonary Hypertension, Gout, DLD, HTN, CKD 3, recent L ankle fx presents for ankle fx repair.    #L medial Malleolar Fx, Distal Fibular Fx  - Had initially experienced fall earlier in September, presented to hospital and was found to have L bimalleolar ankle fx s/p closed reduction  - On follow up as outpt, found to have persistent fx and was scheduled for repair  - Was unable to obtain Pulmonary, Cardiac clearance due to physical dysfunction  - s/p cardiac/pulm/medicine risk stratification   - echo ordered - EF 59% and mild PH   - Ortho following s/p ORIF, POD 1   - plan for dc to rehab     #Urinary Retention   - post op retention   - straight cath today, madrid if needed, TOV tomorrow     #Chronic Back Pain  #Hx of Cervical Radiculopathy  - Sees pain management, Dr. Mayers  - C/w Fentanyl patch PRN, Percocet PRN    #Gout  - C/w Allopurinol    #Pulmonary HTN  - C/w Sildenafil    #HTN  - C/w Atenolol, Nifedipine    #DLD  - C/w Atorvastatin    #COPD, Asthma on home O2 2L  #MARLENI  - C/w Symbicort    #Anxiety  - C/w Wellbutrin  - Valium PRN    #CKD 3  - Stable, sees Dr. Mcarthur    Heparin sq     #Progress Note Handoff:  Pending (specify): rehab placment  Family discussion: d/w patient   Disposition: Home___/SNF___/Other________/Unknown at this time___x_____    Pinky Padron DO .
72 yo F with PMHx of COPD, Asthma uses home oxygen PRN, chronic back pain, cervical radiculopathy, Pulmonary Hypertension, Gout, DLD, HTN, CKD 3, recent L ankle fx presents for ankle fx repair.    #L medial Malleolar Fx, Distal Fibular Fx  - Had initially experienced fall earlier in September, presented to hospital and was found to have L bimalleolar ankle fx s/p closed reduction  - On follow up as outpt, found to have persistent fx and was scheduled for repair  - Was unable to obtain Pulmonary, Cardiac clearance due to physical dysfunction  - s/p cardiac/pulm/medicine risk stratification   - echo ordered - EF 59% and mild PH   - Ortho following: surgery today     #Chronic Back Pain  #Hx of Cervical Radiculopathy  - Sees pain management, Dr. Mayers  - C/w Fentanyl patch PRN, Percocet PRN    #Gout  - C/w Allopurinol    #Pulmonary HTN  - C/w Sildenafil    #HTN  - C/w Atenolol, Nifedipine    #DLD  - C/w Atorvastatin    #COPD, Asthma on home O2 2L  #MARLENI  - C/w Symbicort    #Anxiety  - C/w Wellbutrin  - Valium PRN    #CKD 3  - Stable, sees Dr. Mcarthur    Heparin sq     #Progress Note Handoff:  Pending (specify):  Surgical fixation of L ankle fx today   Family discussion: d/w patient   Disposition: Home___/SNF___/Other________/Unknown at this time___x_____    Pinky Padron DO .
72 yo F with PMHx of COPD, Asthma uses home oxygen PRN, chronic back pain, cervical radiculopathy, Pulmonary Hypertension, Gout, DLD, HTN, CKD 3, recent L ankle fx presents for ankle fx repair.    #L medial Malleolar Fx, Distal Fibular Fx  - Had initially experienced fall earlier in September, presented to hospital and was found to have L bimalleolar ankle fx s/p closed reduction  - On follow up as outpt, found to have persistent fx and was scheduled for repair  - Was unable to obtain Pulmonary, Cardiac clearance due to physical dysfunction  - s/p cardiac/pulm/medicine risk stratification   - echo ordered and pending- required per anesthesia   - Ortho following: surgery tomorrow     #Chronic Back Pain  #Hx of Cervical Radiculopathy  - Sees pain management, Dr. Mayers  - C/w Fentanyl patch PRN, Percocet PRN    #Gout  - C/w Allopurinol    #Pulmonary HTN  - C/w Sildenafil    #HTN  - C/w Atenolol, Nifedipine    #DLD  - C/w Atorvastatin    #COPD, Asthma on home O2 2L  #MARLENI  - C/w Symbicort    #Anxiety  - C/w Wellbutrin  - Valium PRN    #CKD 3  - Stable, sees Dr. Mcarthur    Heparin sq     #Progress Note Handoff:  Pending (specify):  Surgical fixation of L ankle fx tomorrow   Family discussion: d/w patient   Disposition: Home___/SNF___/Other________/Unknown at this time___x_____    Pinky Padron, 
Pt seen and examined.  Agree with resident exam and plan.  c/o chronic R hip and back pain  c/d/i  NVI  72 yo F s/p ORIF left ankle/pilon  -nwb lle, elevate, ice, pain control, DVT proph, 1 week of oral antibiotics.  f/u as outpatient with Dr. Clemons for postop appt in 2 weeks.
***My note supersedes any discrepancies that may be above in the resident's note above***    70 yo F with PMHx of COPD, Asthma uses home oxygen PRN, chronic back pain, cervical radiculopathy, Pulmonary Hypertension, Gout, DLD, HTN, CKD 3, recent L ankle fx presents for ankle fx repair. Still very distended and symptomatic    GENERAL: elderly M, NAD, non toxic  EYES: anicteric sclera, non injected conjunctiva, EOMI  ENT: oropharynx clear, MMM, fair dentition  PSYCH: no agitation, baseline mentation  NERVOUS SYSTEM:  Alert & Oriented X3, Motor Strength 5/5 B/L upper and lower extremities; Sensory intact  PULMONARY: Clear to percussion bilaterally; No rales, rhonchi, wheezing, or rubs  CARDIOVASCULAR: Regular rate and rhythm; No murmurs, rubs, or gallops  GI: serially improvement in abdominal distension and tightness. minimal tender but still very protuberant  EXTREMITIES:  2+ Peripheral Pulses, No clubbing, cyanosis, or edema  LYMPH: No lymphadenopathy noted  SKIN: No rashes or lesions    # Constipation  # Rule out obstruction  # Leukocytosis  - WBC 19k, likely reactive-->18k-->16k  - progressive worsening distension of abdomen  - has not had BM in 5 days, now having small liquid stools  - KUB showed dilated loops but indeterminant study  - on lactulose syrup 10g q6h, miralax 17g bid, senna 2 tabs bid  - making NPO today, NGT in place(had to retract as advanced too far). now on intermittent low suction  - CT abd/pelvis after repeat BMP to ensure resolution of CASSI    # CASSI, RESOLVED  - sCr 1.7->1.6-->1.2  - likely pre-renal 2/2 to decreased po intake in setting of possible obstruction  - continue mIVF @ 75cc/hr  - monitor on serial labs      #L medial Malleolar Fx, Distal Fibular Fx, STABLE  - Had initially experienced fall earlier in September, presented to hospital and was found to have L bimalleolar ankle fx s/p closed reduction  - On follow up as outpt, found to have persistent fx and was scheduled for repair  - Was unable to obtain Pulmonary, Cardiac clearance due to physical dysfunction  - s/p cardiac/pulm/medicine risk stratification   - echo ordered - EF 59% and mild PH   - Ortho following s/p ORIF, POD #7  - plan for dc to rehab   - F/U with Dr. Clemons in 2 weeks. Please discharge patient with 6 total weeks of aspirin 325mg for dvt ppx  stable in splint  dc planning    #Urinary Retention   - post op retention in the setting of immobility  - madrid catheter in place, TOV failed x4  - started flomax.   - will need Urology follow up    #Chronic Back Pain  #Hx of Cervical Radiculopathy  - Sees pain management, Dr. Mayers  - C/w  Percocet PRN ( no longer on Fentanyl patch )     #Gout  - C/w Allopurinol    #Pulmonary HTN  - C/w Sildenafil    #HTN  - C/w Atenolol, Nifedipine    #DLD  - C/w Atorvastatin    #COPD, Asthma on home O2 2L  #MARLENI  - C/w Symbicort    #Anxiety  - C/w Wellbutrin  - Valium PRN    #CKD 3  - Stable, sees Dr. Mcarthur    Heparin sq     #Progress Note Handoff:  Pending (specify): CASSI resolution, obstruction resolution  Family discussion: d/w patient   Disposition: [facility] STR is available. 24-48hrs tentatively
70 yo F with PMHx of COPD, Asthma uses home oxygen PRN, chronic back pain, cervical radiculopathy, Pulmonary Hypertension, Gout, DLD, HTN, CKD 3, recent L ankle fx presents for ankle fx repair. Still very distended and symptomatic.    GENERAL: elderly M, NAD, non toxic  EYES: anicteric sclera, non injected conjunctiva, EOMI  ENT: oropharynx clear, MMM, fair dentition  PSYCH: no agitation, baseline mentation  NERVOUS SYSTEM:  Alert & Oriented X3, Motor Strength 5/5 B/L upper and lower extremities; Sensory intact  PULMONARY: Clear to percussion bilaterally; No rales, rhonchi, wheezing, or rubs  CARDIOVASCULAR: Regular rate and rhythm; No murmurs, rubs, or gallops  GI: tense, distended; Bowel sounds present  EXTREMITIES:  2+ Peripheral Pulses, No clubbing, cyanosis, or edema  LYMPH: No lymphadenopathy noted  SKIN: No rashes or lesions    # Constipation  # Rule out obstruction  # Leukocytosis  - WBC 19k, likely reactive-->18k-->16k-->10k  - KUB showed dilated loops but indeterminant study  - serial abdominal exams shows improvement in distension and tightness  - on lactulose syrup 10g q6h, miralax 17g bid, senna 2 tabs bid  - s/p NGT for 24hrs  - having small frequent liquid stools. unsure if post obstruction diarrhea vs C.diff?  - obtaining C.diff PCR and placing on contact isolation  - STAT CT abdomen/pelvis with IV and oral contrast    # Hypokalemia  -2.5, repeat 2.3  - 80mEq KCl or 60mEq IV  - serial repeat    # CASSI, RESOLVED  - sCr 1.7->1.6-->1.2  - likely pre-renal 2/2 to decreased po intake in setting of possible obstruction  - continue mIVF @ 75cc/hr  - monitor on serial labs      #L medial Malleolar Fx, Distal Fibular Fx  - Had initially experienced fall earlier in September, presented to hospital and was found to have L bimalleolar ankle fx s/p closed reduction  - On follow up as outpt, found to have persistent fx and was scheduled for repair  - Was unable to obtain Pulmonary, Cardiac clearance due to physical dysfunction  - s/p cardiac/pulm/medicine risk stratification   - echo ordered - EF 59% and mild PH   - Ortho following s/p ORIF, POD #7  - plan for dc to rehab   - F/U with Dr. Clemons in 2 weeks. Please discharge patient with 6 total weeks of aspirin 325mg for dvt ppx  stable in splint  dc planning    #Urinary Retention   - post op retention in the setting of immobility  - madrid catheter in place, TOV failed x3  - started flomax. will repeat TOV again   - will need Urology follow up    #Chronic Back Pain  #Hx of Cervical Radiculopathy  - Sees pain management, Dr. Mayers  - C/w  Percocet PRN ( no longer on Fentanyl patch )     #Gout  - C/w Allopurinol    #Pulmonary HTN  - C/w Sildenafil    #HTN  - C/w Atenolol, Nifedipine    #DLD  - C/w Atorvastatin    #COPD, Asthma on home O2 2L  #MARLENI  - C/w Symbicort    #Anxiety  - C/w Wellbutrin  - Valium PRN    #CKD 3  - Stable, sees Dr. Mcarthur    Heparin sq     #Progress Note Handoff:  Pending (specify): obstruction resolution  Family discussion: d/w patient   Disposition: [facility] STR is available. 24-48hrs tentatively
72 yo F with PMHx of COPD, Asthma uses home oxygen PRN, chronic back pain, cervical radiculopathy, Pulmonary Hypertension, Gout, DLD, HTN, CKD 3, recent L ankle fx presents for ankle fx repair.     # Acute Pulmonary embolism  - incidentally captured on CT abd/pelvis  - confirmed with CTA Chest, no evidence of right heart strain  - satting well on RA, HDS  - started on Eliquis     # Constipation  #Abdominal Distention c/f Ileus   - CT A/P without obstruction   - serial abdominal exams shows improvement in distension and tightness  - s/p NGT for 24hrs  - having small frequent liquid stools    # Hypokalemia, RESOLVED    # CASSI, RESOLVED  - likely pre-renal 2/2 to decreased po intake in setting of possible obstruction    #L medial Malleolar Fx, Distal Fibular Fx s/p ORIF   - plan for dc to rehab   - Dr. Clemons following. Please discharge patient with 6 total weeks of aspirin 325mg for dvt ppx  stable in CAM boot, dc planning    #Urinary Retention   - post op retention in the setting of immobility/abdominal distention/constipation  - madrid catheter in place, TOV failed x3  - will need Urology follow up    #Chronic Back Pain  #Hx of Cervical Radiculopathy  - Sees pain management, Dr. Mayers  - C/w  Percocet PRN ( no longer on Fentanyl patch )     #Gout  - C/w Allopurinol    #Pulmonary HTN  - C/w Sildenafil    #HTN  - C/w Atenolol, Nifedipine    #DLD  - C/w Atorvastatin    #COPD, Asthma on home O2 2L  #MARLENI  - C/w Symbicort    #Anxiety  - C/w Wellbutrin  - Valium PRN    #CKD 3  - Stable, sees Dr. Mcarthur    Heparin sq     #Progress Note Handoff:  Pending (specify):pending placement   Family discussion: d/w patient   Disposition: NH placement       Pinky Padron DO
Agree with resident exam and plan  s/p ORIF L pilon and lateral malleolus  c/d/i  nvi  NWB LLE, elevate, ice, pain control, OOB, PT
***My note supersedes any discrepancies that may be above in the resident's note***    72 yo F with PMHx of COPD, Asthma uses home oxygen PRN, chronic back pain, cervical radiculopathy, Pulmonary Hypertension, Gout, DLD, HTN, CKD 3, recent L ankle fx presents for ankle fx repair.     GENERAL: elderly M, NAD, non toxic  EYES: anicteric sclera, non injected conjunctiva, EOMI  ENT: oropharynx clear, MMM, fair dentition  PSYCH: no agitation, baseline mentation  NERVOUS SYSTEM:  Alert & Oriented X3, Motor Strength 5/5 B/L upper and lower extremities; Sensory intact  PULMONARY: Clear to percussion bilaterally; No rales, rhonchi, wheezing, or rubs  CARDIOVASCULAR: Regular rate and rhythm; No murmurs, rubs, or gallops  GI: Soft, Nontender, Nondistended; Bowel sounds present  EXTREMITIES:  2+ Peripheral Pulses, No clubbing, cyanosis, or edema  LYMPH: No lymphadenopathy noted  SKIN: No rashes or lesions    # Acute Pulmonary embolism, STABLE  - likely provoked  - incidentally captured on CT abd/pelvis  - confirmed with CTA Chest, no evidence of right heart strain  - satting well on RA, HDS  - apixaban 5 milliGRAM(s) Oral two times a day for at least 3-6 months(March- May)    # Constipation, IMPROVED  #Abdominal Distention c/f Ileus   - CT A/P without obstruction   - serial abdominal exams shows improvement in distension and tightness  - s/p NGT for 24hrs  - having normal soft BMs per nursing staff     # Hypokalemia, RESOLVED    # CASSI, RESOLVED  - likely pre-renal 2/2 to decreased po intake in setting of possible obstruction    #L medial Malleolar Fx, Distal Fibular Fx s/p ORIF   - plan for dc to rehab   - Dr. Clemons following. Please discharge patient with 6 total weeks of aspirin 325mg for dvt ppx  stable in [facility] boot, dc planning    #Urinary Retention   - post op retention in the setting of immobility/abdominal distention/constipation  - madrid catheter in place, TOV failed x3  - will need Urology follow up    #Chronic Back Pain  #Hx of Cervical Radiculopathy  - Sees pain management, Dr. Mayers  - C/w  Percocet PRN ( no longer on Fentanyl patch )     #Gout  - C/w Allopurinol    #Pulmonary HTN  - C/w Sildenafil, prednisone     #HTN  - C/w Atenolol, Nifedipine    #DLD  - C/w Atorvastatin    #COPD, Asthma on home O2 2L  #MARLENI  - C/w Symbicort    #Anxiety  - C/w Wellbutrin  - Valium PRN    #CKD 3  - Stable, sees Dr. Mcarthur      #Progress Note Handoff:  Pending (specify):pending placement   Family discussion: d/w patient   Disposition: CMA task, pt need new Auth # for str [facility].

## 2021-10-13 NOTE — PROGRESS NOTE ADULT - PROVIDER SPECIALTY LIST ADULT
Hospitalist
Internal Medicine
Orthopedics
Hospitalist
Hospitalist
Internal Medicine
Orthopedics
Orthopedics
Hospitalist
Internal Medicine
Orthopedics
Hospitalist
Hospitalist
Internal Medicine

## 2021-10-13 NOTE — PROGRESS NOTE ADULT - NSPROGADDITIONALINFOA_GEN_ALL_CORE
allopurinol 300 milliGRAM(s) Oral at bedtime  apixaban 5 milliGRAM(s) Oral two times a day  ATENolol  Tablet 50 milliGRAM(s) Oral daily  atorvastatin 80 milliGRAM(s) Oral at bedtime  budesonide 160 MICROgram(s)/formoterol 4.5 MICROgram(s) Inhaler 2 Puff(s) Inhalation two times a day  cholecalciferol 400 Unit(s) Oral daily  colchicine 0.6 milliGRAM(s) Oral daily  FLUoxetine 20 milliGRAM(s) Oral two times a day  fluticasone propionate 50 MICROgram(s)/spray Nasal Spray 1 Spray(s) Both Nostrils two times a day  folic acid 1 milliGRAM(s) Oral daily  furosemide    Tablet 20 milliGRAM(s) Oral daily  hyoscyamine SL 0.125 milliGRAM(s) SubLingual daily  influenza   Vaccine 0.5 milliLiter(s) IntraMuscular once  lidocaine   4% Patch 1 Patch Transdermal daily  magnesium oxide 400 milliGRAM(s) Oral daily  magnesium sulfate  IVPB 2 Gram(s) IV Intermittent once  multivitamin 1 Tablet(s) Oral daily  NIFEdipine XL 60 milliGRAM(s) Oral daily  nystatin Powder 1 Application(s) Topical two times a day  predniSONE   Tablet 10 milliGRAM(s) Oral two times a day  primidone 50 milliGRAM(s) Oral at bedtime  promethazine 50 milliGRAM(s) Oral daily  sildenafil (REVATIO) 20 milliGRAM(s) Oral three times a day  spironolactone 50 milliGRAM(s) Oral daily  tamsulosin 0.4 milliGRAM(s) Oral at bedtime  traZODone 100 milliGRAM(s) Oral at bedtime  valACYclovir 500 milliGRAM(s) Oral at bedtime    MEDICATIONS  (PRN):  acetaminophen   Tablet .. 650 milliGRAM(s) Oral every 6 hours PRN Moderate Pain (4 - 6)  diazepam    Tablet 10 milliGRAM(s) Oral every 8 hours PRN anxiety  magnesium hydroxide Suspension 30 milliLiter(s) Oral daily PRN Constipation  melatonin 5 milliGRAM(s) Oral at bedtime PRN Insomnia  oxycodone    5 mG/acetaminophen 325 mG 1 Tablet(s) Oral every 12 hours PRN Severe Pain (7 - 10)  simethicone 80 milliGRAM(s) Chew daily PRN Indigestion

## 2021-10-13 NOTE — PROGRESS NOTE ADULT - SUBJECTIVE AND OBJECTIVE BOX
HPI:  72 yo F with PMHx of COPD, Asthma uses home oxygen O2 NC 2L, chronic back pain, cervical radiculopathy, Pulmonary Hypertension, hx of [place] palsy, Gout, CKD 3, DLD, HTN, recent L ankle fx presents for ankle fx repair. Pt was reportedly scheduled for repair of ankle with Dr. Clemons but pt was unable to obtain appointments for Pulmonary (Dr. Evans) and Cardiac (Dr. Flood) clearance due to difficulties getting to appointments, inability to afford ambulette service and therefore, procedure canceled. Was advised by Dr. Clemons to report to hospital to obtain clearance and proceed with procedure.   Pt states she has been having shortness of breath for years, often has difficulties getting around due to shortness of breath, also has chronic pains in neck, back, RUE.  In the ED, T 98.9, /52, HR 79, RR 18, SpO2 96% on O2 NC 2L.  (21 Sep 2021 18:26)    Currently admitted to medicine with the primary diagnosis of Fracture, ankle       Today is hospital day 22d.     INTERVAL HPI / OVERNIGHT EVENTS:  Patient was examined and seen at bedside. This morning she is resting comfortably in bed and reports no new issues or overnight events.     ROS: Otherwise unremarkable     PAST MEDICAL & SURGICAL HISTORY  History of neck pain    Spinal stenosis of lumbar region with radiculopathy    Anxiety    Depression    Osteoarthritis    H/O osteoporosis    History of pulmonary hypertension    H/O pulmonary hypertension    COPD (chronic obstructive pulmonary disease)    Hyperlipidemia    H/O total knee replacement, right    Failed spinal cord stimulator      ALLERGIES  adhesives (Pruritus; Rash)  No Known Drug Allergies    MEDICATIONS  STANDING MEDICATIONS  allopurinol 300 milliGRAM(s) Oral at bedtime  apixaban 5 milliGRAM(s) Oral two times a day  ATENolol  Tablet 50 milliGRAM(s) Oral daily  atorvastatin 80 milliGRAM(s) Oral at bedtime  budesonide 160 MICROgram(s)/formoterol 4.5 MICROgram(s) Inhaler 2 Puff(s) Inhalation two times a day  cholecalciferol 400 Unit(s) Oral daily  colchicine 0.6 milliGRAM(s) Oral daily  FLUoxetine 20 milliGRAM(s) Oral two times a day  fluticasone propionate 50 MICROgram(s)/spray Nasal Spray 1 Spray(s) Both Nostrils two times a day  folic acid 1 milliGRAM(s) Oral daily  furosemide    Tablet 20 milliGRAM(s) Oral daily  hyoscyamine SL 0.125 milliGRAM(s) SubLingual daily  influenza   Vaccine 0.5 milliLiter(s) IntraMuscular once  lidocaine   4% Patch 1 Patch Transdermal daily  multivitamin 1 Tablet(s) Oral daily  NIFEdipine XL 60 milliGRAM(s) Oral daily  nystatin Powder 1 Application(s) Topical two times a day  predniSONE   Tablet 10 milliGRAM(s) Oral two times a day  primidone 50 milliGRAM(s) Oral at bedtime  promethazine 50 milliGRAM(s) Oral daily  sildenafil (REVATIO) 20 milliGRAM(s) Oral three times a day  spironolactone 50 milliGRAM(s) Oral daily  tamsulosin 0.4 milliGRAM(s) Oral at bedtime  traZODone 100 milliGRAM(s) Oral at bedtime  valACYclovir 500 milliGRAM(s) Oral at bedtime    PRN MEDICATIONS  acetaminophen   Tablet .. 650 milliGRAM(s) Oral every 6 hours PRN  diazepam    Tablet 10 milliGRAM(s) Oral every 8 hours PRN  magnesium hydroxide Suspension 30 milliLiter(s) Oral daily PRN  melatonin 5 milliGRAM(s) Oral at bedtime PRN  oxycodone    5 mG/acetaminophen 325 mG 1 Tablet(s) Oral every 12 hours PRN  simethicone 80 milliGRAM(s) Chew daily PRN    ICU Vital Signs Last 24 Hrs  T(C): 36.6 (13 Oct 2021 04:46), Max: 37.1 (12 Oct 2021 13:39)  T(F): 97.9 (13 Oct 2021 04:46), Max: 98.7 (12 Oct 2021 13:39)  HR: 69 (13 Oct 2021 04:46) (69 - 73)  BP: 113/56 (13 Oct 2021 04:46) (106/49 - 113/56)  RR: 18 (13 Oct 2021 04:46) (18 - 18)      PHYSICAL EXAM  GEN: NAD, Resting comfortably in bed  PULM: Clear to auscultation bilaterally, No wheezes  CVS: Regular rate and rhythm, S1-S2, no murmurs  ABD: Soft, non-tender, distended, no guarding  EXT: No edema  NEURO: A&Ox3, no focal deficits    LABS                        10.7   14.20 )-----------( 322      ( 12 Oct 2021 11:00 )             32.5     10-12    136  |  103  |  30<H>  ----------------------------<  165<H>  4.2   |  19  |  1.2    Ca    8.8      12 Oct 2021 11:00  Mg     1.6     10-12                    RADIOLOGY     HPI:  72 yo F with PMHx of COPD, Asthma uses home oxygen O2 NC 2L, chronic back pain, cervical radiculopathy, Pulmonary Hypertension, hx of [place] palsy, Gout, CKD 3, DLD, HTN, recent L ankle fx presents for ankle fx repair. Pt was reportedly scheduled for repair of ankle with Dr. Clemons but pt was unable to obtain appointments for Pulmonary (Dr. Evans) and Cardiac (Dr. Flood) clearance due to difficulties getting to appointments, inability to afford ambulette service and therefore, procedure canceled. Was advised by Dr. Clemons to report to hospital to obtain clearance and proceed with procedure.   Pt states she has been having shortness of breath for years, often has difficulties getting around due to shortness of breath, also has chronic pains in neck, back, RUE.  In the ED, T 98.9, /52, HR 79, RR 18, SpO2 96% on O2 NC 2L.  (21 Sep 2021 18:26)    Currently admitted to medicine with the primary diagnosis of Fracture, ankle       Today is hospital day 22d.     INTERVAL HPI / OVERNIGHT EVENTS:  Patient was examined and seen at bedside. This morning she is resting comfortably in bed and reports no new issues or overnight events.     ROS: Otherwise unremarkable     PAST MEDICAL & SURGICAL HISTORY  History of neck pain    Spinal stenosis of lumbar region with radiculopathy    Anxiety    Depression    Osteoarthritis    H/O osteoporosis    History of pulmonary hypertension    H/O pulmonary hypertension    COPD (chronic obstructive pulmonary disease)    Hyperlipidemia    H/O total knee replacement, right    Failed spinal cord stimulator      ALLERGIES  adhesives (Pruritus; Rash)  No Known Drug Allergies    MEDICATIONS  STANDING MEDICATIONS  allopurinol 300 milliGRAM(s) Oral at bedtime  apixaban 5 milliGRAM(s) Oral two times a day  ATENolol  Tablet 50 milliGRAM(s) Oral daily  atorvastatin 80 milliGRAM(s) Oral at bedtime  budesonide 160 MICROgram(s)/formoterol 4.5 MICROgram(s) Inhaler 2 Puff(s) Inhalation two times a day  cholecalciferol 400 Unit(s) Oral daily  colchicine 0.6 milliGRAM(s) Oral daily  FLUoxetine 20 milliGRAM(s) Oral two times a day  fluticasone propionate 50 MICROgram(s)/spray Nasal Spray 1 Spray(s) Both Nostrils two times a day  folic acid 1 milliGRAM(s) Oral daily  furosemide    Tablet 20 milliGRAM(s) Oral daily  hyoscyamine SL 0.125 milliGRAM(s) SubLingual daily  influenza   Vaccine 0.5 milliLiter(s) IntraMuscular once  lidocaine   4% Patch 1 Patch Transdermal daily  multivitamin 1 Tablet(s) Oral daily  NIFEdipine XL 60 milliGRAM(s) Oral daily  nystatin Powder 1 Application(s) Topical two times a day  predniSONE   Tablet 10 milliGRAM(s) Oral two times a day  primidone 50 milliGRAM(s) Oral at bedtime  promethazine 50 milliGRAM(s) Oral daily  sildenafil (REVATIO) 20 milliGRAM(s) Oral three times a day  spironolactone 50 milliGRAM(s) Oral daily  tamsulosin 0.4 milliGRAM(s) Oral at bedtime  traZODone 100 milliGRAM(s) Oral at bedtime  valACYclovir 500 milliGRAM(s) Oral at bedtime    PRN MEDICATIONS  acetaminophen   Tablet .. 650 milliGRAM(s) Oral every 6 hours PRN  diazepam    Tablet 10 milliGRAM(s) Oral every 8 hours PRN  magnesium hydroxide Suspension 30 milliLiter(s) Oral daily PRN  melatonin 5 milliGRAM(s) Oral at bedtime PRN  oxycodone    5 mG/acetaminophen 325 mG 1 Tablet(s) Oral every 12 hours PRN  simethicone 80 milliGRAM(s) Chew daily PRN    ICU Vital Signs Last 24 Hrs  T(C): 36.6 (13 Oct 2021 04:46), Max: 37.1 (12 Oct 2021 13:39)  T(F): 97.9 (13 Oct 2021 04:46), Max: 98.7 (12 Oct 2021 13:39)  HR: 69 (13 Oct 2021 04:46) (69 - 73)  BP: 113/56 (13 Oct 2021 04:46) (106/49 - 113/56)  RR: 18 (13 Oct 2021 04:46) (18 - 18)      PHYSICAL EXAM  GEN: NAD, Resting comfortably in bed  PULM: Clear to auscultation bilaterally, No wheezes  CVS: Regular rate and rhythm, S1-S2, no murmurs  ABD: Soft, non-tender, distended, no guarding  EXT: No edema  NEURO: A&Ox3, no focal deficits    LABS                        9.4    9.56  )-----------( 265      ( 13 Oct 2021 05:51 )             28.5   10-13    140  |  108  |  32<H>  ----------------------------<  133<H>  3.8   |  20  |  1.2    Ca    8.6      13 Oct 2021 05:51  Mg     1.6     10-13                               RADIOLOGY

## 2021-10-13 NOTE — PROGRESS NOTE ADULT - ASSESSMENT
72 yo F with PMHx of COPD, Asthma uses home oxygen PRN, chronic back pain, cervical radiculopathy, Pulmonary Hypertension, Gout, DLD, HTN, CKD 3, recent L ankle fx presents for ankle fx repair.       # Acute Pulmonary embolism, STABLE  - likely provoked  - incidentally captured on CT abd/pelvis  - confirmed with CTA Chest, no evidence of right heart strain  - satting well on RA, HDS  - apixaban 5 milliGRAM(s) Oral two times a day for at least 3-6 months(March- May)    # Constipation, IMPROVED  #Abdominal Distention c/f Ileus   - CT A/P without obstruction   - serial abdominal exams shows improvement in distension and tightness  - s/p NGT for 24hrs  - having normal soft BMs per nursing staff     # Hypokalemia, RESOLVED    # CASSI, RESOLVED  - likely pre-renal 2/2 to decreased po intake in setting of possible obstruction    #L medial Malleolar Fx, Distal Fibular Fx s/p ORIF   - plan for dc to rehab   - Dr. Clemons following. Please discharge patient with 6 total weeks of aspirin 325mg for dvt ppx  stable in CAM boot, dc planning    #Urinary Retention   - post op retention in the setting of immobility/abdominal distention/constipation  - [place] catheter in place, TOV failed x3  - will need Urology follow up    #Chronic Back Pain  #Hx of Cervical Radiculopathy  - Sees pain management, Dr. Mayers  - C/w  Percocet PRN ( no longer on Fentanyl patch )     #Gout  - C/w Allopurinol    #Pulmonary HTN  - C/w Sildenafil, prednisone     #HTN  - C/w Atenolol, Nifedipine    #DLD  - C/w Atorvastatin    #COPD, Asthma on home O2 2L  #MARLENI  - C/w Symbicort    #Anxiety  - C/w Wellbutrin  - Valium PRN    #CKD 3  - Stable, sees Dr. Mcarthur      #Progress Note Handoff:  Pending (specify):pending placement   Family discussion: d/w patient   Disposition: CMA task, pt need new Auth # for str [facility].

## 2021-10-13 NOTE — PROGRESS NOTE ADULT - SUBJECTIVE AND OBJECTIVE BOX
· Subjective and Objective:   HPI:  70 yo F with PMHx of COPD, Asthma uses home oxygen O2 NC 2L, chronic back pain, cervical radiculopathy, Pulmonary Hypertension, hx of [place] palsy, Gout, CKD 3, DLD, HTN, recent L ankle fx presents for ankle fx repair. Pt was reportedly scheduled for repair of ankle with Dr. Clemons but pt was unable to obtain appointments for Pulmonary (Dr. Evans) and Cardiac (Dr. Flood) clearance due to difficulties getting to appointments, inability to afford ambulette service and therefore, procedure canceled. Was advised by Dr. Clemons to report to hospital to obtain clearance and proceed with procedure.   Pt states she has been having shortness of breath for years, often has difficulties getting around due to shortness of breath, also has chronic pains in neck, back, RUE.  In the ED, T 98.9, /52, HR 79, RR 18, SpO2 96% on O2 NC 2L.  (21 Sep 2021 18:26)    Currently admitted to medicine with the primary diagnosis of Fracture, ankle       Today is hospital day 22d.     INTERVAL HPI / OVERNIGHT EVENTS:  Patient was examined and seen at bedside. This morning she is resting comfortably in bed and reports no new issues or overnight events.     ROS: Otherwise unremarkable     PAST MEDICAL & SURGICAL HISTORY  History of neck pain    Spinal stenosis of lumbar region with radiculopathy    Anxiety    Depression    Osteoarthritis    H/O osteoporosis    History of pulmonary hypertension    H/O pulmonary hypertension    COPD (chronic obstructive pulmonary disease)    Hyperlipidemia    H/O total knee replacement, right    Failed spinal cord stimulator      ALLERGIES  adhesives (Pruritus; Rash)  No Known Drug Allergies    MEDICATIONS  STANDING MEDICATIONS  allopurinol 300 milliGRAM(s) Oral at bedtime  apixaban 5 milliGRAM(s) Oral two times a day  ATENolol  Tablet 50 milliGRAM(s) Oral daily  atorvastatin 80 milliGRAM(s) Oral at bedtime  budesonide 160 MICROgram(s)/formoterol 4.5 MICROgram(s) Inhaler 2 Puff(s) Inhalation two times a day  cholecalciferol 400 Unit(s) Oral daily  colchicine 0.6 milliGRAM(s) Oral daily  FLUoxetine 20 milliGRAM(s) Oral two times a day  fluticasone propionate 50 MICROgram(s)/spray Nasal Spray 1 Spray(s) Both Nostrils two times a day  folic acid 1 milliGRAM(s) Oral daily  furosemide    Tablet 20 milliGRAM(s) Oral daily  hyoscyamine SL 0.125 milliGRAM(s) SubLingual daily  influenza   Vaccine 0.5 milliLiter(s) IntraMuscular once  lidocaine   4% Patch 1 Patch Transdermal daily  multivitamin 1 Tablet(s) Oral daily  NIFEdipine XL 60 milliGRAM(s) Oral daily  nystatin Powder 1 Application(s) Topical two times a day  predniSONE   Tablet 10 milliGRAM(s) Oral two times a day  primidone 50 milliGRAM(s) Oral at bedtime  promethazine 50 milliGRAM(s) Oral daily  sildenafil (REVATIO) 20 milliGRAM(s) Oral three times a day  spironolactone 50 milliGRAM(s) Oral daily  tamsulosin 0.4 milliGRAM(s) Oral at bedtime  traZODone 100 milliGRAM(s) Oral at bedtime  valACYclovir 500 milliGRAM(s) Oral at bedtime    PRN MEDICATIONS  acetaminophen   Tablet .. 650 milliGRAM(s) Oral every 6 hours PRN  diazepam    Tablet 10 milliGRAM(s) Oral every 8 hours PRN  magnesium hydroxide Suspension 30 milliLiter(s) Oral daily PRN  melatonin 5 milliGRAM(s) Oral at bedtime PRN  oxycodone    5 mG/acetaminophen 325 mG 1 Tablet(s) Oral every 12 hours PRN  simethicone 80 milliGRAM(s) Chew daily PRN    ICU Vital Signs Last 24 Hrs  T(C): 36.6 (13 Oct 2021 04:46), Max: 37.1 (12 Oct 2021 13:39)  T(F): 97.9 (13 Oct 2021 04:46), Max: 98.7 (12 Oct 2021 13:39)  HR: 69 (13 Oct 2021 04:46) (69 - 73)  BP: 113/56 (13 Oct 2021 04:46) (106/49 - 113/56)  RR: 18 (13 Oct 2021 04:46) (18 - 18)      PHYSICAL EXAM  GENERAL: elderly M, NAD, non toxic  EYES: anicteric sclera, non injected conjunctiva, EOMI  ENT: oropharynx clear, MMM, fair dentition  PSYCH: no agitation, baseline mentation  NERVOUS SYSTEM:  Alert & Oriented X3, Motor Strength 5/5 B/L upper and lower extremities; Sensory intact  PULMONARY: Clear to percussion bilaterally; No rales, rhonchi, wheezing, or rubs  CARDIOVASCULAR: Regular rate and rhythm; No murmurs, rubs, or gallops  GI: Soft, Nontender, Nondistended; Bowel sounds   EXTREMITIES:  2+ Peripheral Pulses, No clubbing, cyanosis, or edema  LYMPH: No lymphadenopathy noted  SKIN: No rashes or lesions    LABS                        9.4    9.56  )-----------( 265      ( 13 Oct 2021 05:51 )             28.5   10-13    140  |  108  |  32<H>  ----------------------------<  133<H>  3.8   |  20  |  1.2    Ca    8.6      13 Oct 2021 05:51  Mg     1.6     10-13

## 2021-10-14 ENCOUNTER — TRANSCRIPTION ENCOUNTER (OUTPATIENT)
Age: 71
End: 2021-10-14

## 2021-10-14 VITALS
RESPIRATION RATE: 18 BRPM | HEART RATE: 80 BPM | TEMPERATURE: 98 F | SYSTOLIC BLOOD PRESSURE: 119 MMHG | DIASTOLIC BLOOD PRESSURE: 58 MMHG

## 2021-10-14 LAB
ANION GAP SERPL CALC-SCNC: 12 MMOL/L — SIGNIFICANT CHANGE UP (ref 7–14)
BASOPHILS # BLD AUTO: 0.01 K/UL — SIGNIFICANT CHANGE UP (ref 0–0.2)
BASOPHILS NFR BLD AUTO: 0.1 % — SIGNIFICANT CHANGE UP (ref 0–1)
BUN SERPL-MCNC: 31 MG/DL — HIGH (ref 10–20)
CALCIUM SERPL-MCNC: 8.9 MG/DL — SIGNIFICANT CHANGE UP (ref 8.5–10.1)
CHLORIDE SERPL-SCNC: 102 MMOL/L — SIGNIFICANT CHANGE UP (ref 98–110)
CO2 SERPL-SCNC: 20 MMOL/L — SIGNIFICANT CHANGE UP (ref 17–32)
CREAT SERPL-MCNC: 1.2 MG/DL — SIGNIFICANT CHANGE UP (ref 0.7–1.5)
EOSINOPHIL # BLD AUTO: 0.03 K/UL — SIGNIFICANT CHANGE UP (ref 0–0.7)
EOSINOPHIL NFR BLD AUTO: 0.3 % — SIGNIFICANT CHANGE UP (ref 0–8)
GLUCOSE SERPL-MCNC: 119 MG/DL — HIGH (ref 70–99)
HCT VFR BLD CALC: 30.5 % — LOW (ref 37–47)
HGB BLD-MCNC: 9.8 G/DL — LOW (ref 12–16)
IMM GRANULOCYTES NFR BLD AUTO: 0.8 % — HIGH (ref 0.1–0.3)
LYMPHOCYTES # BLD AUTO: 1 K/UL — LOW (ref 1.2–3.4)
LYMPHOCYTES # BLD AUTO: 9.3 % — LOW (ref 20.5–51.1)
MAGNESIUM SERPL-MCNC: 2.1 MG/DL — SIGNIFICANT CHANGE UP (ref 1.8–2.4)
MCHC RBC-ENTMCNC: 32.1 G/DL — SIGNIFICANT CHANGE UP (ref 32–37)
MCHC RBC-ENTMCNC: 35.4 PG — HIGH (ref 27–31)
MCV RBC AUTO: 110.1 FL — HIGH (ref 81–99)
MONOCYTES # BLD AUTO: 0.65 K/UL — HIGH (ref 0.1–0.6)
MONOCYTES NFR BLD AUTO: 6 % — SIGNIFICANT CHANGE UP (ref 1.7–9.3)
NEUTROPHILS # BLD AUTO: 8.98 K/UL — HIGH (ref 1.4–6.5)
NEUTROPHILS NFR BLD AUTO: 83.5 % — HIGH (ref 42.2–75.2)
NRBC # BLD: 0 /100 WBCS — SIGNIFICANT CHANGE UP (ref 0–0)
PLATELET # BLD AUTO: 266 K/UL — SIGNIFICANT CHANGE UP (ref 130–400)
POTASSIUM SERPL-MCNC: 4.3 MMOL/L — SIGNIFICANT CHANGE UP (ref 3.5–5)
POTASSIUM SERPL-SCNC: 4.3 MMOL/L — SIGNIFICANT CHANGE UP (ref 3.5–5)
RBC # BLD: 2.77 M/UL — LOW (ref 4.2–5.4)
RBC # FLD: 14.2 % — SIGNIFICANT CHANGE UP (ref 11.5–14.5)
SODIUM SERPL-SCNC: 134 MMOL/L — LOW (ref 135–146)
WBC # BLD: 10.76 K/UL — SIGNIFICANT CHANGE UP (ref 4.8–10.8)
WBC # FLD AUTO: 10.76 K/UL — SIGNIFICANT CHANGE UP (ref 4.8–10.8)

## 2021-10-14 PROCEDURE — 99239 HOSP IP/OBS DSCHRG MGMT >30: CPT

## 2021-10-14 RX ADMIN — Medication 400 UNIT(S): at 11:41

## 2021-10-14 RX ADMIN — LIDOCAINE 1 PATCH: 4 CREAM TOPICAL at 00:43

## 2021-10-14 RX ADMIN — Medication 0.12 MILLIGRAM(S): at 11:40

## 2021-10-14 RX ADMIN — Medication 10 MILLIGRAM(S): at 05:41

## 2021-10-14 RX ADMIN — MAGNESIUM OXIDE 400 MG ORAL TABLET 400 MILLIGRAM(S): 241.3 TABLET ORAL at 11:41

## 2021-10-14 RX ADMIN — Medication 20 MILLIGRAM(S): at 05:41

## 2021-10-14 RX ADMIN — BUDESONIDE AND FORMOTEROL FUMARATE DIHYDRATE 2 PUFF(S): 160; 4.5 AEROSOL RESPIRATORY (INHALATION) at 11:39

## 2021-10-14 RX ADMIN — APIXABAN 5 MILLIGRAM(S): 2.5 TABLET, FILM COATED ORAL at 05:39

## 2021-10-14 RX ADMIN — Medication 1 MILLIGRAM(S): at 11:40

## 2021-10-14 RX ADMIN — Medication 1 TABLET(S): at 11:40

## 2021-10-14 RX ADMIN — NYSTATIN CREAM 1 APPLICATION(S): 100000 CREAM TOPICAL at 05:41

## 2021-10-14 RX ADMIN — Medication 10 MILLIGRAM(S): at 11:39

## 2021-10-14 RX ADMIN — Medication 1 SPRAY(S): at 05:40

## 2021-10-14 RX ADMIN — LIDOCAINE 1 PATCH: 4 CREAM TOPICAL at 11:40

## 2021-10-14 RX ADMIN — Medication 60 MILLIGRAM(S): at 05:40

## 2021-10-14 RX ADMIN — Medication 20 MILLIGRAM(S): at 05:40

## 2021-10-14 RX ADMIN — Medication 0.6 MILLIGRAM(S): at 11:40

## 2021-10-14 RX ADMIN — Medication 50 MILLIGRAM(S): at 11:40

## 2021-10-14 RX ADMIN — SPIRONOLACTONE 50 MILLIGRAM(S): 25 TABLET, FILM COATED ORAL at 05:42

## 2021-10-14 NOTE — DISCHARGE NOTE NURSING/CASE MANAGEMENT/SOCIAL WORK - NSDCVIVACCINE_GEN_ALL_CORE_FT
Tdap; 29-Aug-2019 16:27; Vangie Mojica ([employer]); [employer]; [id number] (Exp. Date: 04-Jul-2021); IntraMuscular; Deltoid Left.; 0.5 milliLiter(s); VIS (VIS Published: 09-May-2013, VIS Presented: 29-Aug-2019);

## 2021-10-14 NOTE — DISCHARGE NOTE NURSING/CASE MANAGEMENT/SOCIAL WORK - PATIENT PORTAL LINK FT
You can access the FollowMyHealth Patient Portal offered by [facility] by registering at the following website: [email or web address]. By joining [employer]’s FollowMyHealth portal, you will also be able to view your health information using other applications (apps) compatible with our system.

## 2021-11-30 ENCOUNTER — INPATIENT (INPATIENT)
Facility: HOSPITAL | Age: 71
LOS: 8 days | Discharge: NURSING, REHAB OR LONG-TERM CARE | End: 2021-12-09
Attending: STUDENT IN AN ORGANIZED HEALTH CARE EDUCATION/TRAINING PROGRAM | Admitting: STUDENT IN AN ORGANIZED HEALTH CARE EDUCATION/TRAINING PROGRAM
Payer: MEDICARE

## 2021-11-30 VITALS
HEART RATE: 72 BPM | TEMPERATURE: 97 F | OXYGEN SATURATION: 95 % | DIASTOLIC BLOOD PRESSURE: 68 MMHG | WEIGHT: 164.91 LBS | SYSTOLIC BLOOD PRESSURE: 120 MMHG | RESPIRATION RATE: 17 BRPM | HEIGHT: 59 IN

## 2021-11-30 DIAGNOSIS — T84.625A INFECTION AND INFLAMMATORY REACTION DUE TO INTERNAL FIXATION DEVICE OF LEFT FIBULA, INITIAL ENCOUNTER: ICD-10-CM
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
## 2021-11-30 DIAGNOSIS — Z86.711 PERSONAL HISTORY OF PULMONARY EMBOLISM: ICD-10-CM

## 2021-11-30 DIAGNOSIS — S82.62XK: ICD-10-CM

## 2021-11-30 DIAGNOSIS — F41.9 ANXIETY DISORDER, UNSPECIFIED: ICD-10-CM

## 2021-11-30 DIAGNOSIS — E83.42 HYPOMAGNESEMIA: ICD-10-CM

## 2021-11-30 DIAGNOSIS — N17.9 ACUTE KIDNEY FAILURE, UNSPECIFIED: ICD-10-CM

## 2021-11-30 DIAGNOSIS — M25.472 EFFUSION, LEFT ANKLE: ICD-10-CM

## 2021-11-30 DIAGNOSIS — M10.9 GOUT, UNSPECIFIED: ICD-10-CM

## 2021-11-30 DIAGNOSIS — F32.9 MAJOR DEPRESSIVE DISORDER, SINGLE EPISODE, UNSPECIFIED: ICD-10-CM

## 2021-11-30 DIAGNOSIS — M50.10 CERVICAL DISC DISORDER WITH RADICULOPATHY, UNSPECIFIED CERVICAL REGION: ICD-10-CM

## 2021-11-30 DIAGNOSIS — E87.6 HYPOKALEMIA: ICD-10-CM

## 2021-11-30 DIAGNOSIS — I27.20 PULMONARY HYPERTENSION, UNSPECIFIED: ICD-10-CM

## 2021-11-30 DIAGNOSIS — A04.72 ENTEROCOLITIS DUE TO CLOSTRIDIUM DIFFICILE, NOT SPECIFIED AS RECURRENT: ICD-10-CM

## 2021-11-30 DIAGNOSIS — N18.30 CHRONIC KIDNEY DISEASE, STAGE 3 UNSPECIFIED: ICD-10-CM

## 2021-11-30 DIAGNOSIS — Z79.01 LONG TERM (CURRENT) USE OF ANTICOAGULANTS: ICD-10-CM

## 2021-11-30 DIAGNOSIS — T84.623A INFECTION AND INFLAMMATORY REACTION DUE TO INTERNAL FIXATION DEVICE OF LEFT TIBIA, INITIAL ENCOUNTER: ICD-10-CM

## 2021-11-30 DIAGNOSIS — Z96.651 PRESENCE OF RIGHT ARTIFICIAL KNEE JOINT: Chronic | ICD-10-CM

## 2021-11-30 DIAGNOSIS — G47.33 OBSTRUCTIVE SLEEP APNEA (ADULT) (PEDIATRIC): ICD-10-CM

## 2021-11-30 DIAGNOSIS — J44.9 CHRONIC OBSTRUCTIVE PULMONARY DISEASE, UNSPECIFIED: ICD-10-CM

## 2021-11-30 DIAGNOSIS — J98.11 ATELECTASIS: ICD-10-CM

## 2021-11-30 DIAGNOSIS — T81.31XA DISRUPTION OF EXTERNAL OPERATION (SURGICAL) WOUND, NOT ELSEWHERE CLASSIFIED, INITIAL ENCOUNTER: ICD-10-CM

## 2021-11-30 DIAGNOSIS — T84.84XA PAIN DUE TO INTERNAL ORTHOPEDIC PROSTHETIC DEVICES, IMPLANTS AND GRAFTS, INITIAL ENCOUNTER: ICD-10-CM

## 2021-11-30 DIAGNOSIS — T85.192A OTHER MECHANICAL COMPLICATION OF IMPLANTED ELECTRONIC NEUROSTIMULATOR OF SPINAL CORD ELECTRODE (LEAD), INITIAL ENCOUNTER: Chronic | ICD-10-CM

## 2021-11-30 DIAGNOSIS — I08.3 COMBINED RHEUMATIC DISORDERS OF MITRAL, AORTIC AND TRICUSPID VALVES: ICD-10-CM

## 2021-11-30 DIAGNOSIS — Z96.651 PRESENCE OF RIGHT ARTIFICIAL KNEE JOINT: ICD-10-CM

## 2021-11-30 DIAGNOSIS — E78.5 HYPERLIPIDEMIA, UNSPECIFIED: ICD-10-CM

## 2021-11-30 DIAGNOSIS — B95.62 METHICILLIN RESISTANT STAPHYLOCOCCUS AUREUS INFECTION AS THE CAUSE OF DISEASES CLASSIFIED ELSEWHERE: ICD-10-CM

## 2021-11-30 DIAGNOSIS — I12.9 HYPERTENSIVE CHRONIC KIDNEY DISEASE WITH STAGE 1 THROUGH STAGE 4 CHRONIC KIDNEY DISEASE, OR UNSPECIFIED CHRONIC KIDNEY DISEASE: ICD-10-CM

## 2021-11-30 DIAGNOSIS — D53.9 NUTRITIONAL ANEMIA, UNSPECIFIED: ICD-10-CM

## 2021-11-30 DIAGNOSIS — M81.0 AGE-RELATED OSTEOPOROSIS WITHOUT CURRENT PATHOLOGICAL FRACTURE: ICD-10-CM

## 2021-11-30 DIAGNOSIS — Z99.81 DEPENDENCE ON SUPPLEMENTAL OXYGEN: ICD-10-CM

## 2021-11-30 DIAGNOSIS — M19.90 UNSPECIFIED OSTEOARTHRITIS, UNSPECIFIED SITE: ICD-10-CM

## 2021-11-30 LAB
ALBUMIN SERPL ELPH-MCNC: 3.7 G/DL — SIGNIFICANT CHANGE UP (ref 3.5–5.2)
ALP SERPL-CCNC: 100 U/L — SIGNIFICANT CHANGE UP (ref 30–115)
ALT FLD-CCNC: 14 U/L — SIGNIFICANT CHANGE UP (ref 0–41)
ANION GAP SERPL CALC-SCNC: 14 MMOL/L — SIGNIFICANT CHANGE UP (ref 7–14)
APTT BLD: 30.2 SEC — SIGNIFICANT CHANGE UP (ref 27–39.2)
AST SERPL-CCNC: 21 U/L — SIGNIFICANT CHANGE UP (ref 0–41)
BASOPHILS # BLD AUTO: 0.05 K/UL — SIGNIFICANT CHANGE UP (ref 0–0.2)
BASOPHILS NFR BLD AUTO: 0.5 % — SIGNIFICANT CHANGE UP (ref 0–1)
BILIRUB SERPL-MCNC: 0.3 MG/DL — SIGNIFICANT CHANGE UP (ref 0.2–1.2)
BLD GP AB SCN SERPL QL: SIGNIFICANT CHANGE UP
BUN SERPL-MCNC: 17 MG/DL — SIGNIFICANT CHANGE UP (ref 10–20)
CALCIUM SERPL-MCNC: 9.1 MG/DL — SIGNIFICANT CHANGE UP (ref 8.5–10.1)
CHLORIDE SERPL-SCNC: 107 MMOL/L — SIGNIFICANT CHANGE UP (ref 98–110)
CO2 SERPL-SCNC: 17 MMOL/L — SIGNIFICANT CHANGE UP (ref 17–32)
CREAT SERPL-MCNC: 1.1 MG/DL — SIGNIFICANT CHANGE UP (ref 0.7–1.5)
EOSINOPHIL # BLD AUTO: 0.03 K/UL — SIGNIFICANT CHANGE UP (ref 0–0.7)
EOSINOPHIL NFR BLD AUTO: 0.3 % — SIGNIFICANT CHANGE UP (ref 0–8)
GLUCOSE SERPL-MCNC: 95 MG/DL — SIGNIFICANT CHANGE UP (ref 70–99)
HCT VFR BLD CALC: 35.3 % — LOW (ref 37–47)
HGB BLD-MCNC: 11.1 G/DL — LOW (ref 12–16)
IMM GRANULOCYTES NFR BLD AUTO: 0.8 % — HIGH (ref 0.1–0.3)
INR BLD: 1.12 RATIO — SIGNIFICANT CHANGE UP (ref 0.65–1.3)
LYMPHOCYTES # BLD AUTO: 1.18 K/UL — LOW (ref 1.2–3.4)
LYMPHOCYTES # BLD AUTO: 10.9 % — LOW (ref 20.5–51.1)
MCHC RBC-ENTMCNC: 31.4 G/DL — LOW (ref 32–37)
MCHC RBC-ENTMCNC: 33.6 PG — HIGH (ref 27–31)
MCV RBC AUTO: 107 FL — HIGH (ref 81–99)
MONOCYTES # BLD AUTO: 0.66 K/UL — HIGH (ref 0.1–0.6)
MONOCYTES NFR BLD AUTO: 6.1 % — SIGNIFICANT CHANGE UP (ref 1.7–9.3)
NEUTROPHILS # BLD AUTO: 8.78 K/UL — HIGH (ref 1.4–6.5)
NEUTROPHILS NFR BLD AUTO: 81.4 % — HIGH (ref 42.2–75.2)
NRBC # BLD: 0 /100 WBCS — SIGNIFICANT CHANGE UP (ref 0–0)
PLATELET # BLD AUTO: 278 K/UL — SIGNIFICANT CHANGE UP (ref 130–400)
POTASSIUM SERPL-MCNC: 4.7 MMOL/L — SIGNIFICANT CHANGE UP (ref 3.5–5)
POTASSIUM SERPL-SCNC: 4.7 MMOL/L — SIGNIFICANT CHANGE UP (ref 3.5–5)
PROT SERPL-MCNC: 5.9 G/DL — LOW (ref 6–8)
PROTHROM AB SERPL-ACNC: 12.9 SEC — HIGH (ref 9.95–12.87)
RBC # BLD: 3.3 M/UL — LOW (ref 4.2–5.4)
RBC # FLD: 15.4 % — HIGH (ref 11.5–14.5)
SARS-COV-2 RNA SPEC QL NAA+PROBE: SIGNIFICANT CHANGE UP
SODIUM SERPL-SCNC: 138 MMOL/L — SIGNIFICANT CHANGE UP (ref 135–146)
WBC # BLD: 10.79 K/UL — SIGNIFICANT CHANGE UP (ref 4.8–10.8)
WBC # FLD AUTO: 10.79 K/UL — SIGNIFICANT CHANGE UP (ref 4.8–10.8)

## 2021-11-30 PROCEDURE — 99285 EMERGENCY DEPT VISIT HI MDM: CPT

## 2021-11-30 PROCEDURE — 73610 X-RAY EXAM OF ANKLE: CPT | Mod: 26,LT

## 2021-11-30 PROCEDURE — 93010 ELECTROCARDIOGRAM REPORT: CPT

## 2021-11-30 PROCEDURE — 71045 X-RAY EXAM CHEST 1 VIEW: CPT | Mod: 26

## 2021-11-30 RX ORDER — ENOXAPARIN SODIUM 100 MG/ML
70 INJECTION SUBCUTANEOUS
Refills: 0 | Status: DISCONTINUED | OUTPATIENT
Start: 2021-11-30 | End: 2021-12-02

## 2021-11-30 RX ORDER — DIAZEPAM 5 MG
10 TABLET ORAL THREE TIMES A DAY
Refills: 0 | Status: DISCONTINUED | OUTPATIENT
Start: 2021-11-30 | End: 2021-12-02

## 2021-11-30 RX ORDER — LANOLIN ALCOHOL/MO/W.PET/CERES
5 CREAM (GRAM) TOPICAL AT BEDTIME
Refills: 0 | Status: DISCONTINUED | OUTPATIENT
Start: 2021-11-30 | End: 2021-12-02

## 2021-11-30 RX ORDER — TRAZODONE HCL 50 MG
100 TABLET ORAL AT BEDTIME
Refills: 0 | Status: DISCONTINUED | OUTPATIENT
Start: 2021-11-30 | End: 2021-12-02

## 2021-11-30 RX ORDER — FUROSEMIDE 40 MG
20 TABLET ORAL DAILY
Refills: 0 | Status: DISCONTINUED | OUTPATIENT
Start: 2021-11-30 | End: 2021-12-02

## 2021-11-30 RX ORDER — ATENOLOL 25 MG/1
50 TABLET ORAL DAILY
Refills: 0 | Status: DISCONTINUED | OUTPATIENT
Start: 2021-11-30 | End: 2021-12-02

## 2021-11-30 RX ORDER — CHOLESTYRAMINE 4 G/9G
4 POWDER, FOR SUSPENSION ORAL
Refills: 0 | Status: DISCONTINUED | OUTPATIENT
Start: 2021-11-30 | End: 2021-11-30

## 2021-11-30 RX ORDER — HYOSCYAMINE SULFATE 0.13 MG
0.12 TABLET ORAL DAILY
Refills: 0 | Status: DISCONTINUED | OUTPATIENT
Start: 2021-11-30 | End: 2021-12-02

## 2021-11-30 RX ORDER — BUPROPION HYDROCHLORIDE 150 MG/1
150 TABLET, EXTENDED RELEASE ORAL DAILY
Refills: 0 | Status: DISCONTINUED | OUTPATIENT
Start: 2021-11-30 | End: 2021-12-02

## 2021-11-30 RX ORDER — CHOLECALCIFEROL (VITAMIN D3) 125 MCG
400 CAPSULE ORAL DAILY
Refills: 0 | Status: DISCONTINUED | OUTPATIENT
Start: 2021-11-30 | End: 2021-12-02

## 2021-11-30 RX ORDER — APIXABAN 2.5 MG/1
5 TABLET, FILM COATED ORAL
Refills: 0 | Status: DISCONTINUED | OUTPATIENT
Start: 2021-11-30 | End: 2021-11-30

## 2021-11-30 RX ORDER — SPIRONOLACTONE 25 MG/1
50 TABLET, FILM COATED ORAL DAILY
Refills: 0 | Status: DISCONTINUED | OUTPATIENT
Start: 2021-11-30 | End: 2021-12-02

## 2021-11-30 RX ORDER — FLUTICASONE PROPIONATE 220 MCG
1 AEROSOL WITH ADAPTER (GRAM) INHALATION
Qty: 0 | Refills: 0 | DISCHARGE

## 2021-11-30 RX ORDER — OXYCODONE AND ACETAMINOPHEN 5; 325 MG/1; MG/1
1 TABLET ORAL EVERY 8 HOURS
Refills: 0 | Status: DISCONTINUED | OUTPATIENT
Start: 2021-11-30 | End: 2021-12-02

## 2021-11-30 RX ORDER — MAGNESIUM CARBONATE 54 MG/5 ML
1 LIQUID (ML) ORAL
Qty: 0 | Refills: 0 | DISCHARGE

## 2021-11-30 RX ORDER — SIMETHICONE 80 MG/1
80 TABLET, CHEWABLE ORAL DAILY
Refills: 0 | Status: DISCONTINUED | OUTPATIENT
Start: 2021-11-30 | End: 2021-12-02

## 2021-11-30 RX ORDER — ALLOPURINOL 300 MG
300 TABLET ORAL AT BEDTIME
Refills: 0 | Status: DISCONTINUED | OUTPATIENT
Start: 2021-11-30 | End: 2021-12-02

## 2021-11-30 RX ORDER — ATORVASTATIN CALCIUM 80 MG/1
80 TABLET, FILM COATED ORAL AT BEDTIME
Refills: 0 | Status: DISCONTINUED | OUTPATIENT
Start: 2021-11-30 | End: 2021-12-02

## 2021-11-30 RX ORDER — MAGNESIUM OXIDE 400 MG ORAL TABLET 241.3 MG
400 TABLET ORAL DAILY
Refills: 0 | Status: DISCONTINUED | OUTPATIENT
Start: 2021-11-30 | End: 2021-12-02

## 2021-11-30 RX ORDER — NIFEDIPINE 30 MG
60 TABLET, EXTENDED RELEASE 24 HR ORAL DAILY
Refills: 0 | Status: DISCONTINUED | OUTPATIENT
Start: 2021-11-30 | End: 2021-12-02

## 2021-11-30 RX ORDER — FLUOXETINE HCL 10 MG
20 CAPSULE ORAL
Refills: 0 | Status: DISCONTINUED | OUTPATIENT
Start: 2021-11-30 | End: 2021-12-02

## 2021-11-30 RX ORDER — ALBUTEROL 90 UG/1
2 AEROSOL, METERED ORAL EVERY 6 HOURS
Refills: 0 | Status: DISCONTINUED | OUTPATIENT
Start: 2021-11-30 | End: 2021-12-02

## 2021-11-30 RX ORDER — TAMSULOSIN HYDROCHLORIDE 0.4 MG/1
0.4 CAPSULE ORAL AT BEDTIME
Refills: 0 | Status: DISCONTINUED | OUTPATIENT
Start: 2021-11-30 | End: 2021-12-02

## 2021-11-30 RX ORDER — VANCOMYCIN HCL 1 G
125 VIAL (EA) INTRAVENOUS EVERY 6 HOURS
Refills: 0 | Status: DISCONTINUED | OUTPATIENT
Start: 2021-11-30 | End: 2021-12-02

## 2021-11-30 RX ORDER — VALACYCLOVIR 500 MG/1
500 TABLET, FILM COATED ORAL DAILY
Refills: 0 | Status: DISCONTINUED | OUTPATIENT
Start: 2021-11-30 | End: 2021-12-02

## 2021-11-30 RX ORDER — ACETAMINOPHEN 500 MG
650 TABLET ORAL EVERY 6 HOURS
Refills: 0 | Status: DISCONTINUED | OUTPATIENT
Start: 2021-11-30 | End: 2021-12-02

## 2021-11-30 RX ORDER — BUDESONIDE AND FORMOTEROL FUMARATE DIHYDRATE 160; 4.5 UG/1; UG/1
2 AEROSOL RESPIRATORY (INHALATION)
Refills: 0 | Status: DISCONTINUED | OUTPATIENT
Start: 2021-11-30 | End: 2021-12-02

## 2021-11-30 RX ORDER — FOLIC ACID 0.8 MG
1 TABLET ORAL DAILY
Refills: 0 | Status: DISCONTINUED | OUTPATIENT
Start: 2021-11-30 | End: 2021-12-02

## 2021-11-30 RX ORDER — PRIMIDONE 250 MG/1
50 TABLET ORAL AT BEDTIME
Refills: 0 | Status: DISCONTINUED | OUTPATIENT
Start: 2021-11-30 | End: 2021-12-02

## 2021-11-30 RX ORDER — COLCHICINE 0.6 MG
0.6 TABLET ORAL DAILY
Refills: 0 | Status: DISCONTINUED | OUTPATIENT
Start: 2021-11-30 | End: 2021-12-02

## 2021-11-30 RX ADMIN — ATORVASTATIN CALCIUM 80 MILLIGRAM(S): 80 TABLET, FILM COATED ORAL at 22:02

## 2021-11-30 RX ADMIN — TAMSULOSIN HYDROCHLORIDE 0.4 MILLIGRAM(S): 0.4 CAPSULE ORAL at 22:02

## 2021-11-30 RX ADMIN — ENOXAPARIN SODIUM 70 MILLIGRAM(S): 100 INJECTION SUBCUTANEOUS at 22:01

## 2021-11-30 RX ADMIN — PRIMIDONE 50 MILLIGRAM(S): 250 TABLET ORAL at 22:02

## 2021-11-30 RX ADMIN — Medication 300 MILLIGRAM(S): at 22:02

## 2021-11-30 NOTE — ED ADULT TRIAGE NOTE - CHIEF COMPLAINT QUOTE
Pt BIBA from rehab complaining of left ankle pain. Pt had surgery in Sept. Pt BIBA from rehab complaining of left ankle pain. Pt had surgery in Sept. states Pin is sticking through skin

## 2021-11-30 NOTE — ED ADULT NURSE NOTE - NSIMPLEMENTINTERV_GEN_ALL_ED
Implemented All Fall with Harm Risk Interventions:  [place] to call system. Call bell, personal items and telephone within reach. Instruct patient to call for assistance. Room bathroom lighting operational. Non-slip footwear when patient is off stretcher. Physically safe environment: no spills, clutter or unnecessary equipment. Stretcher in lowest position, wheels locked, appropriate side rails in place. Provide visual cue, wrist band, yellow gown, etc. Monitor gait and stability. Monitor for mental status changes and reorient to person, place, and time. Review medications for side effects contributing to fall risk. Reinforce activity limits and safety measures with patient and family. Provide visual clues: red socks.

## 2021-11-30 NOTE — PATIENT PROFILE ADULT - FALL HARM RISK - HARM RISK INTERVENTIONS

## 2021-11-30 NOTE — ED ADULT NURSE NOTE - NSICDXFAMILYHX_GEN_ALL_CORE_FT
Zio Patch was placed on 08/24/2020 at 3:39 pm. Patient instructed on how to use the device and their questions were answered. Instructed the patient on how to remove the device and mail it back via the pre-addressed and pre-postage box/envelope after 14 days (per provider instruction). Verbal consent was obtained from patient to place Zio patch.    Michael Qureshi MA on 8/24/2020 at 3:56 PM       FAMILY HISTORY:  Father  Still living? No  Family history of Alzheimer's disease, Age at diagnosis: 71-80    Mother  Still living? No  Family history of bladder cancer, Age at diagnosis: 51-60

## 2021-11-30 NOTE — H&P ADULT - HISTORY OF PRESENT ILLNESS
70 yo F with PMH of COPD, Asthma, Pulmonary HTN - on Home O2 intermittently as needed, chronic back pain, cervical radiculopathy, CKD 3, DLD, HTN, Gout, and recent pshx of L ankle ORIF on 9/23/2021 for L ankle fracture brought from [facility] today with persistent drainage from lateral ankle incision and Left ankle pain with inability to ambulate. Patient endorses persistent pain, swelling, and drainage. Denies any h/o fever, chills, cough, chest pain, shortness of breath. She was seen by Dr. Clemons in the office where she did xrays and examined the ankle. Pt reports she was told by Dr. Clemons to present to ED for evaluation. Patient also states that she recently had watery diarrhea in the nursing home and was being treated for C.diff in the Nursing home.   In the ED patient was hemodynamically stable, Labs were unremarkable. Patient was evaluated by orthopedics team and is being admitted for irrigation/debridement and hardware removal L ankle this week.

## 2021-11-30 NOTE — H&P ADULT - ATTENDING COMMENTS
72 yo F with PMH of COPD, Asthma, PE on eliquis, Pulmonary HTN - on Home O2 intermittently as needed, chronic back pain, cervical radiculopathy, CKD 3, DLD, HTN, Gout, and recent pshx of L ankle ORIF on 9/23/2021 for L ankle fracture brought from [facility] today with persistent drainage from lateral ankle incision and Left ankle pain with inability to ambulate.  Patient was evaluated by orthopedics team and is being admitted for irrigation/debridement and hardware removal L ankle this week.     PHYSICAL EXAM:  GENERAL: lying in bed comfortably  HEAD: Atraumatic, Normocephalic  EYES: conjunctiva and sclera clear  ENT: Moist mucous membranes  CHEST/LUNG: Clear to auscultation bilaterally  HEART: Regular rate and rhythm  ABDOMEN: Soft, Nontender, Nondistended.  EXTREMITIES:  No clubbing, cyanosis, or edema  NERVOUS SYSTEM:  Alert & Oriented X3, speech clear.  MSK: FROM all 4 extremities, full and equal strength L ankle swelling,  tenderness, draining pus   SKIN: No rashes or lesions    # Left ankle pain/drainage 2/2 infected hardware   # Inability to ambulate  - no sepsis on admission   - s/p ORIF on 09/23/2021  - Ortho team following; Planned for irrigation and debridement and hardware removal L ankle this week   - Follow up ESR/CRP  - pulmonary risk stratification   - ID evaluation   - Monitor off antibiotics until debridement, start Rocephin and vancomycin if hemodynamically in case of sepsis   - Follow up tissue cultures after debridement  - pain control    # C. diff colitis/ 11/22/21   - watery diarrhea  - continue with PO vancomycin    # h/o PE  - On Eliquis    # Urinary Retention   - has indwelling madrid since last admission  - post op retention in the setting of immobility    # Chronic Back Pain  # Hx of Cervical Radiculopathy  - Sees pain management, Dr. Mayers  - C/w  Percocet PRN ( no longer on Fentanyl patch )     # Gout  - C/w Allopurinol    # Pulmonary HTN  - C/w Sildenafil, prednisone     # HTN  - C/w Atenolol, Nifedipine    # DLD  - C/w Atorvastatin    # COPD, Asthma on home O2 2L as needed; currently satting well  # MARLENI  - on advair discus at home;   - Symbicort while admitted  - albuterol PRN  - pulmonary risk stratification for the procedure     # Anxiety  - C/w Wellbutrin  - Valium PRN    # CKD 3  - Stable,  - avoid nephrotoxins     #Progress Note Handoff  Pending (specify):  irrigation and debridement and hardware removal, Thursday   discussed with patient   Disposition: PT after procedure 72 yo F with PMH of COPD, Asthma, PE on eliquis, Pulmonary HTN - on Home O2 intermittently as needed, chronic back pain, cervical radiculopathy, CKD 3, DLD, HTN, Gout, and recent pshx of L ankle ORIF on 9/23/2021 for L ankle fracture brought from [facility] today with persistent drainage from lateral ankle incision and Left ankle pain with inability to ambulate.  Patient was evaluated by orthopedics team and is being admitted for irrigation/debridement and hardware removal L ankle this week.     PHYSICAL EXAM:  GENERAL: lying in bed comfortably  HEAD: Atraumatic, Normocephalic  EYES: conjunctiva and sclera clear  ENT: Moist mucous membranes  CHEST/LUNG: Clear to auscultation bilaterally  HEART: Regular rate and rhythm  ABDOMEN: Soft, Nontender, Nondistended.  EXTREMITIES:  No clubbing, cyanosis, or edema  NERVOUS SYSTEM:  Alert & Oriented X3, speech clear.  MSK: FROM all 4 extremities, full and equal strength L ankle swelling,  tenderness, draining pus   SKIN: No rashes or lesions    # Left ankle pain/drainage 2/2 infected hardware   # Inability to ambulate  - no sepsis on admission   - s/p ORIF on 09/23/2021  - Ortho team following; Planned for irrigation and debridement and hardware removal L ankle this week   - Follow up ESR/CRP  - pulmonary risk stratification   - ID evaluation   - Monitor off antibiotics until debridement, start Rocephin and vancomycin if hemodynamically in case of sepsis   - Follow up tissue cultures after debridement  - pain control    # Chronic anemia, no evidence of active loss, iron profile, folate, B12 levels  # C. diff colitis/ 11/22/21   - watery diarrhea  - continue with PO vancomycin    # h/o PE  - On Eliquis    # Urinary Retention   - has indwelling madrid since last admission  - post op retention in the setting of immobility    # Chronic Back Pain  # Hx of Cervical Radiculopathy  - Sees pain management, Dr. Mayers  - C/w  Percocet PRN ( no longer on Fentanyl patch )     # Gout  - C/w Allopurinol    # Pulmonary HTN  - C/w Sildenafil, prednisone     # HTN  - C/w Atenolol, Nifedipine    # DLD  - C/w Atorvastatin    # COPD, Asthma on home O2 2L as needed; currently satting well  # MARLENI  - on advair discus at home;   - Symbicort while admitted  - albuterol PRN  - pulmonary risk stratification for the procedure     # Anxiety  - C/w Wellbutrin  - Valium PRN    # CKD 3  - Stable,  - avoid nephrotoxins     #Progress Note Handoff  Pending (specify):  irrigation and debridement and hardware removal, Thursday   discussed with patient   Disposition: PT after procedure

## 2021-11-30 NOTE — ED ADULT NURSE NOTE - CHIEF COMPLAINT QUOTE
Pt BIBA from rehab complaining of left ankle pain. Pt had surgery in Sept. states Pin is sticking through skin

## 2021-11-30 NOTE — ED PROVIDER NOTE - PHYSICAL EXAMINATION
CONSTITUTIONAL: Well-developed; well-nourished; in no acute distress.   SKIN: warm, dry  HEAD: Normocephalic; atraumatic.  EYES: PERRL, EOMI, normal sclera and conjunctiva   ENT: No nasal discharge; airway clear.  NECK: Supple; non tender.  CARD:  Regular rate and rhythm.   RESP: NO inc WOB , speaking in full sentences, lungs CTAB  ABD: soft ntnd  EXT: Normal ROM. Small amount of purulent drainage from distal aspect of lateral incision  NEURO: Alert, oriented, grossly unremarkable  PSYCH: Cooperative, appropriate.

## 2021-11-30 NOTE — H&P ADULT - NSHPPHYSICALEXAM_GEN_ALL_CORE
General: No acute distress; Pallor (-), Icterus (-), Cyanosis (-), Clubbing (-)  HEENT: Normocephalic, atraumatic, PERRLA, EOMI  PULM: Bilaterally equal and clear breath sounds, wheeze (-), rubs (-), crackles (-)  CVS: Normal S1 and S2, murmurs (-), rubs (-), gallops (-)   GI: Soft, nondistended, nontender, BS +  MSK: Edema (-), Left foot/ankle swelling with ulcer on the lateral aspect draining purulent material.   SKIN: Warm and well perfused,  NEURO:  Alert and Oriented x 3; No gross focal neurological deficit noted

## 2021-11-30 NOTE — H&P ADULT - ASSESSMENT
72 yo F with PMH of COPD, Asthma, PE on eliquis, Pulmonary HTN - on Home O2 intermittently as needed, chronic back pain, cervical radiculopathy, CKD 3, DLD, HTN, Gout, and recent pshx of L ankle ORIF on 9/23/2021 for L ankle fracture brought from [facility] today with persistent drainage from lateral ankle incision and Left ankle pain with inability to ambulate.  Patient was evaluated by orthopedics team and is being admitted for irrigation/debridement and hardware removal L ankle this week.     # Left ankle pain/drainage and inability to ambulate  - s/p ORIF on 09/23/2021  - Ortho team following  - Planned for irrigation and debridement and hardware removal L ankle this week  - Follow up ESR/CRP  - Medical risk stratification  - Monitor off antibiotics until debridement, start Rocephin and vancomycin if hemodynamically unstable  - Follow up tissue cultures after debridement  - NWB LLE  - pain control    # C. diff colitis  - watery diarrhea  - continue with PO vancomycin    # h/o PE  - On eliquis  - No dyspnea/saturating well    # Urinary Retention   - has indwelling [place] since last admission  - post op retention in the setting of immobility  - TOV after she achieves adequate mobility    # Chronic Back Pain  # Hx of Cervical Radiculopathy  - Sees pain management, Dr. Mayers  - C/w  Percocet PRN ( no longer on Fentanyl patch )     # Gout  - C/w Allopurinol    # Pulmonary HTN  - C/w Sildenafil, prednisone     # HTN  - C/w Atenolol, Nifedipine    # DLD  - C/w Atorvastatin    # COPD, Asthma on home O2 2L as needed; currently satting well  # MARLENI  - on advair discus at home;   - Symbicort while admitted  - albuterol PRN    # Anxiety  - C/w Wellbutrin  - Valium PRN    # CKD 3  - Stable, sees Dr. Mcarthur    # Diet: DASH/TLC  # Activity: IAT  # GI PPX: NA  # DVT PPX: on eliquis  # Full code   70 yo F with PMH of COPD, Asthma, PE on eliquis, Pulmonary HTN - on Home O2 intermittently as needed, chronic back pain, cervical radiculopathy, CKD 3, DLD, HTN, Gout, and recent pshx of L ankle ORIF on 9/23/2021 for L ankle fracture brought from [facility] today with persistent drainage from lateral ankle incision and Left ankle pain with inability to ambulate.  Patient was evaluated by orthopedics team and is being admitted for irrigation/debridement and hardware removal L ankle this week.     # Left ankle pain/drainage and inability to ambulate  - s/p ORIF on 09/23/2021  - Ortho team following  - Planned for irrigation and debridement and hardware removal L ankle this week  - Follow up ESR/CRP  - Medical risk stratification  - Monitor off antibiotics until debridement, start Rocephin and vancomycin if hemodynamically unstable  - Follow up tissue cultures after debridement  - NWB LLE  - pain control    # C. diff colitis  - watery diarrhea  - continue with PO vancomycin    # h/o PE  - On eliquis  - No dyspnea/saturating well    # Urinary Retention   - has [place]welling [place] since last admission  - post op retention in the setting of immobility  - TOV after she achieves adequate mobility    # Chronic Back Pain  # Hx of Cervical Radiculopathy  - Sees pain management, Dr. Mayers  - C/w  Percocet PRN ( no longer on Fentanyl patch )     # Gout  - C/w Allopurinol    # Pulmonary HTN  - C/w Sildenafil, prednisone     # HTN  - C/w Atenolol, Nifedipine    # DLD  - C/w Atorvastatin    # COPD, Asthma on home O2 2L as needed; currently satting well  # MARLENI  - on advair discus at home;   - Symbicort while admitted  - albuterol PRN    # Anxiety  - C/w Wellbutrin  - Valium PRN    # CKD 3  - Stable, sees Dr. Mcarthur    # Diet: DASH/TLC,  # Activity: IAT  # GI PPX: NA  # DVT PPX: on eliquis  # Full code   70 yo F with PMH of COPD, Asthma, PE on eliquis, Pulmonary HTN - on Home O2 intermittently as needed, chronic back pain, cervical radiculopathy, CKD 3, DLD, HTN, Gout, and recent pshx of L ankle ORIF on 9/23/2021 for L ankle fracture brought from [facility] today with persistent drainage from lateral ankle incision and Left ankle pain with inability to ambulate.  Patient was evaluated by orthopedics team and is being admitted for irrigation/debridement and hardware removal L ankle this week.     # Left ankle pain/drainage and inability to ambulate  - s/p ORIF on 09/23/2021  - Ortho team following  - Planned for irrigation and debridement and hardware removal L ankle this week - likely thursday  - Follow up ESR/CRP  - Medical risk stratification  - Monitor off antibiotics until debridement, start Rocephin and vancomycin if hemodynamically unstable  - Follow up tissue cultures after debridement  - NWB LLE  - pain control    # C. diff colitis  - watery diarrhea  - continue with PO vancomycin    # h/o PE  - On eliquis  - No dyspnea/saturating well    # Urinary Retention   - has indwelling [place] since last admission  - post op retention in the setting of immobility  - TOV after she achieves adequate mobility    # Chronic Back Pain  # Hx of Cervical Radiculopathy  - Sees pain management, Dr. Mayers  - C/w  Percocet PRN ( no longer on Fentanyl patch )     # Gout  - C/w Allopurinol    # Pulmonary HTN  - C/w Sildenafil, prednisone     # HTN  - C/w Atenolol, Nifedipine    # DLD  - C/w Atorvastatin    # COPD, Asthma on home O2 2L as needed; currently satting well  # MARLENI  - on advair discus at home;   - Symbicort while admitted  - albuterol PRN    # Anxiety  - C/w Wellbutrin  - Valium PRN    # CKD 3  - Stable, sees Dr. Mcarthur    # Diet: DASH/TLC,  # Activity: IAT  # GI PPX: NA  # DVT PPX: on eliquis  # Full code   70 yo F with PMH of COPD, Asthma, PE on eliquis, Pulmonary HTN - on Home O2 intermittently as needed, chronic back pain, cervical radiculopathy, CKD 3, DLD, HTN, Gout, and recent pshx of L ankle ORIF on 9/23/2021 for L ankle fracture brought from [facility] today with persistent drainage from lateral ankle incision and Left ankle pain with inability to ambulate.  Patient was evaluated by orthopedics team and is being admitted for irrigation/debridement and hardware removal L ankle this week.     # Left ankle pain/drainage and inability to ambulate  - s/p ORIF on 09/23/2021  - Ortho team following  - Planned for irrigation and debridement and hardware removal L ankle this week - likely thursday  - Follow up ESR/CRP  - Medical risk stratification  - Monitor off antibiotics until debridement, start Rocephin and vancomycin if hemodynamically unstable  - Follow up tissue cultures after debridement  - NWB LLE  - pain control    # C. diff colitis  - watery diarrhea  - continue with PO vancomycin    # h/o PE  - On eliquis  - No dyspnea/saturating well    # Urinary Retention   - has indwelling [place] since last admission  - post op retention in the setting of immobility  - renew [place]     # Chronic Back Pain  # Hx of Cervical Radiculopathy  - Sees pain management, Dr. Mayers  - C/w  Percocet PRN ( no longer on Fentanyl patch )     # Gout  - C/w Allopurinol    # Pulmonary HTN  - C/w Sildenafil, prednisone     # HTN  - C/w Atenolol, Nifedipine    # DLD  - C/w Atorvastatin    # COPD, Asthma on home O2 2L as needed; currently satting well  # MARLENI  - on advair discus at home;   - Symbicort while admitted  - albuterol PRN    # Anxiety  - C/w Wellbutrin  - Valium PRN    # CKD 3  - Stable, sees Dr. Mcarthur    # Diet: DASH/TLC,  # Activity: IAT  # GI PPX: NA  # DVT PPX: on eliquis  # Full code   72 yo F with PMH of COPD, Asthma, PE on eliquis, Pulmonary HTN - on Home O2 intermittently as needed, chronic back pain, cervical radiculopathy, CKD 3, DLD, HTN, Gout, and recent pshx of L ankle ORIF on 9/23/2021 for L ankle fracture brought from [facility] today with persistent drainage from lateral ankle incision and Left ankle pain with inability to ambulate.  Patient was evaluated by orthopedics team and is being admitted for irrigation/debridement and hardware removal L ankle this week.     # Left ankle pain/drainage and inability to ambulate  - s/p ORIF on 09/23/2021  - Ortho team following  - Planned for irrigation and debridement and hardware removal L ankle this week - likely thursday  - Follow up ESR/CRP  - Medical risk stratification  - Monitor off antibiotics until debridement, start Rocephin and vancomycin if hemodynamically unstable  - Follow up tissue cultures after debridement  - NWB LLE  - pain control    # C. diff colitis  - watery diarrhea  - continue with PO vancomycin    # h/o PE  - On eliquis at home  - change to lovenox before surgery; hold day prior to surgery  - No dyspnea/saturating well    # Urinary Retention   - has [place]welling [place] since last admission  - post op retention in the setting of immobility  - renew [place]     # Chronic Back Pain  # Hx of Cervical Radiculopathy  - Sees pain management, Dr. Mayers  - C/w  Percocet PRN ( no longer on Fentanyl patch )     # Gout  - C/w Allopurinol    # Pulmonary HTN  - C/w Sildenafil, prednisone     # HTN  - C/w Atenolol, Nifedipine    # DLD  - C/w Atorvastatin    # COPD, Asthma on home O2 2L as needed; currently satting well  # MARLENI  - on advair discus at home;   - Symbicort while admitted  - albuterol PRN    # Anxiety  - C/w Wellbutrin  - Valium PRN    # CKD 3  - Stable, sees Dr. Mcarthur    # Diet: DASH/TLC,  # Activity: IAT  # GI PPX: NA  # DVT PPX: Lovenox; hold day prior to surgery  # Full code

## 2021-11-30 NOTE — ED PROVIDER NOTE - CLINICAL SUMMARY MEDICAL DECISION MAKING FREE TEXT BOX
72-year-old female past medical history of COPD asthma CKD stage III presents with persistent drainage from left ankle incision.  Patient sent in by Dr. Clemons for surgery.  Patient has no other complaints this time no fevers no chills no chest pain no numbness tingling.  Well appearing, NAD, non toxic. NCAT PERRLA EOMI neck supple non tender normal wob cta bl rrr abdomen s nt nd no rebound no guarding [id number] neuro non focal  , Small amount of drainage from the distal aspect of incision site of the left ankle.  Labs Ortho eval will admit for surgical correction.

## 2021-11-30 NOTE — ED ADULT TRIAGE NOTE - CCCP TRG CHIEF CMPLNT
I received call regarding request to come to bedside to discuss this pt's sodium level. Apparently, the pt was discharged today and the  reviewed the sodium levels from the last 48hrs and was upset that she was being discharged w/ her current sodium levels. I reviewed the records and this pt has long standing DI and hypernatremia. Endocrinology and Nephrology are following this pt and actively managing her sodium levels. The pt's  states that she has h/o seizures when her sodium drops quickly below 140, he was told her goal is around 145 and she dropped to 136 this evening. He requests that discharge order be rescinded. Around this time, the pt developed nausea and had 1 episode of blood tinged sputum. I reported to the bedside w/ nursing supervisor. I allowed pt's spouse to voice his concerns and I updated him w/ the following tx plan. Start NS @ 100cc/hr x1 bag, administer 1 sodium tab. D/c desmopressin for now and BMP q 4hrs. Pt has had no further symptoms and was resting comfortably and was appropriate. He was agreeable w/ this plan.     The follow up sodium level was 131 and was drawn about 3hrs after starting normal saline. Pt still without symptoms. I reviewed her I received call regarding request to come to bedside to discuss this pt's sodium level. Apparently, the pt was discharged today and the  reviewed the sodium levels from the last 48hrs and was upset that she was being discharged w/ her current sodium levels. I reviewed the records and this pt has long standing DI and hypernatremia. Endocrinology and Nephrology are following this pt and actively managing her sodium levels. The pt's  states that she has h/o seizures when her sodium drops quickly below 140, he was told her goal is around 145 and she dropped to 136 this evening. He requests that discharge order be rescinded. Around this time, the pt developed nausea and had 1 episode of blood tinged sputum. I reported to the bedside w/ nursing supervisor. I allowed pt's spouse to voice his concerns and I updated him w/ the following tx plan. Start NS @ 100cc/hr x1 bag, administer 1 sodium tab. D/c desmopressin for now and BMP q 4hrs. Pt has had no further symptoms and was resting comfortably and was appropriate. He was agreeable w/ this plan.     The follow up sodium level was 131 and was drawn about 3hrs after starting normal saline. Pt still without symptoms. I reviewed her previous records and reviewed the notes from endo/nephro. Discontinued desmopressin so the AM dose was held but would recommended discussing w/ nephro/endo for when to resume based off new finding of hyponatremia. ankle pain/injury

## 2021-11-30 NOTE — CONSULT NOTE ADULT - SUBJECTIVE AND OBJECTIVE BOX
Pt Name: BERNARD SPEARS  MRN: [id number]    HPI:   72yo Female with past medical history of pulm HTN, gout, HLD, and recent pshx of L ankle ORIF on 9/23/2021 for L ankle fracture, presenting today with persistent drainage from lateral ankle incision. Patient endorses persistent pain, swelling, and drainage concerning for infected hardware. Denies subjective fever/chills, cp/sob, n/v, numbness/tingling.       PAST MEDICAL & SURGICAL HISTORY:  History of neck pain  Spinal stenosis of lumbar region with radiculopathy  Anxiety  Depression  Osteoarthritis  H/O osteoporosis  History of pulmonary hypertension  H/O pulmonary hypertension  COPD (chronic obstructive pulmonary disease)  Hyperlipidemia  H/O total knee replacement, right  Failed spinal cord stimulator    PSHX:  ORIF L ankle   R TKA    Allergies: adhesives (Pruritus; Rash)  No Known Drug Allergies    Medications: as per chart    PHYSICAL EXAM:    Vital Signs Last 24 Hrs  T(C): 36.2 (30 Nov 2021 11:32), Max: 36.2 (30 Nov 2021 11:32)  T(F): 97.2 (30 Nov 2021 11:32), Max: 97.2 (30 Nov 2021 11:32)  HR: 72 (30 Nov 2021 11:32) (72 - 72)  BP: 120/68 (30 Nov 2021 11:32) (120/68 - 120/68)  RR: 17 (30 Nov 2021 11:32) (17 - 17)  SpO2: 95% (30 Nov 2021 11:32) (95% - 95%)    Physical Exam:  General: NAD, Alert, Awake and oriented    LLE:   Small amount of purulent drainage from distal aspect of lateral incision  Persistent swelling over lateral mal with associated ttp  NTTP remainder of LLE  Pain with attempted ROM of L ankle   SILT DP/SP/T/Hernandez/Sa.   EHL/FHL/TA/Gs motor intact.  2+ DP pulses with brisk cap refill distally.    Imaging:   XR L ankle: hardware in place s/p ORIF L ankle; healing bimalleolar ankle fracture    A/P:    71y Female with concern for infected hardware s/p L ankle ORIF on 9/23/2021. Discussed case with Dr. Clemons who is recommending for irrigation/debridement and hardware removal L ankle this week. 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 Patient endorses persistent pain, swelling, and drainage concerning for infected hardware. Denies subjective fever/chills, cp/sob, n/v, numbness/tingling.       PAST MEDICAL & SURGICAL HISTORY:  History of neck pain  Spinal stenosis of lumbar region with radiculopathy  Anxiety  Depression  Osteoarthritis  H/O osteoporosis  History of pulmonary hypertension  H/O pulmonary hypertension  COPD (chronic obstructive pulmonary disease)  Hyperlipidemia  H/O total knee replacement, right  Failed spinal cord stimulator    PSHX:  ORIF L ankle   R TKA    Allergies: adhesives (Pruritus; Rash)  No Known Drug Allergies    Medications: as per chart    PHYSICAL EXAM:    Vital Signs Last 24 Hrs  T(C): 36.2 (30 Nov 2021 11:32), Max: 36.2 (30 Nov 2021 11:32)  T(F): 97.2 (30 Nov 2021 11:32), Max: 97.2 (30 Nov 2021 11:32)  HR: 72 (30 Nov 2021 11:32) (72 - 72)  BP: 120/68 (30 Nov 2021 11:32) (120/68 - 120/68)  RR: 17 (30 Nov 2021 11:32) (17 - 17)  SpO2: 95% (30 Nov 2021 11:32) (95% - 95%)    Physical Exam:  General: NAD, Alert, Awake and oriented    LLE:   Small amount of purulent drainage from distal aspect of lateral incision  Persistent swelling over lateral mal with associated ttp  NTTP remainder of LLE  Pain with attempted ROM of L ankle   SILT DP/SP/T/Hernandez/Sa.   EHL/FHL/TA/Gs motor intact.  2+ DP pulses with brisk cap refill distally.    Imaging:   XR L ankle: hardware in place s/p ORIF L ankle; healing bimalleolar ankle fracture    A/P:    71y Female with concern for infected hardware s/p L ankle ORIF on 9/23/2021. Discussed case with Dr. Clemons who is recommending for irrigation/debridement and hardware removal L ankle this week. Recommend:  -NWB LLE   -pain control  -DVT ppx  -ESR/CRP  -pre-op labs: CBC, CMP, Coags, two T&S, COVID test  -EKG/CXR  -will require medical clearance/optimization for surgery  -recommending admission to medicine given patient's significant medical comorbidities in particular COPD, Asthma, and pulmonary HTN on home O2, CKD 3, DLD, HTN; additionally, patient was previously admitted to medicine during her most recent admission for surgery.

## 2021-11-30 NOTE — ED PROVIDER NOTE - HIV OFFER
After Visit Summary   8/1/2018    Farooq Sorto    MRN: [id number]           Patient Information     Date Of Birth          1995        Visit Information        Provider Department      8/1/2018 6:00 PM Beth Wells OD [facility]        Today's Diagnoses     Diabetes mellitus without ophthalmic manifestations (H)    -  1    Astigmatism of both eyes, unspecified type          Care Instructions    Diabetes weakens the blood vessels all over the body, including the eyes. Damage to the blood vessels in the eyes can cause swelling or bleeding into part of the eye (called the retina). This is called diabetic retinopathy (BAHMAN-tin-AH-puh-thee). If not treated, this disease can cause vision loss or blindness.   Symptoms may include blurred or distorted vision, but many people have no symptoms. It's important to see your eye doctor regularly to check for problems.   Early treatment and good control can help protect your vision. Here are the things you can do to help prevent vision loss:      1. Keep your blood sugar levels under tight control.      2. Bring high blood pressure under control.      3. No smoking.      4. Have yearly dilated eye exams.           Follow-ups after your visit        Your next 10 appointments already scheduled     Sep 10, 2018  2:40 PM CDT   PHYSICAL with Daina Waldron MD   [facility] ([facility])    [address] [place]  [facility] [address] [phone]              Who to contact     If you have questions or need follow up information about today's clinic visit or your schedule please contact [facility] directly at [phone].  Normal or non-critical lab and imaging results will be communicated to you by MyChart, letter or phone within 4 business days after the clinic has received the results. If you do not hear from us within 7 days, please contact the clinic through MyChart or phone.  "If you have a critical or abnormal lab result, we will notify you by phone as soon as possible.  Submit refill requests through [employer] or call your pharmacy and they will forward the refill request to us. Please allow 3 business days for your refill to be completed.          Additional Information About Your Visit        [employer] Information     [employer] lets you send messages to your doctor, view your test results, renew your prescriptions, schedule appointments and more. To sign up, go to [email or web address] . Click on \"Log in\" on the left side of the screen, which will take you to the Welcome page. Then click on \"Sign up Now\" on the right side of the page.     You will be asked to enter the access code listed below, as well as some personal information. Please follow the directions to create your username and password.     Your access code is: [email or web address]  Expires: 10/4/2018  7:08 PM     Your access code will  in 90 days. If you need help or a new code, please call your [place] clinic or [phone].        Care EveryWhere ID     This is your Care EveryWhere ID. This could be used by other organizations to access your [place] medical records  [id number]         Blood Pressure from Last 3 Encounters:   18 (!) 153/93   18 134/81   18 120/90    Weight from Last 3 Encounters:   18 124.7 kg (275 lb)   18 117.9 kg (260 lb)   18 122.4 kg (269 lb 12.8 oz)              Today, you had the following     No orders found for display       Primary Care Provider Office Phone # Fax #    Daina Waldron [phone]       [address] [facility] [address]        Equal Access to Services     MARYLOU LOGAN : Hadii gladis Mccarthy, wachrystalda luestefanyadaha, qaybta kaalmada mila, janny paige. So [facility] [phone].    ATENCIÓN: Si habla español, tiene a eagle disposición servicios gratuitos de asistencia lingüística. Llame al " [phone].    We comply with applicable federal civil rights laws and [place] laws. We do not discriminate on the basis of race, color, national origin, age, disability, sex, sexual orientation, or gender identity.            Thank you!     Thank you for choosing [facility]  for your care. Our goal is always to provide you with excellent care. Hearing back from our patients is one way we can continue to improve our services. Please take a few minutes to complete the written survey that you may receive in the mail after your visit with us. Thank you!             Your Updated Medication List - Protect others around you: Learn how to safely use, store and throw away your medicines at www.disposemymeds.org.          This list is accurate as of 8/1/18  7:29 PM.  Always use your most recent med list.                   Brand Name Dispense Instructions for use Diagnosis    amantadine 100 MG capsule    SYMMETREL     Take 100 mg by mouth 2 times daily        balneol Lotn lotion     90 mL    Apply to rectal area as needed for comfort    Rectal irritation       benzoyl peroxide 10 % topical gel     28 g    Apply topically 2 times daily    Acne vulgaris       CLONAZEPAM PO      Take 0.5 mg by mouth        GUANFACINE HCL PO      Take 2 mg by mouth 2 times daily        HYDROcodone-acetaminophen 5-325 MG per tablet    NORCO    10 tablet    Take 1 tablet by mouth every 6 hours as needed for severe pain        hydrocortisone 2.5 % cream    ANUSOL-HC    30 g    Place rectally 2 times daily    Rectal bleeding       IBUPROFEN PO      Take 600 mg by mouth every 6 hours as needed for moderate pain        lamoTRIgine 200 MG tablet    LaMICtal     Take 500 mg by mouth 2 times daily        levETIRAcetam 500 MG tablet    KEPPRA     Take 500 mg by mouth 2 times daily        OLANZapine 10 MG tablet    zyPREXA    90 tablet    Take 1 tablet (10 mg) by mouth At Bedtime    Aggressive behavior, Agitation       Q- MG tablet    Generic drug:  acetaminophen      Take 325 mg by mouth every 4 hours as needed for mild pain or fever        ranitidine 150 MG tablet    ZANTAC    180 tablet    TAKE 1 TABLET(150 MG) BY MOUTH TWICE DAILY    Gastroesophageal reflux disease without esophagitis       sennosides 8.6 MG tablet    SENOKOT     Take 1 tablet by mouth daily as needed for constipation        VIMPAT PO      Take 200 mg by mouth 2 times daily           Previously Declined (within the last year)

## 2021-11-30 NOTE — H&P ADULT - NSHPLABSRESULTS_GEN_ALL_CORE
(11-30 @ 12:24)                      11.1  10.79 )-----------( 278                 35.3    Neutrophils = 8.78 (81.4%)  Lymphocytes = 1.18 (10.9%)  Eosinophils = 0.03 (0.3%)  Basophils = 0.05 (0.5%)  Monocytes = 0.66 (6.1%)  Bands = --%    11-30    138  |  107  |  17  ----------------------------<  95  4.7   |  17  |  1.1    Ca    9.1      30 Nov 2021 15:36    TPro  5.9<L>  /  Alb  3.7  /  TBili  0.3  /  DBili  x   /  AST  21  /  ALT  14  /  AlkPhos  100  11-30    ( 30 Nov 2021 12:24 )   PT: 12.90 sec;   INR: 1.12 ratio;       PTT:30.2 sec

## 2021-11-30 NOTE — ED PROVIDER NOTE - OBJECTIVE STATEMENT
70yo Female with past medical history of COPD, Asthma, chronic back pain,  CKD 3, DLD, HTN and recent pshx of L ankle ORIF on 9/23/2021 for L ankle fracture, presenting today with persistent drainage from lateral ankle incision. Patient endorses persistent pain, swelling, and drainage. She was seen by Dr. Clemons in the office where she did xrays and examined the ankle. Pt reports she was told by Dr. Clemons to present to ED for evaluation. Denies subjective fever/chills, cp/sob, n/v, numbness/tingling.

## 2021-12-01 LAB
ALBUMIN SERPL ELPH-MCNC: 3.6 G/DL — SIGNIFICANT CHANGE UP (ref 3.5–5.2)
ALBUMIN SERPL ELPH-MCNC: 3.7 G/DL — SIGNIFICANT CHANGE UP (ref 3.5–5.2)
ALP SERPL-CCNC: 102 U/L — SIGNIFICANT CHANGE UP (ref 30–115)
ALP SERPL-CCNC: 98 U/L — SIGNIFICANT CHANGE UP (ref 30–115)
ALT FLD-CCNC: 14 U/L — SIGNIFICANT CHANGE UP (ref 0–41)
ALT FLD-CCNC: 15 U/L — SIGNIFICANT CHANGE UP (ref 0–41)
ANION GAP SERPL CALC-SCNC: 15 MMOL/L — HIGH (ref 7–14)
ANION GAP SERPL CALC-SCNC: 15 MMOL/L — HIGH (ref 7–14)
APTT BLD: 44.2 SEC — HIGH (ref 27–39.2)
AST SERPL-CCNC: 17 U/L — SIGNIFICANT CHANGE UP (ref 0–41)
AST SERPL-CCNC: 19 U/L — SIGNIFICANT CHANGE UP (ref 0–41)
BASOPHILS # BLD AUTO: 0.06 K/UL — SIGNIFICANT CHANGE UP (ref 0–0.2)
BASOPHILS NFR BLD AUTO: 0.6 % — SIGNIFICANT CHANGE UP (ref 0–1)
BILIRUB SERPL-MCNC: 0.3 MG/DL — SIGNIFICANT CHANGE UP (ref 0.2–1.2)
BILIRUB SERPL-MCNC: <0.2 MG/DL — SIGNIFICANT CHANGE UP (ref 0.2–1.2)
BLD GP AB SCN SERPL QL: SIGNIFICANT CHANGE UP
BUN SERPL-MCNC: 16 MG/DL — SIGNIFICANT CHANGE UP (ref 10–20)
BUN SERPL-MCNC: 23 MG/DL — HIGH (ref 10–20)
CALCIUM SERPL-MCNC: 9.1 MG/DL — SIGNIFICANT CHANGE UP (ref 8.5–10.1)
CALCIUM SERPL-MCNC: 9.9 MG/DL — SIGNIFICANT CHANGE UP (ref 8.5–10.1)
CHLORIDE SERPL-SCNC: 100 MMOL/L — SIGNIFICANT CHANGE UP (ref 98–110)
CHLORIDE SERPL-SCNC: 103 MMOL/L — SIGNIFICANT CHANGE UP (ref 98–110)
CO2 SERPL-SCNC: 17 MMOL/L — SIGNIFICANT CHANGE UP (ref 17–32)
CO2 SERPL-SCNC: 20 MMOL/L — SIGNIFICANT CHANGE UP (ref 17–32)
CREAT SERPL-MCNC: 1.2 MG/DL — SIGNIFICANT CHANGE UP (ref 0.7–1.5)
CREAT SERPL-MCNC: 1.6 MG/DL — HIGH (ref 0.7–1.5)
CRP SERPL-MCNC: 4 MG/L — SIGNIFICANT CHANGE UP
EOSINOPHIL # BLD AUTO: 0.04 K/UL — SIGNIFICANT CHANGE UP (ref 0–0.7)
EOSINOPHIL NFR BLD AUTO: 0.4 % — SIGNIFICANT CHANGE UP (ref 0–8)
ERYTHROCYTE [SEDIMENTATION RATE] IN BLOOD: 35 MM/HR — HIGH (ref 0–20)
GLUCOSE SERPL-MCNC: 119 MG/DL — HIGH (ref 70–99)
GLUCOSE SERPL-MCNC: 88 MG/DL — SIGNIFICANT CHANGE UP (ref 70–99)
HCT VFR BLD CALC: 32.5 % — LOW (ref 37–47)
HCT VFR BLD CALC: 36.2 % — LOW (ref 37–47)
HGB BLD-MCNC: 10.5 G/DL — LOW (ref 12–16)
HGB BLD-MCNC: 11.7 G/DL — LOW (ref 12–16)
IMM GRANULOCYTES NFR BLD AUTO: 0.9 % — HIGH (ref 0.1–0.3)
INR BLD: 1.24 RATIO — SIGNIFICANT CHANGE UP (ref 0.65–1.3)
LYMPHOCYTES # BLD AUTO: 3.3 K/UL — SIGNIFICANT CHANGE UP (ref 1.2–3.4)
LYMPHOCYTES # BLD AUTO: 34.2 % — SIGNIFICANT CHANGE UP (ref 20.5–51.1)
MAGNESIUM SERPL-MCNC: 1.7 MG/DL — LOW (ref 1.8–2.4)
MAGNESIUM SERPL-MCNC: 1.9 MG/DL — SIGNIFICANT CHANGE UP (ref 1.8–2.4)
MCHC RBC-ENTMCNC: 32.3 G/DL — SIGNIFICANT CHANGE UP (ref 32–37)
MCHC RBC-ENTMCNC: 32.3 G/DL — SIGNIFICANT CHANGE UP (ref 32–37)
MCHC RBC-ENTMCNC: 33.9 PG — HIGH (ref 27–31)
MCHC RBC-ENTMCNC: 34.2 PG — HIGH (ref 27–31)
MCV RBC AUTO: 104.8 FL — HIGH (ref 81–99)
MCV RBC AUTO: 105.8 FL — HIGH (ref 81–99)
MONOCYTES # BLD AUTO: 0.91 K/UL — HIGH (ref 0.1–0.6)
MONOCYTES NFR BLD AUTO: 9.4 % — HIGH (ref 1.7–9.3)
NEUTROPHILS # BLD AUTO: 5.25 K/UL — SIGNIFICANT CHANGE UP (ref 1.4–6.5)
NEUTROPHILS NFR BLD AUTO: 54.5 % — SIGNIFICANT CHANGE UP (ref 42.2–75.2)
NRBC # BLD: 0 /100 WBCS — SIGNIFICANT CHANGE UP (ref 0–0)
NRBC # BLD: 0 /100 WBCS — SIGNIFICANT CHANGE UP (ref 0–0)
PLATELET # BLD AUTO: 290 K/UL — SIGNIFICANT CHANGE UP (ref 130–400)
PLATELET # BLD AUTO: 308 K/UL — SIGNIFICANT CHANGE UP (ref 130–400)
POTASSIUM SERPL-MCNC: 3.8 MMOL/L — SIGNIFICANT CHANGE UP (ref 3.5–5)
POTASSIUM SERPL-MCNC: 4.2 MMOL/L — SIGNIFICANT CHANGE UP (ref 3.5–5)
POTASSIUM SERPL-SCNC: 3.8 MMOL/L — SIGNIFICANT CHANGE UP (ref 3.5–5)
POTASSIUM SERPL-SCNC: 4.2 MMOL/L — SIGNIFICANT CHANGE UP (ref 3.5–5)
PROT SERPL-MCNC: 5.4 G/DL — LOW (ref 6–8)
PROT SERPL-MCNC: 5.8 G/DL — LOW (ref 6–8)
PROTHROM AB SERPL-ACNC: 14.2 SEC — HIGH (ref 9.95–12.87)
RBC # BLD: 3.1 M/UL — LOW (ref 4.2–5.4)
RBC # BLD: 3.42 M/UL — LOW (ref 4.2–5.4)
RBC # FLD: 14.9 % — HIGH (ref 11.5–14.5)
RBC # FLD: 15 % — HIGH (ref 11.5–14.5)
SODIUM SERPL-SCNC: 135 MMOL/L — SIGNIFICANT CHANGE UP (ref 135–146)
SODIUM SERPL-SCNC: 135 MMOL/L — SIGNIFICANT CHANGE UP (ref 135–146)
WBC # BLD: 10.95 K/UL — HIGH (ref 4.8–10.8)
WBC # BLD: 9.65 K/UL — SIGNIFICANT CHANGE UP (ref 4.8–10.8)
WBC # FLD AUTO: 10.95 K/UL — HIGH (ref 4.8–10.8)
WBC # FLD AUTO: 9.65 K/UL — SIGNIFICANT CHANGE UP (ref 4.8–10.8)

## 2021-12-01 PROCEDURE — 99233 SBSQ HOSP IP/OBS HIGH 50: CPT

## 2021-12-01 RX ADMIN — BUDESONIDE AND FORMOTEROL FUMARATE DIHYDRATE 2 PUFF(S): 160; 4.5 AEROSOL RESPIRATORY (INHALATION) at 08:19

## 2021-12-01 RX ADMIN — PRIMIDONE 50 MILLIGRAM(S): 250 TABLET ORAL at 21:28

## 2021-12-01 RX ADMIN — ENOXAPARIN SODIUM 70 MILLIGRAM(S): 100 INJECTION SUBCUTANEOUS at 06:16

## 2021-12-01 RX ADMIN — Medication 125 MILLIGRAM(S): at 23:46

## 2021-12-01 RX ADMIN — BUDESONIDE AND FORMOTEROL FUMARATE DIHYDRATE 2 PUFF(S): 160; 4.5 AEROSOL RESPIRATORY (INHALATION) at 20:38

## 2021-12-01 RX ADMIN — ATENOLOL 50 MILLIGRAM(S): 25 TABLET ORAL at 06:15

## 2021-12-01 RX ADMIN — Medication 0.12 MILLIGRAM(S): at 12:07

## 2021-12-01 RX ADMIN — VALACYCLOVIR 500 MILLIGRAM(S): 500 TABLET, FILM COATED ORAL at 13:19

## 2021-12-01 RX ADMIN — SPIRONOLACTONE 50 MILLIGRAM(S): 25 TABLET, FILM COATED ORAL at 06:15

## 2021-12-01 RX ADMIN — Medication 125 MILLIGRAM(S): at 18:06

## 2021-12-01 RX ADMIN — MAGNESIUM OXIDE 400 MG ORAL TABLET 400 MILLIGRAM(S): 241.3 TABLET ORAL at 12:10

## 2021-12-01 RX ADMIN — Medication 20 MILLIGRAM(S): at 06:15

## 2021-12-01 RX ADMIN — Medication 60 MILLIGRAM(S): at 06:14

## 2021-12-01 RX ADMIN — Medication 20 MILLIGRAM(S): at 18:07

## 2021-12-01 RX ADMIN — Medication 20 MILLIGRAM(S): at 06:14

## 2021-12-01 RX ADMIN — Medication 125 MILLIGRAM(S): at 00:25

## 2021-12-01 RX ADMIN — Medication 300 MILLIGRAM(S): at 21:28

## 2021-12-01 RX ADMIN — Medication 5 MILLIGRAM(S): at 23:35

## 2021-12-01 RX ADMIN — Medication 20 MILLIGRAM(S): at 21:28

## 2021-12-01 RX ADMIN — Medication 400 UNIT(S): at 12:10

## 2021-12-01 RX ADMIN — Medication 125 MILLIGRAM(S): at 12:09

## 2021-12-01 RX ADMIN — Medication 10 MILLIGRAM(S): at 06:30

## 2021-12-01 RX ADMIN — Medication 0.6 MILLIGRAM(S): at 12:07

## 2021-12-01 RX ADMIN — Medication 125 MILLIGRAM(S): at 06:15

## 2021-12-01 RX ADMIN — Medication 20 MILLIGRAM(S): at 00:24

## 2021-12-01 RX ADMIN — ENOXAPARIN SODIUM 70 MILLIGRAM(S): 100 INJECTION SUBCUTANEOUS at 18:07

## 2021-12-01 RX ADMIN — Medication 5 MILLIGRAM(S): at 00:30

## 2021-12-01 RX ADMIN — Medication 100 MILLIGRAM(S): at 00:24

## 2021-12-01 RX ADMIN — Medication 1 MILLIGRAM(S): at 12:06

## 2021-12-01 RX ADMIN — BUPROPION HYDROCHLORIDE 150 MILLIGRAM(S): 150 TABLET, EXTENDED RELEASE ORAL at 12:06

## 2021-12-01 RX ADMIN — ATORVASTATIN CALCIUM 80 MILLIGRAM(S): 80 TABLET, FILM COATED ORAL at 21:28

## 2021-12-01 RX ADMIN — TAMSULOSIN HYDROCHLORIDE 0.4 MILLIGRAM(S): 0.4 CAPSULE ORAL at 21:27

## 2021-12-01 RX ADMIN — Medication 100 MILLIGRAM(S): at 21:28

## 2021-12-01 RX ADMIN — Medication 20 MILLIGRAM(S): at 13:18

## 2021-12-01 NOTE — CONSULT NOTE ADULT - SUBJECTIVE AND OBJECTIVE BOX
Patient is a 71y old  Female who presents with a chief complaint of eft ankle pain (30 Nov 2021 18:43)      HPI: 72 yo F with PMHx of HO PE (10/21/21), MARLENI, COPD on home O2 PRN, Pulmonary HTN, Chronic Back Pain, Cervical Radiculopathy, CKD III, DLD, HTN, Gout, SP Fall in September sustaining Left Ankle bimalleolar fracture SP ORIF on 9/23/21 brought in from nursing facility for concern of left ankle pain, swelling and drainage.     PAST MEDICAL & SURGICAL HISTORY:  History of neck pain    Spinal stenosis of lumbar region with radiculopathy    Anxiety    Depression    Osteoarthritis    H/O osteoporosis    History of pulmonary hypertension    H/O pulmonary hypertension    COPD (chronic obstructive pulmonary disease)    Hyperlipidemia    H/O total knee replacement, right    Failed spinal cord stimulator        SOCIAL HX:   Smoking                         ETOH                            Other    FAMILY HISTORY:  Family history of bladder cancer (Mother)    Family history of Alzheimer&#x27;s disease (Father)    .  No cardiovascular or pulmonary family history     REVIEW OF SYSTEMS:    All ROS are negative except per HPI       Allergies    adhesives (Pruritus; Rash)  No Known Drug Allergies    Intolerances          PHYSICAL EXAM  Vital Signs Last 24 Hrs  T(C): 36.3 (01 Dec 2021 05:14), Max: 36.7 (30 Nov 2021 23:46)  T(F): 97.4 (01 Dec 2021 05:14), Max: 98 (30 Nov 2021 23:46)  HR: 63 (01 Dec 2021 05:14) (63 - 72)  BP: 107/58 (01 Dec 2021 05:14) (98/57 - 120/68)  BP(mean): --  RR: 18 (01 Dec 2021 05:14) (17 - 18)  SpO2: 94% (01 Dec 2021 05:14) (94% - 95%)    CONSTITUTIONAL:  NAD    ENT:   Airway patent,   No thrush    EYES:   Clear bilaterally,   pupils equal,   round and reactive to light.    CARDIAC:   Normal rate,   regular rhythm.    no edema      RESPIRATORY:   No wheezing   Normal chest expansion  Not tachypneic,  No use of accessory muscles    GASTROINTESTINAL:  Abdomen soft, non-tender,   No guarding,   Positive BS    MUSCULOSKELETAL:   Range of motion is limited,      NEUROLOGICAL:   Alert and oriented   No motor deficits.    SKIN:   Skin normal color for race,   No evidence of rash.      HEME LYMPH:   No cervical  lymphadenopathy.  no inguinal lymphadenopathy          LABS:                          11.1   10.79 )-----------( 278      ( 30 Nov 2021 12:24 )             35.3                                               11-30    138  |  107  |  17  ----------------------------<  95  4.7   |  17  |  1.1    Ca    9.1      30 Nov 2021 15:36    TPro  5.9<L>  /  Alb  3.7  /  TBili  0.3  /  DBili  x   /  AST  21  /  ALT  14  /  AlkPhos  100  11-30      PT/INR - ( 30 Nov 2021 12:24 )   PT: 12.90 sec;   INR: 1.12 ratio         PTT - ( 30 Nov 2021 12:24 )  PTT:30.2 sec                                                                                     LIVER FUNCTIONS - ( 30 Nov 2021 15:36 )  Alb: 3.7 g/dL / Pro: 5.9 g/dL / ALK PHOS: 100 U/L / ALT: 14 U/L / AST: 21 U/L / GGT: x                                                                                                MEDICATIONS  (STANDING):  allopurinol 300 milliGRAM(s) Oral at bedtime  ATENolol  Tablet 50 milliGRAM(s) Oral daily  atorvastatin 80 milliGRAM(s) Oral at bedtime  budesonide 160 MICROgram(s)/formoterol 4.5 MICROgram(s) Inhaler 2 Puff(s) Inhalation two times a day  buPROPion XL (24-Hour) . 150 milliGRAM(s) Oral daily  cholecalciferol 400 Unit(s) Oral daily  colchicine 0.6 milliGRAM(s) Oral daily  enoxaparin Injectable 70 milliGRAM(s) SubCutaneous two times a day  FLUoxetine 20 milliGRAM(s) Oral two times a day  folic acid 1 milliGRAM(s) Oral daily  furosemide    Tablet 20 milliGRAM(s) Oral daily  hyoscyamine SL 0.125 milliGRAM(s) SubLingual daily  magnesium oxide 400 milliGRAM(s) Oral daily  NIFEdipine XL 60 milliGRAM(s) Oral daily  predniSONE   Tablet 10 milliGRAM(s) Oral daily  primidone 50 milliGRAM(s) Oral at bedtime  sildenafil (REVATIO) 20 milliGRAM(s) Oral three times a day  spironolactone 50 milliGRAM(s) Oral daily  tamsulosin 0.4 milliGRAM(s) Oral at bedtime  traZODone 100 milliGRAM(s) Oral at bedtime  valACYclovir 500 milliGRAM(s) Oral daily  vancomycin    Solution 125 milliGRAM(s) Oral every 6 hours    MEDICATIONS  (PRN):  acetaminophen     Tablet .. 650 milliGRAM(s) Oral every 6 hours PRN Temp greater or equal to 38C (100.4F), Moderate Pain (4 - 6)  ALBUTerol    90 MICROgram(s) HFA Inhaler 2 Puff(s) Inhalation every 6 hours PRN Shortness of Breath and/or Wheezing  diazepam    Tablet 10 milliGRAM(s) Oral three times a day PRN Anxiety  melatonin 5 milliGRAM(s) Oral at bedtime PRN Insomnia  oxycodone    5 mG/acetaminophen 325 mG 1 Tablet(s) Oral every 8 hours PRN Severe Pain (7 - 10)  simethicone 80 milliGRAM(s) Chew daily PRN Indigestion      CXR: right basilar opacity      Patient is a 71y old  Female who presents with a chief complaint of left ankle pain (30 Nov 2021 18:43)      HPI: 70 yo F with PMHx of HO PE (10/21/21), MARLENI, Pulmonary HTN, Chronic Back Pain, Cervical Radiculopathy, CKD III, DLD, HTN, Gout, SP Fall in September sustaining Left Ankle bimalleolar fracture SP ORIF on 9/23/21 brought in from nursing facility for concern of left ankle pain, swelling and drainage.     PAST MEDICAL & SURGICAL HISTORY:  History of neck pain    Spinal stenosis of lumbar region with radiculopathy    Anxiety    Depression    Osteoarthritis    H/O osteoporosis    History of pulmonary hypertension    H/O pulmonary hypertension    COPD (chronic obstructive pulmonary disease)    Hyperlipidemia    H/O total knee replacement, right    Failed spinal cord stimulator    SOCIAL HX:   Smoking        denies (states she had second hand smoking exposure as child, mother and father smoked 3ppd )    ETOH  denies                          Other denies  Unemployed on disability since 19 years of age     FAMILY HISTORY:  Family history of bladder cancer (Mother)    Family history of Alzheimer&#x27;s disease (Father)    .  No cardiovascular or pulmonary family history     REVIEW OF SYSTEMS:    All ROS are negative except per HPI       Allergies    adhesives (Pruritus; Rash)  No Known Drug Allergies    Intolerances          PHYSICAL EXAM  Vital Signs Last 24 Hrs  T(C): 36.3 (01 Dec 2021 05:14), Max: 36.7 (30 Nov 2021 23:46)  T(F): 97.4 (01 Dec 2021 05:14), Max: 98 (30 Nov 2021 23:46)  HR: 63 (01 Dec 2021 05:14) (63 - 72)  BP: 107/58 (01 Dec 2021 05:14) (98/57 - 120/68)  BP(mean): --  RR: 18 (01 Dec 2021 05:14) (17 - 18)  SpO2: 94% (01 Dec 2021 05:14) (94% - 95%)    CONSTITUTIONAL:  NAD    ENT:   Airway patent,   No thrush    EYES:   Clear bilaterally,   pupils equal,   round and reactive to light.    CARDIAC:   Normal rate,   regular rhythm.    no edema      RESPIRATORY:   No wheezing   Normal chest expansion  Not tachypneic,  No use of accessory muscles    GASTROINTESTINAL:  Abdomen soft, non-tender,   No guarding,   Positive BS    MUSCULOSKELETAL:   Range of motion is limited  Left ankle lateral erythema and warmth       NEUROLOGICAL:   Alert and oriented   No motor deficits.    SKIN:   Skin normal color for race,   No evidence of rash.      HEME LYMPH:   No cervical  lymphadenopathy.  no inguinal lymphadenopathy          LABS:                          11.1   10.79 )-----------( 278      ( 30 Nov 2021 12:24 )             35.3                                               11-30    138  |  107  |  17  ----------------------------<  95  4.7   |  17  |  1.1    Ca    9.1      30 Nov 2021 15:36    TPro  5.9<L>  /  Alb  3.7  /  TBili  0.3  /  DBili  x   /  AST  21  /  ALT  14  /  AlkPhos  100  11-30      PT/INR - ( 30 Nov 2021 12:24 )   PT: 12.90 sec;   INR: 1.12 ratio         PTT - ( 30 Nov 2021 12:24 )  PTT:30.2 sec                                                                                     LIVER FUNCTIONS - ( 30 Nov 2021 15:36 )  Alb: 3.7 g/dL / Pro: 5.9 g/dL / ALK PHOS: 100 U/L / ALT: 14 U/L / AST: 21 U/L / GGT: x                                                                                                MEDICATIONS  (STANDING):  allopurinol 300 milliGRAM(s) Oral at bedtime  ATENolol  Tablet 50 milliGRAM(s) Oral daily  atorvastatin 80 milliGRAM(s) Oral at bedtime  budesonide 160 MICROgram(s)/formoterol 4.5 MICROgram(s) Inhaler 2 Puff(s) Inhalation two times a day  buPROPion XL (24-Hour) . 150 milliGRAM(s) Oral daily  cholecalciferol 400 Unit(s) Oral daily  colchicine 0.6 milliGRAM(s) Oral daily  enoxaparin Injectable 70 milliGRAM(s) SubCutaneous two times a day  FLUoxetine 20 milliGRAM(s) Oral two times a day  folic acid 1 milliGRAM(s) Oral daily  furosemide    Tablet 20 milliGRAM(s) Oral daily  hyoscyamine SL 0.125 milliGRAM(s) SubLingual daily  magnesium oxide 400 milliGRAM(s) Oral daily  NIFEdipine XL 60 milliGRAM(s) Oral daily  predniSONE   Tablet 10 milliGRAM(s) Oral daily  primidone 50 milliGRAM(s) Oral at bedtime  sildenafil (REVATIO) 20 milliGRAM(s) Oral three times a day  spironolactone 50 milliGRAM(s) Oral daily  tamsulosin 0.4 milliGRAM(s) Oral at bedtime  traZODone 100 milliGRAM(s) Oral at bedtime  valACYclovir 500 milliGRAM(s) Oral daily  vancomycin    Solution 125 milliGRAM(s) Oral every 6 hours    MEDICATIONS  (PRN):  acetaminophen     Tablet .. 650 milliGRAM(s) Oral every 6 hours PRN Temp greater or equal to 38C (100.4F), Moderate Pain (4 - 6)  ALBUTerol    90 MICROgram(s) HFA Inhaler 2 Puff(s) Inhalation every 6 hours PRN Shortness of Breath and/or Wheezing  diazepam    Tablet 10 milliGRAM(s) Oral three times a day PRN Anxiety  melatonin 5 milliGRAM(s) Oral at bedtime PRN Insomnia  oxycodone    5 mG/acetaminophen 325 mG 1 Tablet(s) Oral every 8 hours PRN Severe Pain (7 - 10)  simethicone 80 milliGRAM(s) Chew daily PRN Indigestion      CXR: right basilar opacity

## 2021-12-01 NOTE — PROGRESS NOTE ADULT - SUBJECTIVE AND OBJECTIVE BOX
TORY BERNARD  71y  Female      Patient is a 71y old  Female who presents with a chief complaint of Left ankle pain (01 Dec 2021 07:49)      INTERVAL HPI/OVERNIGHT EVENTS: admitted overnight. no acute events. no issues or nursing concerns. pain well controlled      REVIEW OF SYSTEMS:  CONSTITUTIONAL: No fever, weight loss, or fatigue  RESPIRATORY: No cough, wheezing, chills or hemoptysis; No shortness of breath  CARDIOVASCULAR: No chest pain, palpitations, dizziness, or leg swelling  GASTROINTESTINAL: No abdominal or epigastric pain. No nausea, vomiting, or hematemesis; No diarrhea or constipation. No melena or hematochezia.  GENITOURINARY: No dysuria, frequency, hematuria, or incontinence  NEUROLOGICAL: No headaches, memory loss, loss of strength, numbness, or tremors  SKIN: No itching, burning, rashes, or lesions   PSYCHIATRIC: No depression, anxiety, mood swings, or difficulty sleeping  All other review of systems negative    VITALS  T(C): 36.3 (12-01-21 @ 05:14), Max: 36.7 (11-30-21 @ 23:46)  HR: 63 (12-01-21 @ 05:14) (63 - 72)  BP: 107/58 (12-01-21 @ 05:14) (98/57 - 120/68)  RR: 18 (12-01-21 @ 05:14) (17 - 18)  SpO2: 94% (12-01-21 @ 05:14) (94% - 95%)  Wt(kg): --Vital Signs Last 24 Hrs  T(C): 36.3 (01 Dec 2021 05:14), Max: 36.7 (30 Nov 2021 23:46)  T(F): 97.4 (01 Dec 2021 05:14), Max: 98 (30 Nov 2021 23:46)  HR: 63 (01 Dec 2021 05:14) (63 - 72)  BP: 107/58 (01 Dec 2021 05:14) (98/57 - 120/68)  BP(mean): --  RR: 18 (01 Dec 2021 05:14) (17 - 18)  SpO2: 94% (01 Dec 2021 05:14) (94% - 95%)      11-30-21 @ 07:01  -  12-01-21 @ 07:00  --------------------------------------------------------  IN: 0 mL / OUT: 1700 mL / NET: -1700 mL        PHYSICAL EXAM:  GENERAL: elderly F, NAD, non toxic appearing  EYES: anicteric sclera, non injected conjunctiva, EOMI  ENT: hearing grossly intact, oropharynx clear, MMM  PSYCH: no agitation, baseline mentation  NERVOUS SYSTEM:  Alert & Oriented X3, CN 2-12 grossly intact  PULMONARY: Clear to percussion bilaterally; No rales, rhonchi, wheezing, or rubs  CARDIOVASCULAR: Regular rate and rhythm; No murmurs, rubs, or gallops  GI: Soft, Nontender, Nondistended; Bowel sounds present  EXTREMITIES:  2+ Peripheral Pulses, No clubbing, cyanosis, or edema  LYMPH: No lymphadenopathy noted  SKIN: No rashes or lesions    Consultant(s) Notes Reviewed:  [x ] YES  [ ] NO    Discussed with Consultants/Other Providers [ x] YES     LABS                          11.7   9.65  )-----------( 308      ( 01 Dec 2021 06:32 )             36.2     12-01    135  |  100  |  16  ----------------------------<  88  4.2   |  20  |  1.2    Ca    9.9      01 Dec 2021 06:32  Mg     1.9     12-01    TPro  5.8<L>  /  Alb  3.7  /  TBili  0.3  /  DBili  x   /  AST  17  /  ALT  14  /  AlkPhos  98  12-01  PT/INR - ( 30 Nov 2021 12:24 )   PT: 12.90 sec;   INR: 1.12 ratio    PTT - ( 30 Nov 2021 12:24 )  PTT:30.2 sec    RADIOLOGY & ADDITIONAL TESTS:  Xray Chest 1 View- PORTABLE-Routine (Xray Chest 1 View- PORTABLE-Routine .) (11.30.21 @ 15:45) >  Impression:  Right basilar opacity    Xray Ankle Complete 3 Views, Left (11.30.21 @ 14:10) >  IMPRESSION:  Post bimalleolar fracture repair with lateral plate and screw fixation of the medial malleolus and intramedullary nail and screw fixation of the lateral malleolus, in near anatomic alignment. No evidence of hardware complication. Fracture lines are still evident.      Imaging Personally Reviewed:  [ X] YES  [ ] NO    HEALTH ISSUES - PROBLEM Dx:      MEDICATIONS  (STANDING):  allopurinol 300 milliGRAM(s) Oral at bedtime  ATENolol  Tablet 50 milliGRAM(s) Oral daily  atorvastatin 80 milliGRAM(s) Oral at bedtime  budesonide 160 MICROgram(s)/formoterol 4.5 MICROgram(s) Inhaler 2 Puff(s) Inhalation two times a day  buPROPion XL (24-Hour) . 150 milliGRAM(s) Oral daily  cholecalciferol 400 Unit(s) Oral daily  colchicine 0.6 milliGRAM(s) Oral daily  enoxaparin Injectable 70 milliGRAM(s) SubCutaneous two times a day  FLUoxetine 20 milliGRAM(s) Oral two times a day  folic acid 1 milliGRAM(s) Oral daily  furosemide    Tablet 20 milliGRAM(s) Oral daily  hyoscyamine SL 0.125 milliGRAM(s) SubLingual daily  magnesium oxide 400 milliGRAM(s) Oral daily  NIFEdipine XL 60 milliGRAM(s) Oral daily  predniSONE   Tablet 10 milliGRAM(s) Oral daily  primidone 50 milliGRAM(s) Oral at bedtime  sildenafil (REVATIO) 20 milliGRAM(s) Oral three times a day  spironolactone 50 milliGRAM(s) Oral daily  tamsulosin 0.4 milliGRAM(s) Oral at bedtime  traZODone 100 milliGRAM(s) Oral at bedtime  valACYclovir 500 milliGRAM(s) Oral daily  vancomycin    Solution 125 milliGRAM(s) Oral every 6 hours    MEDICATIONS  (PRN):  acetaminophen     Tablet .. 650 milliGRAM(s) Oral every 6 hours PRN Temp greater or equal to 38C (100.4F), Moderate Pain (4 - 6)  ALBUTerol    90 MICROgram(s) HFA Inhaler 2 Puff(s) Inhalation every 6 hours PRN Shortness of Breath and/or Wheezing  diazepam    Tablet 10 milliGRAM(s) Oral three times a day PRN Anxiety  melatonin 5 milliGRAM(s) Oral at bedtime PRN Insomnia  oxycodone    5 mG/acetaminophen 325 mG 1 Tablet(s) Oral every 8 hours PRN Severe Pain (7 - 10)  simethicone 80 milliGRAM(s) Chew daily PRN Indigestion

## 2021-12-01 NOTE — PRE PROCEDURE NOTE - PRE PROCEDURE EVALUATION
ORTHOPEDIC SURGERY PRE OP NOTE      Diagnosis: Left ankle infected hardware    Planned Procedure: Left ankle irrigation and debridement, removal of hardware    Consent: TO BE OBTAINED BY ATTENDING                   Risks/benefits/alternatives were discussed with the patient/family and they wish to proceed with surgery.       ANTICIPATED DATE OF PROCEDURE : 12/2/21  SCHEDULED CASE OR ADD-ON CASE: Booked      Consent: To be obtained by attending    Clearances:   [x] Medicine: optimized  [x] Pulmonology: optimized  [ ] Anesthesia: pending    T(C): 36.4 (12-01-21 @ 12:49), Max: 36.7 (11-30-21 @ 23:46)  HR: 65 (12-01-21 @ 12:49) (63 - 71)  BP: 96/56 (12-01-21 @ 12:49) (96/56 - 107/58)  RR: 18 (12-01-21 @ 05:14) (18 - 18)  SpO2: 94% (12-01-21 @ 05:14) (94% - 94%)    Labs:                        11.7   9.65  )-----------( 308      ( 01 Dec 2021 06:32 )             36.2     12-01    135  |  100  |  16  ----------------------------<  88  4.2   |  20  |  1.2    Ca    9.9      01 Dec 2021 06:32  Mg     1.9     12-01    TPro  5.8<L>  /  Alb  3.7  /  TBili  0.3  /  DBili  x   /  AST  17  /  ALT  14  /  AlkPhos  98  12-01    PT/INR - ( 30 Nov 2021 12:24 )   PT: 12.90 sec;   INR: 1.12 ratio         PTT - ( 30 Nov 2021 12:24 )  PTT:30.2 sec  Type and Screen X 2:    COVID-19 PCR: NotDetec (30 Nov 2021 14:22)  COVID-19 PCR: NotDetec (13 Oct 2021 18:00)  COVID-19 PCR: NotDetec (07 Oct 2021 18:29)  COVID-19 PCR: NotDetec (27 Sep 2021 16:35)  COVID-19 PCR: NotDetec (24 Sep 2021 15:30)  COVID-19 PCR: NotDetec (21 Sep 2021 15:27)    [x]EKG: complete  [x]CXR: complete      DIET: NPO after midnight  IVF: per primary team      ANTICOAGULATION STATUS ( include name of anticoagulant) : LVX  [x] CONTINUE LVX: Hold AM dose, may resume again after surgery                                             A/P: Patient is a 71y y/o Female Pending left ankle irrigation and debridement, removal of hardware    [x]NPO and IVF @ midnight  [x]pain control/analgesia prn per primary team   [ ]Incentive Spirometry   [x]F/U Clearance  [ ]F/U Pending Labs  [ ]Notify Ortho with any questions- spectra [id number]    [x]DISCUSSED WITH PRIMARY TEAM MEMBER (name of team member): Dr. Angela, Medicine  [ ]Date and Time DISCUSSED WITH PRIMARY TEAM MEMBER:  AT 5:20 PM

## 2021-12-01 NOTE — PROGRESS NOTE ADULT - ASSESSMENT
72 yo F with PMH of COPD, Asthma, PE on eliquis, Pulmonary HTN - on Home O2 intermittently as needed, chronic back pain, cervical radiculopathy, CKD 3, DLD, HTN, Gout, and recent pshx of L ankle ORIF on 9/23/2021 for L ankle fracture brought from [facility] today with persistent drainage from lateral ankle incision and Left ankle pain with inability to ambulate.       # Left ankle pain/drainage   # Ambulatory dysfunction  - no leukocytosis  - no fevers; HDS and afebrile  - s/p ORIF on 09/23/2021  - Ortho team following  - Planned for irrigation and debridement and hardware removal L ankle this week  - ESR-35, CRP-pending  - Medical risk stratification-->RCRI score is 0(class 1 risk, 30-day reisk of death, MI, or cardiac arrest), ROME score- 1.3% Risk of myocardial infarction or cardiac arrest, intraoperatively or up to 30-days post op  - Pulm assessment in chart note dated 12/1  - Monitor off antibiotics until debridement, start Rocephin and vancomycin if hemodynamically unstable  - Follow up tissue cultures after debridement  - NWB LLE  - pain control    # C. diff colitis  - watery diarrhea  - continue with PO vancomycin; unsure duration. will have to touch base with NH for collateral hx    # h/o PE  - On eliquis at home  - change to lovenox before surgery; hold day prior to surgery  - No dyspnea/saturating well    # Urinary Retention   - has indwelling [place] since last admission  - post op retention in the setting of immobility  - renew [place]     # Chronic Back Pain  # Hx of Cervical Radiculopathy  - Sees pain management, Dr. Mayers  - C/w  Percocet PRN ( no longer on Fentanyl patch )     # Gout  - C/w Allopurinol    # Pulmonary HTN  - C/w Sildenafil, prednisone     # HTN  - C/w Atenolol, Nifedipine    # DLD  - C/w Atorvastatin    # COPD, Asthma on home O2 2L as needed; currently satting well  # MARLENI  - on advair discus at home;   - Symbicort while admitted  - albuterol PRN    # Anxiety  - C/w Wellbutrin  - Valium PRN    # CKD 3  - Stable, sees Dr. Mcarthur    # Diet: DASH/TLC,  # Activity: IAT  # GI PPX: NA  # DVT PPX: Lovenox; hold day prior to surgery  # Full code     70 yo F with PMH of COPD, Asthma, PE on eliquis, Pulmonary HTN - on Home O2 intermittently as needed, chronic back pain, cervical radiculopathy, CKD 3, DLD, HTN, Gout, and recent pshx of L ankle ORIF on 9/23/2021 for L ankle fracture brought from [facility] today with persistent drainage from lateral ankle incision and Left ankle pain with inability to ambulate.       # Left ankle pain/drainage   # Ambulatory dysfunction  - no leukocytosis  - no fevers; HDS and afebrile  - s/p ORIF on 09/23/2021  - Ortho team following  - Planned for irrigation and debridement and hardware removal L ankle this week  - ESR-35, CRP-pending  - Medical risk stratification-->RCRI score is 0(class 1 risk, 30-day reisk of death, MI, or cardiac arrest), ROME score- 1.3% Risk of myocardial infarction or cardiac arrest, intraoperatively or up to 30-days post op  - Pulm assessment in chart note dated 12/1  - Monitor off antibiotics until debridement, start Rocephin and vancomycin if hemodynamically unstable  - Follow up tissue cultures after debridement  - NWB LLE  - pain control    # C. diff colitis  - watery diarrhea  - continue with PO vancomycin; unsure duration. will have to touch base with NH for collateral hx    # h/o PE  - On eliquis at home(provoked and found incidentally found last admission in October; 3-6 months-->can stop in April)  - change to lovenox before surgery; hold day prior to surgery  - No dyspnea/saturating well    # Urinary Retention   - has indwelling [place] since last admission  - post op retention in the setting of immobility  - renew [place]     # Chronic Back Pain  # Hx of Cervical Radiculopathy  - Sees pain management, Dr. Mayers  - C/w  Percocet PRN ( no longer on Fentanyl patch )     # Gout  - C/w Allopurinol    # Pulmonary HTN  - C/w Sildenafil, prednisone     # HTN  - C/w Atenolol, Nifedipine    # DLD  - C/w Atorvastatin    # COPD, Asthma on home O2 2L as needed; currently satting well  # MARLENI  - on advair discus at home;   - Symbicort while admitted  - albuterol PRN    # Anxiety  - C/w Wellbutrin  - Valium PRN    # CKD 3  - Stable, sees Dr. Mcarthur    # Diet: DASH/TLC,  # Activity: IAT  # GI PPX: NA  # DVT PPX: Lovenox; hold day prior to surgery  # Full code

## 2021-12-02 LAB
ALBUMIN SERPL ELPH-MCNC: 3.7 G/DL — SIGNIFICANT CHANGE UP (ref 3.5–5.2)
ALP SERPL-CCNC: 97 U/L — SIGNIFICANT CHANGE UP (ref 30–115)
ALT FLD-CCNC: 12 U/L — SIGNIFICANT CHANGE UP (ref 0–41)
ANION GAP SERPL CALC-SCNC: 17 MMOL/L — HIGH (ref 7–14)
AST SERPL-CCNC: 13 U/L — SIGNIFICANT CHANGE UP (ref 0–41)
BASOPHILS # BLD AUTO: 0.03 K/UL — SIGNIFICANT CHANGE UP (ref 0–0.2)
BASOPHILS NFR BLD AUTO: 0.4 % — SIGNIFICANT CHANGE UP (ref 0–1)
BILIRUB SERPL-MCNC: <0.2 MG/DL — SIGNIFICANT CHANGE UP (ref 0.2–1.2)
BUN SERPL-MCNC: 19 MG/DL — SIGNIFICANT CHANGE UP (ref 10–20)
CALCIUM SERPL-MCNC: 9.1 MG/DL — SIGNIFICANT CHANGE UP (ref 8.5–10.1)
CHLORIDE SERPL-SCNC: 98 MMOL/L — SIGNIFICANT CHANGE UP (ref 98–110)
CO2 SERPL-SCNC: 21 MMOL/L — SIGNIFICANT CHANGE UP (ref 17–32)
CREAT SERPL-MCNC: 1.4 MG/DL — SIGNIFICANT CHANGE UP (ref 0.7–1.5)
EOSINOPHIL # BLD AUTO: 0 K/UL — SIGNIFICANT CHANGE UP (ref 0–0.7)
EOSINOPHIL NFR BLD AUTO: 0 % — SIGNIFICANT CHANGE UP (ref 0–8)
GLUCOSE SERPL-MCNC: 144 MG/DL — HIGH (ref 70–99)
HCT VFR BLD CALC: 35.3 % — LOW (ref 37–47)
HGB BLD-MCNC: 11.5 G/DL — LOW (ref 12–16)
IMM GRANULOCYTES NFR BLD AUTO: 0.9 % — HIGH (ref 0.1–0.3)
LYMPHOCYTES # BLD AUTO: 1.48 K/UL — SIGNIFICANT CHANGE UP (ref 1.2–3.4)
LYMPHOCYTES # BLD AUTO: 19.9 % — LOW (ref 20.5–51.1)
MAGNESIUM SERPL-MCNC: 2.2 MG/DL — SIGNIFICANT CHANGE UP (ref 1.8–2.4)
MCHC RBC-ENTMCNC: 32.6 G/DL — SIGNIFICANT CHANGE UP (ref 32–37)
MCHC RBC-ENTMCNC: 34 PG — HIGH (ref 27–31)
MCV RBC AUTO: 104.4 FL — HIGH (ref 81–99)
MONOCYTES # BLD AUTO: 0.62 K/UL — HIGH (ref 0.1–0.6)
MONOCYTES NFR BLD AUTO: 8.4 % — SIGNIFICANT CHANGE UP (ref 1.7–9.3)
NEUTROPHILS # BLD AUTO: 5.22 K/UL — SIGNIFICANT CHANGE UP (ref 1.4–6.5)
NEUTROPHILS NFR BLD AUTO: 70.4 % — SIGNIFICANT CHANGE UP (ref 42.2–75.2)
NRBC # BLD: 0 /100 WBCS — SIGNIFICANT CHANGE UP (ref 0–0)
PLATELET # BLD AUTO: 314 K/UL — SIGNIFICANT CHANGE UP (ref 130–400)
POTASSIUM SERPL-MCNC: 4 MMOL/L — SIGNIFICANT CHANGE UP (ref 3.5–5)
POTASSIUM SERPL-SCNC: 4 MMOL/L — SIGNIFICANT CHANGE UP (ref 3.5–5)
PROT SERPL-MCNC: 5.7 G/DL — LOW (ref 6–8)
RBC # BLD: 3.38 M/UL — LOW (ref 4.2–5.4)
RBC # FLD: 14.5 % — SIGNIFICANT CHANGE UP (ref 11.5–14.5)
SODIUM SERPL-SCNC: 136 MMOL/L — SIGNIFICANT CHANGE UP (ref 135–146)
WBC # BLD: 7.42 K/UL — SIGNIFICANT CHANGE UP (ref 4.8–10.8)
WBC # FLD AUTO: 7.42 K/UL — SIGNIFICANT CHANGE UP (ref 4.8–10.8)

## 2021-12-02 PROCEDURE — 99233 SBSQ HOSP IP/OBS HIGH 50: CPT

## 2021-12-02 PROCEDURE — 93970 EXTREMITY STUDY: CPT | Mod: 26

## 2021-12-02 RX ORDER — COLCHICINE 0.6 MG
0.6 TABLET ORAL DAILY
Refills: 0 | Status: DISCONTINUED | OUTPATIENT
Start: 2021-12-02 | End: 2021-12-09

## 2021-12-02 RX ORDER — VANCOMYCIN HCL 1 G
125 VIAL (EA) INTRAVENOUS EVERY 6 HOURS
Refills: 0 | Status: DISCONTINUED | OUTPATIENT
Start: 2021-12-02 | End: 2021-12-03

## 2021-12-02 RX ORDER — HYDROMORPHONE HYDROCHLORIDE 2 MG/ML
0.5 INJECTION INTRAMUSCULAR; INTRAVENOUS; SUBCUTANEOUS
Refills: 0 | Status: DISCONTINUED | OUTPATIENT
Start: 2021-12-02 | End: 2021-12-02

## 2021-12-02 RX ORDER — VANCOMYCIN HCL 1 G
1000 VIAL (EA) INTRAVENOUS EVERY 24 HOURS
Refills: 0 | Status: DISCONTINUED | OUTPATIENT
Start: 2021-12-03 | End: 2021-12-06

## 2021-12-02 RX ORDER — CHOLECALCIFEROL (VITAMIN D3) 125 MCG
400 CAPSULE ORAL DAILY
Refills: 0 | Status: DISCONTINUED | OUTPATIENT
Start: 2021-12-02 | End: 2021-12-09

## 2021-12-02 RX ORDER — ATENOLOL 25 MG/1
50 TABLET ORAL DAILY
Refills: 0 | Status: DISCONTINUED | OUTPATIENT
Start: 2021-12-02 | End: 2021-12-09

## 2021-12-02 RX ORDER — FOLIC ACID 0.8 MG
1 TABLET ORAL DAILY
Refills: 0 | Status: DISCONTINUED | OUTPATIENT
Start: 2021-12-02 | End: 2021-12-09

## 2021-12-02 RX ORDER — CEFEPIME 1 G/1
2000 INJECTION, POWDER, FOR SOLUTION INTRAMUSCULAR; INTRAVENOUS EVERY 12 HOURS
Refills: 0 | Status: DISCONTINUED | OUTPATIENT
Start: 2021-12-03 | End: 2021-12-05

## 2021-12-02 RX ORDER — BUDESONIDE AND FORMOTEROL FUMARATE DIHYDRATE 160; 4.5 UG/1; UG/1
2 AEROSOL RESPIRATORY (INHALATION)
Refills: 0 | Status: DISCONTINUED | OUTPATIENT
Start: 2021-12-02 | End: 2021-12-09

## 2021-12-02 RX ORDER — ATORVASTATIN CALCIUM 80 MG/1
80 TABLET, FILM COATED ORAL AT BEDTIME
Refills: 0 | Status: DISCONTINUED | OUTPATIENT
Start: 2021-12-02 | End: 2021-12-09

## 2021-12-02 RX ORDER — SIMETHICONE 80 MG/1
80 TABLET, CHEWABLE ORAL DAILY
Refills: 0 | Status: DISCONTINUED | OUTPATIENT
Start: 2021-12-02 | End: 2021-12-09

## 2021-12-02 RX ORDER — ALBUTEROL 90 UG/1
2 AEROSOL, METERED ORAL EVERY 6 HOURS
Refills: 0 | Status: DISCONTINUED | OUTPATIENT
Start: 2021-12-02 | End: 2021-12-09

## 2021-12-02 RX ORDER — ACETAMINOPHEN 500 MG
650 TABLET ORAL EVERY 6 HOURS
Refills: 0 | Status: DISCONTINUED | OUTPATIENT
Start: 2021-12-02 | End: 2021-12-09

## 2021-12-02 RX ORDER — ALLOPURINOL 300 MG
300 TABLET ORAL AT BEDTIME
Refills: 0 | Status: DISCONTINUED | OUTPATIENT
Start: 2021-12-02 | End: 2021-12-09

## 2021-12-02 RX ORDER — VALACYCLOVIR 500 MG/1
500 TABLET, FILM COATED ORAL DAILY
Refills: 0 | Status: DISCONTINUED | OUTPATIENT
Start: 2021-12-02 | End: 2021-12-09

## 2021-12-02 RX ORDER — METOCLOPRAMIDE HCL 10 MG
10 TABLET ORAL ONCE
Refills: 0 | Status: DISCONTINUED | OUTPATIENT
Start: 2021-12-02 | End: 2021-12-02

## 2021-12-02 RX ORDER — OXYCODONE AND ACETAMINOPHEN 5; 325 MG/1; MG/1
1 TABLET ORAL EVERY 8 HOURS
Refills: 0 | Status: DISCONTINUED | OUTPATIENT
Start: 2021-12-02 | End: 2021-12-07

## 2021-12-02 RX ORDER — CEFEPIME 1 G/1
2000 INJECTION, POWDER, FOR SOLUTION INTRAMUSCULAR; INTRAVENOUS ONCE
Refills: 0 | Status: COMPLETED | OUTPATIENT
Start: 2021-12-02 | End: 2021-12-02

## 2021-12-02 RX ORDER — BUPROPION HYDROCHLORIDE 150 MG/1
150 TABLET, EXTENDED RELEASE ORAL DAILY
Refills: 0 | Status: DISCONTINUED | OUTPATIENT
Start: 2021-12-02 | End: 2021-12-09

## 2021-12-02 RX ORDER — SODIUM CHLORIDE 9 MG/ML
1000 INJECTION, SOLUTION INTRAVENOUS
Refills: 0 | Status: DISCONTINUED | OUTPATIENT
Start: 2021-12-02 | End: 2021-12-02

## 2021-12-02 RX ORDER — FLUOXETINE HCL 10 MG
20 CAPSULE ORAL
Refills: 0 | Status: DISCONTINUED | OUTPATIENT
Start: 2021-12-02 | End: 2021-12-09

## 2021-12-02 RX ORDER — DIAZEPAM 5 MG
10 TABLET ORAL THREE TIMES A DAY
Refills: 0 | Status: DISCONTINUED | OUTPATIENT
Start: 2021-12-02 | End: 2021-12-08

## 2021-12-02 RX ORDER — LANOLIN ALCOHOL/MO/W.PET/CERES
5 CREAM (GRAM) TOPICAL AT BEDTIME
Refills: 0 | Status: DISCONTINUED | OUTPATIENT
Start: 2021-12-02 | End: 2021-12-09

## 2021-12-02 RX ORDER — PRIMIDONE 250 MG/1
50 TABLET ORAL AT BEDTIME
Refills: 0 | Status: DISCONTINUED | OUTPATIENT
Start: 2021-12-02 | End: 2021-12-09

## 2021-12-02 RX ORDER — SPIRONOLACTONE 25 MG/1
50 TABLET, FILM COATED ORAL DAILY
Refills: 0 | Status: DISCONTINUED | OUTPATIENT
Start: 2021-12-02 | End: 2021-12-09

## 2021-12-02 RX ORDER — FUROSEMIDE 40 MG
20 TABLET ORAL DAILY
Refills: 0 | Status: DISCONTINUED | OUTPATIENT
Start: 2021-12-02 | End: 2021-12-09

## 2021-12-02 RX ORDER — TRAZODONE HCL 50 MG
100 TABLET ORAL AT BEDTIME
Refills: 0 | Status: DISCONTINUED | OUTPATIENT
Start: 2021-12-02 | End: 2021-12-09

## 2021-12-02 RX ORDER — VANCOMYCIN HCL 1 G
1250 VIAL (EA) INTRAVENOUS ONCE
Refills: 0 | Status: COMPLETED | OUTPATIENT
Start: 2021-12-02 | End: 2021-12-02

## 2021-12-02 RX ORDER — TAMSULOSIN HYDROCHLORIDE 0.4 MG/1
0.4 CAPSULE ORAL AT BEDTIME
Refills: 0 | Status: DISCONTINUED | OUTPATIENT
Start: 2021-12-02 | End: 2021-12-09

## 2021-12-02 RX ORDER — HYOSCYAMINE SULFATE 0.13 MG
0.12 TABLET ORAL DAILY
Refills: 0 | Status: DISCONTINUED | OUTPATIENT
Start: 2021-12-02 | End: 2021-12-09

## 2021-12-02 RX ORDER — CEFEPIME 1 G/1
INJECTION, POWDER, FOR SOLUTION INTRAMUSCULAR; INTRAVENOUS
Refills: 0 | Status: DISCONTINUED | OUTPATIENT
Start: 2021-12-02 | End: 2021-12-05

## 2021-12-02 RX ORDER — MAGNESIUM SULFATE 500 MG/ML
2 VIAL (ML) INJECTION ONCE
Refills: 0 | Status: COMPLETED | OUTPATIENT
Start: 2021-12-02 | End: 2021-12-02

## 2021-12-02 RX ORDER — MAGNESIUM OXIDE 400 MG ORAL TABLET 241.3 MG
400 TABLET ORAL DAILY
Refills: 0 | Status: DISCONTINUED | OUTPATIENT
Start: 2021-12-02 | End: 2021-12-09

## 2021-12-02 RX ORDER — NIFEDIPINE 30 MG
60 TABLET, EXTENDED RELEASE 24 HR ORAL DAILY
Refills: 0 | Status: DISCONTINUED | OUTPATIENT
Start: 2021-12-02 | End: 2021-12-09

## 2021-12-02 RX ORDER — VANCOMYCIN HCL 1 G
VIAL (EA) INTRAVENOUS
Refills: 0 | Status: DISCONTINUED | OUTPATIENT
Start: 2021-12-02 | End: 2021-12-06

## 2021-12-02 RX ADMIN — Medication 166.67 MILLIGRAM(S): at 21:22

## 2021-12-02 RX ADMIN — Medication 0.6 MILLIGRAM(S): at 11:43

## 2021-12-02 RX ADMIN — Medication 20 MILLIGRAM(S): at 05:48

## 2021-12-02 RX ADMIN — Medication 20 MILLIGRAM(S): at 05:47

## 2021-12-02 RX ADMIN — Medication 20 MILLIGRAM(S): at 21:21

## 2021-12-02 RX ADMIN — BUDESONIDE AND FORMOTEROL FUMARATE DIHYDRATE 2 PUFF(S): 160; 4.5 AEROSOL RESPIRATORY (INHALATION) at 21:41

## 2021-12-02 RX ADMIN — Medication 5 MILLIGRAM(S): at 22:20

## 2021-12-02 RX ADMIN — Medication 0.12 MILLIGRAM(S): at 11:45

## 2021-12-02 RX ADMIN — Medication 20 MILLIGRAM(S): at 18:55

## 2021-12-02 RX ADMIN — BUDESONIDE AND FORMOTEROL FUMARATE DIHYDRATE 2 PUFF(S): 160; 4.5 AEROSOL RESPIRATORY (INHALATION) at 08:46

## 2021-12-02 RX ADMIN — Medication 1 MILLIGRAM(S): at 11:43

## 2021-12-02 RX ADMIN — BUPROPION HYDROCHLORIDE 150 MILLIGRAM(S): 150 TABLET, EXTENDED RELEASE ORAL at 11:44

## 2021-12-02 RX ADMIN — Medication 25 GRAM(S): at 03:25

## 2021-12-02 RX ADMIN — Medication 10 MILLIGRAM(S): at 05:49

## 2021-12-02 RX ADMIN — ATORVASTATIN CALCIUM 80 MILLIGRAM(S): 80 TABLET, FILM COATED ORAL at 21:22

## 2021-12-02 RX ADMIN — ATENOLOL 50 MILLIGRAM(S): 25 TABLET ORAL at 05:47

## 2021-12-02 RX ADMIN — Medication 125 MILLIGRAM(S): at 05:48

## 2021-12-02 RX ADMIN — Medication 300 MILLIGRAM(S): at 21:22

## 2021-12-02 RX ADMIN — Medication 125 MILLIGRAM(S): at 11:43

## 2021-12-02 RX ADMIN — SPIRONOLACTONE 50 MILLIGRAM(S): 25 TABLET, FILM COATED ORAL at 21:21

## 2021-12-02 RX ADMIN — Medication 100 MILLIGRAM(S): at 21:21

## 2021-12-02 RX ADMIN — SIMETHICONE 80 MILLIGRAM(S): 80 TABLET, CHEWABLE ORAL at 22:20

## 2021-12-02 RX ADMIN — PRIMIDONE 50 MILLIGRAM(S): 250 TABLET ORAL at 21:21

## 2021-12-02 RX ADMIN — CEFEPIME 100 MILLIGRAM(S): 1 INJECTION, POWDER, FOR SOLUTION INTRAMUSCULAR; INTRAVENOUS at 18:54

## 2021-12-02 RX ADMIN — TAMSULOSIN HYDROCHLORIDE 0.4 MILLIGRAM(S): 0.4 CAPSULE ORAL at 21:21

## 2021-12-02 RX ADMIN — SPIRONOLACTONE 50 MILLIGRAM(S): 25 TABLET, FILM COATED ORAL at 05:48

## 2021-12-02 RX ADMIN — MAGNESIUM OXIDE 400 MG ORAL TABLET 400 MILLIGRAM(S): 241.3 TABLET ORAL at 11:44

## 2021-12-02 RX ADMIN — VALACYCLOVIR 500 MILLIGRAM(S): 500 TABLET, FILM COATED ORAL at 11:45

## 2021-12-02 RX ADMIN — Medication 400 UNIT(S): at 11:45

## 2021-12-02 RX ADMIN — Medication 20 MILLIGRAM(S): at 13:35

## 2021-12-02 RX ADMIN — Medication 60 MILLIGRAM(S): at 05:48

## 2021-12-02 NOTE — PROGRESS NOTE ADULT - SUBJECTIVE AND OBJECTIVE BOX
Patient is a 71y old  Female who presents with a chief complaint of Left ankle pain.     INTERVAL HPI/OVERNIGHT EVENTS:  70 y/o F with PMH of COPD, Asthma, PE on eliquis, Pulmonary HTN - on Home O2 intermittently as needed, chronic back pain, cervical radiculopathy, CKD 3, DLD, HTN, Gout, and recent pshx of L ankle ORIF on 9/23/2021 for L ankle fracture brought from [facility] today with persistent drainage from lateral ankle incision and left ankle pain with inability to ambulate. Pt denies nausea, vomiting headache, diarrhea, constipation, hematuria. Pt has no other complaints.      no acute events overnight     REVIEW OF SYSTEMS:  CONSTITUTIONAL: No fever, weight loss, or fatigue  ENMT:  No difficulty hearing, tinnitus, throat pain  NECK: No pain or stiffness  RESPIRATORY: No cough, hemoptysis; No shortness of breath  CARDIOVASCULAR: No chest pain, palpitations, dizziness, + R leg swelling  GASTROINTESTINAL: No abdominal or epigastric pain. No nausea, vomiting, or hematemesis; No diarrhea or constipation. No melena or hematochezia.  GENITOURINARY: No dysuria, hematuria  NEUROLOGICAL: No headaches  SKIN: No itching, burning lesions  MUSCULOSKELETAL: No joint pain. No muscle, back, or extremity pain    FAMILY HISTORY:  Family history of bladder cancer (Mother)    Family history of Alzheimers disease (Father)      T(C): 36.5 (12-02-21 @ 05:21), Max: 37 (12-01-21 @ 20:55)  HR: 64 (12-02-21 @ 05:45) (64 - 72)  BP: 109/58 (12-02-21 @ 05:45) (96/56 - 121/63)  RR: 18 (12-02-21 @ 05:21) (18 - 18)  SpO2: 93% (12-02-21 @ 05:21) (93% - 98%)  Wt(kg): --Vital Signs Last 24 Hrs  T(C): 36.5 (02 Dec 2021 05:21), Max: 37 (01 Dec 2021 20:55)  T(F): 97.7 (02 Dec 2021 05:21), Max: 98.6 (01 Dec 2021 20:55)  HR: 64 (02 Dec 2021 05:45) (64 - 72)  BP: 109/58 (02 Dec 2021 05:45) (96/56 - 121/63)  BP(mean): --  RR: 18 (02 Dec 2021 05:21) (18 - 18)  SpO2: 93% (02 Dec 2021 05:21) (93% - 98%)    PHYSICAL EXAM:  GENERAL: NAD, well-groomed, well-developed  HEAD:  Atraumatic, Normocephalic  EYES: EOMI, PERRLA, conjunctiva and sclera clear  ENMT: No tonsillar erythema, exudates, or enlargement; Moist mucous membranes, Good dentition, No lesions  NECK: Supple, No JVD, Normal thyroid  NERVOUS SYSTEM:  Alert & Oriented X3, Good concentration;   CHEST/LUNG: Clear to percussion bilaterally; +Mild L. lobe rhonchi   HEART: Regular rate and rhythm;   ABDOMEN: Soft, Nontender, Nondistended  EXTREMITIES: No clubbing, cyanosis,  + R leg swelling    LABS:                          10.5   10.95 )-----------( 290      ( 01 Dec 2021 20:00 )             32.5     12-01    135  |  103  |  23<H>  ----------------------------<  119<H>  3.8   |  17  |  1.6<H>    Ca    9.1      01 Dec 2021 20:00  Mg     1.7     12-01    TPro  5.4<L>  /  Alb  3.6  /  TBili  <0.2  /  DBili  x   /  AST  19  /  ALT  15  /  AlkPhos  102  12-01    PT/INR - ( 01 Dec 2021 20:00 )   PT: 14.20 sec;   INR: 1.24 ratio         PTT - ( 01 Dec 2021 20:00 )  PTT:44.2 sec          Consultant(s) Notes Reviewed:  [x ] YES  [ ] NO  Care Discussed with Consultants/Other Providers [ x] YES  [ ] NO    RADIOLOGY & ADDITIONAL TESTS:    Imaging Personally Reviewed:  [ ] YES  [ ] NO  acetaminophen     Tablet .. 650 milliGRAM(s) Oral every 6 hours PRN  ALBUTerol    90 MICROgram(s) HFA Inhaler 2 Puff(s) Inhalation every 6 hours PRN  allopurinol 300 milliGRAM(s) Oral at bedtime  ATENolol  Tablet 50 milliGRAM(s) Oral daily  atorvastatin 80 milliGRAM(s) Oral at bedtime  budesonide 160 MICROgram(s)/formoterol 4.5 MICROgram(s) Inhaler 2 Puff(s) Inhalation two times a day  buPROPion XL (24-Hour) . 150 milliGRAM(s) Oral daily  cholecalciferol 400 Unit(s) Oral daily  colchicine 0.6 milliGRAM(s) Oral daily  diazepam    Tablet 10 milliGRAM(s) Oral three times a day PRN  enoxaparin Injectable 70 milliGRAM(s) SubCutaneous two times a day  FLUoxetine 20 milliGRAM(s) Oral two times a day  folic acid 1 milliGRAM(s) Oral daily  furosemide    Tablet 20 milliGRAM(s) Oral daily  hyoscyamine SL 0.125 milliGRAM(s) SubLingual daily  magnesium oxide 400 milliGRAM(s) Oral daily  melatonin 5 milliGRAM(s) Oral at bedtime PRN  NIFEdipine XL 60 milliGRAM(s) Oral daily  oxycodone    5 mG/acetaminophen 325 mG 1 Tablet(s) Oral every 8 hours PRN  predniSONE   Tablet 10 milliGRAM(s) Oral daily  primidone 50 milliGRAM(s) Oral at bedtime  sildenafil (REVATIO) 20 milliGRAM(s) Oral three times a day  simethicone 80 milliGRAM(s) Chew daily PRN  spironolactone 50 milliGRAM(s) Oral daily  tamsulosin 0.4 milliGRAM(s) Oral at bedtime  traZODone 100 milliGRAM(s) Oral at bedtime  valACYclovir 500 milliGRAM(s) Oral daily  vancomycin    Solution 125 milliGRAM(s) Oral every 6 hours         Patient is a 71y old  Female who presents with a chief complaint of Left ankle pain.     INTERVAL HPI/OVERNIGHT EVENTS:  72 y/o F with PMH of COPD, Asthma, PE on eliquis, Pulmonary HTN - on Home O2 intermittently as needed, chronic back pain, cervical radiculopathy, CKD 3, DLD, HTN, Gout, and recent pshx of L ankle ORIF on 9/23/2021 for L ankle fracture brought from [facility] today with persistent drainage from lateral ankle incision and left ankle pain with inability to ambulate. Pt denies nausea, vomiting headache, diarrhea, constipation, hematuria. Pt has no other complaints.      no acute events overnight     FAMILY HISTORY:  Family history of bladder cancer (Mother)    Family history of Alzheimers disease (Father)      T(C): 36.5 (12-02-21 @ 05:21), Max: 37 (12-01-21 @ 20:55)  HR: 64 (12-02-21 @ 05:45) (64 - 72)  BP: 109/58 (12-02-21 @ 05:45) (96/56 - 121/63)  RR: 18 (12-02-21 @ 05:21) (18 - 18)  SpO2: 93% (12-02-21 @ 05:21) (93% - 98%)  Wt(kg): --Vital Signs Last 24 Hrs  T(C): 36.5 (02 Dec 2021 05:21), Max: 37 (01 Dec 2021 20:55)  T(F): 97.7 (02 Dec 2021 05:21), Max: 98.6 (01 Dec 2021 20:55)  HR: 64 (02 Dec 2021 05:45) (64 - 72)  BP: 109/58 (02 Dec 2021 05:45) (96/56 - 121/63)  BP(mean): --  RR: 18 (02 Dec 2021 05:21) (18 - 18)  SpO2: 93% (02 Dec 2021 05:21) (93% - 98%)    PHYSICAL EXAM:  GENERAL: NAD, well-groomed, well-developed  HEAD:  Atraumatic, Normocephalic  EYES: EOMI, PERRLA, conjunctiva and sclera clear  ENMT: No tonsillar erythema, exudates, or enlargement; Moist mucous membranes, Good dentition, No lesions  NECK: Supple, No JVD, Normal thyroid  NERVOUS SYSTEM:  Alert & Oriented X3, Good concentration;   CHEST/LUNG: Clear to percussion bilaterally; +Mild L. lobe rhonchi   HEART: Regular rate and rhythm;   ABDOMEN: Soft, Nontender, Nondistended  EXTREMITIES: No clubbing, cyanosis,  + R leg swelling    LABS:                          10.5   10.95 )-----------( 290      ( 01 Dec 2021 20:00 )             32.5     12-01    135  |  103  |  23<H>  ----------------------------<  119<H>  3.8   |  17  |  1.6<H>    Ca    9.1      01 Dec 2021 20:00  Mg     1.7     12-01    TPro  5.4<L>  /  Alb  3.6  /  TBili  <0.2  /  DBili  x   /  AST  19  /  ALT  15  /  AlkPhos  102  12-01    PT/INR - ( 01 Dec 2021 20:00 )   PT: 14.20 sec;   INR: 1.24 ratio         PTT - ( 01 Dec 2021 20:00 )  PTT:44.2 sec          Consultant(s) Notes Reviewed:  [x ] YES  [ ] NO  Care Discussed with Consultants/Other Providers [ x] YES  [ ] NO    RADIOLOGY & ADDITIONAL TESTS:    Imaging Personally Reviewed:  [ ] YES  [ ] NO  acetaminophen     Tablet .. 650 milliGRAM(s) Oral every 6 hours PRN  ALBUTerol    90 MICROgram(s) HFA Inhaler 2 Puff(s) Inhalation every 6 hours PRN  allopurinol 300 milliGRAM(s) Oral at bedtime  ATENolol  Tablet 50 milliGRAM(s) Oral daily  atorvastatin 80 milliGRAM(s) Oral at bedtime  budesonide 160 MICROgram(s)/formoterol 4.5 MICROgram(s) Inhaler 2 Puff(s) Inhalation two times a day  buPROPion XL (24-Hour) . 150 milliGRAM(s) Oral daily  cholecalciferol 400 Unit(s) Oral daily  colchicine 0.6 milliGRAM(s) Oral daily  diazepam    Tablet 10 milliGRAM(s) Oral three times a day PRN  enoxaparin Injectable 70 milliGRAM(s) SubCutaneous two times a day  FLUoxetine 20 milliGRAM(s) Oral two times a day  folic acid 1 milliGRAM(s) Oral daily  furosemide    Tablet 20 milliGRAM(s) Oral daily  hyoscyamine SL 0.125 milliGRAM(s) SubLingual daily  magnesium oxide 400 milliGRAM(s) Oral daily  melatonin 5 milliGRAM(s) Oral at bedtime PRN  NIFEdipine XL 60 milliGRAM(s) Oral daily  oxycodone    5 mG/acetaminophen 325 mG 1 Tablet(s) Oral every 8 hours PRN  predniSONE   Tablet 10 milliGRAM(s) Oral daily  primidone 50 milliGRAM(s) Oral at bedtime  sildenafil (REVATIO) 20 milliGRAM(s) Oral three times a day  simethicone 80 milliGRAM(s) Chew daily PRN  spironolactone 50 milliGRAM(s) Oral daily  tamsulosin 0.4 milliGRAM(s) Oral at bedtime  traZODone 100 milliGRAM(s) Oral at bedtime  valACYclovir 500 milliGRAM(s) Oral daily  vancomycin    Solution 125 milliGRAM(s) Oral every 6 hours

## 2021-12-02 NOTE — DIETITIAN INITIAL EVALUATION ADULT. - PERTINENT MEDS FT
MEDICATIONS  (STANDING):  allopurinol 300 milliGRAM(s) Oral at bedtime  ATENolol  Tablet 50 milliGRAM(s) Oral daily  atorvastatin 80 milliGRAM(s) Oral at bedtime  budesonide 160 MICROgram(s)/formoterol 4.5 MICROgram(s) Inhaler 2 Puff(s) Inhalation two times a day  buPROPion XL (24-Hour) . 150 milliGRAM(s) Oral daily  cholecalciferol 400 Unit(s) Oral daily  colchicine 0.6 milliGRAM(s) Oral daily  FLUoxetine 20 milliGRAM(s) Oral two times a day  folic acid 1 milliGRAM(s) Oral daily  furosemide    Tablet 20 milliGRAM(s) Oral daily  hyoscyamine SL 0.125 milliGRAM(s) SubLingual daily  magnesium oxide 400 milliGRAM(s) Oral daily  NIFEdipine XL 60 milliGRAM(s) Oral daily  predniSONE   Tablet 10 milliGRAM(s) Oral daily  primidone 50 milliGRAM(s) Oral at bedtime  sildenafil (REVATIO) 20 milliGRAM(s) Oral three times a day  spironolactone 50 milliGRAM(s) Oral daily  valACYclovir 500 milliGRAM(s) Oral daily  vancomycin    Solution 125 milliGRAM(s) Oral every 6 hours    MEDICATIONS  (PRN):  ALBUTerol    90 MICROgram(s) HFA Inhaler 2 Puff(s) Inhalation every 6 hours PRN Shortness of Breath and/or Wheezing  oxycodone    5 mG/acetaminophen 325 mG 1 Tablet(s) Oral every 8 hours PRN Severe Pain (7 - 10)  simethicone 80 milliGRAM(s) Chew daily PRN Indigestion

## 2021-12-02 NOTE — DIETITIAN INITIAL EVALUATION ADULT. - NAME AND PHONE
Nutrition Intervention:meals and snacks, medical related nutrition management; Nutrition Monitoring:diet order,energy intake,body composition,NFPF, renal/electrolyte profile

## 2021-12-02 NOTE — DIETITIAN INITIAL EVALUATION ADULT. - ORAL INTAKE PTA/DIET HISTORY
PTA coming from [facility] since October 2021, prior to that, she lived at home. Says the vida their was terrible and because of that reason, she just didn't want to eat sometimes, so she would snack and foods like crackers. She also reports experiencing diarrhea 3-4X daily since Sept 21 when she had C.diff. UBW 72.7kg vs. wt at admit: 74.8kg -- stable. Doesn't know if she's had wt loss. NKFA. takes vit D, Mg, and B complex. No cul/rel or personal food rest/prefs.

## 2021-12-02 NOTE — BRIEF OPERATIVE NOTE - NSICDXBRIEFPOSTOP_GEN_ALL_CORE_FT
POST-OP DIAGNOSIS:  Painful orthopaedic hardware 02-Dec-2021 16:39:09 possible postoperative infection Ghazala Clemons

## 2021-12-02 NOTE — BRIEF OPERATIVE NOTE - NSICDXBRIEFPROCEDURE_GEN_ALL_CORE_FT
PROCEDURES:  Removal, hardware, deep 02-Dec-2021 16:36:59  Ghazala Clemons  Irrigation, wound, lower extremity 02-Dec-2021 16:37:47 I&D lateral ankle wound Ghazala Clemons

## 2021-12-02 NOTE — DIETITIAN INITIAL EVALUATION ADULT. - OTHER INFO
pertinent medical information:   # Left ankle pain/drainage   # Ambulatory dysfunction  - no fevers; afebrile  - Planned for irrigation and debridement and hardware removal L ankle 12/2/21  # CASSI - new onset, likely pre-renal- Will monitor for now.  #hypomagnesemia -managed -Repeat serum Mg  # C. diff colitis- watery diarrhea    pertinent subjective information: pt was NPO this morning upon RD visit. However, has been eating somewhere btwn 50-75% of meals previously, reports food is much better here in comparison to the NH. No chewing/swallowing difficulty reported. Discussed nutritional supplements -- pt not interested to try an at this time. Insists that her appetite is good and she will try to eat more.

## 2021-12-02 NOTE — BRIEF OPERATIVE NOTE - NSICDXBRIEFPREOP_GEN_ALL_CORE_FT
PRE-OP DIAGNOSIS:  Painful orthopaedic hardware 02-Dec-2021 16:38:32 possible postoperative infection Ghazala Clemons

## 2021-12-02 NOTE — DIETITIAN INITIAL EVALUATION ADULT. - OTHER CALCULATIONS
Using ABW 74.8kg; Energy: 1169kcal (MSJ 1.0 AF -- obese BMI considered); Protein: 75g/day (1.0g/kg -- same reason as above); Fluid: 1mL/kcal or LIP

## 2021-12-02 NOTE — DIETITIAN INITIAL EVALUATION ADULT. - EDUCATION DIETARY MODIFICATIONS
Refill request for buproprion.  Last visit 10/2/2018  Refill refused due to length of time since last visit .instructed to call office for visit.  
(1) partially meets; needs review/practice/verbalization

## 2021-12-02 NOTE — PROGRESS NOTE ADULT - ASSESSMENT
# Left ankle pain/drainage   # Ambulatory dysfunction  - no fevers; afebrile  - s/p ORIF on 09/23/2021  - Ortho team following  - Planned for irrigation and debridement and hardware removal L ankle 12/2/21  - Anticoagulation: lvx, hold am dose, resume after surgery   - ESR-35, CRP-pending  - Medical risk stratification-->RCRI score is 0 (class 1 risk, 30-day risk of death, MI, or cardiac arrest), ROME score- 1.3% Risk of myocardial infarction or cardiac arrest, intraoperatively or up to 30-days post op  - Pulm assessment in chart note dated 12/1  -General pulmonary post-operative care, incentive spirometry, Aspiration precautions, continue home inhalers, Nebs PRN  - LE duplex STAT   - PT/OT  Patient to follow up with Dr. CHRISTIANO Evans, as OP    - Monitor off antibiotics until debridement, start Rocephin and vancomycin if hemodynamically unstable  - Follow up tissue cultures after debridement  - NWB LLE  - pain control    # hypomagnesemia -managed   -1.7 serum Mg 12/1  -2g MgSo4 IV BP  -Repeat serum Mg    # C. diff colitis  - watery diarrhea  - continue with PO vancomycin; unsure duration. Will have to touch base with NH for collateral hx. Pt denied diarrhea on exam today.     # h/o PE  - On eliquis at home(provoked and found incidentally found last admission in October; 3-6 months-->can stop in April)  - Pt on lovenox before surgery; hold am dose and resume after surgery.     # Urinary Retention   - has indwelling [place] since last admission  - post op retention in the setting of immobility  - renew [place]     # Chronic Back Pain  # Hx of Cervical Radiculopathy  - Sees pain management, Dr. Mayers  - C/w  Percocet PRN ( no longer on Fentanyl patch )     # Gout  - C/w Allopurinol    # Pulmonary HTN  - C/w Sildenafil, prednisone     # HTN  - C/w Atenolol, Nifedipine    # DLD  - C/w Atorvastatin    # COPD, Asthma on home O2 2L as needed; currently satting well  # MARLENI  - on advair discuss at home;   - Symbicort while admitted  - albuterol PRN    # Anxiety  - C/w Wellbutrin  - Valium PRN    # CKD 3  - Stable, sees Dr. Mcarthur   # Left ankle pain/drainage   # Ambulatory dysfunction  - no fevers; afebrile  - s/p ORIF on 09/23/2021  - Ortho team following  - Planned for irrigation and debridement and hardware removal L ankle 12/2/21  - Anticoagulation: lvx, hold am dose, resume after surgery   - ESR-35, CRP-pending  - Medical risk stratification-->RCRI score is 0 (class 1 risk, 30-day risk of death, MI, or cardiac arrest), ROME score- 1.3% Risk of myocardial infarction or cardiac arrest, intraoperatively or up to 30-days post op  - Pulm assessment in chart note dated 12/1  -General pulmonary post-operative care, incentive spirometry, Aspiration precautions, continue home inhalers, Nebs PRN  - LE duplex STAT   - PT/OT  Patient to follow up with Dr. CHRISTIANO Evans, as OP    - Monitor off antibiotics until debridement, start Rocephin and vancomycin if hemodynamically unstable  - Follow up tissue cultures after debridement  - NWB LLE  - pain control    # hypomagnesemia -managed   -1.7 serum Mg 12/1  -2g MgSo4 IV BP  -Repeat serum Mg    # C. diff colitis  - watery diarrhea  - continue with PO vancomycin; unsure duration. Will have to touch base with NH for collateral hx. Pt denied diarrhea on exam today.     # h/o PE  - On eliquis at home(provoked and found incidentally found last admission in October; 3-6 months-->can stop in April)  - Pt on lovenox before surgery; hold am dose and resume after surgery.     # Urinary Retention   - has indwelling [place] since last admission  - post op retention in the setting of immobility  - renew [place] 12/2    # Chronic Back Pain  # Hx of Cervical Radiculopathy  - Sees pain management, Dr. Mayers  - C/w  Percocet PRN ( no longer on Fentanyl patch )     # Gout  - C/w Allopurinol    # Pulmonary HTN  - C/w Sildenafil, prednisone     # HTN  - C/w Atenolol, Nifedipine    # DLD  - C/w Atorvastatin    # COPD, Asthma on home O2 2L as needed; currently satting well  # MARLENI  - on advair discuss at home;   - Symbicort while admitted  - albuterol PRN    # Anxiety  - C/w Wellbutrin  - Valium PRN    # CKD 3  - Stable, sees Dr. Mcarthur   # Left ankle pain/drainage   # Ambulatory dysfunction  - no fevers; afebrile  - s/p ORIF on 09/23/2021  - Ortho team following  - Planned for irrigation and debridement and hardware removal L ankle 12/2/21  - Anticoagulation: lvx, hold am dose, resume after surgery   - ESR-35, CRP-pending  - Medical risk stratification-->RCRI score is 0 (class 1 risk, 30-day risk of death, MI, or cardiac arrest), ROME score- 1.3% Risk of myocardial infarction or cardiac arrest, intraoperatively or up to 30-days post op  - Pulm assessment in chart note dated 12/1  - General pulmonary post-operative care, incentive spirometry, Aspiration precautions, continue home inhalers, Nebs PRN  - LE duplex STAT   - PT/OT  Patient to follow up with Dr. CHRISTIANO Evans, as OP    - Monitor off antibiotics until debridement, start Rocephin and vancomycin if hemodynamically unstable  - Follow up tissue cultures after debridement  - NWB LLE  - pain control    # hypomagnesemia -managed   -1.7 serum Mg 12/1  -Mgox 400mg PO  -Repeat serum Mg    # C. diff colitis  - watery diarrhea  - continue with PO vancomycin; unsure duration. Will have to touch base with NH for collateral hx. Pt denied diarrhea on exam today.     # h/o PE  - On eliquis at home(provoked and found incidentally found last admission in October; 3-6 months-->can stop in April)  - Pt on lovenox before surgery; hold am dose and resume after surgery.     # Urinary Retention   - has indwelling [place] since last admission  - post op retention in the setting of immobility  - renew [place] 12/2    # Chronic Back Pain  # Hx of Cervical Radiculopathy  - Sees pain management, Dr. Mayers  - C/w  Percocet PRN ( no longer on Fentanyl patch )     # Gout  - C/w Allopurinol    # Pulmonary HTN  - C/w Sildenafil, prednisone     # HTN  - C/w Atenolol, Nifedipine    # DLD  - C/w Atorvastatin    # COPD, Asthma on home O2 2L as needed; currently satting well  # MARLENI  - on advair discuss at home;   - Symbicort while admitted  - albuterol PRN    # Anxiety  - C/w Wellbutrin  - Valium PRN    # CKD 3  - Stable, sees Dr. Mcarthur   # Left ankle pain/drainage   # Ambulatory dysfunction  - no fevers; afebrile  - s/p ORIF on 09/23/2021  - Ortho team following  - Planned for irrigation and debridement and hardware removal L ankle 12/2/21  - Anticoagulation: lvx, hold am dose, resume after surgery   - ESR-35, CRP-pending  - Medical risk stratification-->RCRI score is 0 (class 1 risk, 30-day risk of death, MI, or cardiac arrest), ROME score- 1.3% Risk of myocardial infarction or cardiac arrest, intraoperatively or up to 30-days post op  - Pulm assessment in chart note dated 12/1  - General pulmonary post-operative care, incentive spirometry, Aspiration precautions, continue home inhalers, Nebs PRN  - LE duplex STAT   - PT/OT  Patient to follow up with Dr. CHRISTIANO Evans, as OP    - Monitor off antibiotics until debridement, start Rocephin and vancomycin if hemodynamically unstable  - Follow up tissue cultures after debridement  - NWB LLE  - pain control    # CASSI - new onset, likely pre-renal     - Creat 1.6 BUN 23  - Will monitor for now.    #hypomagnesemia -managed   -1.7 serum Mg 12/1  -Mgox 400mg PO  -Repeat serum Mg    # C. diff colitis  - watery diarrhea  - continue with PO vancomycin; unsure duration. Will have to touch base with NH for collateral hx. Pt denied diarrhea on exam today.     # h/o PE  - On eliquis at home(provoked and found incidentally found last admission in October; 3-6 months-->can stop in April)  - Pt on lovenox before surgery; hold am dose and resume after surgery.     # Urinary Retention   - has indwelling [place] since last admission  - post op retention in the setting of immobility  - renew [place] 12/2    # Chronic Back Pain  # Hx of Cervical Radiculopathy  - Sees pain management, Dr. Mayers  - C/w  Percocet PRN ( no longer on Fentanyl patch )     # Gout  - C/w Allopurinol    # Pulmonary HTN  - C/w Sildenafil, prednisone     # HTN  - C/w Atenolol, Nifedipine    # DLD  - C/w Atorvastatin    # COPD, Asthma on home O2 2L as needed; currently satting well  # MARLENI  - on advair discuss at home;   - Symbicort while admitted  - albuterol PRN    # Anxiety  - C/w Wellbutrin  - Valium PRN    # CKD 3  - Stable, sees Dr. Mcarthur   # Left ankle pain/drainage   # Ambulatory dysfunction  - no fevers; afebrile  - s/p ORIF on 09/23/2021  - Ortho team following  - Planned for irrigation and debridement and hardware removal L ankle 12/2/21  - Anticoagulation: lvx, hold am dose, resume after surgery   - ESR-35, CRP-pending  - Medical risk stratification-->RCRI score is 0 (class 1 risk, 30-day risk of death, MI, or cardiac arrest), ROME score- 1.3% Risk of myocardial infarction or cardiac arrest, intraoperatively or up to 30-days post op  - Pulm assessment in chart note dated 12/1  - General pulmonary post-operative care, incentive spirometry, Aspiration precautions, continue home inhalers, Nebs PRN  - LE duplex STAT   - PT/OT  Patient to follow up with Dr. CHRISTIANO Evans, as OP    - Monitor off antibiotics until debridement, start Rocephin and vancomycin if hemodynamically unstable  - Follow up tissue cultures after debridement  - NWB LLE  - pain control    # CASSI - new onset, likely pre-renal     - Creat 1.6 BUN 23  - Will monitor for now.    #hypomagnesemia -managed   -1.7 serum Mg 12/1  -Mgox 400mg PO  -Repeat serum Mg    # C. diff colitis  - watery diarrhea  - continue with PO vancomycin; unsure duration. Will have to touch base with NH for collateral hx. Pt denied diarrhea on exam today.     # h/o PE  - On eliquis at home(provoked and found incidentally found last admission in October; 3-6 months-->can stop in April)  - Pt on lovenox before surgery; hold am dose and resume after surgery.     # Urinary Retention   - has indwelling [place] since last admission  - post op retention in the setting of immobility  - renew [place] 12/2    # Chronic Back Pain  # Hx of Cervical Radiculopathy  - Sees pain management, Dr. Mayers  - C/w  Percocet PRN ( no longer on Fentanyl patch )     # Gout  - C/w Allopurinol    # Pulmonary HTN  - C/w Sildenafil, prednisone     # HTN  - C/w Atenolol, Nifedipine    # DLD  - C/w Atorvastatin    # COPD, Asthma on home O2 2L as needed; currently satting well  # MARLENI  - on advair discuss at home;   - Symbicort while admitted  - albuterol PRN    # Anxiety  - C/w Wellbutrin  - Valium PRN    # CKD 3  - Stable, sees Dr. Mcarthur      # Diet: DASH/TLC. surgery 4pm, NPO for now  # Activity: IAT  # GI PPX: NA  # DVT PPX: Lovenox; placed on hold   # Full code    #Progress Note Handoff: Patient is scheduled for left ankle Sx. lovenox is being held., f/up post op  Family discussion: yes Disposition: to be determined       70 y/o F with PMH of COPD, Asthma, PE on eliquis, Pulmonary HTN - on Home O2 intermittently as needed, chronic back pain, cervical radiculopathy, CKD 3, DLD, HTN, Gout, and recent pshx of L ankle ORIF on 9/23/2021 for L ankle fracture brought from [facility] today with persistent drainage from lateral ankle incision and left ankle pain with inability to ambulate    # Left ankle pain/drainage   # Ambulatory dysfunction  - no fevers; afebrile  - s/p ORIF on 09/23/2021  - Ortho team following  - Planned for irrigation and debridement and hardware removal L ankle 12/2/21  - Anticoagulation: lvx, hold am dose, resume after surgery   - ESR-35, CRP-pending  - Medical and Pulm assessment/risk stratification in chart   - General pulmonary post-operative care, incentive spirometry, Aspiration precautions, continue home inhalers, Nebs PRN  - LE duplex STAT   - PT/OT  Patient to follow up with Dr. CHRISTIANO Evans, as OP    - Monitor off antibiotics until debridement, start Rocephin and vancomycin if hemodynamically unstable  - Follow up tissue cultures after debridement  - NWB LLE  - pain control    # CASSI - new onset, likely pre-renal     - Creat 1.6 BUN 23  - Will monitor for now.    # hypomagnesemia -managed   -1.7 serum Mg 12/1  -Mgox 400mg PO  -Repeat serum Mg    # C. diff colitis  - watery diarrhea  - continue with PO vancomycin; unsure duration. Will have to touch base with NH for collateral hx. Pt denied diarrhea on exam today.     # h/o PE  - On eliquis at home(provoked and found incidentally found last admission in October; 3-6 months-->can stop in April)  - Pt on lovenox before surgery; hold am dose and resume after surgery.     # Urinary Retention   - has indwelling [place] since last admission  - post op retention in the setting of immobility  - renew [place] 12/2    # Chronic Back Pain  # Hx of Cervical Radiculopathy  - Sees pain management, Dr. Mayers  - continue with  Percocet PRN ( no longer on Fentanyl patch )     # Gout  - C/w Allopurinol    # Pulmonary HTN  - C/w Sildenafil, prednisone     # HTN  - C/w Atenolol, Nifedipine    # DLD  - C/w Atorvastatin    # COPD, Asthma on home O2 2L as needed; currently satting well  # MARLENI  - on advair discuss at home;   - Symbicort while admitted  - albuterol PRN    # Anxiety  - C/w Wellbutrin  - Valium PRN    # CKD 3  - Stable, sees Dr. Mcarthur      # Diet: DASH/TLC. surgery 4pm, NPO for now  # Activity: IAT  # GI PPX: NA  # DVT PPX: Lovenox; placed on hold   # Full code    #Progress Note Handoff: Patient is scheduled for left ankle Sx. lovenox is being held., f/up post op  Family discussion: yes Disposition: to be determined

## 2021-12-02 NOTE — CHART NOTE - NSCHARTNOTEFT_GEN_A_CORE
PACU ANESTHESIA ADMISSION NOTE      Procedure: Removal, hardware, deep    Irrigation, wound, lower extremity  I&amp;D lateral ankle wound      Post op diagnosis:  Painful orthopaedic hardware        ____  Intubated  TV:______       Rate: ______      FiO2: ______    __x__  Patent Airway    __x__  Full return of protective reflexes    __x__  Full recovery from anesthesia / back to baseline status    Vitals:  T(C): 36.2 (12-02-21 @ 14:58), Max: 37 (12-01-21 @ 20:55)  HR: 68 (12-02-21 @ 14:58) (61 - 72)  BP: 111/56 (12-02-21 @ 14:58) (98/56 - 121/63)  RR: 14 (12-02-21 @ 14:58) (14 - 20)  SpO2: 95% (12-02-21 @ 14:58) (93% - 100%)    Mental Status:  __x__ Awake   ___x__ Alert   _____ Drowsy   _____ Sedated    Nausea/Vomiting:  __x__ NO  ______Yes,   See Post - Op Orders          Pain Scale (0-10):  __0___    Treatment: ____ None    __x__ See Post - Op/PCA Orders    Post - Operative Fluids:   ____ Oral   __x__ See Post - Op Orders    Plan: Discharge:   ____Home       __x__Floor     _____Critical Care    _____  Other:_________________    Comments: Patient had smooth intraoperative event, no anesthesia complication.  PACU Vital signs: HR: 68           BP:     94   /  53        RR:     20        O2 Sat:   96    %     Temp 98

## 2021-12-02 NOTE — DIETITIAN INITIAL EVALUATION ADULT. - PERSON TAUGHT/METHOD
encouraged PO intake; discussed renal diet -- pt claims to follow a good diet at home and manages K+, Phos well/verbal instruction/patient instructed

## 2021-12-02 NOTE — DIETITIAN INITIAL EVALUATION ADULT. - PHYSCIAL ASSESSMENT
WDL; No edema noted; GI: WDL, LBM 12/2 -- 4 episodes of watery diarrhea; skin: WDL; no other wts in EMR

## 2021-12-02 NOTE — PROGRESS NOTE ADULT - SUBJECTIVE AND OBJECTIVE BOX
TORY BERNARD  [facility] ([facility])            Patient was evaluated and examined  by bedside, no active events over night as per covering nurse report        REVIEW OF SYSTEMS:  please see pertinent positives mentioned above, all other 12 ROS negative      T(C): , Max: 37 (12-01-21 @ 20:55)  HR: 64 (12-02-21 @ 05:45)  BP: 109/58 (12-02-21 @ 05:45)  RR: 18 (12-02-21 @ 05:21)  SpO2: 93% (12-02-21 @ 05:21)  CAPILLARY BLOOD GLUCOSE          PHYSICAL EXAM:  General: NAD, AAOX3, patient is laying comfortably in bed  HEENT: AT, NC, Supple, NO JVD, NO CB  Lungs: CTA B/L, no wheezing, no rhonchi  CVS: normal S1, S2, RRR, NO M/G/R  Abdomen: soft, bowel sounds present, non-tender, non-distended  Extremities: left ankle covered with dressing, no edema, no clubbing, no cyanosis, positive peripheral pulses b/l  Neuro: no acute focal neurological deficits  Skin: no rash, no ecchymosis      LAB  CBC  Date: 12-01-21 @ 20:00  Mean cell Jdtkvnuyhe71.9  Mean cell Hemoglobin Conc32.3  Mean cell Volum 104.8  Platelet count-Automate 290  RBC Count 3.10  Red Cell Distrib Width14.9  WBC Count10.95  % Albumin, Urine--  Hematocrit 32.5  Hemoglobin 10.5  CBC  Date: 12-01-21 @ 06:32  Mean cell Fzhqpowppn81.2  Mean cell Hemoglobin Conc32.3  Mean cell Volum 105.8  Platelet count-Automate 308  RBC Count 3.42  Red Cell Distrib Width15.0  WBC Count9.65  % Albumin, Urine--  Hematocrit 36.2  Hemoglobin 11.7  CBC  Date: 11-30-21 @ 12:24  Mean cell Jwtxmxnwiq07.6  Mean cell Hemoglobin Conc31.4  Mean cell Volum 107.0  Platelet count-Automate 278  RBC Count 3.30  Red Cell Distrib Width15.4  WBC Count10.79  % Albumin, Urine--  Hematocrit 35.3  Hemoglobin 11.1    BMP  12-01-21 @ 20:00  Blood Gas Arterial-Calcium,Ionized--  Blood Urea Nitrogen, Serum 23 mg/dL<H> [10 - 20]  Carbon Dioxide, Serum17 mmol/L [17 - 32]  Chloride, Subxu659 mmol/L [98 - 110]  Creatinie, Serum1.6 mg/dL<H> [0.7 - 1.5]  Glucose, Tdxlv527 mg/dL<H> [70 - 99]  Potassium, Serum3.8 mmol/L [3.5 - 5.0]  Sodium, Serum 135 mmol/L [135 - 146]  [facility]  12-01-21 @ 06:32  Blood Gas Arterial-Calcium,Ionized--  Blood Urea Nitrogen, Serum 16 mg/dL [10 - 20]  Carbon Dioxide, Serum20 mmol/L [17 - 32]  Chloride, Zlihx853 mmol/L [98 - 110]  Creatinie, Serum1.2 mg/dL [0.7 - 1.5]  Glucose, Serum88 mg/dL [70 - 99]  Potassium, Serum4.2 mmol/L [3.5 - 5.0]  Sodium, Serum 135 mmol/L [135 - 146]  [facility]  11-30-21 @ 15:36  Blood Gas Arterial-Calcium,Ionized--  Blood Urea Nitrogen, Serum 17 mg/dL [10 - 20]  Carbon Dioxide, Serum17 mmol/L [17 - 32]  Chloride, Qqeur863 mmol/L [98 - 110]  Creatinie, Serum1.1 mg/dL [0.7 - 1.5]  Glucose, Serum95 mg/dL [70 - 99]  Potassium, Serum4.7 mmol/L [3.5 - 5.0] [Slighty Hemolyzed use with Caution]  Sodium, Serum 138 mmol/L [135 - 146]        PT/INR - ( 01 Dec 2021 20:00 )   PT: 14.20 sec;   INR: 1.24 ratio         PTT - ( 01 Dec 2021 20:00 )  PTT:44.2 sec      Medications:  acetaminophen     Tablet .. 650 milliGRAM(s) Oral every 6 hours PRN  ALBUTerol    90 MICROgram(s) HFA Inhaler 2 Puff(s) Inhalation every 6 hours PRN  allopurinol 300 milliGRAM(s) Oral at bedtime  ATENolol  Tablet 50 milliGRAM(s) Oral daily  atorvastatin 80 milliGRAM(s) Oral at bedtime  budesonide 160 MICROgram(s)/formoterol 4.5 MICROgram(s) Inhaler 2 Puff(s) Inhalation two times a day  buPROPion XL (24-Hour) . 150 milliGRAM(s) Oral daily  cholecalciferol 400 Unit(s) Oral daily  colchicine 0.6 milliGRAM(s) Oral daily  diazepam    Tablet 10 milliGRAM(s) Oral three times a day PRN  enoxaparin Injectable 70 milliGRAM(s) SubCutaneous two times a day  FLUoxetine 20 milliGRAM(s) Oral two times a day  folic acid 1 milliGRAM(s) Oral daily  furosemide    Tablet 20 milliGRAM(s) Oral daily  hyoscyamine SL 0.125 milliGRAM(s) SubLingual daily  magnesium oxide 400 milliGRAM(s) Oral daily  melatonin 5 milliGRAM(s) Oral at bedtime PRN  NIFEdipine XL 60 milliGRAM(s) Oral daily  oxycodone    5 mG/acetaminophen 325 mG 1 Tablet(s) Oral every 8 hours PRN  predniSONE   Tablet 10 milliGRAM(s) Oral daily  primidone 50 milliGRAM(s) Oral at bedtime  sildenafil (REVATIO) 20 milliGRAM(s) Oral three times a day  simethicone 80 milliGRAM(s) Chew daily PRN  spironolactone 50 milliGRAM(s) Oral daily  tamsulosin 0.4 milliGRAM(s) Oral at bedtime  traZODone 100 milliGRAM(s) Oral at bedtime  valACYclovir 500 milliGRAM(s) Oral daily  vancomycin    Solution 125 milliGRAM(s) Oral every 6 hours        Assessment and Plan:  72 yo F with PMH of COPD, Asthma, PE on eliquis, Pulmonary HTN - on Home O2 intermittently as needed, chronic back pain, cervical radiculopathy, CKD 3, DLD, HTN, Gout, and recent pshx of L ankle ORIF on 9/23/2021 for L ankle fracture brought from [facility] today with persistent drainage from lateral ankle incision and Left ankle pain with inability to ambulate.       # Left ankle pain/drainage   # Ambulatory dysfunction  - h/o  ORIF on 09/23/2021  - Ortho team following  - Planned for irrigation and debridement and hardware removal L ankle today  - ESR-35, CRP- 4  - Medical risk stratification-->RCRI score is 0(class 1 risk, 30-day reisk of death, MI, or cardiac arrest), ROME score- 1.3% Risk of myocardial infarction or cardiac arrest, intraoperatively or up to 30-days post op  - Pulm assessment in chart note dated 12/1  - Monitor off antibiotics until debridement  - Follow up tissue cultures after debridement  - NWB LLE  - pain control      # C. diff colitis  - watery diarrhea  - continue with PO vancomycin; unsure duration. will have to touch base with NH for collateral hx    # h/o PE  - On eliquis at home(provoked and found incidentally found last admission in October; 3-6 months-->can stop in April)  - changed to lovenox before surgery; now on hold preop  - No dyspnea/saturating well    # Urinary Retention   - has indwelling [place] since last admission  - post op retention in the setting of immobility  - renew [place]     # Chronic Back Pain  # Hx of Cervical Radiculopathy  - Sees pain management, Dr. Mayers  - C/w  Percocet PRN ( no longer on Fentanyl patch )     # Gout  - C/w Allopurinol    # Pulmonary HTN  - C/w Sildenafil, prednisone     # HTN  - C/w Atenolol, Nifedipine    # DLD  - C/w Atorvastatin    # COPD, Asthma on home O2 2L as needed; currently satting well  # MARLENI  - on advair discus at home;   - Symbicort while admitted  - albuterol PRN    # Anxiety  - C/w Wellbutrin  - Valium PRN    # CKD 3  - Stable, continue renal diet    # Diet: DASH/TLC,  # Activity: IAT  # GI PPX: NA  # DVT PPX: Lovenox; placed on hold   # Full code    #Progress Note Handoff: Patient is scheduled for left ankle Sx. , lovenox is being held., f/up post op  Family discussion: yes Disposition: to be determined    Total time spent to complete patient's bedside assessment, review medical chart, discuss medical plan of care with covering medical team was more than 35 minutes

## 2021-12-03 LAB
ALBUMIN SERPL ELPH-MCNC: 3.7 G/DL — SIGNIFICANT CHANGE UP (ref 3.5–5.2)
ALP SERPL-CCNC: 96 U/L — SIGNIFICANT CHANGE UP (ref 30–115)
ALT FLD-CCNC: 10 U/L — SIGNIFICANT CHANGE UP (ref 0–41)
ANION GAP SERPL CALC-SCNC: 14 MMOL/L — SIGNIFICANT CHANGE UP (ref 7–14)
AST SERPL-CCNC: 13 U/L — SIGNIFICANT CHANGE UP (ref 0–41)
BASOPHILS # BLD AUTO: 0.03 K/UL — SIGNIFICANT CHANGE UP (ref 0–0.2)
BASOPHILS NFR BLD AUTO: 0.3 % — SIGNIFICANT CHANGE UP (ref 0–1)
BILIRUB SERPL-MCNC: 0.3 MG/DL — SIGNIFICANT CHANGE UP (ref 0.2–1.2)
BUN SERPL-MCNC: 20 MG/DL — SIGNIFICANT CHANGE UP (ref 10–20)
CALCIUM SERPL-MCNC: 9.2 MG/DL — SIGNIFICANT CHANGE UP (ref 8.5–10.1)
CHLORIDE SERPL-SCNC: 99 MMOL/L — SIGNIFICANT CHANGE UP (ref 98–110)
CO2 SERPL-SCNC: 21 MMOL/L — SIGNIFICANT CHANGE UP (ref 17–32)
CREAT SERPL-MCNC: 1.3 MG/DL — SIGNIFICANT CHANGE UP (ref 0.7–1.5)
EOSINOPHIL # BLD AUTO: 0.04 K/UL — SIGNIFICANT CHANGE UP (ref 0–0.7)
EOSINOPHIL NFR BLD AUTO: 0.4 % — SIGNIFICANT CHANGE UP (ref 0–8)
GLUCOSE BLDC GLUCOMTR-MCNC: 107 MG/DL — HIGH (ref 70–99)
GLUCOSE BLDC GLUCOMTR-MCNC: 171 MG/DL — HIGH (ref 70–99)
GLUCOSE SERPL-MCNC: 106 MG/DL — HIGH (ref 70–99)
GRAM STN FLD: SIGNIFICANT CHANGE UP
GRAM STN FLD: SIGNIFICANT CHANGE UP
HCT VFR BLD CALC: 32.8 % — LOW (ref 37–47)
HGB BLD-MCNC: 10.8 G/DL — LOW (ref 12–16)
IMM GRANULOCYTES NFR BLD AUTO: 0.7 % — HIGH (ref 0.1–0.3)
LYMPHOCYTES # BLD AUTO: 1.17 K/UL — LOW (ref 1.2–3.4)
LYMPHOCYTES # BLD AUTO: 11 % — LOW (ref 20.5–51.1)
MAGNESIUM SERPL-MCNC: 2.1 MG/DL — SIGNIFICANT CHANGE UP (ref 1.8–2.4)
MCHC RBC-ENTMCNC: 32.9 G/DL — SIGNIFICANT CHANGE UP (ref 32–37)
MCHC RBC-ENTMCNC: 34.3 PG — HIGH (ref 27–31)
MCV RBC AUTO: 104.1 FL — HIGH (ref 81–99)
MONOCYTES # BLD AUTO: 0.63 K/UL — HIGH (ref 0.1–0.6)
MONOCYTES NFR BLD AUTO: 5.9 % — SIGNIFICANT CHANGE UP (ref 1.7–9.3)
NEUTROPHILS # BLD AUTO: 8.65 K/UL — HIGH (ref 1.4–6.5)
NEUTROPHILS NFR BLD AUTO: 81.7 % — HIGH (ref 42.2–75.2)
NRBC # BLD: 0 /100 WBCS — SIGNIFICANT CHANGE UP (ref 0–0)
PLATELET # BLD AUTO: 282 K/UL — SIGNIFICANT CHANGE UP (ref 130–400)
POTASSIUM SERPL-MCNC: 4.2 MMOL/L — SIGNIFICANT CHANGE UP (ref 3.5–5)
POTASSIUM SERPL-SCNC: 4.2 MMOL/L — SIGNIFICANT CHANGE UP (ref 3.5–5)
PROT SERPL-MCNC: 6 G/DL — SIGNIFICANT CHANGE UP (ref 6–8)
RBC # BLD: 3.15 M/UL — LOW (ref 4.2–5.4)
RBC # FLD: 14.6 % — HIGH (ref 11.5–14.5)
SODIUM SERPL-SCNC: 134 MMOL/L — LOW (ref 135–146)
SPECIMEN SOURCE: SIGNIFICANT CHANGE UP
SPECIMEN SOURCE: SIGNIFICANT CHANGE UP
WBC # BLD: 10.59 K/UL — SIGNIFICANT CHANGE UP (ref 4.8–10.8)
WBC # FLD AUTO: 10.59 K/UL — SIGNIFICANT CHANGE UP (ref 4.8–10.8)

## 2021-12-03 PROCEDURE — 99233 SBSQ HOSP IP/OBS HIGH 50: CPT

## 2021-12-03 RX ORDER — OXYCODONE AND ACETAMINOPHEN 5; 325 MG/1; MG/1
1 TABLET ORAL ONCE
Refills: 0 | Status: DISCONTINUED | OUTPATIENT
Start: 2021-12-03 | End: 2021-12-03

## 2021-12-03 RX ORDER — VANCOMYCIN HCL 1 G
125 VIAL (EA) INTRAVENOUS
Refills: 0 | Status: DISCONTINUED | OUTPATIENT
Start: 2021-12-03 | End: 2021-12-09

## 2021-12-03 RX ORDER — ENOXAPARIN SODIUM 100 MG/ML
70 INJECTION SUBCUTANEOUS EVERY 12 HOURS
Refills: 0 | Status: DISCONTINUED | OUTPATIENT
Start: 2021-12-03 | End: 2021-12-08

## 2021-12-03 RX ADMIN — SPIRONOLACTONE 50 MILLIGRAM(S): 25 TABLET, FILM COATED ORAL at 05:15

## 2021-12-03 RX ADMIN — Medication 650 MILLIGRAM(S): at 16:51

## 2021-12-03 RX ADMIN — Medication 1 MILLIGRAM(S): at 11:23

## 2021-12-03 RX ADMIN — OXYCODONE AND ACETAMINOPHEN 1 TABLET(S): 5; 325 TABLET ORAL at 12:38

## 2021-12-03 RX ADMIN — Medication 166.67 MILLIGRAM(S): at 17:32

## 2021-12-03 RX ADMIN — PRIMIDONE 50 MILLIGRAM(S): 250 TABLET ORAL at 21:23

## 2021-12-03 RX ADMIN — Medication 20 MILLIGRAM(S): at 05:14

## 2021-12-03 RX ADMIN — ENOXAPARIN SODIUM 70 MILLIGRAM(S): 100 INJECTION SUBCUTANEOUS at 17:43

## 2021-12-03 RX ADMIN — VALACYCLOVIR 500 MILLIGRAM(S): 500 TABLET, FILM COATED ORAL at 11:30

## 2021-12-03 RX ADMIN — Medication 125 MILLIGRAM(S): at 11:22

## 2021-12-03 RX ADMIN — TAMSULOSIN HYDROCHLORIDE 0.4 MILLIGRAM(S): 0.4 CAPSULE ORAL at 21:23

## 2021-12-03 RX ADMIN — Medication 0.12 MILLIGRAM(S): at 11:21

## 2021-12-03 RX ADMIN — OXYCODONE AND ACETAMINOPHEN 1 TABLET(S): 5; 325 TABLET ORAL at 08:24

## 2021-12-03 RX ADMIN — BUDESONIDE AND FORMOTEROL FUMARATE DIHYDRATE 2 PUFF(S): 160; 4.5 AEROSOL RESPIRATORY (INHALATION) at 05:35

## 2021-12-03 RX ADMIN — ATORVASTATIN CALCIUM 80 MILLIGRAM(S): 80 TABLET, FILM COATED ORAL at 21:23

## 2021-12-03 RX ADMIN — OXYCODONE AND ACETAMINOPHEN 1 TABLET(S): 5; 325 TABLET ORAL at 11:30

## 2021-12-03 RX ADMIN — OXYCODONE AND ACETAMINOPHEN 1 TABLET(S): 5; 325 TABLET ORAL at 11:43

## 2021-12-03 RX ADMIN — Medication 400 UNIT(S): at 17:36

## 2021-12-03 RX ADMIN — Medication 5 MILLIGRAM(S): at 21:33

## 2021-12-03 RX ADMIN — Medication 20 MILLIGRAM(S): at 13:54

## 2021-12-03 RX ADMIN — Medication 650 MILLIGRAM(S): at 15:20

## 2021-12-03 RX ADMIN — Medication 20 MILLIGRAM(S): at 17:33

## 2021-12-03 RX ADMIN — MAGNESIUM OXIDE 400 MG ORAL TABLET 400 MILLIGRAM(S): 241.3 TABLET ORAL at 11:28

## 2021-12-03 RX ADMIN — Medication 20 MILLIGRAM(S): at 21:23

## 2021-12-03 RX ADMIN — BUDESONIDE AND FORMOTEROL FUMARATE DIHYDRATE 2 PUFF(S): 160; 4.5 AEROSOL RESPIRATORY (INHALATION) at 21:25

## 2021-12-03 RX ADMIN — BUPROPION HYDROCHLORIDE 150 MILLIGRAM(S): 150 TABLET, EXTENDED RELEASE ORAL at 11:22

## 2021-12-03 RX ADMIN — Medication 10 MILLIGRAM(S): at 05:15

## 2021-12-03 RX ADMIN — CEFEPIME 100 MILLIGRAM(S): 1 INJECTION, POWDER, FOR SOLUTION INTRAMUSCULAR; INTRAVENOUS at 18:42

## 2021-12-03 RX ADMIN — Medication 100 MILLIGRAM(S): at 21:24

## 2021-12-03 RX ADMIN — Medication 125 MILLIGRAM(S): at 05:15

## 2021-12-03 RX ADMIN — Medication 125 MILLIGRAM(S): at 17:35

## 2021-12-03 RX ADMIN — ATENOLOL 50 MILLIGRAM(S): 25 TABLET ORAL at 05:13

## 2021-12-03 RX ADMIN — Medication 20 MILLIGRAM(S): at 05:15

## 2021-12-03 RX ADMIN — Medication 0.6 MILLIGRAM(S): at 11:23

## 2021-12-03 RX ADMIN — CEFEPIME 100 MILLIGRAM(S): 1 INJECTION, POWDER, FOR SOLUTION INTRAMUSCULAR; INTRAVENOUS at 05:35

## 2021-12-03 RX ADMIN — Medication 60 MILLIGRAM(S): at 05:14

## 2021-12-03 NOTE — PHYSICAL THERAPY INITIAL EVALUATION ADULT - GENERAL OBSERVATIONS, REHAB EVAL
1030 - 1102 Pt rec semifowler in bed with +LLE cast, +bed alarm, in NAD. OT Raiza present t/o session. Pt agreeable to b/s PT, presents motivated for activity.

## 2021-12-03 NOTE — CONSULT NOTE ADULT - ASSESSMENT
ASSESSMENT      Patient is a 72 y/o female with a PMH of COPD, Asthma, Pulmonary HTN - on Home O2 intermittently as needed, chronic back pain, cervical radiculopathy, CKD 3, DLD, HTN, Gout, and recent pshx of L ankle ORIF on 9/23/2021 for L ankle fracture initially presented to the ED from the nursing home due to with persistent drainage from lateral ankle incision and Left ankle pain with the inability to ambulate. Patient described the pain as  persistent, and she endorsed swelling, and drainage. The patient denies any h/o fever, chills, cough, chest pain, shortness of breath. She was seen by Dr. Clemons in the office where she did xrays and examined the ankle. Pt reports she was told by Dr. Clemons to present to ED for evaluation. Patient also states that she recently had watery diarrhea in the nursing home and was being treated for C.diff in the Nursing home. Patient states that she has been having episodes of diarrhea for several weeks prior to presenting to the ED. Patient had HWR procedure last night 12/02. She denies any other symptoms at this time.     In the ED patient was hemodynamically stable, Labs were unremarkable.     IMPRESSION  # Infected ORIF hardware      RECOMMENDATIONS  - f/u on postoperative cultures, ESR, CRP  - Continue vancomycin, cefepime  -Decrease vancomycin to 125 PO BID  - Patient will need IV antibiotics on discharge    This is a pended note. All final recommendations to follow pending discussion with ID Attending      ASSESSMENT      Patient is a 70 y/o female with a PMH of COPD, Asthma, Pulmonary HTN - on Home O2 intermittently as needed, chronic back pain, cervical radiculopathy, CKD 3, DLD, HTN, Gout, and recent pshx of L ankle ORIF on 9/23/2021 for L ankle fracture initially presented to the ED from the nursing home due to with persistent drainage from lateral ankle incision and Left ankle pain with the inability to ambulate. Patient described the pain as  persistent, and she endorsed swelling, and drainage. The patient denies any h/o fever, chills, cough, chest pain, shortness of breath. She was seen by Dr. Clemons in the office where she did xrays and examined the ankle. Pt reports she was told by Dr. Clemons to present to ED for evaluation. Patient also states that she recently had watery diarrhea in the nursing home and was being treated for C.diff in the Nursing home. Patient states that she has been having episodes of diarrhea for several weeks prior to presenting to the ED. Patient had HWR procedure last night 12/02. She denies any other symptoms at this time.     In the ED patient was hemodynamically stable, Labs were unremarkable.     IMPRESSION  # Infected ORIF hardware      RECOMMENDATIONS  - f/u on postoperative cultures, ESR, CRP  - Continue vancomycin, cefepime  - Please check vanc trough 30 min prior to 4th dose   -Decrease vancomycin to 125 PO BID  - Patient will need IV antibiotics on discharge

## 2021-12-03 NOTE — PHYSICAL THERAPY INITIAL EVALUATION ADULT - LEVEL OF INDEPENDENCE: GAIT, REHAB EVAL
Pt declined attempting to hop and that she is unable to do so at this time. Pt reports she has mostly been working on transfers and strengthening/balance at [facility].

## 2021-12-03 NOTE — PROGRESS NOTE ADULT - SUBJECTIVE AND OBJECTIVE BOX
Location: [address] ([address])  Patient Name: BERNARD SPEARS  Age: 71y  Gender: Female      Progress Note  This morning patient was seen and examined at bedside.    Today is hospital day 3d.  Ms. Spears is doing fine.   She reports improvement in left ankle pain.   Her appetite is adequate, and she denies nausea or vomiting.   She denies any abdominal pain but reports watery loose non bloody stools similar to her baseline. Her last bowel movement was loose on 12/02.   She is not ambulating but denies any shortness of breath, chest pain, palpitations, or light headedness.   She is voiding freely and denies urinary symptoms (dysuria, urgency, frequency, intermittence).      Vital Signs in the last 24 hours   Vitals Summary T(C): 36.4 (12-03-21 @ 05:44), Max: 36.7 (12-02-21 @ 16:45)  HR: 68 (12-03-21 @ 05:44) (61 - 70)  BP: 102/60 (12-03-21 @ 05:44) (88/58 - 111/56)  RR: 19 (12-03-21 @ 05:44) (14 - 29)  SpO2: 98% (12-02-21 @ 17:15) (95% - 100%)  Vent Data   Intake/ Output   12-02-21 @ 07:01  -  12-03-21 @ 07:00  --------------------------------------------------------  IN: 0 mL / OUT: 600 mL / NET: -600 mL      Physical Exam  * General Appearance: Alert, cooperative, interactive, well-groomed, oriented to time, place, and person, in no acute distress  * Head: Normocephalic, without obvious abnormality, atraumatic  * Thyroid:  No enlargement/tenderness/nodules; no carotid bruit or JVD  * Lungs: Respirations unlabored, Good bilateral air entry, normal breath sounds (Clear to auscultation bilaterally, no audible wheezes, crackles, or rhonchi)  * Heart: Regular Rate and Rhythm, normal S1 and S2, no audible murmur, rub, or gallop  * Abdomen: Symmetric, non-distended, no scar, soft, non-tender, bowel sounds active all four quadrants, no masses, no organomegaly (no hepatosplenomegaly)  * Extremities: left ankle wrapped with dressing, can wiggle left toes, no cyanosis, no lower extremity pitting edema bilaterally, adequate dorsalis pedis pulses  * Pulses: 2+ and symmetric all extremities  * Skin: Skin color, texture, turgor normal, no rashes or lesions  * Lymph nodes: Cervical, supraclavicular, and axillary nodes normal  * Neurologic: CNII-XII intact, normal strength, sensation and reflexes throughout      Investigations   Laboratory Workup  - CBC:                        10.8   10.59 )-----------( 282      ( 03 Dec 2021 04:30 )             32.8     - Chemistry:  12-03    134<L>  |  99  |  20  ----------------------------<  106<H>  4.2   |  21  |  1.3    Ca    9.2      03 Dec 2021 04:30  Mg     2.1     12-03    TPro  6.0  /  Alb  3.7  /  TBili  0.3  /  DBili  x   /  AST  13  /  ALT  10  /  AlkPhos  96  12-03    - Coagulation Studies:  PT/INR - ( 01 Dec 2021 20:00 )   PT: 14.20 sec;   INR: 1.24 ratio    PTT - ( 01 Dec 2021 20:00 )  PTT:44.2 sec      Current Medications  Standing Medications  allopurinol 300 milliGRAM(s) Oral at bedtime  ATENolol  Tablet 50 milliGRAM(s) Oral daily  atorvastatin 80 milliGRAM(s) Oral at bedtime  budesonide 160 MICROgram(s)/formoterol 4.5 MICROgram(s) Inhaler 2 Puff(s) Inhalation two times a day  buPROPion XL (24-Hour) . 150 milliGRAM(s) Oral daily  cefepime   IVPB 2000 milliGRAM(s) IV Intermittent every 12 hours  cefepime   IVPB      cholecalciferol 400 Unit(s) Oral daily  colchicine 0.6 milliGRAM(s) Oral daily  enoxaparin Injectable 70 milliGRAM(s) SubCutaneous every 12 hours  FLUoxetine 20 milliGRAM(s) Oral two times a day  folic acid 1 milliGRAM(s) Oral daily  furosemide    Tablet 20 milliGRAM(s) Oral daily  hyoscyamine SL 0.125 milliGRAM(s) SubLingual daily  magnesium oxide 400 milliGRAM(s) Oral daily  NIFEdipine XL 60 milliGRAM(s) Oral daily  predniSONE   Tablet 10 milliGRAM(s) Oral daily  primidone 50 milliGRAM(s) Oral at bedtime  sildenafil (REVATIO) 20 milliGRAM(s) Oral three times a day  spironolactone 50 milliGRAM(s) Oral daily  tamsulosin 0.4 milliGRAM(s) Oral at bedtime  traZODone 100 milliGRAM(s) Oral at bedtime  valACYclovir 500 milliGRAM(s) Oral daily  vancomycin    Solution 125 milliGRAM(s) Oral every 6 hours  vancomycin  IVPB 1000 milliGRAM(s) IV Intermittent every 24 hours  vancomycin  IVPB        PRN Medications  acetaminophen     Tablet .. 650 milliGRAM(s) Oral every 6 hours PRN Temp greater or equal to 38C (100.4F), Moderate Pain (4 - 6)  ALBUTerol    90 MICROgram(s) HFA Inhaler 2 Puff(s) Inhalation every 6 hours PRN Shortness of Breath and/or Wheezing  diazepam    Tablet 10 milliGRAM(s) Oral three times a day PRN Anxiety  melatonin 5 milliGRAM(s) Oral at bedtime PRN Insomnia  oxycodone    5 mG/acetaminophen 325 mG 1 Tablet(s) Oral every 8 hours PRN Severe Pain (7 - 10)  simethicone 80 milliGRAM(s) Chew daily PRN Indigestion    Singles Doses Administered  (Floorstock) 1 each &lt;see task&gt; GiveOnce  (Floorstock) 1 each &lt;see task&gt; GiveOnce  cefepime   IVPB 2000 milliGRAM(s) IV Intermittent once  magnesium sulfate  IVPB 2 Gram(s) IV Intermittent once  vancomycin  IVPB 1250 milliGRAM(s) IV Intermittent once

## 2021-12-03 NOTE — OCCUPATIONAL THERAPY INITIAL EVALUATION ADULT - ADDITIONAL COMMENTS
Pt admitted from [facility] for rehab of L ankle ORIF. Prior to 9/23/21 pt from home, lives in private house with spouse, has 5 steps to enter with 1 HR and 5 steps inside to main living space, + chair lift to bedroom. Pt has RW and Wc at home, + bathtub however unable to access due to injury

## 2021-12-03 NOTE — PHYSICAL THERAPY INITIAL EVALUATION ADULT - PERTINENT HX OF CURRENT PROBLEM, REHAB EVAL
Case of a 71 year old female patient with multiple comorbidities including HTN, COPD, CKD III, DL, and Gout who presented from [facility] on 11/30 for left ankle pain and drainage in setting of a recent ORIF to left ankle on 09/23/21, found to have evidence of possible infected hardware, admitted for hardware removal, status post removal of hardware on 12/02.

## 2021-12-03 NOTE — CONSULT NOTE ADULT - SUBJECTIVE AND OBJECTIVE BOX
BERNARD SPEARS  71y, Female  Allergy: adhesives (Pruritus; Rash)  No Known Drug Allergies      CHIEF COMPLAINT: Left ankle pain (03 Dec 2021 09:11)      HPI:  HPI:  Patient is a 72 y/o female with a PMH of COPD, Asthma, Pulmonary HTN - on Home O2 intermittently as needed, chronic back pain, cervical radiculopathy, CKD 3, DLD, HTN, Gout, and recent pshx of L ankle ORIF on 9/23/2021 for L ankle fracture initially presented to the ED from the nursing home due to with persistent drainage from lateral ankle incision and Left ankle pain with the inability to ambulate. Patient described the pain as  persistent, and she endorsed swelling, and drainage. The patient denies any h/o fever, chills, cough, chest pain, shortness of breath. She was seen by Dr. Clemons in the office where she did xrays and examined the ankle. Pt reports she was told by Dr. Clemons to present to ED for evaluation. Patient also states that she recently had watery diarrhea in the nursing home and was being treated for C.diff in the Nursing home. Patient states that she has been having episodes of diarrhea for several weeks prior to presenting to the ED. Patient had HWR procedure last night 12/02. She denies any other symptoms at this time.     In the ED patient was hemodynamically stable, Labs were unremarkable.       Infectious Diseases History:  Old Micro Data/Cultures:     FAMILY HISTORY:  Family history of bladder cancer (Mother)    Family history of Alzheimer&#x27;s disease (Father)      PAST MEDICAL & SURGICAL HISTORY:  History of neck pain    Spinal stenosis of lumbar region with radiculopathy    Anxiety    Depression    Osteoarthritis    H/O osteoporosis    History of pulmonary hypertension    H/O pulmonary hypertension    COPD (chronic obstructive pulmonary disease)    Hyperlipidemia    H/O total knee replacement, right    Failed spinal cord stimulator        SOCIAL HISTORY  Social History:  Substance Use (street drugs): ( x ) never used  (  ) other:  Tobacco Usage:  ( x  ) never smoked   (   ) former smoker   (   ) current smoker  (     ) pack year  Alcohol Usage: None (30 Nov 2021 18:43)      Recent Travel:  Other Exposures:     ROS  General: Denies rigors, nightsweats  HEENT: Denies headache, rhinorrhea, sore throat, eye pain  CV: Denies CP, palpitations  PULM: Denies wheezing, hemoptysis  GI: Denies hematemesis, hematochezia, melena  : Denies discharge, hematuria  MSK: Denies arthralgias, myalgias  SKIN: Denies rash, lesions  NEURO: Denies paresthesias, weakness  PSYCH: Denies depression, anxiety    VITALS:  T(F): 97.6, Max: 98 (12-02-21 @ 16:45)  HR: 68  BP: 102/60  RR: 19Vital Signs Last 24 Hrs  T(C): 36.4 (03 Dec 2021 05:44), Max: 36.7 (02 Dec 2021 16:45)  T(F): 97.6 (03 Dec 2021 05:44), Max: 98 (02 Dec 2021 16:45)  HR: 68 (03 Dec 2021 05:44) (61 - 70)  BP: 102/60 (03 Dec 2021 05:44) (88/58 - 111/56)  BP(mean): --  RR: 19 (03 Dec 2021 05:44) (14 - 29)  SpO2: 98% (02 Dec 2021 17:15) (95% - 100%)    PHYSICAL EXAM:  Gen: NAD, resting in bed  HEENT: Normocephalic, atraumatic  Neck: supple, no lymphadenopathy  CV: Regular rate & regular rhythm  Lungs: CTAB  Abdomen: Soft, BS present  Ext: Warm, well perfused (+) LLE procedure   Neuro: non focal, awake  Skin: no rash, no lesions  Lines: no phlebitis    TESTS & MEASUREMENTS:                        10.8   10.59 )-----------( 282      ( 03 Dec 2021 04:30 )             32.8     12-03    134<L>  |  99  |  20  ----------------------------<  106<H>  4.2   |  21  |  1.3    Ca    9.2      03 Dec 2021 04:30  Mg     2.1     12-03    TPro  6.0  /  Alb  3.7  /  TBili  0.3  /  DBili  x   /  AST  13  /  ALT  10  /  AlkPhos  96  12-03    eGFR if Non African American: 41 mL/min/1.73M2 (12-03-21 @ 04:30)  eGFR if African American: 47 mL/min/1.73M2 (12-03-21 @ 04:30)  eGFR if Non African American: 37 mL/min/1.73M2 (12-02-21 @ 19:10)  eGFR if : 43 mL/min/1.73M2 (12-02-21 @ 19:10)    LIVER FUNCTIONS - ( 03 Dec 2021 04:30 )  Alb: 3.7 g/dL / Pro: 6.0 g/dL / ALK PHOS: 96 U/L / ALT: 10 U/L / AST: 13 U/L / GGT: x                     INFECTIOUS DISEASES TESTING      RADIOLOGY & ADDITIONAL TESTS:  I have personally reviewed the last available Chest xray  CXR      CT      CARDIOLOGY TESTING  12 Lead ECG:   Ventricular Rate 76 BPM    Atrial Rate 76 BPM    P-R Interval 142 ms    QRS Duration 82 ms    Q-T Interval 398 ms    QTC Calculation(Bazett) 447 ms    P Axis 45 degrees    R Axis 5 degrees    T Axis 28 degrees    Diagnosis Line Normal sinus rhythm  Normal ECG    Confirmed by Amari Whitfield ([id number]) on 12/1/2021 1:35:37 PM (11-30-21 @ 19:41)  12 Lead ECG:   Ventricular Rate 70 BPM    Atrial Rate 70 BPM    P-R Interval 144 ms    QRS Duration 88 ms    Q-T Interval 402 ms    QTC Calculation(Bazett) 434 ms    P Axis 48 degrees    R Axis 38 degrees    T Axis 43 degrees    Diagnosis Line Sinus rhythm with Premature atrial complexes  Otherwise normal ECG    Confirmed by TIMMY CROWDER MD [id number]) on 11/30/2021 3:42:12 PM (11-30-21 @ 13:45)      All available historical records have been reviewed    MEDICATIONS  allopurinol 300  ATENolol  Tablet 50  atorvastatin 80  budesonide 160 MICROgram(s)/formoterol 4.5 MICROgram(s) Inhaler 2  buPROPion XL (24-Hour) . 150  cefepime   IVPB 2000  cefepime   IVPB   cholecalciferol 400  colchicine 0.6  enoxaparin Injectable 70  FLUoxetine 20  folic acid 1  furosemide    Tablet 20  hyoscyamine SL 0.125  magnesium oxide 400  NIFEdipine XL 60  predniSONE   Tablet 10  primidone 50  sildenafil (REVATIO) 20  spironolactone 50  tamsulosin 0.4  traZODone 100  valACYclovir 500  vancomycin    Solution 125  vancomycin  IVPB 1000  vancomycin  IVPB       ANTIBIOTICS:  cefepime   IVPB 2000 milliGRAM(s) IV Intermittent every 12 hours  cefepime   IVPB      valACYclovir 500 milliGRAM(s) Oral daily  vancomycin    Solution 125 milliGRAM(s) Oral every 6 hours  vancomycin  IVPB 1000 milliGRAM(s) IV Intermittent every 24 hours  vancomycin  IVPB          All available historical data has been reviewed    ASSESSMENT  71yFemale with a PMH of....... (fill in)    IMPRESSION  # (?) Sepsis on admission (2 or more of the following T<96.8F, T>101F, Pulse>90, Resp Rate>20, WBC>12, wbc<4, Bands>10%), PLUS (+) lactic acidosis, OR metabolic encephalopathy, OR CASSI, OR bacteremia . Otherwise just SIRS on admission, sepsis ruled out  #  #    RECOMMENDATIONS  - f/u pending cultures  - ***    This is a pended note. All final recommendations to follow pending discussion with ID Attending    BERNARD SPEARS  71y, Female  Allergy: adhesives (Pruritus; Rash)  No Known Drug Allergies      CHIEF COMPLAINT: Left ankle pain (03 Dec 2021 09:11)      HPI:    Patient is a 70 y/o female with a PMH of COPD, Asthma, Pulmonary HTN - on Home O2 intermittently as needed, chronic back pain, cervical radiculopathy, CKD 3, DLD, HTN, Gout, and recent pshx of L ankle ORIF on 9/23/2021 for L ankle fracture initially presented to the ED from the nursing home due to with persistent drainage from lateral ankle incision and Left ankle pain with the inability to ambulate. Patient described the pain as  persistent, and she endorsed swelling, and drainage. The patient denies any h/o fever, chills, cough, chest pain, shortness of breath. She was seen by Dr. Clemons in the office where she did xrays and examined the ankle. Pt reports she was told by Dr. Clemons to present to ED for evaluation. Patient also states that she recently had watery diarrhea in the nursing home and was being treated for C.diff in the Nursing home. Patient states that she has been having episodes of diarrhea for several weeks prior to presenting to the ED. Patient had HWR procedure last night 12/02. She denies any other symptoms at this time.     In the ED patient was hemodynamically stable, Labs were unremarkable.       Infectious Diseases History:  Old Micro Data/Cultures:     FAMILY HISTORY:  Family history of bladder cancer (Mother)    Family history of Alzheimer&#x27;s disease (Father)      PAST MEDICAL & SURGICAL HISTORY:  History of neck pain    Spinal stenosis of lumbar region with radiculopathy    Anxiety    Depression    Osteoarthritis    H/O osteoporosis    History of pulmonary hypertension    H/O pulmonary hypertension    COPD (chronic obstructive pulmonary disease)    Hyperlipidemia    H/O total knee replacement, right    Failed spinal cord stimulator        SOCIAL HISTORY  Social History:  Substance Use (street drugs): ( x ) never used  (  ) other:  Tobacco Usage:  ( x  ) never smoked   (   ) former smoker   (   ) current smoker  (     ) pack year  Alcohol Usage: None (30 Nov 2021 18:43)      Recent Travel:  Other Exposures:     ROS  General: Denies rigors, nightsweats  HEENT: Denies headache, rhinorrhea, sore throat, eye pain  CV: Denies CP, palpitations  PULM: Denies wheezing, hemoptysis  GI: Denies hematemesis, hematochezia, melena  : Denies discharge, hematuria  MSK: Denies arthralgias, myalgias  SKIN: Denies rash, lesions  NEURO: Denies paresthesias, weakness  PSYCH: Denies depression, anxiety    VITALS:  T(F): 97.6, Max: 98 (12-02-21 @ 16:45)  HR: 68  BP: 102/60  RR: 19Vital Signs Last 24 Hrs  T(C): 36.4 (03 Dec 2021 05:44), Max: 36.7 (02 Dec 2021 16:45)  T(F): 97.6 (03 Dec 2021 05:44), Max: 98 (02 Dec 2021 16:45)  HR: 68 (03 Dec 2021 05:44) (61 - 70)  BP: 102/60 (03 Dec 2021 05:44) (88/58 - 111/56)  BP(mean): --  RR: 19 (03 Dec 2021 05:44) (14 - 29)  SpO2: 98% (02 Dec 2021 17:15) (95% - 100%)    PHYSICAL EXAM:  Gen: NAD, resting in bed  HEENT: Normocephalic, atraumatic  Neck: supple, no lymphadenopathy  CV: Regular rate & regular rhythm  Lungs: CTAB  Abdomen: Soft, BS present  Ext: Warm, well perfused (+) LLE procedure   Neuro: non focal, awake  Skin: no rash, no lesions  Lines: no phlebitis    TESTS & MEASUREMENTS:                        10.8   10.59 )-----------( 282      ( 03 Dec 2021 04:30 )             32.8     12-03    134<L>  |  99  |  20  ----------------------------<  106<H>  4.2   |  21  |  1.3    Ca    9.2      03 Dec 2021 04:30  Mg     2.1     12-03    TPro  6.0  /  Alb  3.7  /  TBili  0.3  /  DBili  x   /  AST  13  /  ALT  10  /  AlkPhos  96  12-03    eGFR if Non African American: 41 mL/min/1.73M2 (12-03-21 @ 04:30)  eGFR if African American: 47 mL/min/1.73M2 (12-03-21 @ 04:30)  eGFR if Non African American: 37 mL/min/1.73M2 (12-02-21 @ 19:10)  eGFR if : 43 mL/min/1.73M2 (12-02-21 @ 19:10)    LIVER FUNCTIONS - ( 03 Dec 2021 04:30 )  Alb: 3.7 g/dL / Pro: 6.0 g/dL / ALK PHOS: 96 U/L / ALT: 10 U/L / AST: 13 U/L / GGT: x                     INFECTIOUS DISEASES TESTING      RADIOLOGY & ADDITIONAL TESTS:  I have personally reviewed the last available Chest xray  CXR      CT      CARDIOLOGY TESTING  12 Lead ECG:   Ventricular Rate 76 BPM    Atrial Rate 76 BPM    P-R Interval 142 ms    QRS Duration 82 ms    Q-T Interval 398 ms    QTC Calculation(Bazett) 447 ms    P Axis 45 degrees    R Axis 5 degrees    T Axis 28 degrees    Diagnosis Line Normal sinus rhythm  Normal ECG    Confirmed by Amari Whitfield ([id number]) on 12/1/2021 1:35:37 PM (11-30-21 @ 19:41)  12 Lead ECG:   Ventricular Rate 70 BPM    Atrial Rate 70 BPM    P-R Interval 144 ms    QRS Duration 88 ms    Q-T Interval 402 ms    QTC Calculation(Bazett) 434 ms    P Axis 48 degrees    R Axis 38 degrees    T Axis 43 degrees    Diagnosis Line Sinus rhythm with Premature atrial complexes  Otherwise normal ECG    Confirmed by TIMMY CROWDER MD ([id number]) on 11/30/2021 3:42:12 PM (11-30-21 @ 13:45)      All available historical records have been reviewed    MEDICATIONS  allopurinol 300  ATENolol  Tablet 50  atorvastatin 80  budesonide 160 MICROgram(s)/formoterol 4.5 MICROgram(s) Inhaler 2  buPROPion XL (24-Hour) . 150  cefepime   IVPB 2000  cefepime   IVPB   cholecalciferol 400  colchicine 0.6  enoxaparin Injectable 70  FLUoxetine 20  folic acid 1  furosemide    Tablet 20  hyoscyamine SL 0.125  magnesium oxide 400  NIFEdipine XL 60  predniSONE   Tablet 10  primidone 50  sildenafil (REVATIO) 20  spironolactone 50  tamsulosin 0.4  traZODone 100  valACYclovir 500  vancomycin    Solution 125  vancomycin  IVPB 1000  vancomycin  IVPB       ANTIBIOTICS:  cefepime   IVPB 2000 milliGRAM(s) IV Intermittent every 12 hours  cefepime   IVPB      valACYclovir 500 milliGRAM(s) Oral daily  vancomycin    Solution 125 milliGRAM(s) Oral every 6 hours  vancomycin  IVPB 1000 milliGRAM(s) IV Intermittent every 24 hours  vancomycin  IVPB          All available historical data has been reviewed     BERNARD SPEARS  71y, Female  Allergy: adhesives (Pruritus; Rash)  No Known Drug Allergies      CHIEF COMPLAINT: Left ankle pain (03 Dec 2021 09:11)      HPI:    Patient is a 70 y/o female with a PMH of COPD, Asthma, Pulmonary HTN - on Home O2 intermittently as needed, chronic back pain, cervical radiculopathy, CKD 3, DLD, HTN, Gout, and recent pshx of L ankle ORIF on 9/23/2021 for L ankle fracture initially presented to the ED from the nursing home due to with persistent drainage from lateral ankle incision and Left ankle pain with the inability to ambulate. Patient described the pain as  persistent, and she endorsed swelling, and drainage. The patient denies any h/o fever, chills, cough, chest pain, shortness of breath. She was seen by Dr. Clemons in the office where she did xrays and examined the ankle. Pt reports she was told by Dr. Clemons to present to ED for evaluation. Patient also states that she recently had watery diarrhea in the nursing home and was being treated for C.diff in the Nursing home. Patient states that she has been having episodes of diarrhea for several weeks prior to presenting to the ED. Patient had HWR procedure last night 12/02. She denies any other symptoms at this time.     In the ED patient was hemodynamically stable, Labs were unremarkable.       Infectious Diseases History:  Old Micro Data/Cultures: na    FAMILY HISTORY:  Family history of bladder cancer (Mother)    Family history of Alzheimer&#x27;s disease (Father)      PAST MEDICAL & SURGICAL HISTORY:  History of neck pain    Spinal stenosis of lumbar region with radiculopathy    Anxiety    Depression    Osteoarthritis    H/O osteoporosis    History of pulmonary hypertension    H/O pulmonary hypertension    COPD (chronic obstructive pulmonary disease)    Hyperlipidemia    H/O total knee replacement, right    Failed spinal cord stimulator        SOCIAL HISTORY  Social History:  Substance Use (street drugs): ( x ) never used  (  ) other:  Tobacco Usage:  ( x  ) never smoked   (   ) former smoker   (   ) current smoker  (     ) pack year  Alcohol Usage: None (30 Nov 2021 18:43)      Recent Travel:  Other Exposures:     ROS  General: Denies rigors, nightsweats  HEENT: Denies headache, rhinorrhea, sore throat, eye pain  CV: Denies CP, palpitations  PULM: Denies wheezing, hemoptysis  GI: Denies hematemesis, hematochezia, melena  : Denies discharge, hematuria  MSK: Denies arthralgias, myalgias  SKIN: Denies rash, lesions  NEURO: Denies paresthesias, weakness  PSYCH: Denies depression, anxiety    VITALS:  T(F): 97.6, Max: 98 (12-02-21 @ 16:45)  HR: 68  BP: 102/60  RR: 19Vital Signs Last 24 Hrs  T(C): 36.4 (03 Dec 2021 05:44), Max: 36.7 (02 Dec 2021 16:45)  T(F): 97.6 (03 Dec 2021 05:44), Max: 98 (02 Dec 2021 16:45)  HR: 68 (03 Dec 2021 05:44) (61 - 70)  BP: 102/60 (03 Dec 2021 05:44) (88/58 - 111/56)  BP(mean): --  RR: 19 (03 Dec 2021 05:44) (14 - 29)  SpO2: 98% (02 Dec 2021 17:15) (95% - 100%)    PHYSICAL EXAM:  Gen: NAD, resting in bed  HEENT: Normocephalic, atraumatic  Neck: supple, no lymphadenopathy  CV: Regular rate & regular rhythm  Lungs: CTAB  Abdomen: Soft, BS present  Ext: Warm, well perfused (+) LLE dressings/cast  Neuro: non focal, awake  Skin: no rash, no lesions  Lines: no phlebitis    TESTS & MEASUREMENTS:                        10.8   10.59 )-----------( 282      ( 03 Dec 2021 04:30 )             32.8     12-03    134<L>  |  99  |  20  ----------------------------<  106<H>  4.2   |  21  |  1.3    Ca    9.2      03 Dec 2021 04:30  Mg     2.1     12-03    TPro  6.0  /  Alb  3.7  /  TBili  0.3  /  DBili  x   /  AST  13  /  ALT  10  /  AlkPhos  96  12-03    eGFR if Non African American: 41 mL/min/1.73M2 (12-03-21 @ 04:30)  eGFR if African American: 47 mL/min/1.73M2 (12-03-21 @ 04:30)  eGFR if Non African American: 37 mL/min/1.73M2 (12-02-21 @ 19:10)  eGFR if : 43 mL/min/1.73M2 (12-02-21 @ 19:10)    LIVER FUNCTIONS - ( 03 Dec 2021 04:30 )  Alb: 3.7 g/dL / Pro: 6.0 g/dL / ALK PHOS: 96 U/L / ALT: 10 U/L / AST: 13 U/L / GGT: x                     INFECTIOUS DISEASES TESTING      RADIOLOGY & ADDITIONAL TESTS:  I have personally reviewed the last available Chest xray  CXR      CT      CARDIOLOGY TESTING  12 Lead ECG:   Ventricular Rate 76 BPM    Atrial Rate 76 BPM    P-R Interval 142 ms    QRS Duration 82 ms    Q-T Interval 398 ms    QTC Calculation(Bazett) 447 ms    P Axis 45 degrees    R Axis 5 degrees    T Axis 28 degrees    Diagnosis Line Normal sinus rhythm  Normal ECG    Confirmed by Amari Whitfield ([id number]) on 12/1/2021 1:35:37 PM (11-30-21 @ 19:41)  12 Lead ECG:   Ventricular Rate 70 BPM    Atrial Rate 70 BPM    P-R Interval 144 ms    QRS Duration 88 ms    Q-T Interval 402 ms    QTC Calculation(Bazett) 434 ms    P Axis 48 degrees    R Axis 38 degrees    T Axis 43 degrees    Diagnosis Line Sinus rhythm with Premature atrial complexes  Otherwise normal ECG    Confirmed by TIMMY CROWDER MD ([id number]) on 11/30/2021 3:42:12 PM (11-30-21 @ 13:45)      All available historical records have been reviewed    MEDICATIONS  allopurinol 300  ATENolol  Tablet 50  atorvastatin 80  budesonide 160 MICROgram(s)/formoterol 4.5 MICROgram(s) Inhaler 2  buPROPion XL (24-Hour) . 150  cefepime   IVPB 2000  cefepime   IVPB   cholecalciferol 400  colchicine 0.6  enoxaparin Injectable 70  FLUoxetine 20  folic acid 1  furosemide    Tablet 20  hyoscyamine SL 0.125  magnesium oxide 400  NIFEdipine XL 60  predniSONE   Tablet 10  primidone 50  sildenafil (REVATIO) 20  spironolactone 50  tamsulosin 0.4  traZODone 100  valACYclovir 500  vancomycin    Solution 125  vancomycin  IVPB 1000  vancomycin  IVPB       ANTIBIOTICS:  cefepime   IVPB 2000 milliGRAM(s) IV Intermittent every 12 hours  cefepime   IVPB      valACYclovir 500 milliGRAM(s) Oral daily  vancomycin    Solution 125 milliGRAM(s) Oral every 6 hours  vancomycin  IVPB 1000 milliGRAM(s) IV Intermittent every 24 hours  vancomycin  IVPB          All available historical data has been reviewed

## 2021-12-03 NOTE — PROGRESS NOTE ADULT - ASSESSMENT
Assessment and Plan  Case of a 71 year old female patient with multiple comorbidities including HTN, COPD, CKD III, DL, and Gout who presented from [facility] on 11/30 for left ankle pain and drainage in setting of a recent ORIF to left ankle on 09/23/21, found to have evidence of possible infected hardware, admitted for hardware removal, status post removal of hardware on 12/02. Currently hemodynamically stables.      Concern for Infected Left Ankle Hardware   Recent Bimalleolar Fracture Repair with Lateral Plate and Screw Fixation of the Medial Malleolus and Intramedullary Nail and Screw Fixation of the Lateral Malleolus  * ED Vitals 120/68 mmHg, HR 72 bpm, RR 17bpm, T 37.2, SaO2 95% on RA  * ED Labs WBC 10.79  * Xray Ankle Complete 3 Views, Left (11.30.21 @ 14:10) Post bimalleolar fracture repair with lateral plate and screw fixation of the medial malleolus and intramedullary nail and screw fixation of the lateral malleolus, in near anatomic alignment. No evidence of hardware complication. Fracture lines are still evident.    - Infectious Disease Team consulted on 12/02 per Orthopedic Recommendation  - Orthopedic Team on board: status post left ankle hardware removal on 12/02  - NWB to left LE. PT and OT consulted on 12/03 (activity orders in place)  - Resumed Lovenox 70mg SC BID not eliquis 5mg BID on 12/03 per Orthopedic recommendation (can resume eliquis in 1 week post 12/02)  - Monitor pain: tylenol 650mg Q6h PRN and Percocet 5/325mg Q8h PRN  - Monitor T afebrile  - Trend WBC 12/02 7.42 no leukocytosis  - Inflammatory markers: ESR 12/01 35, CRP 4 12/01  - Follow up surgical swab culture 12/02 x2 received. No blood culture sent  - Continue IV Cefepime 2g BID (started 12/02) and IV Vancomycin 1g QD (started 12/03) s/p 1.25g x1 dose in ED 12/02  - Started on PO Vancomycin 125 mg Q6h (on 12/02) in setting of history of Clostridium Difficile. Last Clostridium Difficile PCR 10/03/21 negative. Last BM 12/02 watery/loose per patient (at baseline)      History of Clostridium Difficile  * Last Clostridium Difficile PCR 10/03/21 negative. Last BM 12/02 watery/loose per patient (at baseline)      Others  - DVT Prophylaxis: Lovenox 40mg Subcutaneously daily  - GI Prophylaxis: Pantoprazole 40mg PO QD  - IV Hydration with D5 0.45 NSS at a rate of 20cc/hour   - Diet: Regular  - Code Status: Full    Barriers to learning: YES/NO  Discharge Planning: Patient will be discharged once stable and  Plan was communicated with   Education given to:   Educated about:       Mary Rodriguez MD  PGY - 1 Internal Medicine   [facility], [place]   Assessment and Plan  Case of a 71 year old female patient with multiple comorbidities including HTN, COPD, CKD III, DL, and Gout who presented from [facility] on 11/30 for left ankle pain and drainage in setting of a recent ORIF to left ankle on 09/23/21, found to have evidence of possible infected hardware, admitted for hardware removal, status post removal of hardware on 12/02. Currently hemodynamically stables.      Concern for Infected Left Ankle Hardware   Recent Bimalleolar Fracture Repair with Lateral Plate and Screw Fixation of the Medial Malleolus and Intramedullary Nail and Screw Fixation of the Lateral Malleolus  * ED Vitals 120/68 mmHg, HR 72 bpm, RR 17bpm, T 37.2, SaO2 95% on RA  * ED Labs WBC 10.79  * Xray Ankle Complete 3 Views, Left (11.30.21 @ 14:10) Post bimalleolar fracture repair with lateral plate and screw fixation of the medial malleolus and intramedullary nail and screw fixation of the lateral malleolus, in near anatomic alignment. No evidence of hardware complication. Fracture lines are still evident.    - Infectious Disease Team consulted on 12/02 per Orthopedic Recommendation  - Orthopedic Team on board: status post left ankle hardware removal on 12/02  - NWB to left LE. PT and OT consulted on 12/03 (activity orders in place)  - Resumed Lovenox 70mg SC BID not eliquis 5mg BID on 12/03 per Orthopedic recommendation (can resume eliquis in 1 week post 12/02)  - Monitor pain: tylenol 650mg Q6h PRN and Percocet 5/325mg Q8h PRN  - Monitor T afebrile  - Trend WBC 12/02 7.42 no leukocytosis  - Inflammatory markers: ESR 12/01 35, CRP 4 12/01  - Follow up surgical swab culture 12/02 x2 received. No blood culture sent  - Continue IV Cefepime 2g BID (started 12/02) and IV Vancomycin 1g QD (started 12/03) s/p 1.25g x1 dose in ED 12/02  - Started on PO Vancomycin 125 mg Q6h (on 12/02) in setting of history of Clostridium Difficile. Last Clostridium Difficile PCR 10/03/21 negative. Last BM 12/02 watery/loose per patient (at baseline)      History of Clostridium Difficile  * Last Clostridium Difficile PCR 10/03/21 negative  - Monitor BM: Last BM 12/02 watery/loose per patient (at baseline)  - Monitor T afebrile  - Trend WBC 12/02 7.42 no leukocytosis  - Inflammatory markers: ESR 12/01 35, CRP 4 12/0  - Started on PO Vancomycin 125 mg Q6h (on 12/02) in setting of history of Clostridium Difficile.      COPD/ Asthma  Pulmonary HTN  History of left main PE  * On Home O2 PRN  * Home med Eliquis 5mg BID  * CT angio chest PE 01/04/21 PE diagnosis    - Pulmonary Team on board for orthopedic preoperative clearance  - Monitor SaO2 and O2 requirements  - Switched back to Lovenox 70mcg SC BID 12/03 after being cleared by Orthopedic. Can resume Eliquis 5mg BID 1 week post procedure 12/02  - Continue Albuterol 2p Q6h PRN  - Continue Symbicort 2p BID  - Continue Prednisone 10mg PO QD      History of Bell's Palsy  * Home Valtrex prophylactic dose  - Continue PO Valtrex 500mg QD      Chronic Back Pain  Spinal Stenosis  Cervical Radiculopathy  - Monitor pain: tylenol 650mg Q6h PRN and Percocet 5/325mg Q8h PRN  - NWB to left LE. PT and OT consulted on 12/03 (activity orders in place)      CKD III   * Baseline Cr 1.2-1.4  - Trend CMP and P daily  - Continue Vitamin D3 400mg QD  - Continue Tamsulosin 0.4mg QD      Hypertension  - Monitor BP closely 12/03 102/60mmHg  - Continue Nifedipine 60mg QD, Aldactone 50mg QD, LAsix PO 20mg QD, Atenolol 50mg QD, Sildenafil 20mg TID      Dyslipidemia  * No Lipid Profile in Chart  - Continue Atorvastatin 80 mg QD      History of Gout  * Home meds Allopurinol and Colchicine  - Continue Allopurinol 300mg QD  - Continue Colchicine 0.6mg QD      HFpEF  * TTE 09/22/21 EF 59%, m,ild-mod TR, mod MR  - Monitor in/out  - Daily weight  - Monitor SaO2 and O2 requirements: on room air  - Continue  Aldactone 50mg QD, LAsix PO 20mg QD, Atenolol 50mg QD      Others  - DVT Prophylaxis: as detailed before  - GI Prophylaxis: not on Pantoprazole 40mg PO QD   - Diet: DASH/ TLC  - Code Status: Full      Barriers to learning: NO  Discharge Planning: Patient will be discharged once stable and  Plan was communicated with patient and medical team      Mary Rodriguez MD  PGY - 2 Internal Medicine   [facility]   Assessment and Plan  Case of a 71 year old female patient with multiple comorbidities including HTN, COPD, CKD III, DL, and Gout who presented from [facility] on 11/30 for left ankle pain and drainage in setting of a recent ORIF to left ankle on 09/23/21, found to have evidence of possible infected hardware, admitted for hardware removal, status post removal of hardware on 12/02. Currently hemodynamically stable.      Concern for Infected Left Ankle Hardware   Recent Bimalleolar Fracture Repair with Lateral Plate and Screw Fixation of the Medial Malleolus and Intramedullary Nail and Screw Fixation of the Lateral Malleolus  * ED Vitals 120/68 mmHg, HR 72 bpm, RR 17bpm, T 37.2, SaO2 95% on RA  * ED Labs WBC 10.79  * Xray Ankle Complete 3 Views, Left (11.30.21 @ 14:10) Post bimalleolar fracture repair with lateral plate and screw fixation of the medial malleolus and intramedullary nail and screw fixation of the lateral malleolus, in near anatomic alignment. No evidence of hardware complication. Fracture lines are still evident.    - Infectious Disease Team consulted on 12/02 per Orthopedic Recommendation  - Orthopedic Team on board: status post left ankle hardware removal on 12/02  - NWB to left LE. PT and OT consulted on 12/03 (activity orders in place)  - Resumed Lovenox 70mg SC BID not eliquis 5mg BID on 12/03 per Orthopedic recommendation (can resume eliquis in 1 week post 12/02)  - Monitor pain: tylenol 650mg Q6h PRN and Percocet 5/325mg Q8h PRN  - Monitor T afebrile  - Trend WBC 12/02 7.42 no leukocytosis  - Inflammatory markers: ESR 12/01 35, CRP 4 12/01  - Follow up surgical swab culture 12/02 x2 received. No blood culture sent  - Continue IV Cefepime 2g BID (started 12/02) and IV Vancomycin 1g QD (started 12/03) s/p 1.25g x1 dose in ED 12/02  - Started on PO Vancomycin 125 mg Q6h (on 12/02) in setting of history of Clostridium Difficile. Last Clostridium Difficile PCR 10/03/21 negative. Last BM 12/02 watery/loose per patient (at baseline)      History of Clostridium Difficile  * Last Clostridium Difficile PCR 10/03/21 negative  - Monitor BM: Last BM 12/02 watery/loose per patient (at baseline)  - Monitor T afebrile  - Trend WBC 12/02 7.42 no leukocytosis  - Inflammatory markers: ESR 12/01 35, CRP 4 12/0  - Started on PO Vancomycin 125 mg Q6h (on 12/02) in setting of history of Clostridium Difficile.      COPD/ Asthma  Pulmonary HTN  History of left main PE  * On Home O2 PRN  * Home med Eliquis 5mg BID  * CT angio chest PE 01/04/21 PE diagnosis    - Pulmonary Team on board for orthopedic preoperative clearance  - Monitor SaO2 and O2 requirements  - Switched back to Lovenox 70mcg SC BID 12/03 after being cleared by Orthopedic. Can resume Eliquis 5mg BID 1 week post procedure 12/02  - Continue Albuterol 2p Q6h PRN  - Continue Symbicort 2p BID  - Continue Prednisone 10mg PO QD      History of Bell's Palsy  * Home Valtrex prophylactic dose  - Continue PO Valtrex 500mg QD      Chronic Back Pain  Spinal Stenosis  Cervical Radiculopathy  - Monitor pain: tylenol 650mg Q6h PRN and Percocet 5/325mg Q8h PRN  - NWB to left LE. PT and OT consulted on 12/03 (activity orders in place)      CKD III   * Baseline Cr 1.2-1.4  - Trend CMP and P daily  - Continue Vitamin D3 400mg QD  - Continue Tamsulosin 0.4mg QD      Hypertension  - Monitor BP closely 12/03 102/60mmHg  - Continue Nifedipine 60mg QD, Aldactone 50mg QD, LAsix PO 20mg QD, Atenolol 50mg QD, Sildenafil 20mg TID      Dyslipidemia  * No Lipid Profile in Chart  - Continue Atorvastatin 80 mg QD      History of Gout  * Home meds Allopurinol and Colchicine  - Continue Allopurinol 300mg QD  - Continue Colchicine 0.6mg QD      HFpEF  * TTE 09/22/21 EF 59%, m,ild-mod TR, mod MR  - Monitor in/out  - Daily weight  - Monitor SaO2 and O2 requirements: on room air  - Continue  Aldactone 50mg QD, LAsix PO 20mg QD, Atenolol 50mg QD      Others  - DVT Prophylaxis: as detailed before  - GI Prophylaxis: not on Pantoprazole 40mg PO QD   - Diet: DASH/ TLC  - Code Status: Full      Barriers to learning: NO  Discharge Planning: Patient will be discharged once stable and  Plan was communicated with patient and medical team      Mary Rodriguez MD  PGY - 2 Internal Medicine   [facility]

## 2021-12-03 NOTE — CONSULT NOTE ADULT - ATTENDING COMMENTS
agree with above  booked for surgery this week  cont care per medicine for opimization
I have personally seen and examined this patient.  I have reviewed all pertinent clinical information and reviewed all relevant imaging and diagnostic studies personally.   I counseled the patient about diagnostic testing and treatment plan. All questions were answered.  I discussed recommendations with the primary team.     #ORIF with exposed hardware s/p hardware removal, no systemic signs of sepsis  s/p Removal, hardware, deep 02-Dec-2021 16:36:59  Ghazala Clemons  Irrigation, wound, lower extremity 02-Dec-2021 16:37:47 I&D lateral ankle wound Ghazala Clemons.  "small wound dehiscence over prominent hardware"  #Sepsis ruled out on admission   #Cdiff, recently completed, second episode  #Obesity     - Continue Vanc  - Please check vanc trough 30 min prior to 4th dose  - Continue Cefepime  - CHANGE PO vancomycin to 125 mg BID  - Will need IV antibiotic on D/C, regimen pending OR cx, pt hesitant about PICC, may need midline instead
IMPRESSION    Infected Left Ankle Hardware SP ORIF (9/23/21) awaiting I&D/Revision   ARISCAT score correlating with intermediate risk of in-hospital post-op pulmonary complications  Right Basilar Opacity likely Atelectasis   HO MARLENI not on NIV  HO Pulmonary Hypertension on 2-3L NC PRN, WHO II  HO recent provoked PE on Eliquis   HO Moderate MVR, TVR and AV thickening     Plan as outlined above

## 2021-12-03 NOTE — PROGRESS NOTE ADULT - ASSESSMENT
ORTHOPEDIC SURGERY POC  71y Female s/p L ankle JESSA. Denies significant pain, fevers chills night sweats shortness of breath       Medications:  acetaminophen     Tablet .. 650 milliGRAM(s) Oral every 6 hours PRN  ALBUTerol    90 MICROgram(s) HFA Inhaler 2 Puff(s) Inhalation every 6 hours PRN  allopurinol 300 milliGRAM(s) Oral at bedtime  ATENolol  Tablet 50 milliGRAM(s) Oral daily  atorvastatin 80 milliGRAM(s) Oral at bedtime  budesonide 160 MICROgram(s)/formoterol 4.5 MICROgram(s) Inhaler 2 Puff(s) Inhalation two times a day  buPROPion XL (24-Hour) . 150 milliGRAM(s) Oral daily  cefepime   IVPB 2000 milliGRAM(s) IV Intermittent every 12 hours  cefepime   IVPB      cholecalciferol 400 Unit(s) Oral daily  colchicine 0.6 milliGRAM(s) Oral daily  diazepam    Tablet 10 milliGRAM(s) Oral three times a day PRN  FLUoxetine 20 milliGRAM(s) Oral two times a day  folic acid 1 milliGRAM(s) Oral daily  furosemide    Tablet 20 milliGRAM(s) Oral daily  hyoscyamine SL 0.125 milliGRAM(s) SubLingual daily  magnesium oxide 400 milliGRAM(s) Oral daily  melatonin 5 milliGRAM(s) Oral at bedtime PRN  NIFEdipine XL 60 milliGRAM(s) Oral daily  oxycodone    5 mG/acetaminophen 325 mG 1 Tablet(s) Oral every 8 hours PRN  predniSONE   Tablet 10 milliGRAM(s) Oral daily  primidone 50 milliGRAM(s) Oral at bedtime  sildenafil (REVATIO) 20 milliGRAM(s) Oral three times a day  simethicone 80 milliGRAM(s) Chew daily PRN  spironolactone 50 milliGRAM(s) Oral daily  tamsulosin 0.4 milliGRAM(s) Oral at bedtime  traZODone 100 milliGRAM(s) Oral at bedtime  valACYclovir 500 milliGRAM(s) Oral daily  vancomycin    Solution 125 milliGRAM(s) Oral every 6 hours  vancomycin  IVPB 1000 milliGRAM(s) IV Intermittent every 24 hours  vancomycin  IVPB        adhesives (Pruritus; Rash)  No Known Drug Allergies      PMH/PSH:  History of neck pain    Spinal stenosis of lumbar region with radiculopathy    Anxiety    Depression    Osteoarthritis    H/O osteoporosis    History of pulmonary hypertension    H/O pulmonary hypertension    COPD (chronic obstructive pulmonary disease)    Hyperlipidemia    H/O total knee replacement, right    Failed spinal cord stimulator        Exam:  T(C): 36.3 (12-02-21 @ 20:58), Max: 36.7 (12-02-21 @ 16:45)  HR: 70 (12-02-21 @ 20:58) (61 - 70)  BP: 98/56 (12-02-21 @ 20:58) (88/58 - 111/56)  RR: 20 (12-02-21 @ 20:58) (14 - 29)  SpO2: 98% (12-02-21 @ 17:15) (93% - 100%)  General: Awake, alert, NAD, AAOx3    RLE: Splint intact, wiggling toes, toes wwp     Labs:                        11.5   7.42  )-----------( 314      ( 02 Dec 2021 19:10 )             35.3     12-02    136  |  98  |  19  ----------------------------<  144<H>  4.0   |  21  |  1.4    Ca    9.1      02 Dec 2021 19:10  Mg     2.2     12-02    TPro  5.7<L>  /  Alb  3.7  /  TBili  <0.2  /  DBili  x   /  AST  13  /  ALT  12  /  AlkPhos  97  12-02    PT/INR - ( 01 Dec 2021 20:00 )   PT: 14.20 sec;   INR: 1.24 ratio           A/P:  71yFemale s/p R ankle JESSA    - NWB RLE  - Vanc/cefepime until ID consult   - dvt ppx  - Regular diet  - Ortho to follow

## 2021-12-03 NOTE — OCCUPATIONAL THERAPY INITIAL EVALUATION ADULT - PLANNED THERAPY INTERVENTIONS, OT EVAL
ADL retraining/balance training/bed mobility training/joint mobilization/massage/parent/caregiver training.../ROM/strengthening/stretching/transfer training

## 2021-12-03 NOTE — OCCUPATIONAL THERAPY INITIAL EVALUATION ADULT - GENERAL OBSERVATIONS, REHAB EVAL
Pt encountered semi delarosa in NAD, + IV locked, + intact post op splint to LLE. Pt agreeable to bedside OT assessment, may be seen as confirmed with RN. PT Ravinder present. Pt returned to bed in NAD, + IV locked, + intact post op splint to LLE

## 2021-12-03 NOTE — PROGRESS NOTE ADULT - SUBJECTIVE AND OBJECTIVE BOX
ORTHOPAEDIC SURGERY CONSULT NOTE    Patient seen and examined s/p L ankle JESSA yesterday. Pain well controlled. No new complaints    EXAM  LLE  Splint in place, c/d/i  Moving all toes  SILT   Foot wwp    A&P  72yo F w/ recent pmhx of L ankle ORIF, now s/p JESSA yesterday. Doing well post-operatively. No orthopaedic contraindication to discharge.  -NWB LLE  -pain control  -DVT ppx  -ID consult  -PT/OT  -upon discharge, please follow up with Dr. Clemons in 2 weeks post-operatively at [address] [facility]; please call [phone]

## 2021-12-04 LAB
ALBUMIN SERPL ELPH-MCNC: 3.4 G/DL — LOW (ref 3.5–5.2)
ALP SERPL-CCNC: 87 U/L — SIGNIFICANT CHANGE UP (ref 30–115)
ALT FLD-CCNC: 7 U/L — SIGNIFICANT CHANGE UP (ref 0–41)
ANION GAP SERPL CALC-SCNC: 15 MMOL/L — HIGH (ref 7–14)
AST SERPL-CCNC: 14 U/L — SIGNIFICANT CHANGE UP (ref 0–41)
BASOPHILS # BLD AUTO: 0.04 K/UL — SIGNIFICANT CHANGE UP (ref 0–0.2)
BASOPHILS NFR BLD AUTO: 0.5 % — SIGNIFICANT CHANGE UP (ref 0–1)
BILIRUB SERPL-MCNC: <0.2 MG/DL — SIGNIFICANT CHANGE UP (ref 0.2–1.2)
BUN SERPL-MCNC: 21 MG/DL — HIGH (ref 10–20)
CALCIUM SERPL-MCNC: 8.7 MG/DL — SIGNIFICANT CHANGE UP (ref 8.5–10.1)
CHLORIDE SERPL-SCNC: 104 MMOL/L — SIGNIFICANT CHANGE UP (ref 98–110)
CO2 SERPL-SCNC: 19 MMOL/L — SIGNIFICANT CHANGE UP (ref 17–32)
CREAT SERPL-MCNC: 1.2 MG/DL — SIGNIFICANT CHANGE UP (ref 0.7–1.5)
EOSINOPHIL # BLD AUTO: 0.07 K/UL — SIGNIFICANT CHANGE UP (ref 0–0.7)
EOSINOPHIL NFR BLD AUTO: 0.9 % — SIGNIFICANT CHANGE UP (ref 0–8)
GLUCOSE SERPL-MCNC: 77 MG/DL — SIGNIFICANT CHANGE UP (ref 70–99)
HCT VFR BLD CALC: 30.4 % — LOW (ref 37–47)
HGB BLD-MCNC: 9.9 G/DL — LOW (ref 12–16)
IMM GRANULOCYTES NFR BLD AUTO: 0.7 % — HIGH (ref 0.1–0.3)
LYMPHOCYTES # BLD AUTO: 2.33 K/UL — SIGNIFICANT CHANGE UP (ref 1.2–3.4)
LYMPHOCYTES # BLD AUTO: 28.8 % — SIGNIFICANT CHANGE UP (ref 20.5–51.1)
MAGNESIUM SERPL-MCNC: 2.1 MG/DL — SIGNIFICANT CHANGE UP (ref 1.8–2.4)
MCHC RBC-ENTMCNC: 32.6 G/DL — SIGNIFICANT CHANGE UP (ref 32–37)
MCHC RBC-ENTMCNC: 33.8 PG — HIGH (ref 27–31)
MCV RBC AUTO: 103.8 FL — HIGH (ref 81–99)
MONOCYTES # BLD AUTO: 0.83 K/UL — HIGH (ref 0.1–0.6)
MONOCYTES NFR BLD AUTO: 10.2 % — HIGH (ref 1.7–9.3)
NEUTROPHILS # BLD AUTO: 4.77 K/UL — SIGNIFICANT CHANGE UP (ref 1.4–6.5)
NEUTROPHILS NFR BLD AUTO: 58.9 % — SIGNIFICANT CHANGE UP (ref 42.2–75.2)
NRBC # BLD: 0 /100 WBCS — SIGNIFICANT CHANGE UP (ref 0–0)
PHOSPHATE SERPL-MCNC: 3.5 MG/DL — SIGNIFICANT CHANGE UP (ref 2.1–4.9)
PLATELET # BLD AUTO: 250 K/UL — SIGNIFICANT CHANGE UP (ref 130–400)
POTASSIUM SERPL-MCNC: 4 MMOL/L — SIGNIFICANT CHANGE UP (ref 3.5–5)
POTASSIUM SERPL-SCNC: 4 MMOL/L — SIGNIFICANT CHANGE UP (ref 3.5–5)
PROT SERPL-MCNC: 5.3 G/DL — LOW (ref 6–8)
RBC # BLD: 2.93 M/UL — LOW (ref 4.2–5.4)
RBC # FLD: 14.8 % — HIGH (ref 11.5–14.5)
SODIUM SERPL-SCNC: 138 MMOL/L — SIGNIFICANT CHANGE UP (ref 135–146)
WBC # BLD: 8.1 K/UL — SIGNIFICANT CHANGE UP (ref 4.8–10.8)
WBC # FLD AUTO: 8.1 K/UL — SIGNIFICANT CHANGE UP (ref 4.8–10.8)

## 2021-12-04 PROCEDURE — 99233 SBSQ HOSP IP/OBS HIGH 50: CPT

## 2021-12-04 RX ADMIN — Medication 400 UNIT(S): at 13:16

## 2021-12-04 RX ADMIN — Medication 125 MILLIGRAM(S): at 06:22

## 2021-12-04 RX ADMIN — Medication 0.12 MILLIGRAM(S): at 13:19

## 2021-12-04 RX ADMIN — ATENOLOL 50 MILLIGRAM(S): 25 TABLET ORAL at 06:21

## 2021-12-04 RX ADMIN — Medication 650 MILLIGRAM(S): at 01:51

## 2021-12-04 RX ADMIN — CEFEPIME 100 MILLIGRAM(S): 1 INJECTION, POWDER, FOR SOLUTION INTRAMUSCULAR; INTRAVENOUS at 06:24

## 2021-12-04 RX ADMIN — OXYCODONE AND ACETAMINOPHEN 1 TABLET(S): 5; 325 TABLET ORAL at 13:28

## 2021-12-04 RX ADMIN — Medication 60 MILLIGRAM(S): at 06:21

## 2021-12-04 RX ADMIN — ENOXAPARIN SODIUM 70 MILLIGRAM(S): 100 INJECTION SUBCUTANEOUS at 18:56

## 2021-12-04 RX ADMIN — BUDESONIDE AND FORMOTEROL FUMARATE DIHYDRATE 2 PUFF(S): 160; 4.5 AEROSOL RESPIRATORY (INHALATION) at 08:49

## 2021-12-04 RX ADMIN — Medication 10 MILLIGRAM(S): at 13:29

## 2021-12-04 RX ADMIN — Medication 0.6 MILLIGRAM(S): at 13:20

## 2021-12-04 RX ADMIN — VALACYCLOVIR 500 MILLIGRAM(S): 500 TABLET, FILM COATED ORAL at 13:24

## 2021-12-04 RX ADMIN — ATORVASTATIN CALCIUM 80 MILLIGRAM(S): 80 TABLET, FILM COATED ORAL at 21:01

## 2021-12-04 RX ADMIN — CEFEPIME 100 MILLIGRAM(S): 1 INJECTION, POWDER, FOR SOLUTION INTRAMUSCULAR; INTRAVENOUS at 17:42

## 2021-12-04 RX ADMIN — OXYCODONE AND ACETAMINOPHEN 1 TABLET(S): 5; 325 TABLET ORAL at 15:47

## 2021-12-04 RX ADMIN — Medication 20 MILLIGRAM(S): at 13:13

## 2021-12-04 RX ADMIN — Medication 20 MILLIGRAM(S): at 06:21

## 2021-12-04 RX ADMIN — MAGNESIUM OXIDE 400 MG ORAL TABLET 400 MILLIGRAM(S): 241.3 TABLET ORAL at 13:20

## 2021-12-04 RX ADMIN — Medication 650 MILLIGRAM(S): at 01:21

## 2021-12-04 RX ADMIN — ENOXAPARIN SODIUM 70 MILLIGRAM(S): 100 INJECTION SUBCUTANEOUS at 06:22

## 2021-12-04 RX ADMIN — BUPROPION HYDROCHLORIDE 150 MILLIGRAM(S): 150 TABLET, EXTENDED RELEASE ORAL at 13:14

## 2021-12-04 RX ADMIN — PRIMIDONE 50 MILLIGRAM(S): 250 TABLET ORAL at 21:01

## 2021-12-04 RX ADMIN — Medication 20 MILLIGRAM(S): at 21:01

## 2021-12-04 RX ADMIN — TAMSULOSIN HYDROCHLORIDE 0.4 MILLIGRAM(S): 0.4 CAPSULE ORAL at 21:01

## 2021-12-04 RX ADMIN — Medication 125 MILLIGRAM(S): at 17:43

## 2021-12-04 RX ADMIN — Medication 1 MILLIGRAM(S): at 13:13

## 2021-12-04 RX ADMIN — SPIRONOLACTONE 50 MILLIGRAM(S): 25 TABLET, FILM COATED ORAL at 06:20

## 2021-12-04 RX ADMIN — Medication 100 MILLIGRAM(S): at 21:01

## 2021-12-04 RX ADMIN — Medication 10 MILLIGRAM(S): at 06:25

## 2021-12-04 RX ADMIN — Medication 166.67 MILLIGRAM(S): at 18:57

## 2021-12-04 RX ADMIN — Medication 300 MILLIGRAM(S): at 21:01

## 2021-12-04 RX ADMIN — Medication 20 MILLIGRAM(S): at 17:39

## 2021-12-04 NOTE — PROGRESS NOTE ADULT - SUBJECTIVE AND OBJECTIVE BOX
Orthopaedics Progress Note    [facility]  [id number]      Patient seen and examined bedside.  Pain well controlled, Tolerating PO intake. No other complaints. Denies nausea/vomiting, fever/chills, chest pain/SOB.    acetaminophen     Tablet .. 650 milliGRAM(s) Oral every 6 hours PRN  ALBUTerol    90 MICROgram(s) HFA Inhaler 2 Puff(s) Inhalation every 6 hours PRN  allopurinol 300 milliGRAM(s) Oral at bedtime  ATENolol  Tablet 50 milliGRAM(s) Oral daily  atorvastatin 80 milliGRAM(s) Oral at bedtime  budesonide 160 MICROgram(s)/formoterol 4.5 MICROgram(s) Inhaler 2 Puff(s) Inhalation two times a day  buPROPion XL (24-Hour) . 150 milliGRAM(s) Oral daily  cefepime   IVPB 2000 milliGRAM(s) IV Intermittent every 12 hours  cefepime   IVPB      cholecalciferol 400 Unit(s) Oral daily  colchicine 0.6 milliGRAM(s) Oral daily  diazepam    Tablet 10 milliGRAM(s) Oral three times a day PRN  enoxaparin Injectable 70 milliGRAM(s) SubCutaneous every 12 hours  FLUoxetine 20 milliGRAM(s) Oral two times a day  folic acid 1 milliGRAM(s) Oral daily  furosemide    Tablet 20 milliGRAM(s) Oral daily  hyoscyamine SL 0.125 milliGRAM(s) SubLingual daily  magnesium oxide 400 milliGRAM(s) Oral daily  melatonin 5 milliGRAM(s) Oral at bedtime PRN  NIFEdipine XL 60 milliGRAM(s) Oral daily  oxycodone    5 mG/acetaminophen 325 mG 1 Tablet(s) Oral every 8 hours PRN  predniSONE   Tablet 10 milliGRAM(s) Oral daily  primidone 50 milliGRAM(s) Oral at bedtime  sildenafil (REVATIO) 20 milliGRAM(s) Oral three times a day  simethicone 80 milliGRAM(s) Chew daily PRN  spironolactone 50 milliGRAM(s) Oral daily  tamsulosin 0.4 milliGRAM(s) Oral at bedtime  traZODone 100 milliGRAM(s) Oral at bedtime  valACYclovir 500 milliGRAM(s) Oral daily  vancomycin    Solution 125 milliGRAM(s) Oral two times a day  vancomycin  IVPB 1000 milliGRAM(s) IV Intermittent every 24 hours  vancomycin  IVPB          T(C): 36.5 (12-04-21 @ 06:28), Max: 37.1 (12-03-21 @ 13:40)  HR: 65 (12-04-21 @ 06:28) (65 - 77)  BP: 128/67 (12-04-21 @ 06:28) (94/55 - 128/67)  RR: 18 (12-04-21 @ 06:28) (18 - 18)  SpO2: --    Physical Exam  NAD, resting comfortably  Breathing comfortably on RA      LLE  Splint in place, c/d/i  Moving all toes  SILT   Foot wwp    Labs                        9.9    8.10  )-----------( 250      ( 04 Dec 2021 04:30 )             30.4     12-03    134<L>  |  99  |  20  ----------------------------<  106<H>  4.2   |  21  |  1.3    Ca    9.2      03 Dec 2021 04:30  Mg     2.1     12-03    TPro  6.0  /  Alb  3.7  /  TBili  0.3  /  DBili  x   /  AST  13  /  ALT  10  /  AlkPhos  96  12-03    LIVER FUNCTIONS - ( 03 Dec 2021 04:30 )  Alb: 3.7 g/dL / Pro: 6.0 g/dL / ALK PHOS: 96 U/L / ALT: 10 U/L / AST: 13 U/L / GGT: x           Intraoperative cultures still pending    A&P  70yo F w/ recent pmhx of L ankle ORIF, now s/p JESSA yesterday. Doing well post-operatively. No orthopaedic contraindication to discharge.  -NWB LLE  -pain control  - f/u intra-op cultures  -DVT ppx  -ID consult: appreciate recs- obtain ESR/CRP, c/w vanc/cefepime, obtain scheduled vanc troughs  -PT/OT  -upon discharge, please follow up with Dr. Clemons in 2 weeks post-operatively at [address] [facility]; please call [phone]

## 2021-12-04 NOTE — PROGRESS NOTE ADULT - SUBJECTIVE AND OBJECTIVE BOX
BERNARD SPEARS  71y Female    INTERVAL HPI/OVERNIGHT EVENTS:    no overnight events  still with watery diarrhea - chronic  no SOB, chest pain, N/V  h/o lower BP  agrees for PICC line if needed    T(F): 98.2 (12-04-21 @ 12:40), Max: 98.4 (12-03-21 @ 20:30)  HR: 74 (12-04-21 @ 12:40) (65 - 75)  BP: 110/56 (12-04-21 @ 12:40) (109/58 - 128/67)  RR: 18 (12-04-21 @ 12:40) (18 - 18)  SpO2: --  I&O's Summary    03 Dec 2021 07:01  -  04 Dec 2021 07:00  --------------------------------------------------------  IN: 50 mL / OUT: 1300 mL / NET: -1250 mL    04 Dec 2021 07:01  -  04 Dec 2021 14:08  --------------------------------------------------------  IN: 0 mL / OUT: 500 mL / NET: -500 mL      PHYSICAL EXAM:  GENERAL: NAD  HEAD:  Normocephalic  EYES:  conjunctiva and sclera clear  ENMT: Moist mucous membranes  NERVOUS SYSTEM:  Alert, awake, Good concentration  CHEST/LUNG: CTA b/l  HEART: Regular rate and rhythm  ABDOMEN: Soft, Nontender, Nondistended  EXTREMITIES:   No edema  left LE with dressing    Consultant(s) Notes Reviewed:  [x ] YES  [ ] NO  Care Discussed with Consultants/Other Providers [ x] YES  [ ] NO    MEDICATIONS  (STANDING):  allopurinol 300 milliGRAM(s) Oral at bedtime  ATENolol  Tablet 50 milliGRAM(s) Oral daily  atorvastatin 80 milliGRAM(s) Oral at bedtime  budesonide 160 MICROgram(s)/formoterol 4.5 MICROgram(s) Inhaler 2 Puff(s) Inhalation two times a day  buPROPion XL (24-Hour) . 150 milliGRAM(s) Oral daily  cefepime   IVPB 2000 milliGRAM(s) IV Intermittent every 12 hours  cefepime   IVPB      cholecalciferol 400 Unit(s) Oral daily  colchicine 0.6 milliGRAM(s) Oral daily  enoxaparin Injectable 70 milliGRAM(s) SubCutaneous every 12 hours  FLUoxetine 20 milliGRAM(s) Oral two times a day  folic acid 1 milliGRAM(s) Oral daily  furosemide    Tablet 20 milliGRAM(s) Oral daily  hyoscyamine SL 0.125 milliGRAM(s) SubLingual daily  magnesium oxide 400 milliGRAM(s) Oral daily  NIFEdipine XL 60 milliGRAM(s) Oral daily  predniSONE   Tablet 10 milliGRAM(s) Oral daily  primidone 50 milliGRAM(s) Oral at bedtime  sildenafil (REVATIO) 20 milliGRAM(s) Oral three times a day  spironolactone 50 milliGRAM(s) Oral daily  tamsulosin 0.4 milliGRAM(s) Oral at bedtime  traZODone 100 milliGRAM(s) Oral at bedtime  valACYclovir 500 milliGRAM(s) Oral daily  vancomycin    Solution 125 milliGRAM(s) Oral two times a day  vancomycin  IVPB 1000 milliGRAM(s) IV Intermittent every 24 hours  vancomycin  IVPB        MEDICATIONS  (PRN):  acetaminophen     Tablet .. 650 milliGRAM(s) Oral every 6 hours PRN Temp greater or equal to 38C (100.4F), Moderate Pain (4 - 6)  ALBUTerol    90 MICROgram(s) HFA Inhaler 2 Puff(s) Inhalation every 6 hours PRN Shortness of Breath and/or Wheezing  diazepam    Tablet 10 milliGRAM(s) Oral three times a day PRN Anxiety  melatonin 5 milliGRAM(s) Oral at bedtime PRN Insomnia  oxycodone    5 mG/acetaminophen 325 mG 1 Tablet(s) Oral every 8 hours PRN Severe Pain (7 - 10)  simethicone 80 milliGRAM(s) Chew daily PRN Indigestion      LABS:                        9.9    8.10  )-----------( 250      ( 04 Dec 2021 04:30 )             30.4     12-04    138  |  104  |  21<H>  ----------------------------<  77  4.0   |  19  |  1.2    Ca    8.7      04 Dec 2021 04:30  Phos  3.5     12-04  Mg     2.1     12-04    TPro  5.3<L>  /  Alb  3.4<L>  /  TBili  <0.2  /  DBili  x   /  AST  14  /  ALT  7   /  AlkPhos  87  12-04        Culture - Surgical Swab (collected 02 Dec 2021 15:43)  Source: .Surgical Swab None  Preliminary Report (04 Dec 2021 13:22):    No growth    Culture - Surgical Swab (collected 02 Dec 2021 15:43)  Source: .Surgical Swab None  Preliminary Report (04 Dec 2021 13:27):    Numerous Staphylococcus aureus        Case discussed with RN today    Care discussed with pt and partner at bedside

## 2021-12-04 NOTE — PROGRESS NOTE ADULT - ASSESSMENT
70 y/o woman with PMH of COPD/asthma, CKD III, dyslipidemia, Pulm HTN, recent diagnosed PE and on apixaban, chronic back pain due to spinal stenosis, cervical radiculopathy, Bell's palsy and Gout who presented from [facility] on 11/30 for left ankle pain and drainage in setting of a recent ORIF to left ankle on 09/23/21. She was found to have evidence of possible infected hardware, was admitted for hardware removal and is now status post removal of hardware on 12/02.    1. s/p removal of hardware 12/2 for exposed hardware and suspected infection in pt s/p ORIF of left ankle 9/23  - IV abx per ID (vanco and check trough before 4th dose and cefepime)  - PO vanco bid for h/o C. difficile  - Ortho following  - NWB left LE  - f/u OR cultures - numerous Staph aureus in culture so far  - pain control  - continue PT/OT    2. h/o acute PE 10/2021  - seen by pulmonary  - on lovenox therapeutic dose for now  - Can resume Eliquis 5mg BID 1 week post procedure 12/02    3. HTN - baseline SBP in 100-120 range  on atenolol 50mg daily, lasix 20mg daily, spironolactone and nifedipine XL 60mg daily    4. Pulm HTN - on sildenafil, diuretics    5. Asthma/COPD - stable - O2 prn    6. pt on prednisone 10mg daily - ? reason - not clear in chart review    continue current management for gout, CKD 3 - stable      PROGRESS NOTE HANDOFF    Pending: f/u OR cultures, vanco trough before 4th dose on 12/5, continue PT/OT    pt aware of plan of care    Disposition: [facility]

## 2021-12-05 LAB
-  AMOXICILLIN/CLAVULANIC ACID: SIGNIFICANT CHANGE UP
-  AMPICILLIN/SULBACTAM: SIGNIFICANT CHANGE UP
-  AMPICILLIN: SIGNIFICANT CHANGE UP
-  CEFAZOLIN: SIGNIFICANT CHANGE UP
-  CEFTRIAXONE: SIGNIFICANT CHANGE UP
-  CIPROFLOXACIN: SIGNIFICANT CHANGE UP
-  CLINDAMYCIN: SIGNIFICANT CHANGE UP
-  DAPTOMYCIN: SIGNIFICANT CHANGE UP
-  ERYTHROMYCIN: SIGNIFICANT CHANGE UP
-  GENTAMICIN: SIGNIFICANT CHANGE UP
-  LEVOFLOXACIN: SIGNIFICANT CHANGE UP
-  LINEZOLID: SIGNIFICANT CHANGE UP
-  MEROPENEM: SIGNIFICANT CHANGE UP
-  MOXIFLOXACIN(AEROBIC): SIGNIFICANT CHANGE UP
-  OXACILLIN: SIGNIFICANT CHANGE UP
-  PENICILLIN: SIGNIFICANT CHANGE UP
-  RIFAMPIN: SIGNIFICANT CHANGE UP
-  TETRACYCLINE: SIGNIFICANT CHANGE UP
-  TRIMETHOPRIM/SULFAMETHOXAZOLE: SIGNIFICANT CHANGE UP
-  VANCOMYCIN: SIGNIFICANT CHANGE UP
ANION GAP SERPL CALC-SCNC: 13 MMOL/L — SIGNIFICANT CHANGE UP (ref 7–14)
BUN SERPL-MCNC: 25 MG/DL — HIGH (ref 10–20)
CALCIUM SERPL-MCNC: 8.7 MG/DL — SIGNIFICANT CHANGE UP (ref 8.5–10.1)
CHLORIDE SERPL-SCNC: 103 MMOL/L — SIGNIFICANT CHANGE UP (ref 98–110)
CO2 SERPL-SCNC: 20 MMOL/L — SIGNIFICANT CHANGE UP (ref 17–32)
CREAT SERPL-MCNC: 1.3 MG/DL — SIGNIFICANT CHANGE UP (ref 0.7–1.5)
GLUCOSE SERPL-MCNC: 90 MG/DL — SIGNIFICANT CHANGE UP (ref 70–99)
METHOD TYPE: SIGNIFICANT CHANGE UP
POTASSIUM SERPL-MCNC: 3.3 MMOL/L — LOW (ref 3.5–5)
POTASSIUM SERPL-SCNC: 3.3 MMOL/L — LOW (ref 3.5–5)
SODIUM SERPL-SCNC: 136 MMOL/L — SIGNIFICANT CHANGE UP (ref 135–146)
VANCOMYCIN TROUGH SERPL-MCNC: 11.4 UG/ML — HIGH (ref 5–10)

## 2021-12-05 PROCEDURE — 99233 SBSQ HOSP IP/OBS HIGH 50: CPT

## 2021-12-05 RX ORDER — POTASSIUM CHLORIDE 20 MEQ
40 PACKET (EA) ORAL ONCE
Refills: 0 | Status: COMPLETED | OUTPATIENT
Start: 2021-12-05 | End: 2021-12-05

## 2021-12-05 RX ADMIN — Medication 300 MILLIGRAM(S): at 21:32

## 2021-12-05 RX ADMIN — TAMSULOSIN HYDROCHLORIDE 0.4 MILLIGRAM(S): 0.4 CAPSULE ORAL at 21:32

## 2021-12-05 RX ADMIN — Medication 166.67 MILLIGRAM(S): at 17:32

## 2021-12-05 RX ADMIN — Medication 400 UNIT(S): at 12:00

## 2021-12-05 RX ADMIN — OXYCODONE AND ACETAMINOPHEN 1 TABLET(S): 5; 325 TABLET ORAL at 15:03

## 2021-12-05 RX ADMIN — Medication 40 MILLIEQUIVALENT(S): at 15:04

## 2021-12-05 RX ADMIN — Medication 20 MILLIGRAM(S): at 21:32

## 2021-12-05 RX ADMIN — VALACYCLOVIR 500 MILLIGRAM(S): 500 TABLET, FILM COATED ORAL at 15:00

## 2021-12-05 RX ADMIN — Medication 125 MILLIGRAM(S): at 17:32

## 2021-12-05 RX ADMIN — ATORVASTATIN CALCIUM 80 MILLIGRAM(S): 80 TABLET, FILM COATED ORAL at 21:31

## 2021-12-05 RX ADMIN — Medication 125 MILLIGRAM(S): at 05:03

## 2021-12-05 RX ADMIN — Medication 10 MILLIGRAM(S): at 15:02

## 2021-12-05 RX ADMIN — SPIRONOLACTONE 50 MILLIGRAM(S): 25 TABLET, FILM COATED ORAL at 06:15

## 2021-12-05 RX ADMIN — Medication 60 MILLIGRAM(S): at 06:16

## 2021-12-05 RX ADMIN — MAGNESIUM OXIDE 400 MG ORAL TABLET 400 MILLIGRAM(S): 241.3 TABLET ORAL at 11:59

## 2021-12-05 RX ADMIN — Medication 20 MILLIGRAM(S): at 15:01

## 2021-12-05 RX ADMIN — Medication 10 MILLIGRAM(S): at 05:04

## 2021-12-05 RX ADMIN — PRIMIDONE 50 MILLIGRAM(S): 250 TABLET ORAL at 21:32

## 2021-12-05 RX ADMIN — CEFEPIME 100 MILLIGRAM(S): 1 INJECTION, POWDER, FOR SOLUTION INTRAMUSCULAR; INTRAVENOUS at 05:04

## 2021-12-05 RX ADMIN — Medication 0.12 MILLIGRAM(S): at 12:00

## 2021-12-05 RX ADMIN — Medication 1 MILLIGRAM(S): at 12:00

## 2021-12-05 RX ADMIN — ENOXAPARIN SODIUM 70 MILLIGRAM(S): 100 INJECTION SUBCUTANEOUS at 06:17

## 2021-12-05 RX ADMIN — OXYCODONE AND ACETAMINOPHEN 1 TABLET(S): 5; 325 TABLET ORAL at 11:03

## 2021-12-05 RX ADMIN — Medication 0.6 MILLIGRAM(S): at 12:00

## 2021-12-05 RX ADMIN — BUPROPION HYDROCHLORIDE 150 MILLIGRAM(S): 150 TABLET, EXTENDED RELEASE ORAL at 12:00

## 2021-12-05 RX ADMIN — Medication 5 MILLIGRAM(S): at 21:34

## 2021-12-05 RX ADMIN — Medication 20 MILLIGRAM(S): at 05:04

## 2021-12-05 RX ADMIN — Medication 100 MILLIGRAM(S): at 21:31

## 2021-12-05 RX ADMIN — Medication 20 MILLIGRAM(S): at 06:16

## 2021-12-05 RX ADMIN — BUDESONIDE AND FORMOTEROL FUMARATE DIHYDRATE 2 PUFF(S): 160; 4.5 AEROSOL RESPIRATORY (INHALATION) at 08:00

## 2021-12-05 NOTE — PROGRESS NOTE ADULT - SUBJECTIVE AND OBJECTIVE BOX
Orthopaedics Progress Note    [facility]  [id number]      Patient seen and examined bedside.  Pain well controlled, Tolerating PO intake. No other complaints. Denies nausea/vomiting, fever/chills, chest pain/SOB.    PE:  GEN: NAD, alert, awake  NL breathing on RA    LLE  Splint in place, c/d/i  Moving all toes  SILT   Foot wwp    Intraoperative cultures: Cx 1 - S. aureus, Cx 2 - NGTD    A&P  72yo F w/ recent pmhx of L ankle ORIF, now s/p JESSA 12/2/21. Doing well post-operatively. No orthopaedic contraindication to discharge.    -NWB LLE  -pain control  - f/u intra-op cultures  -DVT ppx  -ID consult: appreciate recs- obtain ESR/CRP, c/w vanc/cefepime, obtain scheduled vanc troughs; would appreciate updated recs on duration and selection given prelim culture results  -PT/OT  -upon discharge, please follow up with Dr. Clemons in 2 weeks post-operatively at [facility]; please call [phone]

## 2021-12-05 NOTE — PROGRESS NOTE ADULT - ASSESSMENT
ASSESSMENT      Patient is a 70 y/o female with a PMH of COPD, Asthma, Pulmonary HTN - on Home O2 intermittently as needed, chronic back pain, cervical radiculopathy, CKD 3, DLD, HTN, Gout, and recent pshx of L ankle ORIF on 9/23/2021 for L ankle fracture initially presented to the ED from the nursing home due to with persistent drainage from lateral ankle incision and Left ankle pain with the inability to ambulate. Patient described the pain as  persistent, and she endorsed swelling, and drainage. The patient denies any h/o fever, chills, cough, chest pain, shortness of breath. She was seen by Dr. Clemons in the office where she did xrays and examined the ankle. Pt reports she was told by Dr. Clemons to present to ED for evaluation. Patient also states that she recently had watery diarrhea in the nursing home and was being treated for C.diff in the Nursing home. Patient states that she has been having episodes of diarrhea for several weeks prior to presenting to the ED. Patient had HWR procedure last night 12/02. She denies any other symptoms at this time.     In the ED patient was hemodynamically stable, Labs were unremarkable.     IMPRESSION  #ORIF with exposed hardware s/p hardware removal, no systemic signs of sepsis    OR cx staph aureus   s/p Removal, hardware, deep 02-Dec-2021 16:36:59  Ghazala Clemons  Irrigation, wound, lower extremity 02-Dec-2021 16:37:47 I&D lateral ankle wound Ghazala Clemons.  "small wound dehiscence over prominent hardware"  #Sepsis ruled out on admission   #Cdiff, recently completed, second episode  #Obesity     RECOMMEND  - f/u staph aureus sensitivities   - Continue Vanc  - Please check vanc trough 30 min prior to 4th dose this PM , goal 15-20  - D/C Cefepime  - PO vancomycin to 125 mg BID, recent Cdiff   - Will need IV antibiotic on D/C, via PICC, (If MRSA- Vanc, If MSSA- cefazolin, would continue PO vancomycin 125 mg daily as ppx while on Abx as recent CDI)    If any questions, please call or send a message on [employer] Teams  Please continue to update ID with any pertinent new laboratory or radiographic findings  Spectra [id number]

## 2021-12-05 NOTE — PROGRESS NOTE ADULT - SUBJECTIVE AND OBJECTIVE BOX
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    INTERVAL HPI/OVERNIGHT EVENTS:    some pain of left ankle - encouraged pt to use pain med as needed  no fever, N/V, SOB, chest pain  chronic diarrhea    T(F): 96.5 (12-05-21 @ 12:27), Max: 98.3 (12-04-21 @ 20:29)  HR: 80 (12-05-21 @ 12:27) (68 - 80)  BP: 103/58 (12-05-21 @ 12:27) (102/57 - 116/59)  RR: 18 (12-05-21 @ 12:27) (18 - 18)  SpO2: --  I&O's Summary    04 Dec 2021 07:01  -  05 Dec 2021 07:00  --------------------------------------------------------  IN: 0 mL / OUT: 700 mL / NET: -700 mL      PHYSICAL EXAM:  GENERAL: NAD  HEAD:  Normocephalic  EYES:  conjunctiva and sclera clear  ENMT: Moist mucous membranes  NERVOUS SYSTEM:  Alert, awake, Good concentration  CHEST/LUNG: CTA b/l  HEART: Regular rate and rhythm  ABDOMEN: Soft, Nontender, Nondistended  EXTREMITIES:  left ankle with dressing  no edema of right LE  SKIN: as above    Consultant(s) Notes Reviewed:  [x ] YES  [ ] NO  Care Discussed with Consultants/Other Providers [ x] YES  [ ] NO    MEDICATIONS  (STANDING):  allopurinol 300 milliGRAM(s) Oral at bedtime  ATENolol  Tablet 50 milliGRAM(s) Oral daily  atorvastatin 80 milliGRAM(s) Oral at bedtime  budesonide 160 MICROgram(s)/formoterol 4.5 MICROgram(s) Inhaler 2 Puff(s) Inhalation two times a day  buPROPion XL (24-Hour) . 150 milliGRAM(s) Oral daily  cefepime   IVPB 2000 milliGRAM(s) IV Intermittent every 12 hours  cefepime   IVPB      cholecalciferol 400 Unit(s) Oral daily  colchicine 0.6 milliGRAM(s) Oral daily  enoxaparin Injectable 70 milliGRAM(s) SubCutaneous every 12 hours  FLUoxetine 20 milliGRAM(s) Oral two times a day  folic acid 1 milliGRAM(s) Oral daily  furosemide    Tablet 20 milliGRAM(s) Oral daily  hyoscyamine SL 0.125 milliGRAM(s) SubLingual daily  magnesium oxide 400 milliGRAM(s) Oral daily  NIFEdipine XL 60 milliGRAM(s) Oral daily  potassium chloride   Powder 40 milliEquivalent(s) Oral once  predniSONE   Tablet 10 milliGRAM(s) Oral daily  primidone 50 milliGRAM(s) Oral at bedtime  sildenafil (REVATIO) 20 milliGRAM(s) Oral three times a day  spironolactone 50 milliGRAM(s) Oral daily  tamsulosin 0.4 milliGRAM(s) Oral at bedtime  traZODone 100 milliGRAM(s) Oral at bedtime  valACYclovir 500 milliGRAM(s) Oral daily  vancomycin    Solution 125 milliGRAM(s) Oral two times a day  vancomycin  IVPB      vancomycin  IVPB 1000 milliGRAM(s) IV Intermittent every 24 hours    MEDICATIONS  (PRN):  acetaminophen     Tablet .. 650 milliGRAM(s) Oral every 6 hours PRN Temp greater or equal to 38C (100.4F), Moderate Pain (4 - 6)  ALBUTerol    90 MICROgram(s) HFA Inhaler 2 Puff(s) Inhalation every 6 hours PRN Shortness of Breath and/or Wheezing  diazepam    Tablet 10 milliGRAM(s) Oral three times a day PRN Anxiety  melatonin 5 milliGRAM(s) Oral at bedtime PRN Insomnia  oxycodone    5 mG/acetaminophen 325 mG 1 Tablet(s) Oral every 8 hours PRN Severe Pain (7 - 10)  simethicone 80 milliGRAM(s) Chew daily PRN Indigestion      LABS:                        9.9    8.10  )-----------( 250      ( 04 Dec 2021 04:30 )             30.4     12-05    136  |  103  |  25<H>  ----------------------------<  90  3.3<L>   |  20  |  1.3    Ca    8.7      05 Dec 2021 04:30  Phos  3.5     12-04  Mg     2.1     12-04    TPro  5.3<L>  /  Alb  3.4<L>  /  TBili  <0.2  /  DBili  x   /  AST  14  /  ALT  7   /  AlkPhos  87  12-04        Culture - Surgical Swab (collected 02 Dec 2021 15:43)  Source: .Surgical Swab None  Preliminary Report (04 Dec 2021 13:22):    No growth    Culture - Surgical Swab (collected 02 Dec 2021 15:43)  Source: .Surgical Swab None  Preliminary Report (05 Dec 2021 13:44):    Numerous Methicillin Resistant Staphylococcus aureus  Organism: Methicillin resistant Staphylococcus aureus (05 Dec 2021 13:42)  Organism: Methicillin resistant Staphylococcus aureus (05 Dec 2021 13:42)          Case discussed with RN today    Care discussed with pt

## 2021-12-05 NOTE — PROGRESS NOTE ADULT - ASSESSMENT
70 y/o woman with PMH of COPD/asthma, CKD III, dyslipidemia, Pulm HTN, recent diagnosed PE and on apixaban, chronic back pain due to spinal stenosis, cervical radiculopathy, Bell's palsy and Gout who presented from [facility] on 11/30 for left ankle pain and drainage in setting of a recent ORIF to left ankle on 09/23/21. She was found to have evidence of possible infected hardware, was admitted for hardware removal and is now status post removal of hardware on 12/02.    1. s/p removal of hardware 12/2 for exposed hardware and infection  s/p ORIF of left ankle 9/23  - case discussed with ID today  - OR culture with MRSA  - continue IV Vanco and check trough today (scheduled for 4PM)  - d/c cefepime  - IR consult for PICC line  - will need extended course of abx  - PO vanco bid for h/o C. difficile  - Ortho following  - NWB left LE  - pain control  - continue PT/OT    2. h/o acute PE 10/2021  - seen by pulmonary  - on lovenox therapeutic dose for now  - Can resume Eliquis 5mg BID 1 week post procedure 12/02    3. HTN - baseline SBP in 100-120 range  on atenolol 50mg daily, lasix 20mg daily, spironolactone and nifedipine XL 60mg daily    4. Pulm HTN - on sildenafil, diuretics    5. Asthma/COPD - stable - O2 prn    6. pt on prednisone 10mg daily - ? reason - not clear in chart review  pt unsure if for COPD or gout    7. Hypokalemia - repleted PO and pt had banana at bedside    continue current management for gout, CKD 3 - stable      PROGRESS NOTE HANDOFF    Pending: vanco trough 12/5, continue PT/OT, IR consult for PICC line, labs in AM    pt aware of plan of care    Disposition: [facility]

## 2021-12-05 NOTE — PROGRESS NOTE ADULT - SUBJECTIVE AND OBJECTIVE BOX
BERNARD SPEARS  71y, Female  Allergy: adhesives (Pruritus; Rash)  No Known Drug Allergies      LOS  5d    CHIEF COMPLAINT: Left ankle pain (05 Dec 2021 07:07)      INTERVAL EVENTS/HPI  - No acute events overnight  - T(F): , Max: 98.3 (12-04-21 @ 20:29)  - Denies any worsening symptoms  - Tolerating medication  - WBC Count: 8.10 (12-04-21 @ 04:30)  WBC Count: 10.59 (12-03-21 @ 04:30)     - Creatinine, Serum: 1.3 (12-05-21 @ 04:30)  Creatinine, Serum: 1.2 (12-04-21 @ 04:30)       ROS  General: Denies rigors, nightsweats  HEENT: Denies headache, rhinorrhea, sore throat, eye pain  CV: Denies CP, palpitations  PULM: Denies wheezing, hemoptysis  GI: Denies hematemesis, hematochezia, melena  : Denies discharge, hematuria  MSK: Denies arthralgias, myalgias  SKIN: Denies rash, lesions  NEURO: Denies paresthesias, weakness  PSYCH: Denies depression, anxiety    VITALS:  T(F): 96.5, Max: 98.3 (12-04-21 @ 20:29)  HR: 80  BP: 103/58  RR: 18Vital Signs Last 24 Hrs  T(C): 35.8 (05 Dec 2021 12:27), Max: 36.8 (04 Dec 2021 20:29)  T(F): 96.5 (05 Dec 2021 12:27), Max: 98.3 (04 Dec 2021 20:29)  HR: 80 (05 Dec 2021 12:27) (68 - 80)  BP: 103/58 (05 Dec 2021 12:27) (102/57 - 116/59)  BP(mean): --  RR: 18 (05 Dec 2021 12:27) (18 - 18)  SpO2: --    PHYSICAL EXAM:  Gen: NAD, resting in bed  HEENT: Normocephalic, atraumatic  Neck: supple, no lymphadenopathy  CV: Regular rate & regular rhythm  Lungs: decreased BS at bases, no fremitus  Abdomen: Soft, BS present  Ext: Warm, well perfused LE dressings   Neuro: non focal, awake  Skin: no rash, no erythema  Lines: no phlebitis    FH: Non-contributory  Social Hx: Non-contributory    TESTS & MEASUREMENTS:                        9.9    8.10  )-----------( 250      ( 04 Dec 2021 04:30 )             30.4     12-05    136  |  103  |  25<H>  ----------------------------<  90  3.3<L>   |  20  |  1.3    Ca    8.7      05 Dec 2021 04:30  Phos  3.5     12-04  Mg     2.1     12-04    TPro  5.3<L>  /  Alb  3.4<L>  /  TBili  <0.2  /  DBili  x   /  AST  14  /  ALT  7   /  AlkPhos  87  12-04    eGFR if Non African American: 41 mL/min/1.73M2 (12-05-21 @ 04:30)  eGFR if African American: 47 mL/min/1.73M2 (12-05-21 @ 04:30)    LIVER FUNCTIONS - ( 04 Dec 2021 04:30 )  Alb: 3.4 g/dL / Pro: 5.3 g/dL / ALK PHOS: 87 U/L / ALT: 7 U/L / AST: 14 U/L / GGT: x               Culture - Surgical Swab (collected 12-02-21 @ 15:43)  Source: .Surgical Swab None  Preliminary Report (12-04-21 @ 13:22):    No growth    Culture - Surgical Swab (collected 12-02-21 @ 15:43)  Source: .Surgical Swab None  Preliminary Report (12-04-21 @ 13:27):    Numerous Staphylococcus aureus            INFECTIOUS DISEASES TESTING  COVID-19 PCR: NotDetec (11-30-21 @ 14:22)  COVID-19 PCR: NotDetec (10-13-21 @ 18:00)  COVID-19 PCR: NotDetec (10-07-21 @ 18:29)  COVID-19 PCR: NotDetec (09-27-21 @ 16:35)  COVID-19 PCR: NotDetec (09-24-21 @ 15:30)  Hepatitis C Virus Interpretation Result: Nonreact (09-22-21 @ 08:00)  COVID-19 PCR: NotDetec (09-21-21 @ 15:27)      INFLAMMATORY MARKERS  Sedimentation Rate, Erythrocyte: 35 mm/Hr (12-01-21 @ 06:32)  C-Reactive Protein, Serum: 4 mg/L (12-01-21 @ 06:32)      RADIOLOGY & ADDITIONAL TESTS:  I have personally reviewed the last available Chest xray  CXR      CT      CARDIOLOGY TESTING  12 Lead ECG:   Ventricular Rate 76 BPM    Atrial Rate 76 BPM    P-R Interval 142 ms    QRS Duration 82 ms    Q-T Interval 398 ms    QTC Calculation(Bazett) 447 ms    P Axis 45 degrees    R Axis 5 degrees    T Axis 28 degrees    Diagnosis Line Normal sinus rhythm  Normal ECG    Confirmed by Amari Whitfield ([id number]) on 12/1/2021 1:35:37 PM (11-30-21 @ 19:41)  12 Lead ECG:   Ventricular Rate 70 BPM    Atrial Rate 70 BPM    P-R Interval 144 ms    QRS Duration 88 ms    Q-T Interval 402 ms    QTC Calculation(Bazett) 434 ms    P Axis 48 degrees    R Axis 38 degrees    T Axis 43 degrees    Diagnosis Line Sinus rhythm with Premature atrial complexes  Otherwise normal ECG    Confirmed by TIMMY CROWDER MD [id number]) on 11/30/2021 3:42:12 PM (11-30-21 @ 13:45)      MEDICATIONS  allopurinol 300 Oral at bedtime  ATENolol  Tablet 50 Oral daily  atorvastatin 80 Oral at bedtime  budesonide 160 MICROgram(s)/formoterol 4.5 MICROgram(s) Inhaler 2 Inhalation two times a day  buPROPion XL (24-Hour) . 150 Oral daily  cefepime   IVPB 2000 IV Intermittent every 12 hours  cefepime   IVPB     cholecalciferol 400 Oral daily  colchicine 0.6 Oral daily  enoxaparin Injectable 70 SubCutaneous every 12 hours  FLUoxetine 20 Oral two times a day  folic acid 1 Oral daily  furosemide    Tablet 20 Oral daily  hyoscyamine SL 0.125 SubLingual daily  magnesium oxide 400 Oral daily  NIFEdipine XL 60 Oral daily  potassium chloride   Powder 40 Oral once  predniSONE   Tablet 10 Oral daily  primidone 50 Oral at bedtime  sildenafil (REVATIO) 20 Oral three times a day  spironolactone 50 Oral daily  tamsulosin 0.4 Oral at bedtime  traZODone 100 Oral at bedtime  valACYclovir 500 Oral daily  vancomycin    Solution 125 Oral two times a day  vancomycin  IVPB     vancomycin  IVPB 1000 IV Intermittent every 24 hours      WEIGHT  Weight (kg): 74.8 (12-02-21 @ 14:58)  Creatinine, Serum: 1.3 mg/dL (12-05-21 @ 04:30)      ANTIBIOTICS:  cefepime   IVPB 2000 milliGRAM(s) IV Intermittent every 12 hours  cefepime   IVPB      valACYclovir 500 milliGRAM(s) Oral daily  vancomycin    Solution 125 milliGRAM(s) Oral two times a day  vancomycin  IVPB      vancomycin  IVPB 1000 milliGRAM(s) IV Intermittent every 24 hours      All available historical records have been reviewed

## 2021-12-06 LAB
ANION GAP SERPL CALC-SCNC: 14 MMOL/L — SIGNIFICANT CHANGE UP (ref 7–14)
APTT BLD: 32.7 SEC — SIGNIFICANT CHANGE UP (ref 27–39.2)
BUN SERPL-MCNC: 20 MG/DL — SIGNIFICANT CHANGE UP (ref 10–20)
CALCIUM SERPL-MCNC: 9.1 MG/DL — SIGNIFICANT CHANGE UP (ref 8.5–10.1)
CHLORIDE SERPL-SCNC: 105 MMOL/L — SIGNIFICANT CHANGE UP (ref 98–110)
CO2 SERPL-SCNC: 19 MMOL/L — SIGNIFICANT CHANGE UP (ref 17–32)
CREAT SERPL-MCNC: 1.2 MG/DL — SIGNIFICANT CHANGE UP (ref 0.7–1.5)
GLUCOSE SERPL-MCNC: 88 MG/DL — SIGNIFICANT CHANGE UP (ref 70–99)
HCT VFR BLD CALC: 31.4 % — LOW (ref 37–47)
HGB BLD-MCNC: 10 G/DL — LOW (ref 12–16)
INR BLD: 1.07 RATIO — SIGNIFICANT CHANGE UP (ref 0.65–1.3)
MCHC RBC-ENTMCNC: 31.8 G/DL — LOW (ref 32–37)
MCHC RBC-ENTMCNC: 34 PG — HIGH (ref 27–31)
MCV RBC AUTO: 106.8 FL — HIGH (ref 81–99)
NRBC # BLD: 0 /100 WBCS — SIGNIFICANT CHANGE UP (ref 0–0)
PLATELET # BLD AUTO: 232 K/UL — SIGNIFICANT CHANGE UP (ref 130–400)
POTASSIUM SERPL-MCNC: 4.5 MMOL/L — SIGNIFICANT CHANGE UP (ref 3.5–5)
POTASSIUM SERPL-SCNC: 4.5 MMOL/L — SIGNIFICANT CHANGE UP (ref 3.5–5)
PROTHROM AB SERPL-ACNC: 12.3 SEC — SIGNIFICANT CHANGE UP (ref 9.95–12.87)
RBC # BLD: 2.94 M/UL — LOW (ref 4.2–5.4)
RBC # FLD: 15.2 % — HIGH (ref 11.5–14.5)
SODIUM SERPL-SCNC: 138 MMOL/L — SIGNIFICANT CHANGE UP (ref 135–146)
WBC # BLD: 9.07 K/UL — SIGNIFICANT CHANGE UP (ref 4.8–10.8)
WBC # FLD AUTO: 9.07 K/UL — SIGNIFICANT CHANGE UP (ref 4.8–10.8)

## 2021-12-06 PROCEDURE — 99233 SBSQ HOSP IP/OBS HIGH 50: CPT

## 2021-12-06 PROCEDURE — 36573 INSJ PICC RS&I 5 YR+: CPT

## 2021-12-06 RX ORDER — VANCOMYCIN HCL 1 G
1250 VIAL (EA) INTRAVENOUS EVERY 24 HOURS
Refills: 0 | Status: DISCONTINUED | OUTPATIENT
Start: 2021-12-06 | End: 2021-12-09

## 2021-12-06 RX ADMIN — BUPROPION HYDROCHLORIDE 150 MILLIGRAM(S): 150 TABLET, EXTENDED RELEASE ORAL at 15:50

## 2021-12-06 RX ADMIN — ATENOLOL 50 MILLIGRAM(S): 25 TABLET ORAL at 06:12

## 2021-12-06 RX ADMIN — Medication 1 MILLIGRAM(S): at 15:51

## 2021-12-06 RX ADMIN — VALACYCLOVIR 500 MILLIGRAM(S): 500 TABLET, FILM COATED ORAL at 15:53

## 2021-12-06 RX ADMIN — Medication 100 MILLIGRAM(S): at 21:43

## 2021-12-06 RX ADMIN — BUDESONIDE AND FORMOTEROL FUMARATE DIHYDRATE 2 PUFF(S): 160; 4.5 AEROSOL RESPIRATORY (INHALATION) at 07:51

## 2021-12-06 RX ADMIN — MAGNESIUM OXIDE 400 MG ORAL TABLET 400 MILLIGRAM(S): 241.3 TABLET ORAL at 15:51

## 2021-12-06 RX ADMIN — Medication 0.12 MILLIGRAM(S): at 15:52

## 2021-12-06 RX ADMIN — Medication 5 MILLIGRAM(S): at 21:50

## 2021-12-06 RX ADMIN — Medication 300 MILLIGRAM(S): at 21:42

## 2021-12-06 RX ADMIN — Medication 125 MILLIGRAM(S): at 06:29

## 2021-12-06 RX ADMIN — Medication 20 MILLIGRAM(S): at 06:13

## 2021-12-06 RX ADMIN — ENOXAPARIN SODIUM 70 MILLIGRAM(S): 100 INJECTION SUBCUTANEOUS at 06:13

## 2021-12-06 RX ADMIN — PRIMIDONE 50 MILLIGRAM(S): 250 TABLET ORAL at 21:42

## 2021-12-06 RX ADMIN — TAMSULOSIN HYDROCHLORIDE 0.4 MILLIGRAM(S): 0.4 CAPSULE ORAL at 21:43

## 2021-12-06 RX ADMIN — OXYCODONE AND ACETAMINOPHEN 1 TABLET(S): 5; 325 TABLET ORAL at 15:50

## 2021-12-06 RX ADMIN — OXYCODONE AND ACETAMINOPHEN 1 TABLET(S): 5; 325 TABLET ORAL at 16:26

## 2021-12-06 RX ADMIN — ATORVASTATIN CALCIUM 80 MILLIGRAM(S): 80 TABLET, FILM COATED ORAL at 21:42

## 2021-12-06 RX ADMIN — Medication 60 MILLIGRAM(S): at 06:13

## 2021-12-06 RX ADMIN — Medication 10 MILLIGRAM(S): at 06:14

## 2021-12-06 RX ADMIN — ENOXAPARIN SODIUM 70 MILLIGRAM(S): 100 INJECTION SUBCUTANEOUS at 17:52

## 2021-12-06 RX ADMIN — Medication 166.67 MILLIGRAM(S): at 17:52

## 2021-12-06 RX ADMIN — SPIRONOLACTONE 50 MILLIGRAM(S): 25 TABLET, FILM COATED ORAL at 06:13

## 2021-12-06 RX ADMIN — Medication 20 MILLIGRAM(S): at 15:53

## 2021-12-06 RX ADMIN — Medication 20 MILLIGRAM(S): at 17:52

## 2021-12-06 RX ADMIN — Medication 125 MILLIGRAM(S): at 17:52

## 2021-12-06 RX ADMIN — BUDESONIDE AND FORMOTEROL FUMARATE DIHYDRATE 2 PUFF(S): 160; 4.5 AEROSOL RESPIRATORY (INHALATION) at 20:05

## 2021-12-06 RX ADMIN — Medication 10 MILLIGRAM(S): at 11:43

## 2021-12-06 RX ADMIN — Medication 400 UNIT(S): at 15:53

## 2021-12-06 RX ADMIN — Medication 0.6 MILLIGRAM(S): at 15:51

## 2021-12-06 RX ADMIN — Medication 20 MILLIGRAM(S): at 06:12

## 2021-12-06 NOTE — PROGRESS NOTE ADULT - ATTENDING COMMENTS
agree with above
Pt seen and examined independently today and case discussed on rounds today  She is participating in PT and OT  + pain of left ankle after therapy  + chronic loose stools  denies fever, N/V, SOB, chest pain  wants to go back to SNF for therapy   plan of care discussed with her in detail    PE VS reviewed  general - NAD  chest - CTA b/l  CVS - RRR  abdomen - soft, NT, ND  extremities - left LE dressing  neuro - awake, alert    labs 12/3 reviewed    < from: [facility] Duplex Lower Ext Vein Scan, Bilat (12.02.21 @ 08:46) >    IMPRESSION:  No evidence of deep venous thrombosis in either lower extremity.    < end of copied text >    OR cultures pending      s/p removal of hardware 12/2 for exposed hardware in pt s/p ORIF of left ankle 9/23  - IV abx per ID (vanco and check trough before 4th dose and cefepime)  - PO vanco bid for h/o C. difficile  - Ortho f/u  - NWB left LE  - f/u OR cultures  - pain control  - continue PT/OT    h/o acute PE 10/2021  - seen by pulmonary  - on lovenox therapeutic dose for now    HTN - SBP in the  range - per chart review - -120  on atenolol 50mg daily, lasix 20mg daily, spironolactone and nifedipine XL 60mg daily  pt asymptomatic  monitor closely    Pulm HTN - on sildenafil, diuretics    Asthma/COPD - stable - O2 prn    continue current management for gout, CKD 3 - stable      PROGRESS NOTE HANDOFF    Pending: f/u OR cultures, vanco trough before 4th dose, continue PT/OT    pt aware of plan of care    Disposition: [facility]

## 2021-12-06 NOTE — PROGRESS NOTE ADULT - SUBJECTIVE AND OBJECTIVE BOX
Orthopaedics Progress Note    [facility]  [id number]    Patient is a 71y year old Female s/p left ankle JESSA  Patient seen and examined bedside  Pain well controlled  No other complaints    acetaminophen     Tablet .. 650 milliGRAM(s) Oral every 6 hours PRN  ALBUTerol    90 MICROgram(s) HFA Inhaler 2 Puff(s) Inhalation every 6 hours PRN  allopurinol 300 milliGRAM(s) Oral at bedtime  ATENolol  Tablet 50 milliGRAM(s) Oral daily  atorvastatin 80 milliGRAM(s) Oral at bedtime  budesonide 160 MICROgram(s)/formoterol 4.5 MICROgram(s) Inhaler 2 Puff(s) Inhalation two times a day  buPROPion XL (24-Hour) . 150 milliGRAM(s) Oral daily  cholecalciferol 400 Unit(s) Oral daily  colchicine 0.6 milliGRAM(s) Oral daily  diazepam    Tablet 10 milliGRAM(s) Oral three times a day PRN  enoxaparin Injectable 70 milliGRAM(s) SubCutaneous every 12 hours  FLUoxetine 20 milliGRAM(s) Oral two times a day  folic acid 1 milliGRAM(s) Oral daily  furosemide    Tablet 20 milliGRAM(s) Oral daily  hyoscyamine SL 0.125 milliGRAM(s) SubLingual daily  magnesium oxide 400 milliGRAM(s) Oral daily  melatonin 5 milliGRAM(s) Oral at bedtime PRN  NIFEdipine XL 60 milliGRAM(s) Oral daily  oxycodone    5 mG/acetaminophen 325 mG 1 Tablet(s) Oral every 8 hours PRN  predniSONE   Tablet 10 milliGRAM(s) Oral daily  primidone 50 milliGRAM(s) Oral at bedtime  sildenafil (REVATIO) 20 milliGRAM(s) Oral three times a day  simethicone 80 milliGRAM(s) Chew daily PRN  spironolactone 50 milliGRAM(s) Oral daily  tamsulosin 0.4 milliGRAM(s) Oral at bedtime  traZODone 100 milliGRAM(s) Oral at bedtime  valACYclovir 500 milliGRAM(s) Oral daily  vancomycin    Solution 125 milliGRAM(s) Oral two times a day  vancomycin  IVPB 1250 milliGRAM(s) IV Intermittent every 24 hours      T(C): 36.6 (12-06-21 @ 12:26), Max: 36.6 (12-06-21 @ 12:26)  HR: 74 (12-06-21 @ 12:26) (74 - 77)  BP: 113/51 (12-06-21 @ 12:26) (106/55 - 113/51)  RR: 18 (12-06-21 @ 12:26) (18 - 18)  SpO2: --    Physical Exam  NAD  Breathing comfortably on RA  Resting comfortably      LLE  Splint/Dressing c/d/i  Motor: TA/EHL/Gastroc intact  Sensory: SP/DP/Hernandez/Sa intact  Vasc: foot WWP, 2+ DP pulse    Labs                        10.0   9.07  )-----------( 232      ( 06 Dec 2021 06:00 )             31.4     12-06    138  |  105  |  20  ----------------------------<  88  4.5   |  19  |  1.2    Ca    9.1      06 Dec 2021 06:00        PT/INR - ( 06 Dec 2021 06:00 )   PT: 12.30 sec;   INR: 1.07 ratio         PTT - ( 06 Dec 2021 06:00 )  PTT:32.7 sec    Cx- Staph aureu    A/P: Patient is a 71y year old Female as above    NWB LLE  DVT ppx  Abx: per ID  Pain control  Trend labs  Dispo: Pending PT recommendations

## 2021-12-06 NOTE — PROGRESS NOTE ADULT - ASSESSMENT
72 y/o woman with PMH of COPD/asthma, CKD III, dyslipidemia, Pulm HTN, recent diagnosed PE and on apixaban, chronic back pain due to spinal stenosis, cervical radiculopathy, Bell's palsy and Gout who presented from [facility] on 11/30 for left ankle pain and drainage in setting of a recent ORIF to left ankle on 09/23/21. She was found to have evidence of possible infected hardware, was admitted for hardware removal and is now status post removal of hardware on 12/02.    1. s/p removal of hardware 12/2 for exposed hardware and infection  s/p ORIF of left ankle 9/23  - case discussed with ID over the weekend  - OR culture with MRSA  - increase IV Vanco to 1250 mg daily (trough 11.4 on 12/5)   - IR consulted for PICC line  - will need extended course of abx  - PO vanco bid for h/o C. difficile  - Ortho following  - NWB left LE  - pain control  - continue PT/OT    2. h/o acute PE 10/2021  - seen by pulmonary  - on lovenox therapeutic dose for now  - Can resume Eliquis 5mg BID 1 week post procedure 12/02    3. HTN - baseline SBP in 100-120 range  on atenolol 50mg daily, lasix 20mg daily, spironolactone and nifedipine XL 60mg daily    4. Pulm HTN - on sildenafil, diuretics    5. Asthma/COPD - stable - O2 prn    6. pt on prednisone 10mg daily - ? reason - not clear in chart review  pt unsure if for COPD or gout    7. Hypokalemia - repleted PO and pt had banana at bedside    continue current management for gout, CKD 3 - stable      PROGRESS NOTE HANDOFF    Pending: continue PT/OT, PICC line    pt aware of plan of care    Disposition: [facility] for STR

## 2021-12-06 NOTE — PROGRESS NOTE ADULT - SUBJECTIVE AND OBJECTIVE BOX
BERNARD SPEARS  71y Female    INTERVAL HPI/OVERNIGHT EVENTS:    no new complaints  anxious about PICC line placement - pt reassured  no fever, N/V, SOB, chest pain  chronic diarrhea    T(F): 96.6 (12-06-21 @ 06:08), Max: 97.6 (12-05-21 @ 21:22)  HR: 77 (12-06-21 @ 06:08) (77 - 80)  BP: 106/55 (12-06-21 @ 06:08) (103/58 - 108/56)  RR: 18 (12-06-21 @ 06:08) (18 - 18)  SpO2: --  I&O's Summary    05 Dec 2021 07:01  -  06 Dec 2021 07:00  --------------------------------------------------------  IN: 0 mL / OUT: 1000 mL / NET: -1000 mL      PHYSICAL EXAM:  GENERAL: NAD  HEAD:  Normocephalic  EYES:  conjunctiva and sclera clear  ENMT: Moist mucous membranes  NERVOUS SYSTEM:  Alert, awake, Good concentration  CHEST/LUNG: CTA b/l  HEART: Regular rate and rhythm  ABDOMEN: Soft, Nontender, Nondistended  EXTREMITIES:   No edema  left ankle dressing  SKIN: dry    Consultant(s) Notes Reviewed:  [x ] YES  [ ] NO  Care Discussed with Consultants/Other Providers [ x] YES  [ ] NO    MEDICATIONS  (STANDING):  allopurinol 300 milliGRAM(s) Oral at bedtime  ATENolol  Tablet 50 milliGRAM(s) Oral daily  atorvastatin 80 milliGRAM(s) Oral at bedtime  budesonide 160 MICROgram(s)/formoterol 4.5 MICROgram(s) Inhaler 2 Puff(s) Inhalation two times a day  buPROPion XL (24-Hour) . 150 milliGRAM(s) Oral daily  cholecalciferol 400 Unit(s) Oral daily  colchicine 0.6 milliGRAM(s) Oral daily  enoxaparin Injectable 70 milliGRAM(s) SubCutaneous every 12 hours  FLUoxetine 20 milliGRAM(s) Oral two times a day  folic acid 1 milliGRAM(s) Oral daily  furosemide    Tablet 20 milliGRAM(s) Oral daily  hyoscyamine SL 0.125 milliGRAM(s) SubLingual daily  magnesium oxide 400 milliGRAM(s) Oral daily  NIFEdipine XL 60 milliGRAM(s) Oral daily  predniSONE   Tablet 10 milliGRAM(s) Oral daily  primidone 50 milliGRAM(s) Oral at bedtime  sildenafil (REVATIO) 20 milliGRAM(s) Oral three times a day  spironolactone 50 milliGRAM(s) Oral daily  tamsulosin 0.4 milliGRAM(s) Oral at bedtime  traZODone 100 milliGRAM(s) Oral at bedtime  valACYclovir 500 milliGRAM(s) Oral daily  vancomycin    Solution 125 milliGRAM(s) Oral two times a day  vancomycin  IVPB 1250 milliGRAM(s) IV Intermittent every 24 hours    MEDICATIONS  (PRN):  acetaminophen     Tablet .. 650 milliGRAM(s) Oral every 6 hours PRN Temp greater or equal to 38C (100.4F), Moderate Pain (4 - 6)  ALBUTerol    90 MICROgram(s) HFA Inhaler 2 Puff(s) Inhalation every 6 hours PRN Shortness of Breath and/or Wheezing  diazepam    Tablet 10 milliGRAM(s) Oral three times a day PRN Anxiety  melatonin 5 milliGRAM(s) Oral at bedtime PRN Insomnia  oxycodone    5 mG/acetaminophen 325 mG 1 Tablet(s) Oral every 8 hours PRN Severe Pain (7 - 10)  simethicone 80 milliGRAM(s) Chew daily PRN Indigestion      LABS:                        10.0   9.07  )-----------( 232      ( 06 Dec 2021 06:00 )             31.4     12-06    138  |  105  |  20  ----------------------------<  88  4.5   |  19  |  1.2    Ca    9.1      06 Dec 2021 06:00      PT/INR - ( 06 Dec 2021 06:00 )   PT: 12.30 sec;   INR: 1.07 ratio         PTT - ( 06 Dec 2021 06:00 )  PTT:32.7 sec          Case discussed with residents and RN on rounds today    Care discussed with pt

## 2021-12-06 NOTE — PROGRESS NOTE ADULT - ASSESSMENT
70 y/o F with PMH of COPD, Asthma, PE on eliquis, Pulmonary HTN - on Home O2 intermittently as needed, chronic back pain, cervical radiculopathy, CKD 3, DLD, HTN, Gout, and recent pshx of L ankle ORIF on 9/23/2021 for L ankle fracture, [facility] for left ankle pain, and drainage in setting of a recent ORIF to left ankle on 09/23/21. She was found to have evidence of possible infected hardware, was admitted for hardware removal and is now status post removal of hardware on 12/02.    # Left ankle pain/drainage   # Ambulatory dysfunction  # s/p removal of hardware 12/2 for exposed hardware and infection  - s/p ORIF on 09/23/2021  - ID following   - OR culture with MRSA  - increase IV Vanco from 1000 to 1250 mg daily (trough 11.4 on 12/5)   - IR consulted for PICC line as pt will need extended course of abx  - C. difficile infection- PO vancomycin BID   - Ortho following  - Left LE Non weight bearing   - pain control  - continue PT/OT    #h/o acute PE 10/2021  - On eliquis at home (provoked and found incidentally found last admission in October)  - seen by pulmonary  - on lovenox therapeutic dose for now  - Resume Eliquis 5mg BID 1 week post procedure 12/02    # COPD, Asthma on home O2 2L as needed; currently satting well  # MARLENI  - Symbicort while admitted  - albuterol PRN    #HTN - baseline SBP in 100-120 range  - Atenolol 50mg qd, lasix 20mg qd, spironolactone and nifedipine 60mg ed  - Monitor for now     #Anemia   - likely macrocytic Hb: 106.8 Mcv: 106.8  - Pt on folic acid PO qd   - Monitor for now     # Chronic Back Pain  # Hx of Cervical Radiculopathy  - Sees pain management, Dr. Mayers  - Continue pain control     #Pulm HTN - on sildenafil, diuretics    # CKD 3 - stable  - Creat 1.2 BUN 20   - Sees Dr. Mcarthur    # DLD  - C/w Atorvastatin    #Hypokalemia - repleted PO and pt had banana at bedside    #gout  - Pt on Allopurinol 300mg qd  - Continue current management     PROGRESS NOTE HANDOFF    Pending: continue PT/OT, PICC line  Disposition: [facility] for STR 72 y/o F with PMH of COPD, Asthma, PE on eliquis, Pulmonary HTN - on Home O2 intermittently as needed, chronic back pain, cervical radiculopathy, CKD 3, DLD, HTN, Gout, and recent pshx of L ankle ORIF on 9/23/2021 for L ankle fracture, brought from [facility] on 11/30 for left ankle pain, and drainage in setting of a recent ORIF to left ankle on 09/23/21. She was found to have evidence of possible infected hardware, was admitted for hardware removal and is now status post removal of hardware on 12/02.    # Left ankle pain/drainage   # Ambulatory dysfunction  # s/p removal of hardware 12/2 for exposed hardware and infection  - s/p ORIF on 09/23/2021  - ID following   - OR culture with MRSA  - increased IV Vanco from 1000 to 1250 mg daily (trough 11.4 on 12/5)   - IR consulted for PICC line today as pt will need extended course of abx  - C. difficile infection- PO vancomycin BID   - Ortho following  - Left LE Non weight bearing   - pain control  - continue PT/OT    #h/o acute PE 10/2021  - On eliquis at home (provoked and found incidentally found last admission in October)  - seen by pulmonary  - on lovenox therapeutic dose for now  - Resume Eliquis 5mg BID 1 week post procedure 12/02    # COPD, Asthma on home O2 2L as needed; currently satting well  # MARLENI  - Symbicort while admitted  - albuterol PRN    #HTN - baseline SBP in 100-120 range  - Atenolol 50mg qd, lasix 20mg qd, spironolactone and nifedipine 60mg ed  - Monitor for now     #Anemia   - likely macrocytic Hb: 106.8 Mcv: 106.8  - Pt on folic acid PO qd   - Monitor for now     # Chronic Back Pain  # Hx of Cervical Radiculopathy  - Sees pain management, Dr. Mayers  - Continue pain control     #Pulm HTN - on sildenafil, diuretics    # CKD 3 - stable  - Creat 1.2 BUN 20   - Sees Dr. Mcarthur    # DLD  - C/w Atorvastatin    #Hypokalemia - repleted PO and pt had banana at bedside    #gout  - Pt on Allopurinol 300mg qd  - Continue current management     PROGRESS NOTE HANDOFF    Pending: continue PT/OT, PICC line  Disposition: [facility] for STR 70 y/o F with PMH of COPD, Asthma, PE on eliquis, Pulmonary HTN - on Home O2 intermittently as needed, chronic back pain, cervical radiculopathy, CKD 3, DLD, HTN, Gout, and recent pshx of L ankle ORIF on 9/23/2021 for L ankle fracture, brought from [facility] on 11/30 for left ankle pain, and drainage in setting of a recent ORIF to left ankle on 09/23/21. She was found to have evidence of possible infected hardware, was admitted for hardware removal and is now status post removal of hardware on 12/02.    # Left ankle pain/drainage   # Ambulatory dysfunction  # s/p removal of hardware 12/2 for exposed hardware and infection  - s/p ORIF on 09/23/2021  - ID following   - OR culture with MRSA  - increased IV Vanco from 1000 to 1250 mg daily (trough 11.4 on 12/5)   - IR consulted for PICC line today as pt will need extended course of abx  - C. difficile infection- PO vancomycin BID   - Ortho following  - Left LE Non weight bearing   - pain control  - continue PT/OT    #h/o acute PE 10/2021  - On eliquis at home (provoked and found incidentally found last admission in October)  - seen by pulmonary  - on lovenox therapeutic dose for now  - Resume Eliquis 5mg BID 1 week post procedure 12/02    # COPD, Asthma on home O2 2L as needed; currently satting well  # MARLENI  - Symbicort while admitted  - albuterol PRN    #HTN - baseline SBP in 100-120 range  - Atenolol 50mg qd, lasix 20mg qd, spironolactone and nifedipine 60mg ed  - Monitor for now     #Anemia   - likely macrocytic Hb: 10 Mcv: 106.8  - Pt on folic acid PO qd   - Monitor for now     # Chronic Back Pain  # Hx of Cervical Radiculopathy  - Sees pain management, Dr. Mayers  - Continue pain control     #Pulm HTN - on sildenafil, diuretics    # CKD 3 - stable  - Creat 1.2 BUN 20   - Sees Dr. Mcarthur    # DLD  - C/w Atorvastatin    #Hypokalemia - repleted PO and pt had banana at bedside    #gout  - Pt on Allopurinol 300mg qd  - Continue current management     PROGRESS NOTE HANDOFF    Pending: continue PT/OT, PICC line  Disposition: [facility] for STR

## 2021-12-06 NOTE — PROGRESS NOTE ADULT - SUBJECTIVE AND OBJECTIVE BOX
Patient is a 71y old  Female who presents with a chief complaint of Left ankle pain (06 Dec 2021 11:34)      INTERVAL HPI/OVERNIGHT EVENTS:  No acute events overnight. Pt is anxious about PICC line and her left ankle infection. Pt denies fever, headache nausea, vomiting, SOB, chest pain. Pt admits chronic diarrhea 2/2 to [facility]. Pt denies all other pertinent complaints.       FAMILY HISTORY:  Family history of bladder cancer (Mother)    Family history of Alzheimer&#x27;s disease (Father)      T(C): 35.9 (12-06-21 @ 06:08), Max: 36.4 (12-05-21 @ 21:22)  HR: 77 (12-06-21 @ 06:08) (77 - 80)  BP: 106/55 (12-06-21 @ 06:08) (103/58 - 108/56)  RR: 18 (12-06-21 @ 06:08) (18 - 18)  SpO2: --  Wt(kg): --Vital Signs Last 24 Hrs  T(C): 35.9 (06 Dec 2021 06:08), Max: 36.4 (05 Dec 2021 21:22)  T(F): 96.6 (06 Dec 2021 06:08), Max: 97.6 (05 Dec 2021 21:22)  HR: 77 (06 Dec 2021 06:08) (77 - 80)  BP: 106/55 (06 Dec 2021 06:08) (103/58 - 108/56)  BP(mean): --  RR: 18 (06 Dec 2021 06:08) (18 - 18)  SpO2: --    PHYSICAL EXAM:  GENERAL: NAD, thin, elderly   HEAD:  Atraumatic, Normocephalic  EYES: conjunctiva and sclera clear  ENMT: No lesions  NECK: No JVD  NERVOUS SYSTEM:  Alert & Oriented X3, Good concentration;  CHEST/LUNG: Clear to percussion bilaterally; No rales  HEART: Regular rate and rhythm; No murmurs  ABDOMEN: Soft, Nontender, Nondistended; Bowel sounds present  EXTREMITIES: + left ankle dressing  LYMPH: No lymphadenopathy noted  SKIN: dry skin      LABS:                            10.0   9.07  )-----------( 232      ( 06 Dec 2021 06:00 )             31.4     12-06    138  |  105  |  20  ----------------------------<  88  4.5   |  19  |  1.2    Ca    9.1      06 Dec 2021 06:00      PT/INR - ( 06 Dec 2021 06:00 )   PT: 12.30 sec;   INR: 1.07 ratio         PTT - ( 06 Dec 2021 06:00 )  PTT:32.7 sec      RADIOLOGY & ADDITIONAL TESTS:    Imaging Personally Reviewed:  [ ] YES  [ ] NO  acetaminophen     Tablet .. 650 milliGRAM(s) Oral every 6 hours PRN  ALBUTerol    90 MICROgram(s) HFA Inhaler 2 Puff(s) Inhalation every 6 hours PRN  allopurinol 300 milliGRAM(s) Oral at bedtime  ATENolol  Tablet 50 milliGRAM(s) Oral daily  atorvastatin 80 milliGRAM(s) Oral at bedtime  budesonide 160 MICROgram(s)/formoterol 4.5 MICROgram(s) Inhaler 2 Puff(s) Inhalation two times a day  buPROPion XL (24-Hour) . 150 milliGRAM(s) Oral daily  cholecalciferol 400 Unit(s) Oral daily  colchicine 0.6 milliGRAM(s) Oral daily  diazepam    Tablet 10 milliGRAM(s) Oral three times a day PRN  enoxaparin Injectable 70 milliGRAM(s) SubCutaneous every 12 hours  FLUoxetine 20 milliGRAM(s) Oral two times a day  folic acid 1 milliGRAM(s) Oral daily  furosemide    Tablet 20 milliGRAM(s) Oral daily  hyoscyamine SL 0.125 milliGRAM(s) SubLingual daily  magnesium oxide 400 milliGRAM(s) Oral daily  melatonin 5 milliGRAM(s) Oral at bedtime PRN  NIFEdipine XL 60 milliGRAM(s) Oral daily  oxycodone    5 mG/acetaminophen 325 mG 1 Tablet(s) Oral every 8 hours PRN  predniSONE   Tablet 10 milliGRAM(s) Oral daily  primidone 50 milliGRAM(s) Oral at bedtime  sildenafil (REVATIO) 20 milliGRAM(s) Oral three times a day  simethicone 80 milliGRAM(s) Chew daily PRN  spironolactone 50 milliGRAM(s) Oral daily  tamsulosin 0.4 milliGRAM(s) Oral at bedtime  traZODone 100 milliGRAM(s) Oral at bedtime  valACYclovir 500 milliGRAM(s) Oral daily  vancomycin    Solution 125 milliGRAM(s) Oral two times a day  vancomycin  IVPB 1250 milliGRAM(s) IV Intermittent every 24 hours           Patient is a 71y old  Female who presents with a chief complaint of Left ankle pain (06 Dec 2021 11:34)      INTERVAL HPI/OVERNIGHT EVENTS:  No acute events overnight. Pt is anxious about PICC line and her left ankle infection. Pt denies fever, headache nausea, vomiting, SOB, chest pain. Pt admits chronic diarrhea 2/2 to [facility]. Pt denies all other pertinent complaints.       FAMILY HISTORY:  Family history of bladder cancer (Mother)    Family history of Alzheimer&#x27;s disease (Father)      T(C): 35.9 (12-06-21 @ 06:08), Max: 36.4 (12-05-21 @ 21:22)  HR: 77 (12-06-21 @ 06:08) (77 - 80)  BP: 106/55 (12-06-21 @ 06:08) (103/58 - 108/56)  RR: 18 (12-06-21 @ 06:08) (18 - 18)  SpO2: --  Wt(kg): --Vital Signs Last 24 Hrs  T(C): 35.9 (06 Dec 2021 06:08), Max: 36.4 (05 Dec 2021 21:22)  T(F): 96.6 (06 Dec 2021 06:08), Max: 97.6 (05 Dec 2021 21:22)  HR: 77 (06 Dec 2021 06:08) (77 - 80)  BP: 106/55 (06 Dec 2021 06:08) (103/58 - 108/56)  BP(mean): --  RR: 18 (06 Dec 2021 06:08) (18 - 18)  SpO2: --    PHYSICAL EXAM:  GENERAL: NAD, thin, elderly   HEAD:  Atraumatic, Normocephalic  EYES: conjunctiva and sclera clear  ENMT: No lesions  NECK: No JVD  NERVOUS SYSTEM:  Alert & Oriented X3, Good concentration;  CHEST/LUNG: Clear to percussion bilaterally; No rales  HEART: Regular rate and rhythm; No murmurs  ABDOMEN: Soft, Nontender, Nondistended; Bowel sounds present  EXTREMITIES: + left ankle dressing  LYMPH: No lymphadenopathy noted  SKIN: dry skin      LABS:                            10.0   9.07  )-----------( 232      ( 06 Dec 2021 06:00 )             31.4     12-06    138  |  105  |  20  ----------------------------<  88  4.5   |  19  |  1.2    Ca    9.1      06 Dec 2021 06:00      PT/INR - ( 06 Dec 2021 06:00 )   PT: 12.30 sec;   INR: 1.07 ratio         PTT - ( 06 Dec 2021 06:00 )  PTT:32.7 sec      acetaminophen     Tablet .. 650 milliGRAM(s) Oral every 6 hours PRN  ALBUTerol    90 MICROgram(s) HFA Inhaler 2 Puff(s) Inhalation every 6 hours PRN  allopurinol 300 milliGRAM(s) Oral at bedtime  ATENolol  Tablet 50 milliGRAM(s) Oral daily  atorvastatin 80 milliGRAM(s) Oral at bedtime  budesonide 160 MICROgram(s)/formoterol 4.5 MICROgram(s) Inhaler 2 Puff(s) Inhalation two times a day  buPROPion XL (24-Hour) . 150 milliGRAM(s) Oral daily  cholecalciferol 400 Unit(s) Oral daily  colchicine 0.6 milliGRAM(s) Oral daily  diazepam    Tablet 10 milliGRAM(s) Oral three times a day PRN  enoxaparin Injectable 70 milliGRAM(s) SubCutaneous every 12 hours  FLUoxetine 20 milliGRAM(s) Oral two times a day  folic acid 1 milliGRAM(s) Oral daily  furosemide    Tablet 20 milliGRAM(s) Oral daily  hyoscyamine SL 0.125 milliGRAM(s) SubLingual daily  magnesium oxide 400 milliGRAM(s) Oral daily  melatonin 5 milliGRAM(s) Oral at bedtime PRN  NIFEdipine XL 60 milliGRAM(s) Oral daily  oxycodone    5 mG/acetaminophen 325 mG 1 Tablet(s) Oral every 8 hours PRN  predniSONE   Tablet 10 milliGRAM(s) Oral daily  primidone 50 milliGRAM(s) Oral at bedtime  sildenafil (REVATIO) 20 milliGRAM(s) Oral three times a day  simethicone 80 milliGRAM(s) Chew daily PRN  spironolactone 50 milliGRAM(s) Oral daily  tamsulosin 0.4 milliGRAM(s) Oral at bedtime  traZODone 100 milliGRAM(s) Oral at bedtime  valACYclovir 500 milliGRAM(s) Oral daily  vancomycin    Solution 125 milliGRAM(s) Oral two times a day  vancomycin  IVPB 1250 milliGRAM(s) IV Intermittent every 24 hours           Patient is a 71y old  Female who presents with a chief complaint of Left ankle pain (06 Dec 2021 11:34)      INTERVAL HPI/OVERNIGHT EVENTS:  No acute events overnight. Pt is anxious about PICC line and her left ankle infection. Pt denies fever, headache nausea, vomiting, SOB, chest pain. Pt admits chronic diarrhea 2/2 to [facility]. Pt denies all other pertinent complaints.       FAMILY HISTORY:  Family history of bladder cancer (Mother)    Family history of Alzheimers disease (Father)      T(C): 35.9 (12-06-21 @ 06:08), Max: 36.4 (12-05-21 @ 21:22)  HR: 77 (12-06-21 @ 06:08) (77 - 80)  BP: 106/55 (12-06-21 @ 06:08) (103/58 - 108/56)  RR: 18 (12-06-21 @ 06:08) (18 - 18)  SpO2: --  Wt(kg): --Vital Signs Last 24 Hrs  T(C): 35.9 (06 Dec 2021 06:08), Max: 36.4 (05 Dec 2021 21:22)  T(F): 96.6 (06 Dec 2021 06:08), Max: 97.6 (05 Dec 2021 21:22)  HR: 77 (06 Dec 2021 06:08) (77 - 80)  BP: 106/55 (06 Dec 2021 06:08) (103/58 - 108/56)  BP(mean): --  RR: 18 (06 Dec 2021 06:08) (18 - 18)  SpO2: --    PHYSICAL EXAM:  GENERAL: NAD, thin, elderly   HEAD:  Atraumatic, Normocephalic  EYES: conjunctiva and sclera clear  ENMT: No lesions  NECK: No JVD  NERVOUS SYSTEM:  Alert & Oriented X3, Good concentration;  CHEST/LUNG: Clear to percussion bilaterally; No rales  HEART: Regular rate and rhythm; No murmurs  ABDOMEN: Soft, Nontender, Nondistended; Bowel sounds present  EXTREMITIES: + left ankle dressing  LYMPH: No lymphadenopathy noted  SKIN: dry skin      LABS:                            10.0   9.07  )-----------( 232      ( 06 Dec 2021 06:00 )             31.4     12-06    138  |  105  |  20  ----------------------------<  88  4.5   |  19  |  1.2    Ca    9.1      06 Dec 2021 06:00      PT/INR - ( 06 Dec 2021 06:00 )   PT: 12.30 sec;   INR: 1.07 ratio         PTT - ( 06 Dec 2021 06:00 )  PTT:32.7 sec      acetaminophen     Tablet .. 650 milliGRAM(s) Oral every 6 hours PRN  ALBUTerol    90 MICROgram(s) HFA Inhaler 2 Puff(s) Inhalation every 6 hours PRN  allopurinol 300 milliGRAM(s) Oral at bedtime  ATENolol  Tablet 50 milliGRAM(s) Oral daily  atorvastatin 80 milliGRAM(s) Oral at bedtime  budesonide 160 MICROgram(s)/formoterol 4.5 MICROgram(s) Inhaler 2 Puff(s) Inhalation two times a day  buPROPion XL (24-Hour) . 150 milliGRAM(s) Oral daily  cholecalciferol 400 Unit(s) Oral daily  colchicine 0.6 milliGRAM(s) Oral daily  diazepam    Tablet 10 milliGRAM(s) Oral three times a day PRN  enoxaparin Injectable 70 milliGRAM(s) SubCutaneous every 12 hours  FLUoxetine 20 milliGRAM(s) Oral two times a day  folic acid 1 milliGRAM(s) Oral daily  furosemide    Tablet 20 milliGRAM(s) Oral daily  hyoscyamine SL 0.125 milliGRAM(s) SubLingual daily  magnesium oxide 400 milliGRAM(s) Oral daily  melatonin 5 milliGRAM(s) Oral at bedtime PRN  NIFEdipine XL 60 milliGRAM(s) Oral daily  oxycodone    5 mG/acetaminophen 325 mG 1 Tablet(s) Oral every 8 hours PRN  predniSONE   Tablet 10 milliGRAM(s) Oral daily  primidone 50 milliGRAM(s) Oral at bedtime  sildenafil (REVATIO) 20 milliGRAM(s) Oral three times a day  simethicone 80 milliGRAM(s) Chew daily PRN  spironolactone 50 milliGRAM(s) Oral daily  tamsulosin 0.4 milliGRAM(s) Oral at bedtime  traZODone 100 milliGRAM(s) Oral at bedtime  valACYclovir 500 milliGRAM(s) Oral daily  vancomycin    Solution 125 milliGRAM(s) Oral two times a day  vancomycin  IVPB 1250 milliGRAM(s) IV Intermittent every 24 hours

## 2021-12-07 LAB
ALBUMIN SERPL ELPH-MCNC: 3.6 G/DL — SIGNIFICANT CHANGE UP (ref 3.5–5.2)
ALP SERPL-CCNC: 103 U/L — SIGNIFICANT CHANGE UP (ref 30–115)
ALT FLD-CCNC: 25 U/L — SIGNIFICANT CHANGE UP (ref 0–41)
ANION GAP SERPL CALC-SCNC: 16 MMOL/L — HIGH (ref 7–14)
AST SERPL-CCNC: 36 U/L — SIGNIFICANT CHANGE UP (ref 0–41)
BASOPHILS # BLD AUTO: 0.05 K/UL — SIGNIFICANT CHANGE UP (ref 0–0.2)
BASOPHILS NFR BLD AUTO: 0.5 % — SIGNIFICANT CHANGE UP (ref 0–1)
BILIRUB SERPL-MCNC: <0.2 MG/DL — SIGNIFICANT CHANGE UP (ref 0.2–1.2)
BUN SERPL-MCNC: 19 MG/DL — SIGNIFICANT CHANGE UP (ref 10–20)
CALCIUM SERPL-MCNC: 9.2 MG/DL — SIGNIFICANT CHANGE UP (ref 8.5–10.1)
CHLORIDE SERPL-SCNC: 106 MMOL/L — SIGNIFICANT CHANGE UP (ref 98–110)
CO2 SERPL-SCNC: 17 MMOL/L — SIGNIFICANT CHANGE UP (ref 17–32)
CREAT SERPL-MCNC: 1.2 MG/DL — SIGNIFICANT CHANGE UP (ref 0.7–1.5)
CRP SERPL-MCNC: <3 MG/L — SIGNIFICANT CHANGE UP
EOSINOPHIL # BLD AUTO: 0.22 K/UL — SIGNIFICANT CHANGE UP (ref 0–0.7)
EOSINOPHIL NFR BLD AUTO: 2.3 % — SIGNIFICANT CHANGE UP (ref 0–8)
ERYTHROCYTE [SEDIMENTATION RATE] IN BLOOD: 44 MM/HR — HIGH (ref 0–20)
GLUCOSE SERPL-MCNC: 87 MG/DL — SIGNIFICANT CHANGE UP (ref 70–99)
HCT VFR BLD CALC: 31.4 % — LOW (ref 37–47)
HGB BLD-MCNC: 10 G/DL — LOW (ref 12–16)
IMM GRANULOCYTES NFR BLD AUTO: 1.1 % — HIGH (ref 0.1–0.3)
LYMPHOCYTES # BLD AUTO: 2.26 K/UL — SIGNIFICANT CHANGE UP (ref 1.2–3.4)
LYMPHOCYTES # BLD AUTO: 24 % — SIGNIFICANT CHANGE UP (ref 20.5–51.1)
MAGNESIUM SERPL-MCNC: 2 MG/DL — SIGNIFICANT CHANGE UP (ref 1.8–2.4)
MCHC RBC-ENTMCNC: 31.8 G/DL — LOW (ref 32–37)
MCHC RBC-ENTMCNC: 34 PG — HIGH (ref 27–31)
MCV RBC AUTO: 106.8 FL — HIGH (ref 81–99)
MONOCYTES # BLD AUTO: 1.17 K/UL — HIGH (ref 0.1–0.6)
MONOCYTES NFR BLD AUTO: 12.4 % — HIGH (ref 1.7–9.3)
NEUTROPHILS # BLD AUTO: 5.6 K/UL — SIGNIFICANT CHANGE UP (ref 1.4–6.5)
NEUTROPHILS NFR BLD AUTO: 59.7 % — SIGNIFICANT CHANGE UP (ref 42.2–75.2)
NRBC # BLD: 0 /100 WBCS — SIGNIFICANT CHANGE UP (ref 0–0)
PLATELET # BLD AUTO: 213 K/UL — SIGNIFICANT CHANGE UP (ref 130–400)
POTASSIUM SERPL-MCNC: 4 MMOL/L — SIGNIFICANT CHANGE UP (ref 3.5–5)
POTASSIUM SERPL-SCNC: 4 MMOL/L — SIGNIFICANT CHANGE UP (ref 3.5–5)
PROT SERPL-MCNC: 5.7 G/DL — LOW (ref 6–8)
RBC # BLD: 2.94 M/UL — LOW (ref 4.2–5.4)
RBC # FLD: 15.1 % — HIGH (ref 11.5–14.5)
SARS-COV-2 RNA SPEC QL NAA+PROBE: SIGNIFICANT CHANGE UP
SODIUM SERPL-SCNC: 139 MMOL/L — SIGNIFICANT CHANGE UP (ref 135–146)
WBC # BLD: 9.4 K/UL — SIGNIFICANT CHANGE UP (ref 4.8–10.8)
WBC # FLD AUTO: 9.4 K/UL — SIGNIFICANT CHANGE UP (ref 4.8–10.8)

## 2021-12-07 PROCEDURE — 99232 SBSQ HOSP IP/OBS MODERATE 35: CPT

## 2021-12-07 RX ADMIN — OXYCODONE AND ACETAMINOPHEN 1 TABLET(S): 5; 325 TABLET ORAL at 14:58

## 2021-12-07 RX ADMIN — Medication 166.67 MILLIGRAM(S): at 17:40

## 2021-12-07 RX ADMIN — BUDESONIDE AND FORMOTEROL FUMARATE DIHYDRATE 2 PUFF(S): 160; 4.5 AEROSOL RESPIRATORY (INHALATION) at 21:17

## 2021-12-07 RX ADMIN — BUDESONIDE AND FORMOTEROL FUMARATE DIHYDRATE 2 PUFF(S): 160; 4.5 AEROSOL RESPIRATORY (INHALATION) at 08:56

## 2021-12-07 RX ADMIN — Medication 60 MILLIGRAM(S): at 05:52

## 2021-12-07 RX ADMIN — Medication 0.12 MILLIGRAM(S): at 12:31

## 2021-12-07 RX ADMIN — Medication 400 UNIT(S): at 12:32

## 2021-12-07 RX ADMIN — TAMSULOSIN HYDROCHLORIDE 0.4 MILLIGRAM(S): 0.4 CAPSULE ORAL at 21:11

## 2021-12-07 RX ADMIN — Medication 300 MILLIGRAM(S): at 21:12

## 2021-12-07 RX ADMIN — Medication 20 MILLIGRAM(S): at 13:33

## 2021-12-07 RX ADMIN — OXYCODONE AND ACETAMINOPHEN 1 TABLET(S): 5; 325 TABLET ORAL at 15:09

## 2021-12-07 RX ADMIN — Medication 20 MILLIGRAM(S): at 05:52

## 2021-12-07 RX ADMIN — ATENOLOL 50 MILLIGRAM(S): 25 TABLET ORAL at 05:52

## 2021-12-07 RX ADMIN — Medication 5 MILLIGRAM(S): at 21:20

## 2021-12-07 RX ADMIN — SPIRONOLACTONE 25 MILLIGRAM(S): 25 TABLET, FILM COATED ORAL at 05:53

## 2021-12-07 RX ADMIN — Medication 0.6 MILLIGRAM(S): at 12:32

## 2021-12-07 RX ADMIN — Medication 1 MILLIGRAM(S): at 12:31

## 2021-12-07 RX ADMIN — PRIMIDONE 50 MILLIGRAM(S): 250 TABLET ORAL at 21:11

## 2021-12-07 RX ADMIN — ENOXAPARIN SODIUM 70 MILLIGRAM(S): 100 INJECTION SUBCUTANEOUS at 17:41

## 2021-12-07 RX ADMIN — Medication 20 MILLIGRAM(S): at 21:11

## 2021-12-07 RX ADMIN — Medication 10 MILLIGRAM(S): at 13:53

## 2021-12-07 RX ADMIN — VALACYCLOVIR 500 MILLIGRAM(S): 500 TABLET, FILM COATED ORAL at 12:32

## 2021-12-07 RX ADMIN — Medication 10 MILLIGRAM(S): at 05:53

## 2021-12-07 RX ADMIN — BUPROPION HYDROCHLORIDE 150 MILLIGRAM(S): 150 TABLET, EXTENDED RELEASE ORAL at 12:31

## 2021-12-07 RX ADMIN — ENOXAPARIN SODIUM 70 MILLIGRAM(S): 100 INJECTION SUBCUTANEOUS at 05:52

## 2021-12-07 RX ADMIN — Medication 20 MILLIGRAM(S): at 17:40

## 2021-12-07 RX ADMIN — Medication 125 MILLIGRAM(S): at 17:40

## 2021-12-07 RX ADMIN — Medication 125 MILLIGRAM(S): at 05:54

## 2021-12-07 RX ADMIN — MAGNESIUM OXIDE 400 MG ORAL TABLET 400 MILLIGRAM(S): 241.3 TABLET ORAL at 12:31

## 2021-12-07 RX ADMIN — ATORVASTATIN CALCIUM 80 MILLIGRAM(S): 80 TABLET, FILM COATED ORAL at 21:11

## 2021-12-07 RX ADMIN — Medication 100 MILLIGRAM(S): at 21:11

## 2021-12-07 NOTE — PROGRESS NOTE ADULT - ASSESSMENT
72 y/o woman with PMH of COPD/asthma, CKD III, dyslipidemia, Pulm HTN, recent diagnosed PE and on apixaban, chronic back pain due to spinal stenosis, cervical radiculopathy, Bell's palsy and Gout who presented from [facility] on 11/30 for left ankle pain and drainage in setting of a recent ORIF to left ankle on 09/23/21. She was found to have evidence of possible infected hardware, was admitted for hardware removal and is now status post removal of hardware on 12/02.    1. s/p removal of hardware 12/2 for exposed hardware and infection  s/p ORIF of left ankle 9/23  - case discussed with ID   - OR culture with MRSA  - increased IV Vanco to 1250 mg daily (trough 11.4 on 12/5)   - s/p PICC line placement 12/6  - will need extended course of abx - 6 weeks from OR per ID  - PO vanco 125mg daily while on IV abx for h/o C. difficile  - Weekly CBC, CMP, ESR/CRP, Vanco trough  - ID follow-up with Dr. Erwin Logan for Telehealth 12/14. They will call the patient between 10:30-6:30      [facility]       [phone]       Fax [phone]     - Ortho following  - NWB left LE  - pain control  - continue PT/OT    2. h/o acute PE 10/2021  - seen by pulmonary  - on lovenox therapeutic dose for now  - Can resume Eliquis 5mg BID 1 week post procedure 12/02    3. HTN - baseline SBP in 100-120 range  on atenolol 50mg daily, lasix 20mg daily, spironolactone and nifedipine XL 60mg daily    4. Pulm HTN - on sildenafil, diuretics    5. Asthma/COPD - stable - O2 prn    6. pt on prednisone 10mg daily - ? reason - not clear in chart review  pt unsure if for COPD or gout    7. Hypokalemia - repleted PO     continue current management for gout, CKD 3 - stable      PROGRESS NOTE HANDOFF    Pending: check vanco level before dose on 12/9    pt aware of plan of care    Disposition: [facility] for STR  anticipate discharge 12/8

## 2021-12-07 NOTE — PROGRESS NOTE ADULT - SUBJECTIVE AND OBJECTIVE BOX
BERNARD SPEARS  71y, Female  Allergy: adhesives (Pruritus; Rash)  No Known Drug Allergies      LOS  7d    CHIEF COMPLAINT: Left ankle pain (06 Dec 2021 16:55)      INTERVAL EVENTS/HPI  - No acute events overnight  - T(F): , Max: 98.5 (12-06-21 @ 20:59)  - Denies any worsening symptoms  - Tolerating medication  - WBC Count: 9.40 (12-07-21 @ 04:30)  WBC Count: 9.07 (12-06-21 @ 06:00)     - Creatinine, Serum: 1.2 (12-07-21 @ 04:30)  Creatinine, Serum: 1.2 (12-06-21 @ 06:00)       ROS  General: Denies rigors, nightsweats  HEENT: Denies headache, rhinorrhea, sore throat, eye pain  CV: Denies CP, palpitations  PULM: Denies wheezing, hemoptysis  GI: Denies hematemesis, hematochezia, melena  : Denies discharge, hematuria  MSK: Denies arthralgias, myalgias  SKIN: Denies rash, lesions  NEURO: Denies paresthesias, weakness  PSYCH: Denies depression, anxiety    VITALS:  T(F): 96.9, Max: 98.5 (12-06-21 @ 20:59)  HR: 69  BP: 117/56  RR: 18Vital Signs Last 24 Hrs  T(C): 36.1 (07 Dec 2021 04:35), Max: 36.9 (06 Dec 2021 20:59)  T(F): 96.9 (07 Dec 2021 04:35), Max: 98.5 (06 Dec 2021 20:59)  HR: 69 (07 Dec 2021 04:35) (69 - 73)  BP: 117/56 (07 Dec 2021 04:35) (106/58 - 117/56)  BP(mean): --  RR: 18 (07 Dec 2021 04:35) (18 - 18)  SpO2: --    PHYSICAL EXAM:  Gen: NAD, resting in bed  HEENT: Normocephalic, atraumatic  Neck: supple, no lymphadenopathy  CV: Regular rate & regular rhythm  Lungs: decreased BS at bases, no fremitus  Abdomen: Soft, BS present  Ext: Warm, well perfused LE dressings   Neuro: non focal, awake  Skin: no rash, no erythema  Lines: no phlebitis    FH: Non-contributory  Social Hx: Non-contributory    TESTS & MEASUREMENTS:                        10.0   9.40  )-----------( 213      ( 07 Dec 2021 04:30 )             31.4     12-07    139  |  106  |  19  ----------------------------<  87  4.0   |  17  |  1.2    Ca    9.2      07 Dec 2021 04:30  Mg     2.0     12-07    TPro  5.7<L>  /  Alb  3.6  /  TBili  <0.2  /  DBili  x   /  AST  36  /  ALT  25  /  AlkPhos  103  12-07    eGFR if Non African American: 45 mL/min/1.73M2 (12-07-21 @ 04:30)  eGFR if African American: 52 mL/min/1.73M2 (12-07-21 @ 04:30)    LIVER FUNCTIONS - ( 07 Dec 2021 04:30 )  Alb: 3.6 g/dL / Pro: 5.7 g/dL / ALK PHOS: 103 U/L / ALT: 25 U/L / AST: 36 U/L / GGT: x               Culture - Surgical Swab (collected 12-02-21 @ 15:43)  Source: .Surgical Swab None  Preliminary Report (12-04-21 @ 13:22):    No growth    Culture - Surgical Swab (collected 12-02-21 @ 15:43)  Source: .Surgical Swab None  Preliminary Report (12-05-21 @ 13:44):    Numerous Methicillin Resistant Staphylococcus aureus  Organism: Methicillin resistant Staphylococcus aureus (12-05-21 @ 13:42)  Organism: Methicillin resistant Staphylococcus aureus (12-05-21 @ 13:42)      -  Amoxicillin/Clavulanic Acid: R <=4/2      -  Ampicillin: R >8      -  Ampicillin/Sulbactam: R <=8/4      -  Cefazolin: R <=4      -  Ceftriaxone: R 8      -  Ciprofloxacin: R >2      -  Clindamycin: S <=0.25      -  Daptomycin: S 0.5      -  Erythromycin: R >4      -  Gentamicin: S <=1 Should not be used as monotherapy      -  Levofloxacin: R >4      -  Linezolid: S 2      -  Meropenem: R <=2      -  Moxifloxacin(Aerobic): I 4      -  Oxacillin: R >2      -  Penicillin: R >8      -  RIF- Rifampin: S <=1 Should not be used as monotherapy      -  Tetra/Doxy: S <=1      -  Trimethoprim/Sulfamethoxazole: S <=0.5/9.5      -  Vancomycin: S 1      Method Type: TETE            INFECTIOUS DISEASES TESTING  Vancomycin Level, Trough: 11.4 (12-05-21 @ 17:03)  COVID-19 PCR: NotDetec (11-30-21 @ 14:22)  COVID-19 PCR: NotDetec (10-13-21 @ 18:00)  COVID-19 PCR: NotDetec (10-07-21 @ 18:29)  COVID-19 PCR: NotDetec (09-27-21 @ 16:35)  COVID-19 PCR: NotDetec (09-24-21 @ 15:30)  Hepatitis C Virus Interpretation Result: Nonreact (09-22-21 @ 08:00)  COVID-19 PCR: NotDetec (09-21-21 @ 15:27)      INFLAMMATORY MARKERS  Sedimentation Rate, Erythrocyte: 44 mm/Hr (12-07-21 @ 04:30)  Sedimentation Rate, Erythrocyte: 35 mm/Hr (12-01-21 @ 06:32)  C-Reactive Protein, Serum: 4 mg/L (12-01-21 @ 06:32)      RADIOLOGY & ADDITIONAL TESTS:  I have personally reviewed the last available Chest xray  CXR      CT      CARDIOLOGY TESTING  12 Lead ECG:   Ventricular Rate 76 BPM    Atrial Rate 76 BPM    P-R Interval 142 ms    QRS Duration 82 ms    Q-T Interval 398 ms    QTC Calculation(Bazett) 447 ms    P Axis 45 degrees    R Axis 5 degrees    T Axis 28 degrees    Diagnosis Line Normal sinus rhythm  Normal ECG    Confirmed by Amari Whitfield ([id number]) on 12/1/2021 1:35:37 PM (11-30-21 @ 19:41)  12 Lead ECG:   Ventricular Rate 70 BPM    Atrial Rate 70 BPM    P-R Interval 144 ms    QRS Duration 88 ms    Q-T Interval 402 ms    QTC Calculation(Bazett) 434 ms    P Axis 48 degrees    R Axis 38 degrees    T Axis 43 degrees    Diagnosis Line Sinus rhythm with Premature atrial complexes  Otherwise normal ECG    Confirmed by TIMMY CROWDER MD ([id number]) on 11/30/2021 3:42:12 PM (11-30-21 @ 13:45)      MEDICATIONS  allopurinol 300 Oral at bedtime  ATENolol  Tablet 50 Oral daily  atorvastatin 80 Oral at bedtime  budesonide 160 MICROgram(s)/formoterol 4.5 MICROgram(s) Inhaler 2 Inhalation two times a day  buPROPion XL (24-Hour) . 150 Oral daily  cholecalciferol 400 Oral daily  colchicine 0.6 Oral daily  enoxaparin Injectable 70 SubCutaneous every 12 hours  FLUoxetine 20 Oral two times a day  folic acid 1 Oral daily  furosemide    Tablet 20 Oral daily  hyoscyamine SL 0.125 SubLingual daily  magnesium oxide 400 Oral daily  NIFEdipine XL 60 Oral daily  predniSONE   Tablet 10 Oral daily  primidone 50 Oral at bedtime  sildenafil (REVATIO) 20 Oral three times a day  spironolactone 50 Oral daily  tamsulosin 0.4 Oral at bedtime  traZODone 100 Oral at bedtime  valACYclovir 500 Oral daily  vancomycin    Solution 125 Oral two times a day  vancomycin  IVPB 1250 IV Intermittent every 24 hours      WEIGHT  Weight (kg): 74.8 (12-02-21 @ 14:58)  Creatinine, Serum: 1.2 mg/dL (12-07-21 @ 04:30)      ANTIBIOTICS:  valACYclovir 500 milliGRAM(s) Oral daily  vancomycin    Solution 125 milliGRAM(s) Oral two times a day  vancomycin  IVPB 1250 milliGRAM(s) IV Intermittent every 24 hours      All available historical records have been reviewed

## 2021-12-07 NOTE — PROGRESS NOTE ADULT - ASSESSMENT
ASSESSMENT      Patient is a 70 y/o female with a PMH of COPD, Asthma, Pulmonary HTN - on Home O2 intermittently as needed, chronic back pain, cervical radiculopathy, CKD 3, DLD, HTN, Gout, and recent pshx of L ankle ORIF on 9/23/2021 for L ankle fracture initially presented to the ED from the nursing home due to with persistent drainage from lateral ankle incision and Left ankle pain with the inability to ambulate. Patient described the pain as  persistent, and she endorsed swelling, and drainage. The patient denies any h/o fever, chills, cough, chest pain, shortness of breath. She was seen by Dr. Clemons in the office where she did xrays and examined the ankle. Pt reports she was told by Dr. Clemons to present to ED for evaluation. Patient also states that she recently had watery diarrhea in the nursing home and was being treated for C.diff in the Nursing home. Patient states that she has been having episodes of diarrhea for several weeks prior to presenting to the ED. Patient had HWR procedure last night 12/02. She denies any other symptoms at this time.     In the ED patient was hemodynamically stable, Labs were unremarkable.     IMPRESSION  #ORIF with exposed hardware s/p hardware removal, no systemic signs of sepsis    OR cx MRSA  s/p Removal, hardware, deep 02-Dec-2021 16:36:59  Ghazala Clemons  Irrigation, wound, lower extremity 02-Dec-2021 16:37:47 I&D lateral ankle wound Ghazala Clemons.  "small wound dehiscence over prominent hardware"  #Sepsis ruled out on admission   #Cdiff, recently completed, second episode  #Obesity     RECOMMEND  - Continue Vanc 1.25 g q12h IV  - Please recheck vanc trough 30 min prior to 4th dose this PM , goal 15-20  - PO vancomycin to 125 mg BID, recent Cdiff   - Will need IV antibiotic on D/C, via PICC, (VANC IV x 6 weeks from OR date & would continue PO vancomycin 125 mg daily as ppx while on Abx as recent CDI)  - Weekly CBC, CMP, ESR/CRP, Vanc trough  - ID follow-up with Dr. Erwin Logan for Telehealth 12/14. We will call the patient between 10:30-6:30      [address] [place] [address]       [phone]       Fax [phone]     If any questions, please call or send a message on [employer] Teams  Please continue to update ID with any pertinent new laboratory or radiographic findings  [id number]

## 2021-12-07 NOTE — PROGRESS NOTE ADULT - ASSESSMENT
72 y/o F with PMH of COPD, Asthma, PE on eliquis, Pulmonary HTN - on Home O2 intermittently as needed, chronic back pain, cervical radiculopathy, CKD 3, DLD, HTN, Gout, and recent pshx of L ankle ORIF on 9/23/2021 for L ankle fracture, brought from [facility] on 11/30 for left ankle pain, and drainage in setting of a recent ORIF to left ankle on 09/23/21. She was found to have evidence of possible infected hardware, was admitted for hardware removal and is now status post removal of hardware on 12/02.    # Left ankle pain/drainage   # Ambulatory dysfunction  # s/p removal of hardware 12/2 for exposed hardware and infection  - s/p ORIF on 09/23/2021  - s/p PICC line 12/6/21  - OR culture with MRSA  - ID following  - increased IV Vancomycin from 1000 to 1250 mg daily (trough 11.4 on 12/5)   - Per ID, recheck vancomycin trough 30 min prior to 4th dose this PM , goal 15-20  - Per ID, pt will need IV antibiotic on D/C, via PICC (vancomycin IV x 6 weeks from OR date & would continue PO vancomycin 125 mg daily as ppx while on Abx as recent CDI)  - Weekly CBC, CMP, ESR/CRP, Vanc trough  - Per ID, follow-up with Dr. Erwin Logan for Telehealth 12/14. ID will call pt between 10:30-6:30  - Ortho consulted   - JIA LLE   - pain control  - continue PT/OT    #h/o acute PE 10/2021  - On eliquis at home (provoked and found incidentally found last admission in October)  - seen by pulmonary  - on lovenox therapeutic dose for now  - Resume Eliquis 5mg BID 1 week post procedure 12/02    # COPD, Asthma on home O2 2L as needed; currently satting well  # MARLENI  - Symbicort while admitted  - albuterol PRN    #HTN - baseline SBP in 100-120 range  - Atenolol 50mg qd, lasix 20mg qd, spironolactone and nifedipine 60mg ed  - Monitor for now     #Anemia   - likely macrocytic Hb: 10 Mcv: 106.8  - Pt on folic acid PO qd   - Monitor for now     # Chronic Back Pain  # Hx of Cervical Radiculopathy  - Sees pain management, Dr. Mayers  - Continue pain control     #Pulm HTN - on sildenafil, diuretics    # CKD 3 - stable  - Creat 1.2 BUN 19  - Sees Dr. Mcarthur    # DLD  - C/w Atorvastatin    #Hypokalemia - resolved     #gout  - Pt on Allopurinol 300mg qd  - Continue current management     PROGRESS NOTE HANDOFF    Pending: continue PT/OT, covid test   Disposition: [facility] 70 y/o F with PMH of COPD, Asthma, PE on eliquis, Pulmonary HTN - on Home O2 intermittently as needed, chronic back pain, cervical radiculopathy, CKD 3, DLD, HTN, Gout, and recent pshx of L ankle ORIF on 9/23/2021 for L ankle fracture, brought from [facility] on 11/30 for left ankle pain, and drainage in setting of a recent ORIF to left ankle on 09/23/21. She was found to have evidence of possible infected hardware, was admitted for hardware removal and is now status post removal of hardware on 12/02.    # Left ankle pain/drainage   # Ambulatory dysfunction  # s/p removal of hardware 12/2 for exposed hardware and infection  - s/p ORIF on 09/23/2021  - s/p PICC line 12/6/21  - OR culture with MRSA  - ID following  - increased IV Vancomycin from 1000 to 1250 mg daily (trough 11.4 on 12/5)   - Per ID, pt will need IV antibiotic on D/C, via PICC (vancomycin IV x 6 weeks from OR date & would continue PO vancomycin 125 mg daily as ppx while on Abx as recent CDI  - Weekly CBC, CMP, ESR/CRP, Vanc trough  - Per ID, follow-up with Dr. Erwin Logan for Telehealth 12/14. ID will call pt between 10:30-6:30  - Ortho consulted   - JIA OTTO   - pain control  - continue PT/OT    #h/o acute PE 10/2021  - On eliquis at home (provoked and found incidentally found last admission in October)  - seen by pulmonary  - on lovenox therapeutic dose for now  - Resume Eliquis 5mg BID 1 week post procedure ( from 12/02)    # COPD, Asthma on home O2 2L as needed; currently satting well  # MARLENI  - Symbicort while admitted  - albuterol PRN    #HTN - baseline SBP in 100-120 range  - Atenolol 50mg qd, lasix 20mg qd, spironolactone and nifedipine 60mg ed  - Monitor for now     #Anemia   - likely macrocytic Hb: 10 Mcv: 106.8  - Pt on folic acid PO qd   - OP follow up     # Chronic Back Pain  # Hx of Cervical Radiculopathy  - Sees pain management, Dr. Mayers  - Continue pain control     #Pulm HTN - on sildenafil, diuretics    # CKD 3 - stable  - Sees Dr. Mcarthur OP    # DLD  - C/w Atorvastatin    #Hypokalemia - resolved     #gout  - Pt on Allopurinol 300mg qd  - Continue current management     PROGRESS NOTE HANDOFF    Pending: continue PT/OT, covid test   Disposition: [facility] 72 y/o F with PMH of COPD, Asthma, PE on eliquis, Pulmonary HTN - on Home O2 intermittently as needed, chronic back pain, cervical radiculopathy, CKD 3, DLD, HTN, Gout, and recent pshx of L ankle ORIF on 9/23/2021 for L ankle fracture, brought from [facility] on 11/30 for left ankle pain, and drainage in setting of a recent ORIF to left ankle on 09/23/21. She was found to have evidence of possible infected hardware, was admitted for hardware removal and is now status post removal of hardware on 12/02.    # Left ankle pain/drainage   # Ambulatory dysfunction  # s/p removal of hardware 12/2 for exposed hardware and infection  - s/p ORIF on 09/23/2021  - s/p PICC line 12/6/21  - OR culture with MRSA  - ID following  - increased IV Vancomycin from 1000 to 1250 mg daily (trough 11.4 on 12/5)   - Per ID, pt will need IV antibiotic on D/C, via PICC (vancomycin IV x 6 weeks from OR date & would continue PO vancomycin 125 mg daily as ppx while on Abx as recent CDI  - Weekly CBC, CMP, ESR/CRP, Vanc trough  - Per ID, follow-up with Dr. Erwin Logan for Telehealth 12/14. ID will call pt between 10:30-6:30  - Ortho consulted   - JIA OTTO   - pain control  - continue PT/OT    #h/o acute PE 10/2021  - On eliquis at home (provoked and found incidentally found last admission in October)  - seen by pulmonary  - on lovenox therapeutic dose for now  - Resume Eliquis 5mg BID 1 week post procedure ( from 12/02)    # COPD, Asthma on home O2 2L as needed; currently satting well  # MARLENI  - Symbicort while admitted  - albuterol PRN    #HTN - baseline SBP in 100-120 range  - Atenolol 50mg qd, lasix 20mg qd, spironolactone and nifedipine 60mg ed  - Monitor for now     #Anemia   - likely macrocytic Hb: 10 Mcv: 106.8  - Pt on folic acid PO qd   - OP follow up     # Chronic Back Pain  # Hx of Cervical Radiculopathy  - Sees pain management, Dr. Mayers  - Continue pain control     #Pulm HTN - on sildenafil, diuretics    # CKD 3 - stable  - Sees Dr. Mcarthur OP    # DLD  - C/w Atorvastatin    #Hypokalemia - resolved     #gout  - Pt on Allopurinol 300mg qd  - Continue current management     PROGRESS NOTE HANDOFF    DVT ppx: Lovenox SQ  GI ppx: n/a  Pending: continue PT/OT, covid test   Disposition: [facility]

## 2021-12-07 NOTE — PROGRESS NOTE ADULT - SUBJECTIVE AND OBJECTIVE BOX
BERNARD SPEARS  71y Female    INTERVAL HPI/OVERNIGHT EVENTS:    has some pain at site of PICC line  chronic diarrhea  denies N/V, SOB, chest pain  tired today     T(F): 97.9 (12-07-21 @ 14:02), Max: 98.5 (12-06-21 @ 20:59)  HR: 76 (12-07-21 @ 14:02) (69 - 76)  BP: 126/61 (12-07-21 @ 14:02) (106/58 - 126/61)  RR: 18 (12-07-21 @ 14:02) (18 - 18)  SpO2: --  I&O's Summary    06 Dec 2021 07:01  -  07 Dec 2021 07:00  --------------------------------------------------------  IN: 0 mL / OUT: 650 mL / NET: -650 mL    PHYSICAL EXAM:  GENERAL: NAD  HEAD:  Normocephalic  EYES:  conjunctiva and sclera clear  ENMT: Moist mucous membranes  NERVOUS SYSTEM:  Alert, awake, Good concentration  CHEST/LUNG: CTA b/l  HEART: Regular rate and rhythm  ABDOMEN: Soft, Nontender, Nondistended; Bowel sounds present  EXTREMITIES:  Left LE with dressing  SKIN: left arm with PICC line    Consultant(s) Notes Reviewed:  [x ] YES  [ ] NO  Care Discussed with Consultants/Other Providers [ x] YES  [ ] NO    MEDICATIONS  (STANDING):  allopurinol 300 milliGRAM(s) Oral at bedtime  ATENolol  Tablet 50 milliGRAM(s) Oral daily  atorvastatin 80 milliGRAM(s) Oral at bedtime  budesonide 160 MICROgram(s)/formoterol 4.5 MICROgram(s) Inhaler 2 Puff(s) Inhalation two times a day  buPROPion XL (24-Hour) . 150 milliGRAM(s) Oral daily  cholecalciferol 400 Unit(s) Oral daily  colchicine 0.6 milliGRAM(s) Oral daily  enoxaparin Injectable 70 milliGRAM(s) SubCutaneous every 12 hours  FLUoxetine 20 milliGRAM(s) Oral two times a day  folic acid 1 milliGRAM(s) Oral daily  furosemide    Tablet 20 milliGRAM(s) Oral daily  hyoscyamine SL 0.125 milliGRAM(s) SubLingual daily  magnesium oxide 400 milliGRAM(s) Oral daily  NIFEdipine XL 60 milliGRAM(s) Oral daily  predniSONE   Tablet 10 milliGRAM(s) Oral daily  primidone 50 milliGRAM(s) Oral at bedtime  sildenafil (REVATIO) 20 milliGRAM(s) Oral three times a day  spironolactone 50 milliGRAM(s) Oral daily  tamsulosin 0.4 milliGRAM(s) Oral at bedtime  traZODone 100 milliGRAM(s) Oral at bedtime  valACYclovir 500 milliGRAM(s) Oral daily  vancomycin    Solution 125 milliGRAM(s) Oral two times a day  vancomycin  IVPB 1250 milliGRAM(s) IV Intermittent every 24 hours    MEDICATIONS  (PRN):  acetaminophen     Tablet .. 650 milliGRAM(s) Oral every 6 hours PRN Temp greater or equal to 38C (100.4F), Moderate Pain (4 - 6)  ALBUTerol    90 MICROgram(s) HFA Inhaler 2 Puff(s) Inhalation every 6 hours PRN Shortness of Breath and/or Wheezing  diazepam    Tablet 10 milliGRAM(s) Oral three times a day PRN Anxiety  melatonin 5 milliGRAM(s) Oral at bedtime PRN Insomnia  oxycodone    5 mG/acetaminophen 325 mG 1 Tablet(s) Oral every 8 hours PRN Severe Pain (7 - 10)  simethicone 80 milliGRAM(s) Chew daily PRN Indigestion      LABS:                        10.0   9.40  )-----------( 213      ( 07 Dec 2021 04:30 )             31.4     12-07    139  |  106  |  19  ----------------------------<  87  4.0   |  17  |  1.2    Ca    9.2      07 Dec 2021 04:30  Mg     2.0     12-07    TPro  5.7<L>  /  Alb  3.6  /  TBili  <0.2  /  DBili  x   /  AST  36  /  ALT  25  /  AlkPhos  103  12-07    PT/INR - ( 06 Dec 2021 06:00 )   PT: 12.30 sec;   INR: 1.07 ratio         PTT - ( 06 Dec 2021 06:00 )  PTT:32.7 sec        Case discussed with residents and RN on rounds today    Care discussed with pt

## 2021-12-07 NOTE — PROGRESS NOTE ADULT - SUBJECTIVE AND OBJECTIVE BOX
Patient is a 71y old  Female who presents with a chief complaint of Left ankle pain (07 Dec 2021 12:29)      INTERVAL HPI/OVERNIGHT EVENTS:  No acute events overnight. Pt had PICC line placed yesterday. Pt denies fever, headache nausea, vomiting, SOB, chest pain. Pt admits chronic diarrhea. Pt denies all other pertinent complaints.     FAMILY HISTORY:  Family history of bladder cancer (Mother)    Family history of Alzheimer&#x27;s disease (Father)      T(C): 36.1 (12-07-21 @ 04:35), Max: 36.9 (12-06-21 @ 20:59)  HR: 69 (12-07-21 @ 04:35) (69 - 73)  BP: 117/56 (12-07-21 @ 04:35) (106/58 - 117/56)  RR: 18 (12-07-21 @ 04:35) (18 - 18)  SpO2: --  Wt(kg): --Vital Signs Last 24 Hrs  T(C): 36.1 (07 Dec 2021 04:35), Max: 36.9 (06 Dec 2021 20:59)  T(F): 96.9 (07 Dec 2021 04:35), Max: 98.5 (06 Dec 2021 20:59)  HR: 69 (07 Dec 2021 04:35) (69 - 73)  BP: 117/56 (07 Dec 2021 04:35) (106/58 - 117/56)  BP(mean): --  RR: 18 (07 Dec 2021 04:35) (18 - 18)  SpO2: --    PHYSICAL EXAM:  GENERAL: NAD, thin, elderly   HEAD:  Atraumatic, Normocephalic  EYES: conjunctiva and sclera clear  ENMT: No lesions  NECK: No JVD  NERVOUS SYSTEM:  Alert & Oriented X3, Good concentration;  CHEST/LUNG: Clear to percussion bilaterally; No rales  HEART: Regular rate and rhythm; No murmurs  ABDOMEN: Soft, Nontender, Nondistended; Bowel sounds present  EXTREMITIES: + left ankle dressing  LYMPH: No lymphadenopathy noted  SKIN: dry skin      LABS:                          10.0   9.40  )-----------( 213      ( 07 Dec 2021 04:30 )             31.4     12-07    139  |  106  |  19  ----------------------------<  87  4.0   |  17  |  1.2    Ca    9.2      07 Dec 2021 04:30  Mg     2.0     12-07    TPro  5.7<L>  /  Alb  3.6  /  TBili  <0.2  /  DBili  x   /  AST  36  /  ALT  25  /  AlkPhos  103  12-07    PT/INR - ( 06 Dec 2021 06:00 )   PT: 12.30 sec;   INR: 1.07 ratio         PTT - ( 06 Dec 2021 06:00 )  PTT:32.7 sec                RADIOLOGY & ADDITIONAL TESTS:    Imaging Personally Reviewed:  [ ] YES  [ ] NO  acetaminophen     Tablet .. 650 milliGRAM(s) Oral every 6 hours PRN  ALBUTerol    90 MICROgram(s) HFA Inhaler 2 Puff(s) Inhalation every 6 hours PRN  allopurinol 300 milliGRAM(s) Oral at bedtime  ATENolol  Tablet 50 milliGRAM(s) Oral daily  atorvastatin 80 milliGRAM(s) Oral at bedtime  budesonide 160 MICROgram(s)/formoterol 4.5 MICROgram(s) Inhaler 2 Puff(s) Inhalation two times a day  buPROPion XL (24-Hour) . 150 milliGRAM(s) Oral daily  cholecalciferol 400 Unit(s) Oral daily  colchicine 0.6 milliGRAM(s) Oral daily  diazepam    Tablet 10 milliGRAM(s) Oral three times a day PRN  enoxaparin Injectable 70 milliGRAM(s) SubCutaneous every 12 hours  FLUoxetine 20 milliGRAM(s) Oral two times a day  folic acid 1 milliGRAM(s) Oral daily  furosemide    Tablet 20 milliGRAM(s) Oral daily  hyoscyamine SL 0.125 milliGRAM(s) SubLingual daily  magnesium oxide 400 milliGRAM(s) Oral daily  melatonin 5 milliGRAM(s) Oral at bedtime PRN  NIFEdipine XL 60 milliGRAM(s) Oral daily  oxycodone    5 mG/acetaminophen 325 mG 1 Tablet(s) Oral every 8 hours PRN  predniSONE   Tablet 10 milliGRAM(s) Oral daily  primidone 50 milliGRAM(s) Oral at bedtime  sildenafil (REVATIO) 20 milliGRAM(s) Oral three times a day  simethicone 80 milliGRAM(s) Chew daily PRN  spironolactone 50 milliGRAM(s) Oral daily  tamsulosin 0.4 milliGRAM(s) Oral at bedtime  traZODone 100 milliGRAM(s) Oral at bedtime  valACYclovir 500 milliGRAM(s) Oral daily  vancomycin    Solution 125 milliGRAM(s) Oral two times a day  vancomycin  IVPB 1250 milliGRAM(s) IV Intermittent every 24 hours           Patient is a 71y old  Female who presents with a chief complaint of Left ankle pain (07 Dec 2021 12:29)      INTERVAL HPI/OVERNIGHT EVENTS:  No acute events overnight. Pt had PICC line placed yesterday. Pt denies fever, headache nausea, vomiting, SOB, chest pain. Pt admits chronic diarrhea. Pt denies all other pertinent complaints.     FAMILY HISTORY:  Family history of bladder cancer (Mother)    Family history of Alzheimer's disease (Father)    T(C): 36.1 (12-07-21 @ 04:35), Max: 36.9 (12-06-21 @ 20:59)  HR: 69 (12-07-21 @ 04:35) (69 - 73)  BP: 117/56 (12-07-21 @ 04:35) (106/58 - 117/56)  RR: 18 (12-07-21 @ 04:35) (18 - 18)  SpO2: --  Wt(kg): --Vital Signs Last 24 Hrs  T(C): 36.1 (07 Dec 2021 04:35), Max: 36.9 (06 Dec 2021 20:59)  T(F): 96.9 (07 Dec 2021 04:35), Max: 98.5 (06 Dec 2021 20:59)  HR: 69 (07 Dec 2021 04:35) (69 - 73)  BP: 117/56 (07 Dec 2021 04:35) (106/58 - 117/56)  BP(mean): --  RR: 18 (07 Dec 2021 04:35) (18 - 18)  SpO2: --    PHYSICAL EXAM:  GENERAL: NAD, thin, elderly   HEAD:  Atraumatic, Normocephalic  EYES: conjunctiva and sclera clear  ENMT: No lesions  NECK: No JVD  NERVOUS SYSTEM:  Alert & Oriented X3, Good concentration;  CHEST/LUNG: Clear to percussion bilaterally; No rales  HEART: Regular rate and rhythm; No murmurs  ABDOMEN: Soft, Nontender, Nondistended; Bowel sounds present  EXTREMITIES: + left ankle dressing  LYMPH: No lymphadenopathy noted  SKIN: dry skin      LABS:                          10.0   9.40  )-----------( 213      ( 07 Dec 2021 04:30 )             31.4     12-07    139  |  106  |  19  ----------------------------<  87  4.0   |  17  |  1.2    Ca    9.2      07 Dec 2021 04:30  Mg     2.0     12-07    TPro  5.7<L>  /  Alb  3.6  /  TBili  <0.2  /  DBili  x   /  AST  36  /  ALT  25  /  AlkPhos  103  12-07    PT/INR - ( 06 Dec 2021 06:00 )   PT: 12.30 sec;   INR: 1.07 ratio         PTT - ( 06 Dec 2021 06:00 )  PTT:32.7 sec                RADIOLOGY & ADDITIONAL TESTS:    Imaging Personally Reviewed:  [ ] YES  [ ] NO  acetaminophen     Tablet .. 650 milliGRAM(s) Oral every 6 hours PRN  ALBUTerol    90 MICROgram(s) HFA Inhaler 2 Puff(s) Inhalation every 6 hours PRN  allopurinol 300 milliGRAM(s) Oral at bedtime  ATENolol  Tablet 50 milliGRAM(s) Oral daily  atorvastatin 80 milliGRAM(s) Oral at bedtime  budesonide 160 MICROgram(s)/formoterol 4.5 MICROgram(s) Inhaler 2 Puff(s) Inhalation two times a day  buPROPion XL (24-Hour) . 150 milliGRAM(s) Oral daily  cholecalciferol 400 Unit(s) Oral daily  colchicine 0.6 milliGRAM(s) Oral daily  diazepam    Tablet 10 milliGRAM(s) Oral three times a day PRN  enoxaparin Injectable 70 milliGRAM(s) SubCutaneous every 12 hours  FLUoxetine 20 milliGRAM(s) Oral two times a day  folic acid 1 milliGRAM(s) Oral daily  furosemide    Tablet 20 milliGRAM(s) Oral daily  hyoscyamine SL 0.125 milliGRAM(s) SubLingual daily  magnesium oxide 400 milliGRAM(s) Oral daily  melatonin 5 milliGRAM(s) Oral at bedtime PRN  NIFEdipine XL 60 milliGRAM(s) Oral daily  oxycodone    5 mG/acetaminophen 325 mG 1 Tablet(s) Oral every 8 hours PRN  predniSONE   Tablet 10 milliGRAM(s) Oral daily  primidone 50 milliGRAM(s) Oral at bedtime  sildenafil (REVATIO) 20 milliGRAM(s) Oral three times a day  simethicone 80 milliGRAM(s) Chew daily PRN  spironolactone 50 milliGRAM(s) Oral daily  tamsulosin 0.4 milliGRAM(s) Oral at bedtime  traZODone 100 milliGRAM(s) Oral at bedtime  valACYclovir 500 milliGRAM(s) Oral daily  vancomycin    Solution 125 milliGRAM(s) Oral two times a day  vancomycin  IVPB 1250 milliGRAM(s) IV Intermittent every 24 hours

## 2021-12-08 ENCOUNTER — TRANSCRIPTION ENCOUNTER (OUTPATIENT)
Age: 71
End: 2021-12-08

## 2021-12-08 LAB
ALBUMIN SERPL ELPH-MCNC: 3.7 G/DL — SIGNIFICANT CHANGE UP (ref 3.5–5.2)
ALP SERPL-CCNC: 95 U/L — SIGNIFICANT CHANGE UP (ref 30–115)
ALT FLD-CCNC: 26 U/L — SIGNIFICANT CHANGE UP (ref 0–41)
ANION GAP SERPL CALC-SCNC: 16 MMOL/L — HIGH (ref 7–14)
AST SERPL-CCNC: 25 U/L — SIGNIFICANT CHANGE UP (ref 0–41)
BASOPHILS # BLD AUTO: 0.05 K/UL — SIGNIFICANT CHANGE UP (ref 0–0.2)
BASOPHILS NFR BLD AUTO: 0.5 % — SIGNIFICANT CHANGE UP (ref 0–1)
BILIRUB SERPL-MCNC: 0.2 MG/DL — SIGNIFICANT CHANGE UP (ref 0.2–1.2)
BUN SERPL-MCNC: 24 MG/DL — HIGH (ref 10–20)
CALCIUM SERPL-MCNC: 9.4 MG/DL — SIGNIFICANT CHANGE UP (ref 8.5–10.1)
CHLORIDE SERPL-SCNC: 103 MMOL/L — SIGNIFICANT CHANGE UP (ref 98–110)
CO2 SERPL-SCNC: 19 MMOL/L — SIGNIFICANT CHANGE UP (ref 17–32)
CREAT SERPL-MCNC: 1.4 MG/DL — SIGNIFICANT CHANGE UP (ref 0.7–1.5)
CULTURE RESULTS: SIGNIFICANT CHANGE UP
CULTURE RESULTS: SIGNIFICANT CHANGE UP
EOSINOPHIL # BLD AUTO: 0.18 K/UL — SIGNIFICANT CHANGE UP (ref 0–0.7)
EOSINOPHIL NFR BLD AUTO: 1.7 % — SIGNIFICANT CHANGE UP (ref 0–8)
GLUCOSE SERPL-MCNC: 86 MG/DL — SIGNIFICANT CHANGE UP (ref 70–99)
HCT VFR BLD CALC: 30 % — LOW (ref 37–47)
HGB BLD-MCNC: 9.6 G/DL — LOW (ref 12–16)
IMM GRANULOCYTES NFR BLD AUTO: 1 % — HIGH (ref 0.1–0.3)
LYMPHOCYTES # BLD AUTO: 2.59 K/UL — SIGNIFICANT CHANGE UP (ref 1.2–3.4)
LYMPHOCYTES # BLD AUTO: 24.5 % — SIGNIFICANT CHANGE UP (ref 20.5–51.1)
MAGNESIUM SERPL-MCNC: 1.9 MG/DL — SIGNIFICANT CHANGE UP (ref 1.8–2.4)
MCHC RBC-ENTMCNC: 32 G/DL — SIGNIFICANT CHANGE UP (ref 32–37)
MCHC RBC-ENTMCNC: 33.2 PG — HIGH (ref 27–31)
MCV RBC AUTO: 103.8 FL — HIGH (ref 81–99)
MONOCYTES # BLD AUTO: 1.13 K/UL — HIGH (ref 0.1–0.6)
MONOCYTES NFR BLD AUTO: 10.7 % — HIGH (ref 1.7–9.3)
NEUTROPHILS # BLD AUTO: 6.49 K/UL — SIGNIFICANT CHANGE UP (ref 1.4–6.5)
NEUTROPHILS NFR BLD AUTO: 61.6 % — SIGNIFICANT CHANGE UP (ref 42.2–75.2)
NRBC # BLD: 0 /100 WBCS — SIGNIFICANT CHANGE UP (ref 0–0)
ORGANISM # SPEC MICROSCOPIC CNT: SIGNIFICANT CHANGE UP
ORGANISM # SPEC MICROSCOPIC CNT: SIGNIFICANT CHANGE UP
PLATELET # BLD AUTO: 209 K/UL — SIGNIFICANT CHANGE UP (ref 130–400)
POTASSIUM SERPL-MCNC: 3.6 MMOL/L — SIGNIFICANT CHANGE UP (ref 3.5–5)
POTASSIUM SERPL-SCNC: 3.6 MMOL/L — SIGNIFICANT CHANGE UP (ref 3.5–5)
PROT SERPL-MCNC: 5.6 G/DL — LOW (ref 6–8)
RBC # BLD: 2.89 M/UL — LOW (ref 4.2–5.4)
RBC # FLD: 14.8 % — HIGH (ref 11.5–14.5)
SODIUM SERPL-SCNC: 138 MMOL/L — SIGNIFICANT CHANGE UP (ref 135–146)
SPECIMEN SOURCE: SIGNIFICANT CHANGE UP
SPECIMEN SOURCE: SIGNIFICANT CHANGE UP
WBC # BLD: 10.55 K/UL — SIGNIFICANT CHANGE UP (ref 4.8–10.8)
WBC # FLD AUTO: 10.55 K/UL — SIGNIFICANT CHANGE UP (ref 4.8–10.8)

## 2021-12-08 PROCEDURE — 99239 HOSP IP/OBS DSCHRG MGMT >30: CPT

## 2021-12-08 RX ORDER — VANCOMYCIN HCL 1 G
1 VIAL (EA) INTRAVENOUS
Qty: 0 | Refills: 0 | DISCHARGE
Start: 2021-12-08 | End: 2022-01-13

## 2021-12-08 RX ORDER — CHOLESTYRAMINE 4 G/9G
4 POWDER, FOR SUSPENSION ORAL
Qty: 0 | Refills: 0 | DISCHARGE

## 2021-12-08 RX ORDER — VANCOMYCIN HCL 1 G
5 VIAL (EA) INTRAVENOUS
Qty: 0 | Refills: 0 | DISCHARGE

## 2021-12-08 RX ORDER — APIXABAN 2.5 MG/1
5 TABLET, FILM COATED ORAL EVERY 12 HOURS
Refills: 0 | Status: DISCONTINUED | OUTPATIENT
Start: 2021-12-08 | End: 2021-12-09

## 2021-12-08 RX ORDER — LIDOCAINE 4 G/100G
1 CREAM TOPICAL
Qty: 0 | Refills: 0 | DISCHARGE

## 2021-12-08 RX ORDER — VANCOMYCIN HCL 1 G
1.25 VIAL (EA) INTRAVENOUS
Qty: 0 | Refills: 0 | DISCHARGE
Start: 2021-12-08

## 2021-12-08 RX ADMIN — APIXABAN 5 MILLIGRAM(S): 2.5 TABLET, FILM COATED ORAL at 18:57

## 2021-12-08 RX ADMIN — ATORVASTATIN CALCIUM 80 MILLIGRAM(S): 80 TABLET, FILM COATED ORAL at 21:52

## 2021-12-08 RX ADMIN — Medication 300 MILLIGRAM(S): at 21:52

## 2021-12-08 RX ADMIN — Medication 20 MILLIGRAM(S): at 06:16

## 2021-12-08 RX ADMIN — Medication 100 MILLIGRAM(S): at 21:52

## 2021-12-08 RX ADMIN — Medication 20 MILLIGRAM(S): at 21:52

## 2021-12-08 RX ADMIN — Medication 10 MILLIGRAM(S): at 13:14

## 2021-12-08 RX ADMIN — ATENOLOL 50 MILLIGRAM(S): 25 TABLET ORAL at 11:54

## 2021-12-08 RX ADMIN — Medication 10 MILLIGRAM(S): at 21:53

## 2021-12-08 RX ADMIN — Medication 20 MILLIGRAM(S): at 13:16

## 2021-12-08 RX ADMIN — Medication 650 MILLIGRAM(S): at 13:14

## 2021-12-08 RX ADMIN — PRIMIDONE 50 MILLIGRAM(S): 250 TABLET ORAL at 21:52

## 2021-12-08 RX ADMIN — TAMSULOSIN HYDROCHLORIDE 0.4 MILLIGRAM(S): 0.4 CAPSULE ORAL at 21:52

## 2021-12-08 RX ADMIN — BUPROPION HYDROCHLORIDE 150 MILLIGRAM(S): 150 TABLET, EXTENDED RELEASE ORAL at 11:56

## 2021-12-08 RX ADMIN — VALACYCLOVIR 500 MILLIGRAM(S): 500 TABLET, FILM COATED ORAL at 11:56

## 2021-12-08 RX ADMIN — Medication 0.6 MILLIGRAM(S): at 11:54

## 2021-12-08 RX ADMIN — ENOXAPARIN SODIUM 70 MILLIGRAM(S): 100 INJECTION SUBCUTANEOUS at 06:15

## 2021-12-08 RX ADMIN — Medication 125 MILLIGRAM(S): at 06:20

## 2021-12-08 RX ADMIN — Medication 125 MILLIGRAM(S): at 18:56

## 2021-12-08 RX ADMIN — Medication 20 MILLIGRAM(S): at 06:17

## 2021-12-08 RX ADMIN — Medication 0.12 MILLIGRAM(S): at 11:55

## 2021-12-08 RX ADMIN — Medication 60 MILLIGRAM(S): at 11:55

## 2021-12-08 RX ADMIN — SPIRONOLACTONE 50 MILLIGRAM(S): 25 TABLET, FILM COATED ORAL at 06:17

## 2021-12-08 RX ADMIN — MAGNESIUM OXIDE 400 MG ORAL TABLET 400 MILLIGRAM(S): 241.3 TABLET ORAL at 11:55

## 2021-12-08 RX ADMIN — BUDESONIDE AND FORMOTEROL FUMARATE DIHYDRATE 2 PUFF(S): 160; 4.5 AEROSOL RESPIRATORY (INHALATION) at 08:16

## 2021-12-08 RX ADMIN — Medication 400 UNIT(S): at 13:15

## 2021-12-08 RX ADMIN — Medication 166.67 MILLIGRAM(S): at 18:58

## 2021-12-08 RX ADMIN — Medication 10 MILLIGRAM(S): at 06:18

## 2021-12-08 RX ADMIN — Medication 20 MILLIGRAM(S): at 18:57

## 2021-12-08 RX ADMIN — Medication 1 MILLIGRAM(S): at 11:54

## 2021-12-08 NOTE — DISCHARGE NOTE PROVIDER - NSDCCPCAREPLAN_GEN_ALL_CORE_FT
PRINCIPAL DISCHARGE DIAGNOSIS  Diagnosis: Wound infection complicating hardware  Assessment and Plan of Treatment: You have had your hardware removed by orthopedic surgery. Infectious disease recommends a six week long course of antibiotics for which you have recieved a PICC line. It is absolutely vital that your rehab facility draws a vancomycin trough prior to your next dose of vancomycin to make sure your vancomycin levels are therapeutic. Please follow up with your orthopedic surgeon and infectious disease doctor.       PRINCIPAL DISCHARGE DIAGNOSIS  Diagnosis: Wound infection complicating hardware  Assessment and Plan of Treatment: You have had your hardware removed by orthopedic surgery. Infectious disease recommends a six week long course of antibiotics for which you have recieved a PICC line. It is absolutely vital that your rehab facility draws a vancomycin trough prior to your next dose of vancomycin to make sure your vancomycin levels are therapeutic. Please follow up with your orthopedic surgeon and infectious disease doctor. Your appointment with infectious disease is a televisit and scheduled for 12/4.      SECONDARY DISCHARGE DIAGNOSES  Diagnosis: Urinary retention  Assessment and Plan of Treatment: You have urinary retention. A foleyy catheter has been placed. Please attempt further trial of void and work up at the rehab facility. Please follow up with a urologist as an OP.     PRINCIPAL DISCHARGE DIAGNOSIS  Diagnosis: Wound infection complicating hardware  Assessment and Plan of Treatment: You have had your hardware removed by orthopedic surgery. Infectious disease recommends a six week long course of antibiotics for which you have recieved a PICC line. It is absolutely vital that your rehab facility draws a vancomycin trough prior to your next dose of vancomycin to make sure your vancomycin levels are therapeutic. Please follow up with your orthopedic surgeon and infectious disease doctor. Your appointment with infectious disease is a televisit and scheduled for 12/4.      SECONDARY DISCHARGE DIAGNOSES  Diagnosis: Urinary retention  Assessment and Plan of Treatment: You have urinary retention. A foleyy catheter has been placed. Please attempt further trial of void and work up at the rehab facility. Please follow up with a urologist as an OP. Please also see your nephrologist within 1 week of discharge.     PRINCIPAL DISCHARGE DIAGNOSIS  Diagnosis: Wound infection complicating hardware  Assessment and Plan of Treatment: You have had your hardware removed by orthopedic surgery. You were found to have a MRSA infection. Infectious disease recommends a six week long course of antibiotics for which you have recieved a PICC line. It is absolutely vital that your rehab facility draws a vancomycin trough prior to your next dose of vancomycin to make sure your vancomycin levels are therapeutic. Please follow up with your orthopedic surgeon and infectious disease doctor. Your appointment with infectious disease is a televisit and scheduled for 12/4.      SECONDARY DISCHARGE DIAGNOSES  Diagnosis: Urinary retention  Assessment and Plan of Treatment: You have urinary retention. A foleyy catheter has been placed. Please attempt further trial of void and work up at the rehab facility. Please follow up with a urologist as an OP. Please also see your nephrologist within 1 week of discharge.

## 2021-12-08 NOTE — DISCHARGE NOTE PROVIDER - NSDCFUADDINST_GEN_ALL_CORE_FT
- NWB on LLE   - It is absolutely vital that your rehab facility draws a vancomycin trough prior to your next dose of vancomycin to make sure your vancomycin levels are therapeutic.     - NWB on LLE   - It is absolutely vital that your rehab facility draws a vancomycin trough prior to your next dose of vancomycin to make sure your vancomycin levels are therapeutic.  - You will need weekly CBC, CMP, ESR/CRP, Vanc trough    ID follow-up with Dr. Erwin Logan for Telehealth 12/14. We will call the patient between 10:30-6:30      [id number] [facility]       [phone]       Fax [phone]

## 2021-12-08 NOTE — PROGRESS NOTE ADULT - ASSESSMENT
70 y/o F with PMH of COPD, Asthma, PE on eliquis, Pulmonary HTN - on Home O2 intermittently as needed, chronic back pain, cervical radiculopathy, CKD 3, DLD, HTN, Gout, and recent pshx of L ankle ORIF on 9/23/2021 for L ankle fracture, brought from [facility] on 11/30 for left ankle pain, and drainage in setting of a recent ORIF to left ankle on 09/23/21. She was found to have evidence of possible infected hardware, was admitted for hardware removal and is now status post removal of hardware on 12/02.    # Left ankle pain/drainage   # Ambulatory dysfunction  # s/p removal of hardware 12/2 for exposed hardware and infection  - s/p ORIF on 09/23/2021  - s/p PICC line 12/6/21  - OR culture with MRSA  - ID consulted  - increased IV Vancomycin from 1000 to 1250 mg daily (trough 11.4 on 12/5)   - Per ID, pt will need IV antibiotic on D/C, via PICC (vancomycin IV x 6 weeks from OR date & would continue PO vancomycin 125 mg daily as ppx while on Abx as recent CDI  - Weekly CBC, CMP, ESR/CRP, Vanc trough OP  - Per ID, follow-up with Dr. Erwin Myers [place] for Telehealth 12/14. ID will call pt between 10:30-6:30  [address] [place] [address] Rd  [phone]    Fax [phone]   - Continue PT/OT  - Per Ortho consult - Patient is stable for d/c; follow up with Dr. Clemons in office next week  - NWVIDAL LLE   - pain control      #h/o acute PE 10/2021  - On eliquis at home (provoked and found incidentally found last admission in October)  - seen by pulmonary  - On lovenox therapeutic dose  - Resume Eliquis 5mg BID 1 week post procedure ( from 12/02)    # COPD, Asthma on home O2 2L as needed; currently satting well  # MARLENI  - Symbicort while admitted  - albuterol PRN    #HTN - baseline SBP in 100-120 range  - Atenolol 50mg qd, lasix 20mg qd, spironolactone and nifedipine 60mg ed  - Monitor for now     #Anemia   - likely macrocytic Hb: 10 Mcv: 106.8  - Pt on folic acid PO qd   - OP follow up     # Chronic Back Pain  # Hx of Cervical Radiculopathy  - Sees pain management, Dr. Mayers  - Continue pain control     #Pulm HTN - on sildenafil, diuretics    # CKD 3 - stable  - Sees Dr. Mcarthur OP    # DLD  - C/w Atorvastatin    #Hypokalemia - resolved     #gout  - Pt on Allopurinol 300mg qd  - Continue current management     PROGRESS NOTE HANDOFF  - covid test completed - neg      DVT ppx: Lovenox SQ -switch to eliquis   GI ppx: n/a  Pending: continue PT/OT  Disposition: [facility] STRINGER for STR 72 y/o F with PMH of COPD, Asthma, PE on eliquis, Pulmonary HTN - on Home O2 intermittently as needed, chronic back pain, cervical radiculopathy, CKD 3, DLD, HTN, Gout, and recent pshx of L ankle ORIF on 9/23/2021 for L ankle fracture, brought from [facility] on 11/30 for left ankle pain, and drainage in setting of a recent ORIF to left ankle on 09/23/21. She was found to have evidence of possible infected hardware, was admitted for hardware removal and is now status post removal of hardware on 12/02.    # Left ankle pain/drainage   # Ambulatory dysfunction  # s/p removal of hardware 12/2 for exposed hardware and infection  - s/p ORIF on 09/23/2021  - s/p PICC line 12/6/21  - OR culture with MRSA  - ID consulted  - increased IV Vancomycin from 1000 to 1250 mg daily (trough 11.4 on 12/5)   - Per ID, pt will need IV antibiotic on D/C, via PICC (vancomycin IV x 6 weeks from OR date & would continue PO vancomycin 125 mg daily as ppx while on Abx as recent CDI  - Weekly CBC, CMP, ESR/CRP, Vanc trough OP  - Per ID, follow-up with Dr. Erwin Myers [place] for Telehealth 12/14. ID will call pt between 10:30-6:30  [address] [place] [address] Rd  [phone]    Fax [phone]   - Continue PT/OT  - Per Ortho consult - Patient is stable for d/c; follow up with Dr. Clemons in office next week  - NWVIDAL LLE   - pain control      #h/o acute PE 10/2021  - on eliquis at home (provoked and found incidentally found last admission in October)  - seen by pulmonary  - d/c lovenox therapeutic dose  - Resume Eliquis today 5mg BID, 1 week post procedure (from 12/02)    # COPD, Asthma on home O2 2L as needed; currently satting well  # MARLENI  - Symbicort while admitted  - albuterol PRN    #HTN - baseline SBP in 100-120 range  - Atenolol 50mg qd, lasix 20mg qd, spironolactone and nifedipine 60mg ed  - Monitor for now     #Anemia   - likely macrocytic Hb: 9.6 Mcv: 103  - Pt on folic acid PO qd   - OP follow up     # Chronic Back Pain  # Hx of Cervical Radiculopathy  - Sees pain management, Dr. Mayers  - Continue pain control     #Pulm HTN - on sildenafil, diuretics    # CKD 3 - stable  - Sees Dr. Mcarthur OP    # DLD  - C/w Atorvastatin    #Hypokalemia - resolved     #gout  - Pt on Allopurinol 300mg qd  - Continue current management     PROGRESS NOTE HANDOFF  - covid test completed - neg      DVT ppx: Lovenox SQ -switch to eliquis   GI ppx: n/a  Pending: continue PT/OT  Disposition: [facility]fs[facility] STRINGER for STR

## 2021-12-08 NOTE — DISCHARGE NOTE PROVIDER - NSDCMRMEDTOKEN_GEN_ALL_CORE_FT
acetaminophen 325 mg oral tablet: 2 tab(s) orally every 6 hours, As needed, Moderate Pain (4 - 6)  Advair Diskus 500 mcg-50 mcg inhalation powder: inhaled once a day  allopurinol 300 mg oral tablet: 1 tab(s) orally once a day (at bedtime)  apixaban 5 mg oral tablet: 1 tab(s) orally 2 times a day  atenolol 50 mg oral tablet: 1 tab(s) orally once a day  cholestyramine 4 g/5 g oral powder for reconstitution: 4 gram(s) orally 2 times a day  colchicine 0.6 mg oral capsule: 1 cap(s) orally once a day  FLUoxetine 20 mg oral capsule: 1 cap(s) orally 2 times a day  folic acid 1 mg oral tablet: 1 tab(s) orally once a day  Fosamax 70 mg oral tablet: 1 tab(s) orally once a week  furosemide 20 mg oral tablet: 1 tab(s) orally once a day  hyoscyamine 0.125 mg oral tablet: orally once a day  Lidoderm 5% topical film: Apply topically to affected area once a day  Lipitor 80 mg oral tablet: 1 tab(s) orally once a day (at bedtime)  magnesium oxide 250 mg oral tablet: 0.5 tab(s) orally once a day  melatonin 5 mg oral tablet: 1 tab(s) orally once a day (at bedtime), As needed, Insomnia  nystatin 100,000 units/g topical powder: 1 application topically 2 times a day  oxycodone-acetaminophen 5 mg-325 mg oral tablet: 1 tab(s) orally every 8 hours, As Needed  predniSONE 10 mg oral tablet: 1 tab(s) orally once a day  primidone 50 mg oral tablet: 1 tab(s) orally once a day (at bedtime)  Procardia XL 60 mg oral tablet, extended release: 1 tab(s) orally once a day  sildenafil 20 mg oral tablet: 1 tab(s) orally 3 times a day  simethicone 80 mg oral tablet, chewable: 1 tab(s) orally once a day, As needed, Indigestion  spironolactone 50 mg oral tablet: orally once a day  tamsulosin 0.4 mg oral capsule: 1 cap(s) orally once a day (at bedtime)  traZODone 100 mg oral tablet: orally once a day (at bedtime)  Valium 10 mg oral tablet: 1 tab(s) orally 3 times a day, As Needed  Valtrex 500 mg oral tablet: 1 tab(s) orally once a day  vancomycin 25 mg/mL oral liquid: 5 milliliter(s) orally 4 times a day  Vitamin D3 400 intl units (10 mcg) oral tablet: 1 tab(s) orally once a day  Wellbutrin: 150  orally once a day   Advair Diskus 500 mcg-50 mcg inhalation powder: inhaled once a day  allopurinol 300 mg oral tablet: 1 tab(s) orally once a day (at bedtime)  apixaban 5 mg oral tablet: 1 tab(s) orally 2 times a day  atenolol 50 mg oral tablet: 1 tab(s) orally once a day  cholestyramine 4 g/5 g oral powder for reconstitution: 4 gram(s) orally 2 times a day  colchicine 0.6 mg oral capsule: 1 cap(s) orally once a day  FLUoxetine 20 mg oral capsule: 1 cap(s) orally 2 times a day  folic acid 1 mg oral tablet: 1 tab(s) orally once a day  Fosamax 70 mg oral tablet: 1 tab(s) orally once a week  furosemide 20 mg oral tablet: 1 tab(s) orally once a day  hyoscyamine 0.125 mg oral tablet: orally once a day  Lidoderm 5% topical film: Apply topically to affected area once a day  Lipitor 80 mg oral tablet: 1 tab(s) orally once a day (at bedtime)  magnesium oxide 250 mg oral tablet: 0.5 tab(s) orally once a day  melatonin 5 mg oral tablet: 1 tab(s) orally once a day (at bedtime), As needed, Insomnia  nystatin 100,000 units/g topical powder: 1 application topically 2 times a day  oxycodone-acetaminophen 5 mg-325 mg oral tablet: 1 tab(s) orally every 8 hours, As Needed  predniSONE 10 mg oral tablet: 1 tab(s) orally once a day  primidone 50 mg oral tablet: 1 tab(s) orally once a day (at bedtime)  Procardia XL 60 mg oral tablet, extended release: 1 tab(s) orally once a day  sildenafil 20 mg oral tablet: 1 tab(s) orally 3 times a day  simethicone 80 mg oral tablet, chewable: 1 tab(s) orally once a day, As needed, Indigestion  spironolactone 50 mg oral tablet: orally once a day  tamsulosin 0.4 mg oral capsule: 1 cap(s) orally once a day (at bedtime)  traZODone 100 mg oral tablet: orally once a day (at bedtime)  Valium 10 mg oral tablet: 1 tab(s) orally 3 times a day, As Needed  Valtrex 500 mg oral tablet: 1 tab(s) orally once a day  vancomycin 25 mg/mL oral liquid: 5 milliliter(s) orally 4 times a day  Vitamin D3 400 intl units (10 mcg) oral tablet: 1 tab(s) orally once a day  Wellbutrin: 150  orally once a day   Advair Diskus 500 mcg-50 mcg inhalation powder: inhaled once a day  allopurinol 300 mg oral tablet: 1 tab(s) orally once a day (at bedtime)  apixaban 5 mg oral tablet: 1 tab(s) orally 2 times a day  atenolol 50 mg oral tablet: 1 tab(s) orally once a day  colchicine 0.6 mg oral capsule: 1 cap(s) orally once a day  FLUoxetine 20 mg oral capsule: 1 cap(s) orally 2 times a day  folic acid 1 mg oral tablet: 1 tab(s) orally once a day  Fosamax 70 mg oral tablet: 1 tab(s) orally once a week  furosemide 20 mg oral tablet: 1 tab(s) orally once a day  hyoscyamine 0.125 mg oral tablet: orally once a day  Lipitor 80 mg oral tablet: 1 tab(s) orally once a day (at bedtime)  magnesium oxide 250 mg oral tablet: 0.5 tab(s) orally once a day  melatonin 5 mg oral tablet: 1 tab(s) orally once a day (at bedtime), As needed, Insomnia  oxycodone-acetaminophen 5 mg-325 mg oral tablet: 1 tab(s) orally every 8 hours, As Needed  predniSONE 10 mg oral tablet: 1 tab(s) orally once a day  primidone 50 mg oral tablet: 1 tab(s) orally once a day (at bedtime)  Procardia XL 60 mg oral tablet, extended release: 1 tab(s) orally once a day  sildenafil 20 mg oral tablet: 1 tab(s) orally 3 times a day  simethicone 80 mg oral tablet, chewable: 1 tab(s) orally once a day, As needed, Indigestion  spironolactone 50 mg oral tablet: orally once a day  tamsulosin 0.4 mg oral capsule: 1 cap(s) orally once a day (at bedtime)  traZODone 100 mg oral tablet: orally once a day (at bedtime)  Valium 10 mg oral tablet: 1 tab(s) orally 3 times a day, As Needed  Valtrex 500 mg oral tablet: 1 tab(s) orally once a day  vancomycin 1.25 g intravenous injection: 1.25 gram(s) intravenous every 24 hours  vancomycin 125 mg oral capsule: 1 cap(s) orally every 12 hours for the duration of the abx course   Vitamin D3 400 intl units (10 mcg) oral tablet: 1 tab(s) orally once a day  Wellbutrin: 150  orally once a day

## 2021-12-08 NOTE — DISCHARGE NOTE NURSING/CASE MANAGEMENT/SOCIAL WORK - NSDCVIVACCINE_GEN_ALL_CORE_FT
Tdap; 29-Aug-2019 16:27; Vangie Mojica ([employer]); [employer]; [id number] (Exp. Date: 04-Jul-2021); IntraMuscular; Deltoid Left.; 0.5 milliLiter(s); VIS (VIS Published: 09-May-2013, VIS Presented: 29-Aug-2019);

## 2021-12-08 NOTE — PROGRESS NOTE ADULT - SUBJECTIVE AND OBJECTIVE BOX
ORTHOPEDIC SURGERY PROGRESS NOTE      71F s/p Left ankle JESSA on 12/2.   -Continue ID reccs-- OR culture of MRSA, Vancomycin 1250mg daily, s/p PICC on 12/6, 6 weeks long term abx, f/u with  upon d/c  -cont NWB LLE  -Patient is stable for discharge per orthopedics; follow up with  in office next week

## 2021-12-08 NOTE — DISCHARGE NOTE NURSING/CASE MANAGEMENT/SOCIAL WORK - NSDCPEFALRISK_GEN_ALL_CORE
For information on Fall & Injury Prevention, visit: [email or web address] OR  [email or web address] OR  https://www.cdc.gov/steadi/patient.html

## 2021-12-08 NOTE — PROGRESS NOTE ADULT - ASSESSMENT
ASSESSMENT      Patient is a 70 y/o female with a PMH of COPD, Asthma, Pulmonary HTN - on Home O2 intermittently as needed, chronic back pain, cervical radiculopathy, CKD 3, DLD, HTN, Gout, and recent pshx of L ankle ORIF on 9/23/2021 for L ankle fracture initially presented to the ED from the nursing home due to with persistent drainage from lateral ankle incision and Left ankle pain with the inability to ambulate. Patient described the pain as  persistent, and she endorsed swelling, and drainage. The patient denies any h/o fever, chills, cough, chest pain, shortness of breath. She was seen by Dr. Clemons in the office where she did xrays and examined the ankle. Pt reports she was told by Dr. Clemons to present to ED for evaluation. Patient also states that she recently had watery diarrhea in the nursing home and was being treated for C.diff in the Nursing home. Patient states that she has been having episodes of diarrhea for several weeks prior to presenting to the ED. Patient had HWR procedure last night 12/02. She denies any other symptoms at this time.     In the ED patient was hemodynamically stable, Labs were unremarkable.     IMPRESSION  #ORIF with exposed hardware s/p hardware removal, no systemic signs of sepsis    OR cx MRSA  s/p Removal, hardware, deep 02-Dec-2021 16:36:59  Ghazala Clemons  Irrigation, wound, lower extremity 02-Dec-2021 16:37:47 I&D lateral ankle wound Ghazala Clemons.  "small wound dehiscence over prominent hardware"  #Sepsis ruled out on admission   #Cdiff, recently completed, second episode  #Obesity     RECOMMEND  - Continue Vanc 1.25 g q12h IV  - Please recheck vanc trough  , goal 15-20  - PO vancomycin to 125 mg BID, recent Cdiff   - Will need IV antibiotic on D/C, via PICC, (VANC IV x 6 weeks from OR date & would continue PO vancomycin 125 mg daily as ppx while on Abx as recent CDI)  - Weekly CBC, CMP, ESR/CRP, Vanc trough  - ID follow-up with Dr. Erwin Logan for Telehealth 12/14. We will call the patient between 10:30-6:30      [address] [facility]       [phone]       Fax [phone]     If any questions, please call or send a message on [employer] Teams  Please continue to update ID with any pertinent new laboratory or radiographic findings  [facility] [id number]

## 2021-12-08 NOTE — DISCHARGE NOTE NURSING/CASE MANAGEMENT/SOCIAL WORK - PATIENT PORTAL LINK FT
You can access the FollowMyHealth Patient Portal offered by [facility] by registering at the following website: [email or web address]. By joining [facility]’s FollowMyHealth portal, you will also be able to view your health information using other applications (apps) compatible with our system.

## 2021-12-08 NOTE — DISCHARGE NOTE PROVIDER - PROVIDER TOKENS
PROVIDER:[TOKEN:[[id number]],SCHEDULEDAPPT:[12/14/2021],SCHEDULEDAPPTTIME:[10:00 AM]],PROVIDER:[TOKEN:[[id number]],FOLLOWUP:[1 week]] PROVIDER:[TOKEN:[[id number]],SCHEDULEDAPPT:[12/14/2021],SCHEDULEDAPPTTIME:[10:00 AM]],PROVIDER:[TOKEN:[[id number]],FOLLOWUP:[1 week]],PROVIDER:[TOKEN:[[id number]],FOLLOWUP:[1 week]] PROVIDER:[TOKEN:[[id number]],SCHEDULEDAPPT:[12/14/2021],SCHEDULEDAPPTTIME:[10:00 AM]],PROVIDER:[TOKEN:[[id number]],FOLLOWUP:[1 week]],PROVIDER:[TOKEN:[[id number]]] PROVIDER:[TOKEN:[[id number]],SCHEDULEDAPPT:[12/14/2021],SCHEDULEDAPPTTIME:[10:00 AM]],PROVIDER:[TOKEN:[[id number]],FOLLOWUP:[1 week]],PROVIDER:[TOKEN:[[id number]]],PROVIDER:[TOKEN:[[id number]],FOLLOWUP:[1 week]]

## 2021-12-08 NOTE — PROGRESS NOTE ADULT - SUBJECTIVE AND OBJECTIVE BOX
BERNARD SPEARS  71y, Female  Allergy: adhesives (Pruritus; Rash)  No Known Drug Allergies      LOS  8d    CHIEF COMPLAINT: Left ankle pain (08 Dec 2021 10:03)      INTERVAL EVENTS/HPI  - No acute events overnight  - T(F): , Max: 98.6 (12-07-21 @ 19:47)  - Denies any worsening symptoms  - Tolerating medication  - WBC Count: 10.55 (12-08-21 @ 04:30)  WBC Count: 9.40 (12-07-21 @ 04:30)     - Creatinine, Serum: 1.4 (12-08-21 @ 04:30)  Creatinine, Serum: 1.2 (12-07-21 @ 04:30)       ROS  General: Denies rigors, nightsweats  HEENT: Denies headache, rhinorrhea, sore throat, eye pain  CV: Denies CP, palpitations  PULM: Denies wheezing, hemoptysis  GI: Denies hematemesis, hematochezia, melena  : Denies discharge, hematuria  MSK: Denies arthralgias, myalgias  SKIN: Denies rash, lesions  NEURO: Denies paresthesias, weakness  PSYCH: Denies depression, anxiety    VITALS:  T(F): 96.8, Max: 98.6 (12-07-21 @ 19:47)  HR: 68  BP: 100/57  RR: 18Vital Signs Last 24 Hrs  T(C): 36 (08 Dec 2021 04:45), Max: 37 (07 Dec 2021 19:47)  T(F): 96.8 (08 Dec 2021 04:45), Max: 98.6 (07 Dec 2021 19:47)  HR: 68 (08 Dec 2021 04:45) (68 - 76)  BP: 100/57 (08 Dec 2021 04:45) (100/57 - 126/61)  BP(mean): 75 (07 Dec 2021 19:47) (75 - 75)  RR: 18 (08 Dec 2021 04:45) (18 - 18)  SpO2: --    PHYSICAL EXAM:  Gen: NAD, resting in bed  HEENT: Normocephalic, atraumatic  Neck: supple, no lymphadenopathy  CV: Regular rate & regular rhythm  Lungs: decreased BS at bases, no fremitus  Abdomen: Soft, BS present  Ext: Warm, well perfused LE dressings   Neuro: non focal, awake  Skin: no rash, no erythema  Lines: no phlebitis    FH: Non-contributory  Social Hx: Non-contributory    TESTS & MEASUREMENTS:                        9.6    10.55 )-----------( 209      ( 08 Dec 2021 04:30 )             30.0     12-08    138  |  103  |  24<H>  ----------------------------<  86  3.6   |  19  |  1.4    Ca    9.4      08 Dec 2021 04:30  Mg     1.9     12-08    TPro  5.6<L>  /  Alb  3.7  /  TBili  0.2  /  DBili  x   /  AST  25  /  ALT  26  /  AlkPhos  95  12-08    eGFR if Non African American: 37 mL/min/1.73M2 (12-08-21 @ 04:30)  eGFR if African American: 43 mL/min/1.73M2 (12-08-21 @ 04:30)    LIVER FUNCTIONS - ( 08 Dec 2021 04:30 )  Alb: 3.7 g/dL / Pro: 5.6 g/dL / ALK PHOS: 95 U/L / ALT: 26 U/L / AST: 25 U/L / GGT: x               Culture - Surgical Swab (collected 12-02-21 @ 15:43)  Source: .Surgical Swab None  Final Report (12-08-21 @ 09:45):    No growth at 5 days    Culture - Surgical Swab (collected 12-02-21 @ 15:43)  Source: .Surgical Swab None  Final Report (12-08-21 @ 10:11):    Numerous Methicillin Resistant Staphylococcus aureus  Organism: Methicillin resistant Staphylococcus aureus (12-08-21 @ 10:11)  Organism: Methicillin resistant Staphylococcus aureus (12-08-21 @ 10:11)      -  Amoxicillin/Clavulanic Acid: R <=4/2      -  Ampicillin: R >8      -  Ampicillin/Sulbactam: R <=8/4      -  Cefazolin: R <=4      -  Ceftriaxone: R 8      -  Ciprofloxacin: R >2      -  Clindamycin: S <=0.25      -  Daptomycin: S 0.5      -  Erythromycin: R >4      -  Gentamicin: S <=1 Should not be used as monotherapy      -  Levofloxacin: R >4      -  Linezolid: S 2      -  Meropenem: R <=2      -  Moxifloxacin(Aerobic): I 4      -  Oxacillin: R >2      -  Penicillin: R >8      -  RIF- Rifampin: S <=1 Should not be used as monotherapy      -  Tetra/Doxy: S <=1      -  Trimethoprim/Sulfamethoxazole: S <=0.5/9.5      -  Vancomycin: S 1      Method Type: TETE            INFECTIOUS DISEASES TESTING  COVID-19 PCR: NotDetec (12-07-21 @ 14:12)  Vancomycin Level, Trough: 11.4 (12-05-21 @ 17:03)  COVID-19 PCR: NotDetec (11-30-21 @ 14:22)  COVID-19 PCR: NotDetec (10-13-21 @ 18:00)  COVID-19 PCR: NotDetec (10-07-21 @ 18:29)  COVID-19 PCR: NotDetec (09-27-21 @ 16:35)  COVID-19 PCR: NotDetec (09-24-21 @ 15:30)  Hepatitis C Virus Interpretation Result: Nonreact (09-22-21 @ 08:00)  COVID-19 PCR: NotDetec (09-21-21 @ 15:27)      INFLAMMATORY MARKERS  Sedimentation Rate, Erythrocyte: 44 mm/Hr (12-07-21 @ 04:30)  C-Reactive Protein, Serum: <3 mg/L (12-07-21 @ 04:30)  Sedimentation Rate, Erythrocyte: 35 mm/Hr (12-01-21 @ 06:32)  C-Reactive Protein, Serum: 4 mg/L (12-01-21 @ 06:32)      RADIOLOGY & ADDITIONAL TESTS:  I have personally reviewed the last available Chest xray  CXR      CT      CARDIOLOGY TESTING  12 Lead ECG:   Ventricular Rate 76 BPM    Atrial Rate 76 BPM    P-R Interval 142 ms    QRS Duration 82 ms    Q-T Interval 398 ms    QTC Calculation(Bazett) 447 ms    P Axis 45 degrees    R Axis 5 degrees    T Axis 28 degrees    Diagnosis Line Normal sinus rhythm  Normal ECG    Confirmed by Amari Whitfield ([id number]) on 12/1/2021 1:35:37 PM (11-30-21 @ 19:41)  12 Lead ECG:   Ventricular Rate 70 BPM    Atrial Rate 70 BPM    P-R Interval 144 ms    QRS Duration 88 ms    Q-T Interval 402 ms    QTC Calculation(Bazett) 434 ms    P Axis 48 degrees    R Axis 38 degrees    T Axis 43 degrees    Diagnosis Line Sinus rhythm with Premature atrial complexes  Otherwise normal ECG    Confirmed by TIMMY CROWDER MD ([id number]) on 11/30/2021 3:42:12 PM (11-30-21 @ 13:45)      MEDICATIONS  allopurinol 300 Oral at bedtime  ATENolol  Tablet 50 Oral daily  atorvastatin 80 Oral at bedtime  budesonide 160 MICROgram(s)/formoterol 4.5 MICROgram(s) Inhaler 2 Inhalation two times a day  buPROPion XL (24-Hour) . 150 Oral daily  cholecalciferol 400 Oral daily  colchicine 0.6 Oral daily  enoxaparin Injectable 70 SubCutaneous every 12 hours  FLUoxetine 20 Oral two times a day  folic acid 1 Oral daily  furosemide    Tablet 20 Oral daily  hyoscyamine SL 0.125 SubLingual daily  magnesium oxide 400 Oral daily  NIFEdipine XL 60 Oral daily  predniSONE   Tablet 10 Oral daily  primidone 50 Oral at bedtime  sildenafil (REVATIO) 20 Oral three times a day  spironolactone 50 Oral daily  tamsulosin 0.4 Oral at bedtime  traZODone 100 Oral at bedtime  valACYclovir 500 Oral daily  vancomycin    Solution 125 Oral two times a day  vancomycin  IVPB 1250 IV Intermittent every 24 hours      WEIGHT  Weight (kg): 74.8 (12-02-21 @ 14:58)  Creatinine, Serum: 1.4 mg/dL (12-08-21 @ 04:30)      ANTIBIOTICS:  valACYclovir 500 milliGRAM(s) Oral daily  vancomycin    Solution 125 milliGRAM(s) Oral two times a day  vancomycin  IVPB 1250 milliGRAM(s) IV Intermittent every 24 hours      All available historical records have been reviewed

## 2021-12-08 NOTE — DISCHARGE NOTE PROVIDER - NPI NUMBER (FOR [employer] USE ONLY) :
[[id number] [[id number]],[[id number]],[[id number]] [[id number]],[[id number]],[[id number]] [[id number]],[[id number]]

## 2021-12-08 NOTE — DISCHARGE NOTE PROVIDER - CARE PROVIDERS DIRECT ADDRESSES
,DirectAddress_Unknown,DirectAddress_Unknown ,DirectAddress_Unknown,DirectAddress_Unknown,[email or web address] ,DirectAddress_Unknown,[email or web address] ,DirectAddress_Unknown,[email or web address]

## 2021-12-08 NOTE — PROGRESS NOTE ADULT - TIME BILLING
I have personally seen and examined this patient.  I have reviewed all pertinent clinical information and reviewed all relevant imaging and diagnostic studies personally.   I counseled the patient about diagnostic testing and treatment plan. All questions were answered.  I discussed recommendations with the primary team.
chart review, nursing/patient rounds, and interdisciplinary planning.

## 2021-12-08 NOTE — PROGRESS NOTE ADULT - SUBJECTIVE AND OBJECTIVE BOX
Patient is a 71y old  Female who presents with a chief complaint of Left ankle pain (08 Dec 2021 08:57).       INTERVAL HPI/OVERNIGHT EVENTS:  No acute events overnight. Pt had PICC line placed 12/8/21. Pt denies fever, headache nausea, vomiting, SOB, chest pain. Pt admits chronic diarrhea. Pt denies all other pertinent complaints.       FAMILY HISTORY:  Family history of bladder cancer (Mother)    Family history of Alzheimer&#x27;s disease (Father)      T(C): 36 (12-08-21 @ 04:45), Max: 37 (12-07-21 @ 19:47)  HR: 68 (12-08-21 @ 04:45) (68 - 76)  BP: 100/57 (12-08-21 @ 04:45) (100/57 - 126/61)  RR: 18 (12-08-21 @ 04:45) (18 - 18)  SpO2: --  Wt(kg): --Vital Signs Last 24 Hrs  T(C): 36 (08 Dec 2021 04:45), Max: 37 (07 Dec 2021 19:47)  T(F): 96.8 (08 Dec 2021 04:45), Max: 98.6 (07 Dec 2021 19:47)  HR: 68 (08 Dec 2021 04:45) (68 - 76)  BP: 100/57 (08 Dec 2021 04:45) (100/57 - 126/61)  BP(mean): 75 (07 Dec 2021 19:47) (75 - 75)  RR: 18 (08 Dec 2021 04:45) (18 - 18)  SpO2: --    PHYSICAL EXAM:  GENERAL: NAD, thin, elderly   HEAD:  Atraumatic, Normocephalic  EYES: conjunctiva and sclera clear  ENMT: No lesions  NECK: No JVD  NERVOUS SYSTEM:  Alert & Oriented X3, Good concentration;  CHEST/LUNG: Clear to percussion bilaterally; No rales  HEART: Regular rate and rhythm; No murmurs  ABDOMEN: Soft, Nontender, Nondistended; Bowel sounds present  EXTREMITIES: + left ankle dressing  LYMPH: No lymphadenopathy noted  SKIN: dry skin          LABS:                          9.6    10.55 )-----------( 209      ( 08 Dec 2021 04:30 )             30.0     12-08    138  |  103  |  24<H>  ----------------------------<  86  3.6   |  19  |  1.4    Ca    9.4      08 Dec 2021 04:30  Mg     1.9     12-08    TPro  5.6<L>  /  Alb  3.7  /  TBili  0.2  /  DBili  x   /  AST  25  /  ALT  26  /  AlkPhos  95  12-08        RADIOLOGY & ADDITIONAL TESTS:    Imaging Personally Reviewed:  [ ] YES  [ ] NO    acetaminophen     Tablet .. 650 milliGRAM(s) Oral every 6 hours PRN  ALBUTerol    90 MICROgram(s) HFA Inhaler 2 Puff(s) Inhalation every 6 hours PRN  allopurinol 300 milliGRAM(s) Oral at bedtime  ATENolol  Tablet 50 milliGRAM(s) Oral daily  atorvastatin 80 milliGRAM(s) Oral at bedtime  budesonide 160 MICROgram(s)/formoterol 4.5 MICROgram(s) Inhaler 2 Puff(s) Inhalation two times a day  buPROPion XL (24-Hour) . 150 milliGRAM(s) Oral daily  cholecalciferol 400 Unit(s) Oral daily  colchicine 0.6 milliGRAM(s) Oral daily  diazepam    Tablet 10 milliGRAM(s) Oral three times a day PRN  enoxaparin Injectable 70 milliGRAM(s) SubCutaneous every 12 hours  FLUoxetine 20 milliGRAM(s) Oral two times a day  folic acid 1 milliGRAM(s) Oral daily  furosemide    Tablet 20 milliGRAM(s) Oral daily  hyoscyamine SL 0.125 milliGRAM(s) SubLingual daily  magnesium oxide 400 milliGRAM(s) Oral daily  melatonin 5 milliGRAM(s) Oral at bedtime PRN  NIFEdipine XL 60 milliGRAM(s) Oral daily  oxycodone    5 mG/acetaminophen 325 mG 1 Tablet(s) Oral every 8 hours PRN  predniSONE   Tablet 10 milliGRAM(s) Oral daily  primidone 50 milliGRAM(s) Oral at bedtime  sildenafil (REVATIO) 20 milliGRAM(s) Oral three times a day  simethicone 80 milliGRAM(s) Chew daily PRN  spironolactone 50 milliGRAM(s) Oral daily  tamsulosin 0.4 milliGRAM(s) Oral at bedtime  traZODone 100 milliGRAM(s) Oral at bedtime  valACYclovir 500 milliGRAM(s) Oral daily  vancomycin    Solution 125 milliGRAM(s) Oral two times a day  vancomycin  IVPB 1250 milliGRAM(s) IV Intermittent every 24 hours

## 2021-12-08 NOTE — DISCHARGE NOTE PROVIDER - CARE PROVIDER_API CALL
Erwin Myers (MD)  Infectious Disease; Internal Medicine  [address] [place] [address]  Phone: [phone]  Fax: [phone]  Scheduled Appointment: 12/14/2021 10:00 AM    Walker Clemons)  Orthopaedic Surgery  [address] [place] [address] [place] [address]  Phone: [phone]  Fax: [phone]  Follow Up Time: 1 week   Erwin Myers)  Infectious Disease; Internal Medicine  [address] [place] [address]  Phone: [phone]  Fax: [phone]  Scheduled Appointment: 12/14/2021 10:00 AM    Walker Clemons)  Orthopaedic Surgery  [address] [place] [address] [place] [address]  Phone: [phone]  Fax: [phone]  Follow Up Time: 1 week    Hawa Chairez)  Urology  [address] [place] [address]  Phone: [phone]  Fax: [phone]  Follow Up Time: 1 week   Erwin Myers)  Infectious Disease; Internal Medicine  [address] [place] [address]  Phone: [phone]  Fax: [phone]  Scheduled Appointment: 12/14/2021 10:00 AM    Hawa Chairez)  Urology  [address] [facility], [address]  [place] [address]  Phone: [phone]  Fax: [phone]  Follow Up Time: 1 week    Ghazala Clemons)  Orthopaedic Surgery  [address] [place] [address]  Phone: [phone]  Fax: [phone]  Follow Up Time:    Erwin Myers)  Infectious Disease; Internal Medicine  [address] [place] [address]  Phone: [phone]  Fax: [phone]  Scheduled Appointment: 12/14/2021 10:00 AM    Hawa Chairez)  Urology  [address] [facility] [address]  [place] [address]  Phone: [phone]  Fax: [phone]  Follow Up Time: 1 week    Ghazala Clemons)  Orthopaedic Surgery  [address] [place] [address]  Phone: [phone]  Fax: [phone]  Follow Up Time:     Mary Alice Hood)  Internal Medicine; Nephrology  [address] [place] [address]  Phone: [phone]  Fax: [phone]  Follow Up Time: 1 week

## 2021-12-08 NOTE — DISCHARGE NOTE PROVIDER - HOSPITAL COURSE
Ms Díaz is a 70 y/o Female with PMH of COPD, Asthma, PE on eliquis, Pulmonary HTN - on Home O2 intermittently as needed, chronic back pain, cervical radiculopathy, CKD 3, DLD, HTN, Gout, and recent pshx of L ankle ORIF on 9/23/2021 for L ankle fracture, brought from [facility] on 11/30 for left ankle pain, and drainage in setting of a recent ORIF to left ankle on 09/23/21. She was found to have evidence of possible infected hardware, was admitted for hardware removal and is now status post removal of hardware on 12/02. OR culture positive for MRSA. ID was consulted, pt started on IV Vancomycin from 1000, increased to 1250 mg daily (trough 11.4 on 12/5). Per ID, pt will need IV antibiotic on D/C, via PICC (vancomycin IV x 6 weeks from OR date & would continue PO vancomycin 125 mg daily as ppx while on Abx as recent CDI. Pt is s/p PICC line 12/6/21. Per ID, Pt is to follow up with weekly CBC, CMP, ESR/CRP. Pt is to follow up on Vanc trough OP. Per ID, pt can follow-up with:   Dr. Erwin Myers Tuesdays for Telehealth 12/14.   ID will call pt between 10:30-6:30    [address]  [phone]      Fax [phone]     Per Ortho consult - Patient is stable for d/c; follow up with Dr. Clemons in office next week. Per orthopedics consult, NWB LLE, PT f/u for RLE and f/u for further pain control as needed.     Pt also experienced episodes of hyperkalemia during hospital course, which was resolved with management.     For h/o acute PE 10/2021, pt was on eliquis at home (provoked and found incidentally found last admission in October). Pt was followed up by pulmonary. Pt was discontinued on lovenox therapeutic dose today. She can resume Eliquis today 5mg BID, 1 week post procedure (from 12/02).     For COPD, Asthma, MARLENI, pt on home O2 2L as needed. She received symbicort while admitted and albuterol PRN. Pt can follow up outpatient for management.      For HTN - baseline SBP in 100-120 range. Pt received Atenolol 50mg qd, lasix 20mg qd, spironolactone and nifedipine 60mg during hospital course. Pt can follow up outpatient for management.      For Anemia, likely macrocytic Hb: 9.6 Mcv: 103. Pt received folic acid PO qd. Pt can follow up outpatient for management as needed.      For Chronic Back Pain and Hx of Cervical Radiculopathy. Pt sees pain management, Dr. Mayers. Continue pain control with outpatient management as required.      For Pulm HTN, pt was on sildenafil, diuretics during hospital course. Pt can follow up outpatient for management as needed.      For CKD 3 - pt remained stable during hospital course. Pt sees Dr. Mcarthur OP, follow up for further management as needed.     For DLD, pt was managed with Atorvastatin. Pt can follow up outpatient for management as needed.       Pt's gout managed with Allopurinol 300mg qd. Pt can continue current management and follow up further for outpatient management as needed.      Pt is cleared for [facility]/c to [facility] today 12/8/21.      Ms Díaz is a 70 y/o Female with PMH of COPD, Asthma, PE on eliquis, Pulmonary HTN - on Home O2 intermittently as needed, chronic back pain, cervical radiculopathy, CKD 3, DLD, HTN, Gout, and recent pshx of L ankle ORIF on 9/23/2021 for L ankle fracture, brought from [facility] on 11/30 for left ankle pain, and drainage in setting of a recent ORIF to left ankle on 09/23/21. She was found to have evidence of possible infected hardware, was admitted for hardware removal and is now status post removal of hardware on 12/02. OR culture positive for MRSA. ID was consulted, pt started on IV Vancomycin from 1000, increased to 1250 mg daily (trough 11.4 on 12/5). Per ID, pt will need IV antibiotic on D/C, via PICC (vancomycin IV x 6 weeks from OR date & would continue PO vancomycin 125 mg daily as ppx while on Abx as recent CDI. Pt is s/p PICC line 12/6/21. Per ID, Pt is to follow up with weekly CBC, CMP, ESR/CRP. Pt is to follow up on Vanc trough OP. Per ID, pt can follow-up with:   Dr. Erwin Myers Tuesdays for Telehealth 12/14.   ID will call pt between 10:30-6:30    [address]  [phone]      Fax [phone]     Per Ortho consult - Patient is stable for d/c; follow up with Dr. Clemons in office next week. Per orthopedics consult, NWB LLE, PT f/u for RLE and f/u for further pain control as needed.     Pt also experienced episodes of hyperkalemia during hospital course, which was resolved with management.     For h/o acute PE 10/2021, pt was on eliquis at home (provoked and found incidentally found last admission in October). Pt was followed up by pulmonary. Pt was discontinued on lovenox therapeutic dose today. She can resume Eliquis today 5mg BID, 1 week post procedure (from 12/02).     For COPD, Asthma, MARLENI, pt on home O2 2L as needed. She received symbicort while admitted and albuterol PRN. Pt can follow up outpatient for management.      For HTN - baseline SBP in 100-120 range. Pt received Atenolol 50mg qd, lasix 20mg qd, spironolactone and nifedipine 60mg during hospital course. Pt can follow up outpatient for management.      For Anemia, likely macrocytic Hb: 9.6 Mcv: 103. Pt received folic acid PO qd. Pt can follow up outpatient for management as needed.      For Chronic Back Pain and Hx of Cervical Radiculopathy. Pt sees pain management, Dr. Mayers. Continue pain control with outpatient management as required.      For Pulm HTN, pt was on sildenafil, diuretics during hospital course. Pt can follow up outpatient for management as needed.      For CKD 3 - pt remained stable during hospital course. Pt sees Dr. Mcarthur OP, follow up for further management as needed.     For DLD, pt was managed with Atorvastatin. Pt can follow up outpatient for management as needed.       Pt's gout managed with Allopurinol 300mg qd. Pt can continue current management and follow up further for outpatient management as needed.      [facility].     Vital Signs Last 24 Hrs  T(C): 36.6 (08 Dec 2021 14:09), Max: 37 (07 Dec 2021 19:47)  T(F): 97.9 (08 Dec 2021 14:09), Max: 98.6 (07 Dec 2021 19:47)  HR: 77 (08 Dec 2021 14:09) (68 - 77)  BP: 112/58 (08 Dec 2021 14:09) (100/57 - 112/58)  BP(mean): 75 (07 Dec 2021 19:47) (75 - 75)  RR: 18 (08 Dec 2021 14:09) (18 - 18)  SpO2: --   Ms Díaz is a 72 y/o Female with PMH of COPD, Asthma, PE on eliquis, Pulmonary HTN - on Home O2 intermittently as needed, chronic back pain, cervical radiculopathy, CKD 3, DLD, HTN, Gout, and recent pshx of L ankle ORIF on 9/23/2021 for L ankle fracture, brought from [facility] on 11/30 for left ankle pain, and drainage in setting of a recent ORIF to left ankle on 09/23/21. She was found to have evidence of possible infected hardware, was admitted for hardware removal and is now status post removal of hardware on 12/02. OR culture positive for MRSA. ID was consulted, pt started on IV Vancomycin from 1000, increased to 1250 mg daily (trough 11.4 on 12/5). Per ID, pt will need IV antibiotic on D/C, via PICC (vancomycin IV x 6 weeks from OR date & would continue PO vancomycin 125 mg daily as ppx while on Abx as recent CDI. Pt is s/p PICC line 12/6/21. Per ID, Pt is to follow up with weekly CBC, CMP, ESR/CRP. Pt is to follow up on Vanc trough OP. Per ID, pt can follow-up with:   Dr. Erwin Myers Tuesdays for Telehealth 12/14.   ID will call pt between 10:30-6:30    [address]  [phone]      Fax [phone]     Per Ortho consult - Patient is stable for d/c; follow up with Dr. Clemons in office next week. Per orthopedics consult, NWB LLE, PT f/u for RLE and f/u for further pain control as needed.     Pt also experienced episodes of hyperkalemia during hospital course, which was resolved with management.     For h/o acute PE 10/2021, pt was on eliquis at home (provoked and found incidentally found last admission in October). Pt was followed up by pulmonary. Pt was discontinued on lovenox therapeutic dose today. She can resume Eliquis today 5mg BID, 1 week post procedure (from 12/02).     For COPD, Asthma, MARLENI, pt on home O2 2L as needed. She received symbicort while admitted and albuterol PRN. Pt can follow up outpatient for management.      For HTN - baseline SBP in 100-120 range. Pt received Atenolol 50mg qd, lasix 20mg qd, spironolactone and nifedipine 60mg during hospital course. Pt can follow up outpatient for management.      For Anemia, likely macrocytic Hb: 9.6 Mcv: 103. Pt received folic acid PO qd. Pt can follow up outpatient for management as needed.      For Chronic Back Pain and Hx of Cervical Radiculopathy. Pt sees pain management, Dr. Mayers. Continue pain control with outpatient management as required.      For Pulm HTN, pt was on sildenafil, diuretics during hospital course. Pt can follow up outpatient for management as needed.      For CKD 3 - pt remained stable during hospital course. Pt sees Dr. Mcarthur OP, follow up for further management as needed.     For DLD, pt was managed with Atorvastatin. Pt can follow up outpatient for management as needed.       Pt's gout managed with Allopurinol 300mg qd. Pt can continue current management and follow up further for outpatient management as needed.      [facility].     Vital Signs Last 24 Hrs  T(C): 36.6 (08 Dec 2021 14:09), Max: 37 (07 Dec 2021 19:47)  T(F): 97.9 (08 Dec 2021 14:09), Max: 98.6 (07 Dec 2021 19:47)  HR: 77 (08 Dec 2021 14:09) (68 - 77)  BP: 112/58 (08 Dec 2021 14:09) (100/57 - 112/58)  BP(mean): 75 (07 Dec 2021 19:47) (75 - 75)  RR: 18 (08 Dec 2021 14:09) (18 - 18)  SpO2: --

## 2021-12-09 VITALS — SYSTOLIC BLOOD PRESSURE: 94 MMHG | HEART RATE: 77 BPM | TEMPERATURE: 98 F | DIASTOLIC BLOOD PRESSURE: 49 MMHG

## 2021-12-09 LAB
ALBUMIN SERPL ELPH-MCNC: 3.5 G/DL — SIGNIFICANT CHANGE UP (ref 3.5–5.2)
ALP SERPL-CCNC: 94 U/L — SIGNIFICANT CHANGE UP (ref 30–115)
ALT FLD-CCNC: 23 U/L — SIGNIFICANT CHANGE UP (ref 0–41)
ANION GAP SERPL CALC-SCNC: 16 MMOL/L — HIGH (ref 7–14)
AST SERPL-CCNC: 21 U/L — SIGNIFICANT CHANGE UP (ref 0–41)
BASOPHILS # BLD AUTO: 0.06 K/UL — SIGNIFICANT CHANGE UP (ref 0–0.2)
BASOPHILS NFR BLD AUTO: 0.6 % — SIGNIFICANT CHANGE UP (ref 0–1)
BILIRUB SERPL-MCNC: <0.2 MG/DL — SIGNIFICANT CHANGE UP (ref 0.2–1.2)
BUN SERPL-MCNC: 22 MG/DL — HIGH (ref 10–20)
CALCIUM SERPL-MCNC: 9 MG/DL — SIGNIFICANT CHANGE UP (ref 8.5–10.1)
CHLORIDE SERPL-SCNC: 100 MMOL/L — SIGNIFICANT CHANGE UP (ref 98–110)
CO2 SERPL-SCNC: 18 MMOL/L — SIGNIFICANT CHANGE UP (ref 17–32)
CREAT SERPL-MCNC: 1.3 MG/DL — SIGNIFICANT CHANGE UP (ref 0.7–1.5)
EOSINOPHIL # BLD AUTO: 0.25 K/UL — SIGNIFICANT CHANGE UP (ref 0–0.7)
EOSINOPHIL NFR BLD AUTO: 2.6 % — SIGNIFICANT CHANGE UP (ref 0–8)
GLUCOSE SERPL-MCNC: 90 MG/DL — SIGNIFICANT CHANGE UP (ref 70–99)
HCT VFR BLD CALC: 29.3 % — LOW (ref 37–47)
HGB BLD-MCNC: 9.4 G/DL — LOW (ref 12–16)
IMM GRANULOCYTES NFR BLD AUTO: 1.1 % — HIGH (ref 0.1–0.3)
LYMPHOCYTES # BLD AUTO: 2.29 K/UL — SIGNIFICANT CHANGE UP (ref 1.2–3.4)
LYMPHOCYTES # BLD AUTO: 23.5 % — SIGNIFICANT CHANGE UP (ref 20.5–51.1)
MCHC RBC-ENTMCNC: 32.1 G/DL — SIGNIFICANT CHANGE UP (ref 32–37)
MCHC RBC-ENTMCNC: 33.5 PG — HIGH (ref 27–31)
MCV RBC AUTO: 104.3 FL — HIGH (ref 81–99)
MONOCYTES # BLD AUTO: 1.12 K/UL — HIGH (ref 0.1–0.6)
MONOCYTES NFR BLD AUTO: 11.5 % — HIGH (ref 1.7–9.3)
NEUTROPHILS # BLD AUTO: 5.92 K/UL — SIGNIFICANT CHANGE UP (ref 1.4–6.5)
NEUTROPHILS NFR BLD AUTO: 60.7 % — SIGNIFICANT CHANGE UP (ref 42.2–75.2)
NRBC # BLD: 0 /100 WBCS — SIGNIFICANT CHANGE UP (ref 0–0)
PLATELET # BLD AUTO: 218 K/UL — SIGNIFICANT CHANGE UP (ref 130–400)
POTASSIUM SERPL-MCNC: 3.4 MMOL/L — LOW (ref 3.5–5)
POTASSIUM SERPL-SCNC: 3.4 MMOL/L — LOW (ref 3.5–5)
PROT SERPL-MCNC: 5.5 G/DL — LOW (ref 6–8)
RBC # BLD: 2.81 M/UL — LOW (ref 4.2–5.4)
RBC # FLD: 14.6 % — HIGH (ref 11.5–14.5)
SODIUM SERPL-SCNC: 134 MMOL/L — LOW (ref 135–146)
WBC # BLD: 9.75 K/UL — SIGNIFICANT CHANGE UP (ref 4.8–10.8)
WBC # FLD AUTO: 9.75 K/UL — SIGNIFICANT CHANGE UP (ref 4.8–10.8)

## 2021-12-09 PROCEDURE — 99232 SBSQ HOSP IP/OBS MODERATE 35: CPT

## 2021-12-09 RX ORDER — POTASSIUM CHLORIDE 20 MEQ
40 PACKET (EA) ORAL ONCE
Refills: 0 | Status: COMPLETED | OUTPATIENT
Start: 2021-12-09 | End: 2021-12-09

## 2021-12-09 RX ADMIN — MAGNESIUM OXIDE 400 MG ORAL TABLET 400 MILLIGRAM(S): 241.3 TABLET ORAL at 11:44

## 2021-12-09 RX ADMIN — Medication 0.6 MILLIGRAM(S): at 11:44

## 2021-12-09 RX ADMIN — Medication 125 MILLIGRAM(S): at 06:01

## 2021-12-09 RX ADMIN — Medication 20 MILLIGRAM(S): at 06:00

## 2021-12-09 RX ADMIN — Medication 1 MILLIGRAM(S): at 11:44

## 2021-12-09 RX ADMIN — BUDESONIDE AND FORMOTEROL FUMARATE DIHYDRATE 2 PUFF(S): 160; 4.5 AEROSOL RESPIRATORY (INHALATION) at 07:59

## 2021-12-09 RX ADMIN — Medication 40 MILLIEQUIVALENT(S): at 09:34

## 2021-12-09 RX ADMIN — BUPROPION HYDROCHLORIDE 150 MILLIGRAM(S): 150 TABLET, EXTENDED RELEASE ORAL at 11:44

## 2021-12-09 RX ADMIN — Medication 10 MILLIGRAM(S): at 06:12

## 2021-12-09 RX ADMIN — Medication 20 MILLIGRAM(S): at 06:02

## 2021-12-09 RX ADMIN — Medication 0.12 MILLIGRAM(S): at 11:43

## 2021-12-09 RX ADMIN — ATENOLOL 50 MILLIGRAM(S): 25 TABLET ORAL at 11:45

## 2021-12-09 RX ADMIN — Medication 400 UNIT(S): at 11:46

## 2021-12-09 RX ADMIN — SPIRONOLACTONE 50 MILLIGRAM(S): 25 TABLET, FILM COATED ORAL at 06:02

## 2021-12-09 RX ADMIN — APIXABAN 5 MILLIGRAM(S): 2.5 TABLET, FILM COATED ORAL at 06:00

## 2021-12-09 RX ADMIN — Medication 60 MILLIGRAM(S): at 11:45

## 2021-12-09 NOTE — PROGRESS NOTE ADULT - ASSESSMENT
72 y/o woman with PMH of COPD/asthma, CKD III, dyslipidemia, Pulm HTN, recent diagnosed PE and on apixaban, chronic back pain due to spinal stenosis, cervical radiculopathy, Bell's palsy and Gout who presented from [facility] on 11/30 for left ankle pain and drainage in setting of a recent ORIF to left ankle on 09/23/21. She was found to have evidence of possible infected hardware, was admitted for hardware removal and is now status post removal of hardware on 12/02.    1. s/p removal of hardware 12/2 for exposed hardware and infection  s/p ORIF of left ankle 9/23  - case discussed with ID   - OR culture with MRSA  - increased IV Vanco to 1250 mg daily (trough 11.4 on 12/5)   - s/p PICC line placement 12/6  - will need extended course of abx - 6 weeks from OR per ID  - PO vanco 125mg daily while on IV abx for h/o C. difficile  - Weekly CBC, CMP, ESR/CRP, Vanco trough  - ID follow-up with Dr. Erwin Logan for Telehealth 12/14. They will call the patient between 10:30-6:30      [facility]       [phone]       Fax [phone]     - Ortho following  - NWB left LE  - pain control  - continue PT/OT    2. h/o acute PE 10/2021  - seen by pulmonary  - on lovenox therapeutic dose for now  - Can resume Eliquis 5mg BID 1 week post procedure 12/02    3. HTN - baseline SBP in 100-120 range  on atenolol 50mg daily, lasix 20mg daily, spironolactone and nifedipine XL 60mg daily    4. Pulm HTN - on sildenafil, diuretics    5. Asthma/COPD - stable - O2 prn    6. pt on prednisone 10mg daily - ? reason - not clear in chart review  pt unsure if for COPD or gout    7. Hypokalemia - repleted PO     continue current management for gout, CKD 3 - stable      PROGRESS NOTE HANDOFF    Pending: check vanco level before dose on 12/9    pt aware of plan of care    Disposition: [facility] for STR today 12/9

## 2021-12-09 NOTE — PROGRESS NOTE ADULT - REASON FOR ADMISSION
Left ankle pain

## 2021-12-09 NOTE — PROGRESS NOTE ADULT - PROVIDER SPECIALTY LIST ADULT
Hospitalist
Orthopedics
Hospitalist
Hospitalist
Infectious Disease
Internal Medicine
Orthopedics
Hospitalist
Infectious Disease
Internal Medicine
Internal Medicine
Orthopedics
Orthopedics
Hospitalist
Internal Medicine
Internal Medicine
Infectious Disease

## 2021-12-09 NOTE — PROGRESS NOTE ADULT - SUBJECTIVE AND OBJECTIVE BOX
BERNARD SPEARS  71y  Female      Patient is a 71y old  Female who presents with a chief complaint of Left ankle pain (08 Dec 2021 13:35)      INTERVAL HPI/OVERNIGHT EVENTS: no acute events overnight. patient was to be discharged but transport showed up very late at night. patient very frustrated and wants to leave "now". no other major complaints.       REVIEW OF SYSTEMS:  CONSTITUTIONAL: No fever, weight loss, or fatigue  RESPIRATORY: No cough, wheezing, chills or hemoptysis; No shortness of breath  CARDIOVASCULAR: No chest pain, palpitations, dizziness, or leg swelling  GASTROINTESTINAL: No abdominal or epigastric pain. No nausea, vomiting, or hematemesis; No diarrhea or constipation. No melena or hematochezia.  GENITOURINARY: No dysuria, frequency, hematuria, or incontinence  NEUROLOGICAL: No headaches, memory loss, loss of strength, numbness, or tremors  SKIN: No itching, burning, rashes, or lesions   MUSCULOSKELETAL: No joint pain or swelling; No muscle, back, or extremity pain  PSYCHIATRIC: No depression, anxiety, mood swings, or difficulty sleeping  All other review of systems negative    VITALS  T(C): 36.4 (12-09-21 @ 04:45), Max: 37.2 (12-08-21 @ 20:35)  HR: 68 (12-09-21 @ 04:45) (68 - 77)  BP: 97/53 (12-09-21 @ 04:45) (97/53 - 118/61)  RR: 18 (12-09-21 @ 04:45) (18 - 18)  SpO2: --  Wt(kg): --Vital Signs Last 24 Hrs  T(C): 36.4 (09 Dec 2021 04:45), Max: 37.2 (08 Dec 2021 20:35)  T(F): 97.5 (09 Dec 2021 04:45), Max: 99 (08 Dec 2021 20:35)  HR: 68 (09 Dec 2021 04:45) (68 - 77)  BP: 97/53 (09 Dec 2021 04:45) (97/53 - 118/61)  BP(mean): --  RR: 18 (09 Dec 2021 04:45) (18 - 18)  SpO2: --      12-08-21 @ 07:01  -  12-09-21 @ 07:00  --------------------------------------------------------  IN: 0 mL / OUT: 700 mL / NET: -700 mL        PHYSICAL EXAM:  GENERAL: elderly F, NAD, non toxic  PSYCH: no agitation, baseline mentation  NERVOUS SYSTEM:  Alert & Oriented X3, CN 2-12 grossly intact, normal mentation.   PULMONARY: Clear to percussion bilaterally; No rales, rhonchi, wheezing, or rubs  CARDIOVASCULAR: Regular rate and rhythm; No murmurs, rubs, or gallops  GI: Soft, Nontender, Nondistended; Bowel sounds present  EXTREMITIES:  2+ Peripheral Pulses, No clubbing, cyanosis, or edema  LYMPH: No lymphadenopathy noted  SKIN: No rashes or lesions    Consultant(s) Notes Reviewed:  [x ] YES  [ ] NO    Discussed with Consultants/Other Providers [ x] YES     LABS                          9.4    9.75  )-----------( 218      ( 09 Dec 2021 07:17 )             29.3     12-09    134<L>  |  100  |  22<H>  ----------------------------<  90  3.4<L>   |  18  |  1.3    Ca    9.0      09 Dec 2021 07:17  Mg     1.9     12-08    TPro  5.5<L>  /  Alb  3.5  /  TBili  <0.2  /  DBili  x   /  AST  21  /  ALT  23  /  AlkPhos  94  12-09      RADIOLOGY & ADDITIONAL TESTS:  - no images 12/9    Imaging Personally Reviewed:  [ ] YES  [ ] NO    HEALTH ISSUES - PROBLEM Dx:      MEDICATIONS  (STANDING):  allopurinol 300 milliGRAM(s) Oral at bedtime  apixaban 5 milliGRAM(s) Oral every 12 hours  ATENolol  Tablet 50 milliGRAM(s) Oral daily  atorvastatin 80 milliGRAM(s) Oral at bedtime  budesonide 160 MICROgram(s)/formoterol 4.5 MICROgram(s) Inhaler 2 Puff(s) Inhalation two times a day  buPROPion XL (24-Hour) . 150 milliGRAM(s) Oral daily  cholecalciferol 400 Unit(s) Oral daily  colchicine 0.6 milliGRAM(s) Oral daily  FLUoxetine 20 milliGRAM(s) Oral two times a day  folic acid 1 milliGRAM(s) Oral daily  furosemide    Tablet 20 milliGRAM(s) Oral daily  hyoscyamine SL 0.125 milliGRAM(s) SubLingual daily  magnesium oxide 400 milliGRAM(s) Oral daily  NIFEdipine XL 60 milliGRAM(s) Oral daily  predniSONE   Tablet 10 milliGRAM(s) Oral daily  primidone 50 milliGRAM(s) Oral at bedtime  sildenafil (REVATIO) 20 milliGRAM(s) Oral three times a day  spironolactone 50 milliGRAM(s) Oral daily  tamsulosin 0.4 milliGRAM(s) Oral at bedtime  traZODone 100 milliGRAM(s) Oral at bedtime  valACYclovir 500 milliGRAM(s) Oral daily  vancomycin    Solution 125 milliGRAM(s) Oral two times a day  vancomycin  IVPB 1250 milliGRAM(s) IV Intermittent every 24 hours    MEDICATIONS  (PRN):  acetaminophen     Tablet .. 650 milliGRAM(s) Oral every 6 hours PRN Temp greater or equal to 38C (100.4F), Moderate Pain (4 - 6)  ALBUTerol    90 MICROgram(s) HFA Inhaler 2 Puff(s) Inhalation every 6 hours PRN Shortness of Breath and/or Wheezing  diazepam    Tablet 10 milliGRAM(s) Oral three times a day PRN Anxiety  melatonin 5 milliGRAM(s) Oral at bedtime PRN Insomnia  oxycodone    5 mG/acetaminophen 325 mG 1 Tablet(s) Oral every 8 hours PRN Severe Pain (7 - 10)  simethicone 80 milliGRAM(s) Chew daily PRN Indigestion

## 2021-12-21 NOTE — CDI QUERY NOTE - NSCDIOTHERTXTBX_GEN_ALL_CORE_HH
CLINICAL INDICATORS  PRE/POST- OPERATIVE DIAGNOSES: Painful Orthopedic Hardware and Possible Postop Wound Infection.  OPERATION PERFORMED: Hardware Removal and Irrigation and Debridement of the Wound on the Left Lower Extremity.  PROCEDURE: … An incision was made ellipsing out the diseased tissue and directly underneath the diseased tissue was a prominent screw, which was removed without difficulty. The remaining two screws were also removed and then another small incision was made distally. K-wire was introduced and the set screw was removed and then the removal device was attached. The ties were released and the nail was removed in its entirety without difficulty. 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 At this point, the wound was thoroughly irrigated with 3 L of normal saline and vancomycin powder was placed inside the wounds. The wounds were then closed with PDS suture and nylon suture and the patient was placed in a splint …    Based on your professional judgment and the operative report, please clarify if the debridement of the wound on the left lower extremity can be further specified as:  •  Debridement of the wound on the left lower extremity was excisional debridement down to and including skin    •  Debridement of the wound on the left lower extremity was excisional debridement down to and including subcutaneous tissue    •  Debridement of the wound on the left lower extremity was nonexcisional debridement down to and including skin    •  Debridement of the wound on the left lower extremity was nonexcisional debridement down to and including subcutaneous tissue    •  Other (please specify):        Thank you,  Ani Moore RN, CCS, CCDS  [phone] CLINICAL INDICATORS    PRE/POST- OPERATIVE DIAGNOSES: Painful Orthopedic Hardware and Possible Postop Wound Infection.    OPERATION PERFORMED: Hardware Removal and Irrigation and Debridement of the Wound on the Left Lower Extremity.    PROCEDURE: … An incision was made ellipsing out the diseased tissue and directly underneath the diseased tissue was a prominent screw, which was removed without difficulty. The remaining two screws were also removed and then another small incision was made distally. K-wire was introduced and the set screw was removed and then the removal device was attached. The ties were released and the nail was removed in its entirety without difficulty. 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 At this point, the wound was thoroughly irrigated with 3 L of normal saline and vancomycin powder was placed inside the wounds. The wounds were then closed with PDS suture and nylon suture and the patient was placed in a splint …                                  Based on your professional judgment and the operative report, please clarify if the debridement of the wound on the left lower extremity can be further specified as:    •  Debridement of the wound on the left lower extremity was excisional debridement down to and including skin    •  Debridement of the wound on the left lower extremity was excisional debridement down to and including subcutaneous tissue    •  Debridement of the wound on the left lower extremity was nonexcisional debridement down to and including skin    •  Debridement of the wound on the left lower extremity was nonexcisional debridement down to and including subcutaneous tissue    •  Other (please specify):        Thank you,  Ani Moore RN, CCS, CCDS  [phone]

## 2022-01-04 ENCOUNTER — NON-APPOINTMENT (OUTPATIENT)
Age: 72
End: 2022-01-04

## 2022-01-04 ENCOUNTER — APPOINTMENT (OUTPATIENT)
Dept: NEUROLOGY | Facility: CLINIC | Age: 72
End: 2022-01-04
Payer: MEDICARE

## 2022-01-04 PROCEDURE — 99442: CPT

## 2022-01-04 NOTE — HISTORY OF PRESENT ILLNESS
[Home] : at home, [unfilled] , at the time of the visit. [Medical Office: ([facility])___] : at the medical office located in  [Verbal consent obtained from patient] : the patient, [unfilled] [FreeTextEntry1] : This visit was done as / her request for a telephonic visit. [place] a [place] . after she has had a compound Fx of the anitha for which she did have surgery and unfortunately with complications and now also MRSA \par infection\par She is taking IV antibiotics and as of now cannot have weight bearing.\par she is according to her having difficulty with healing and new bone formation\par She is scheduled to see her surgeon on Monday\par One issue that she has is the steps in their house and because of that she could not be discharged home until she is completely healed.\par No Other issues except being sad and crying. she says because of the Covid she has to stay in her room all the time. Shortage of staff is also not helping\par \par \par We had talked about different issues, she will call me after her visit\par

## 2022-06-22 ENCOUNTER — APPOINTMENT (OUTPATIENT)
Dept: ORTHOPEDIC SURGERY | Facility: CLINIC | Age: 72
End: 2022-06-22
Payer: MEDICARE

## 2022-06-22 VITALS — WEIGHT: 169 LBS | HEIGHT: 58 IN | BODY MASS INDEX: 35.48 KG/M2

## 2022-06-22 PROCEDURE — 99213 OFFICE O/P EST LOW 20 MIN: CPT

## 2022-06-22 PROCEDURE — 73610 X-RAY EXAM OF ANKLE: CPT | Mod: LT

## 2022-06-22 NOTE — ASSESSMENT
[FreeTextEntry1] :  patient is stable now home doing some walking still reports some diffuse pain we had a lengthy discussion no reason to consider any additional surgery were progress weight-bearing with the Aircast I will see her in 3 months no provided for her therapist Percocet was refilled

## 2022-06-22 NOTE — PHYSICAL EXAM
[de-identified] : decreased ROM   no wounds   skin intact\par xrays  unchanged from previous x-rays plate on medial side some evidence of delayed union alignment acceptable

## 2022-06-22 NOTE — REASON FOR VISIT
[Friend] : friend [FreeTextEntry2] : Follow up right ankle fracture.  Patient has continued pain the the ankle\par home wearing short CAM boot  doing therapy at home  injury was 09/21/2021

## 2022-07-08 NOTE — ED PROVIDER NOTE - COVID-19 ORDERING FACILITY
[facility]  - [facility]
Attending Attestation (For Attendings USE Only)...

## 2022-07-28 ENCOUNTER — OUTPATIENT (OUTPATIENT)
Dept: OUTPATIENT SERVICES | Facility: HOSPITAL | Age: 72
LOS: 1 days | Discharge: HOME | End: 2022-07-28

## 2022-07-28 VITALS
SYSTOLIC BLOOD PRESSURE: 131 MMHG | TEMPERATURE: 97 F | HEIGHT: 56 IN | RESPIRATION RATE: 18 BRPM | WEIGHT: 130.07 LBS | HEART RATE: 81 BPM | OXYGEN SATURATION: 96 % | DIASTOLIC BLOOD PRESSURE: 61 MMHG

## 2022-07-28 VITALS — SYSTOLIC BLOOD PRESSURE: 117 MMHG | DIASTOLIC BLOOD PRESSURE: 87 MMHG | RESPIRATION RATE: 17 BRPM | HEART RATE: 74 BPM

## 2022-07-28 DIAGNOSIS — T85.192A OTHER MECHANICAL COMPLICATION OF IMPLANTED ELECTRONIC NEUROSTIMULATOR OF SPINAL CORD ELECTRODE (LEAD), INITIAL ENCOUNTER: Chronic | ICD-10-CM

## 2022-07-28 DIAGNOSIS — Z96.651 PRESENCE OF RIGHT ARTIFICIAL KNEE JOINT: Chronic | ICD-10-CM

## 2022-07-28 NOTE — PRE-ANESTHESIA EVALUATION ADULT - HEIGHT IN FEET
PT CALLING THAT NEED REFILL FOR METOPROLOL 50 MG, 2 X DAILY, NUMBER 180    PLEASE SENT TO [facility] IN [place]    PT # [phone]  
Patient here for 05/02/2019 for yearly. Instructed 6 month follow-up . Refill sent . Patient informed   
4

## 2022-07-28 NOTE — ASU PATIENT PROFILE, ADULT - FALL HARM RISK - HARM RISK INTERVENTIONS

## 2022-07-28 NOTE — ASU PATIENT PROFILE, ADULT - WILL THE PATIENT ACCEPT THE PFIZER COVID-19 VACCINE IF ELIGIBLE AND IT IS AVAILABLE?
2022    Vandana Reynaga (:  1953) is a 78 y.o. male, here for a preoperative evaluation and follow-up of his chronic health problems. He is scheduled on 2022 with Dr Agapito Reagan to have a urological procedure for his erectile dysfunction related to his diabetes. Patient sees endocrinology and his diabetes has been under control. He also states he is not having any recent issues with his asthma. He had echocardiogram in 2019 which showed possible cardiomyopathy and decreased ejection fraction however on angiogram after the echocardiogram he had nonobstructive CAD and a normal ejection fraction of 65%. He is on anticoagulant secondary to atrial fibrillation per his cardiologist Dr. Romel Angulo. Patient Active Problem List   Diagnosis    Erectile dysfunction    Chronic back pain    GERD (gastroesophageal reflux disease)    CLARISSE (obstructive sleep apnea)    Hypotestosteronism    Pulmonary nodule    S/P cardiac cath: non-obstructive CAD    Vitamin D deficiency    Diabetic neuropathy, painful (HCC)    Acquired hypothyroidism    Chronic atrial fibrillation (HCC)    Essential hypertension    Moderate persistent asthma without complication    Mixed hyperlipidemia    BPH associated with nocturia    Anticoagulated on Coumadin    Obesity (BMI 30-39. 9)    Diabetic gastroparesis (HCC)    Primary osteoarthritis of left elbow    Lumbar radiculopathy    Cervical radiculopathy    Type 2 diabetes mellitus with diabetic polyneuropathy, with long-term current use of insulin (HCC)    History of tobacco abuse    Chronic bronchitis (HCC)    Coronary artery calcification seen on CT scan    Cardiomyopathy ([address]    Personal history of COVID-19       Review of Systems   Constitutional:  Negative for fatigue and unexpected weight change. HENT:  Negative for congestion and hearing loss. Eyes:  Negative for pain and visual disturbance. Respiratory:  Negative for shortness of breath and wheezing.     Cardiovascular: Insulin Pen Needle (NOVOFINE) 30G X 8 MM MISC APPLY 1 EACH TOPICALLY 2 TIMES DAILY. Yes Kiran Bolden MD   albuterol sulfate HFA (PROAIR HFA) 108 (90 Base) MCG/ACT inhaler Inhale 2 puffs into the lungs every 6 hours as needed for Wheezing or Shortness of Breath  Matthew Carrizales MD   predniSONE (DELTASONE) 20 MG tablet Take 1 tablet by mouth in the morning for 10 days.   Matthew Carrizales MD   fluticasone-salmeterol (ADVAIR DISKUS) 250-50 MCG/DOSE AEPB USE 1 INHALATION TWO TIMES  DAILY  Matthew Carrizales MD   ipratropium-albuterol (Sam Walker) 0.5-2.5 (3) MG/3ML SOLN nebulizer solution Inhale 3 mLs into the lungs every 6 hours as needed for Shortness of Breath  Vanessa Gamboa APRN - CNP   glucose blood VI test strips (CRUZITO CONTOUR NEXT TEST) strip Testing 4 x a day DX: E11.42  Kiran Bolden MD        Allergies   Allergen Reactions    Lyrica [Pregabalin] Swelling    Metoclopramide Hives     tremor  tremor  tremor    Simvastatin Hives     Increased CPK  Increased CPK  Increased CPK    Amlodipine Other (See Comments)     Feet edema       Past Medical History:   Diagnosis Date    Arthritis     Asthma     Reactive airway disease    Atrial fibrillation (HCC)     BPH (benign prostatic hypertrophy) 2013    Dr Petros Meza    CAD (coronary artery disease)     Minimal (Nonobstructive) Dr Shiva Maradiaga    Cervical disc disorder, unspecified     Chronic back pain     Chronic respiratory failure with hypoxia ([address]     COPD     Diabetes mellitus ([address]     DM (diabetes mellitus) with complications ([address]     Elevated CPK 2009    Rheum workup negative 2009    Erectile dysfunction     GERD (gastroesophageal reflux disease)     Hypertension     Hypotestosteronism     Hypothyroidism     Neuropathy     Diabetic    CLARISSE (obstructive sleep apnea)     need 11 cm    Paroxysmal atrial fibrillation (HCC)     Dr Eaton Revels    RBBB (right bundle branch block)     on EKG    Right ankle pain 5/20/2019    Vitamin D deficiency        Past Surgical History: Asthma Neg Hx     Cancer Neg Hx        ADVANCE DIRECTIVE: N, <no information>    Vitals:    07/26/22 0937   BP: 112/72   Pulse: 72   Temp: 98 °F (36.7 °C)   TempSrc: Oral   SpO2: 94%   Weight: 223 lb (101.2 kg)     Estimated body mass index is 32 kg/m² as calculated from the following:    Height as of 7/18/22: 5' 10\" (1.778 m). Weight as of this encounter: 223 lb (101.2 kg). Physical Exam  Vitals reviewed. Constitutional:       General: He is not in acute distress. Appearance: He is well-developed. He is not toxic-appearing. Eyes:      Conjunctiva/sclera: Conjunctivae normal.      Pupils: Pupils are equal, round, and reactive to light. Neck:      Thyroid: No thyromegaly. Vascular: No carotid bruit or JVD. Trachea: Trachea normal.   Cardiovascular:      Rate and Rhythm: Normal rate. Rhythm irregular. Heart sounds: Normal heart sounds. Pulmonary:      Effort: Pulmonary effort is normal.      Breath sounds: Normal breath sounds. No wheezing or rales. Abdominal:      Palpations: Abdomen is soft. There is no hepatomegaly, splenomegaly or mass. Tenderness: There is no abdominal tenderness. There is no guarding. Musculoskeletal:      Cervical back: Neck supple. Lymphadenopathy:      Cervical: No cervical adenopathy. Skin:     General: Skin is warm and dry. Neurological:      Mental Status: He is alert and oriented to person, place, and time. Psychiatric:         Behavior: Behavior normal. Behavior is cooperative.        EKG today in the office shows atrial fibrillation    Immunization History   Administered Date(s) Administered    COVID-19, PFIZER PURPLE top, DILUTE for use, (age 15 y+), 30mcg/0.3mL 02/08/2021, 03/08/2021, 10/19/2021    Influenza 10/31/2013    Influenza A ([id number]) Vaccine IM 10/01/2009    Influenza Virus Vaccine 10/01/2009, 11/03/2014, 11/09/2015    Influenza, High Dose (Fluzone 65 yrs and older) 09/26/2018    Influenza, Quadv, IM, (6 mo and older Fluzone, Flulaval, Fluarix and 3 yrs and older Afluria) 10/20/2016, 12/06/2017    Influenza, Quadv, adjuvanted, 65 yrs +, IM, PF (Fluad) 11/24/2021    Pneumococcal Conjugate 13-valent ([id number]) 08/24/2018    Pneumococcal Polysaccharide ([id number]) 10/31/2013, 06/06/2022    Tdap (Boostrix, Adacel) 09/12/2019       Assessment & Plan     1. Type 2 diabetes mellitus with diabetic polyneuropathy, with long-term current use of insulin ([facility]    2. S/P cardiac cath: non-obstructive CAD    3. CLARISSE (obstructive sleep apnea)    4. Moderate persistent asthma without complication    5. Erectile dysfunction due to arterial insufficiency    6. Acquired hypothyroidism         Orders Placed This Encounter   Procedures    EKG 12 Lead - Clinic Performed   The patient will get his routine labs by his endocrinologist in early August.  My opinion is that the patient is currently medically stable. The urologist requested that he stop his coumadin 5 days before the procedure. He will discuss with his coumadin clinic doing a lovenox bridge. This note will be sent to the surgeon. Prieto Mueller M.D. [address] [facility].  [address] [facility]      --Prieto Mueller MD Not applicable

## 2022-07-28 NOTE — ASU DISCHARGE PLAN (ADULT/PEDIATRIC) - NS MD DC FALL RISK RISK
For information on Fall & Injury Prevention, visit: [email or web address] OR  [email or web address] OR  https://www.cdc.gov/steadi/patient.html

## 2022-08-01 DIAGNOSIS — Z96.651 PRESENCE OF RIGHT ARTIFICIAL KNEE JOINT: ICD-10-CM

## 2022-08-01 DIAGNOSIS — J44.9 CHRONIC OBSTRUCTIVE PULMONARY DISEASE, UNSPECIFIED: ICD-10-CM

## 2022-08-01 DIAGNOSIS — H57.03 MIOSIS: ICD-10-CM

## 2022-08-01 DIAGNOSIS — M19.90 UNSPECIFIED OSTEOARTHRITIS, UNSPECIFIED SITE: ICD-10-CM

## 2022-08-01 DIAGNOSIS — E78.5 HYPERLIPIDEMIA, UNSPECIFIED: ICD-10-CM

## 2022-08-01 DIAGNOSIS — H26.8 OTHER SPECIFIED CATARACT: ICD-10-CM

## 2022-08-01 DIAGNOSIS — I27.20 PULMONARY HYPERTENSION, UNSPECIFIED: ICD-10-CM

## 2022-08-01 DIAGNOSIS — F32.A DEPRESSION, UNSPECIFIED: ICD-10-CM

## 2022-08-01 DIAGNOSIS — F41.9 ANXIETY DISORDER, UNSPECIFIED: ICD-10-CM

## 2022-08-01 DIAGNOSIS — Z79.01 LONG TERM (CURRENT) USE OF ANTICOAGULANTS: ICD-10-CM

## 2022-08-01 DIAGNOSIS — Z98.41 CATARACT EXTRACTION STATUS, RIGHT EYE: ICD-10-CM

## 2022-08-01 DIAGNOSIS — M81.0 AGE-RELATED OSTEOPOROSIS WITHOUT CURRENT PATHOLOGICAL FRACTURE: ICD-10-CM

## 2022-09-15 ENCOUNTER — APPOINTMENT (OUTPATIENT)
Dept: ORTHOPEDIC SURGERY | Facility: CLINIC | Age: 72
End: 2022-09-15
Payer: MEDICARE

## 2022-09-15 PROCEDURE — 99213 OFFICE O/P EST LOW 20 MIN: CPT

## 2022-09-15 RX ORDER — OXYCODONE AND ACETAMINOPHEN 5; 325 MG/1; MG/1
5-325 TABLET ORAL
Qty: 60 | Refills: 0 | Status: ACTIVE | COMMUNITY
Start: 2022-09-15 | End: 1900-01-01

## 2022-09-15 NOTE — PHYSICAL EXAM
[de-identified] : decreased ROM   no wounds   skin intact\par xrays   from last visit  plate on medial side some evidence of delayed union alignment acceptable

## 2022-09-15 NOTE — ASSESSMENT
[FreeTextEntry1] :  patient is stable now home doing some walking still reports some diffuse pain we again had a lengthy discussion no reason to consider any additional surgery will progress weight-bearing with the Aircast or sleeve I will see her in 3 months no provided for her therapist Percocet was refilled   no indication today for repeat x-ray

## 2022-09-15 NOTE — REASON FOR VISIT
[Friend] : friend [FreeTextEntry2] : Follow up left ankle fracture.  Patient has continued pain the the ankle\par home wearing sleeve doing therapy at home  injury was 09/21/2021  still swells occasionally taking Percocet does have a rolling walker

## 2022-10-07 NOTE — ED PROVIDER NOTE - WR ORDER ID 2
"I recommend the book, "The Obesity Code" for weight loss; it recommends intermittent fasting and avoidance of sugar, artificial sweeteners and refined carbohydrates.    Also, here is information on a Mediterranean type diet including fish, the pesco-mediterranean diet from the American College of Cardiology:    1.  Humans are evolutionarily adapted to obtain calories and nutrients from both plant and animal food sources. Many people overconsume animal products, often-processed meats high in saturated fats and chemical additives. In contrast, while strict veganism has gained popularity for many reasons and has value in certain groups, it can cause nutritional deficiencies (vitamin B12, high-quality proteins, iron, zinc, omega-3 fatty acid, vitamin D, and calcium), and predispose to osteopenia, loss of muscle mass, and anemia. This is not true of a lacto-ovo vegetarian diet, which allows no animal-based food except for eggs and dairy. A 6-year study of [id number] [place] [employer] reported a decreased incidence of all-cause mortality when comparing vegetarians with nonvegetarians. However, when the vegetarians were stratified into vegans, lacto-ovo vegetarians, pesco-vegetarians, and semi-vegetarians, the pesco-vegetarians had lowest risks for all-cause mortality, cardiovascular disease (CVD) mortality, and mortality from other causes.     2.  The authors propose a plant-rich diet rich in nuts with fish and seafood as the principle source of animal food. Known as the Pesco-Mediterranean diet, it is supplemented with extra-virgin olive oil (EVOO), which is the principle fat source, along with moderate amounts of dairy (particularly yogurt and cheese) and eggs, as well as modest amounts of alcohol consumption (ideally red wine with the evening meal), but few red and processed meats.     3.  Both epidemiological studies and randomized clinical trials indicate that the traditional Mediterranean diet is associated with " lower risks for all-cause and CVD mortality, coronary heart disease, metabolic syndrome, diabetes, cognitive decline, neurodegenerative diseases (including Alzheimers), depression, overall cancer mortality, and breast and colorectal cancers.     4.  The traditional Mediterranean diet has been endorsed in the most recent Dietary Guidelines for Americans and the American College of Cardiology/American Heart Association guidelines. The 2020 U.S. News & World Report ranked it #1 for overall health based upon it being nutritious, safe, relatively easy to follow, protective against CVD and diabetes, and effective for weight loss.     5.  Fish and seafood are important sources of vitamins protein and omega-3 fatty acids, of which the higher blood and adipose tissue are associated with reduced fatal and nonfatal myocardial infarction. When not fried, fish consumption has been associated with reduced risk of heart failure, and the incidence of the metabolic syndrome, coronary heart disease, ischemic stroke, and sudden cardiac death, particularly when seafood replaces less healthy foods.     6.  Unrestricted use of olive oil in the kitchen, on salads (with vinegar), cooking vegetables, and at the table is the foundation of the traditional Mediterranean diet, although olive oil quality is crucial, which makes it expensive. EVOO retains hydrophilic components of olives including highly bioactive polyphenols, which are believed to underlie many of EVOOs cardiometabolic benefits, such as reduced low-density lipoprotein cholesterol (LDL-C) and increased high-density lipoprotein cholesterol (HDL-C), improved vascular reactivity, enhanced HDL particle functionality, and a lower diabetes risk.     7.  Tree nuts, an integral component of the traditional Mediterranean diet, are nutrient dense rich in unsaturated fats, fiber, protein, polyphenols, phytosterols, and tocopherols. Nut consumption is associated with decreased incidence  and mortality rates from both CVD and coronary artery disease (CAD), as well as atrial fibrillation and diabetes. Randomized controlled trials have shown that diets enriched with nuts produce cardiometabolic benefits including improvements in insulin sensitivity, LDL-C, inflammation, and vascular reactivity. A 1-daily serving of mixed nuts resulted in a 28% reduction in CVD risk. Generous intake of nuts does not promote weight gain because of increased satiety and incomplete digestion.     8.  Legumes are an excellent source of vegetable protein, folate, and magnesium and fiber, and like other seeds including peanuts, are rich in polyphenols. Consumption of legumes has been linked to a reduced risk of incident and fatal CVD and CAD, as well as improvements in blood glucose, cholesterol, blood pressure, and body weight. Legumes, like fish, are a satiating and healthy substitute for red meat and processed meats.     9.  Dairy products and eggs are important sources of protein, nonsodium minerals, probiotics, and vitamin D. Although there is no clear consensus among nutrition experts on the role of dairy products in CVD risk, they are allowed in this Pesco-Mediterranean diet. Fermented low-fat versions, such as yogurt and soft cheeses, are preferred; butter and hard cheese are high in saturated fats and salt.     10.  Eggs are composed of beneficial nutrients including all essential amino acids, in addition to minerals (selenium, phosphorus, iodine, zinc), vitamins (A, D, B2, B12, niacin), and carotenoids (lutein, zeaxanthin). Although each yolk contains about 184 mg of dietary cholesterol, large prospective cohorts suggest that egg consumption is unrelated to serum cholesterol and does not increase CVD risk. Eggs are allowed in the Pesco-Mediterranean diet; egg whites are unlimited and preferably no more than 5 yolks/week.     11.  Whole grains, such as barley, whole oats, rye, corn, buckwheat, brown rice, and quinoa,  are an integral part of the traditional Mediterranean diet. Pasta is an example of a starchy food that has a low glycemic index despite being a refined carbohydrate. In the context of a low glycemic index dietary pattern such as the Mediterranean diet, pasta does not adversely affect adiposity and may even help reduce body weight and there is no evidence that pasta promotes cardiometabolic risk factors. White rice is associated with type 2 diabetes mellitus in Asians but not in Western cohorts, possibly because it is cooked and served plain in [place] and in Western cultures cooked in mixed dishes with vegetables and vegetable oil including EVOO.     12.  The staple beverage of the Pesco-Mediterranean diet is water--which can be flavored but not sweetened. Unsweetened tea and coffee are rich in antioxidants and are associated with improved CVD outcomes. If alcohol is consumed at all, dry red wine is recommended, with the ideal amount being a single glass (6 oz) for women and 1 or 2 glasses/day for men (6-12 oz) consumed with meals.     13.  Time-restricted eating, a type of intermittent fasting, is the practice of limiting the daily intake of calories to a window of time usually between 6-12 hours each day. Intermittent fasting when done on a regular basis has been shown to decrease intra-abdominal adipose tissue and reduce free-radical production. This elicits powerful cellular responses that improve glucose metabolism and reduce systemic inflammation, and may also reduce risks of diabetes, CVD, cancer, and neurodegenerative diseases. After a 12-hour overnight fast, insulin levels are typically low, and glycogen stores have been depleted. In this fasted state, the body starts mobilizing fatty acids from adipose cells to burn as metabolic fuel instead of glucose. This improves insulin sensitivity. Time-restricted eating is not more effective for weight loss than standard calorie-restriction, but appears to enhance CV  health even in nonobese people. Fasting may also lower blood pressure and resting heart rate and improve autonomic balance with augmented heart rate variability.     14.  The evidence regarding time-restricted eating is mostly based on animal models and observational human studies. The most popular form of time-restricted eating involves eating two rather than three meals and compressing the calorie-consumption window. No head-to-head studies have been performed to assess the optimal time window.     [id number]

## 2022-10-08 RX ORDER — SILDENAFIL 20 MG/1
20 TABLET ORAL
Qty: 270 | Refills: 3 | Status: ACTIVE | COMMUNITY
Start: 2021-03-19 | End: 1900-01-01

## 2022-11-05 ENCOUNTER — APPOINTMENT (OUTPATIENT)
Age: 72
End: 2022-11-05

## 2022-11-05 VITALS
HEART RATE: 66 BPM | WEIGHT: 130 LBS | BODY MASS INDEX: 27.29 KG/M2 | HEIGHT: 58 IN | OXYGEN SATURATION: 99 % | DIASTOLIC BLOOD PRESSURE: 58 MMHG | SYSTOLIC BLOOD PRESSURE: 110 MMHG | RESPIRATION RATE: 14 BRPM

## 2022-11-05 PROCEDURE — 99215 OFFICE O/P EST HI 40 MIN: CPT

## 2022-12-26 ENCOUNTER — EMERGENCY (EMERGENCY)
Facility: HOSPITAL | Age: 72
LOS: 0 days | Discharge: HOME | End: 2022-12-26
Attending: EMERGENCY MEDICINE | Admitting: EMERGENCY MEDICINE
Payer: MEDICARE

## 2022-12-26 VITALS
DIASTOLIC BLOOD PRESSURE: 62 MMHG | SYSTOLIC BLOOD PRESSURE: 132 MMHG | OXYGEN SATURATION: 95 % | RESPIRATION RATE: 20 BRPM | TEMPERATURE: 99 F | HEART RATE: 102 BPM

## 2022-12-26 VITALS
DIASTOLIC BLOOD PRESSURE: 67 MMHG | RESPIRATION RATE: 20 BRPM | OXYGEN SATURATION: 99 % | SYSTOLIC BLOOD PRESSURE: 142 MMHG | HEART RATE: 84 BPM | TEMPERATURE: 99 F

## 2022-12-26 DIAGNOSIS — M19.90 UNSPECIFIED OSTEOARTHRITIS, UNSPECIFIED SITE: ICD-10-CM

## 2022-12-26 DIAGNOSIS — Z96.651 PRESENCE OF RIGHT ARTIFICIAL KNEE JOINT: Chronic | ICD-10-CM

## 2022-12-26 DIAGNOSIS — T85.192A OTHER MECHANICAL COMPLICATION OF IMPLANTED ELECTRONIC NEUROSTIMULATOR OF SPINAL CORD ELECTRODE (LEAD), INITIAL ENCOUNTER: Chronic | ICD-10-CM
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
## 2022-12-26 DIAGNOSIS — S81.012A LACERATION WITHOUT FOREIGN BODY, LEFT KNEE, INITIAL ENCOUNTER: ICD-10-CM

## 2022-12-26 DIAGNOSIS — E78.5 HYPERLIPIDEMIA, UNSPECIFIED: ICD-10-CM

## 2022-12-26 DIAGNOSIS — I27.20 PULMONARY HYPERTENSION, UNSPECIFIED: ICD-10-CM

## 2022-12-26 DIAGNOSIS — F32.A DEPRESSION, UNSPECIFIED: ICD-10-CM

## 2022-12-26 DIAGNOSIS — J44.9 CHRONIC OBSTRUCTIVE PULMONARY DISEASE, UNSPECIFIED: ICD-10-CM
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
## 2022-12-26 DIAGNOSIS — Z96.651 PRESENCE OF RIGHT ARTIFICIAL KNEE JOINT: ICD-10-CM

## 2022-12-26 DIAGNOSIS — F41.9 ANXIETY DISORDER, UNSPECIFIED: ICD-10-CM
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
## 2022-12-26 DIAGNOSIS — Z23 ENCOUNTER FOR IMMUNIZATION: ICD-10-CM

## 2022-12-26 DIAGNOSIS — Z91.048 OTHER NONMEDICINAL SUBSTANCE ALLERGY STATUS: ICD-10-CM

## 2022-12-26 DIAGNOSIS — M81.0 AGE-RELATED OSTEOPOROSIS WITHOUT CURRENT PATHOLOGICAL FRACTURE: ICD-10-CM

## 2022-12-26 PROCEDURE — 12004 RPR S/N/AX/GEN/TRK7.6-12.5CM: CPT

## 2022-12-26 PROCEDURE — 73564 X-RAY EXAM KNEE 4 OR MORE: CPT | Mod: 26,LT

## 2022-12-26 PROCEDURE — 99283 EMERGENCY DEPT VISIT LOW MDM: CPT | Mod: 25

## 2022-12-26 RX ORDER — TETANUS TOXOID, REDUCED DIPHTHERIA TOXOID AND ACELLULAR PERTUSSIS VACCINE, ADSORBED 5; 2.5; 8; 8; 2.5 [IU]/.5ML; [IU]/.5ML; UG/.5ML; UG/.5ML; UG/.5ML
0.5 SUSPENSION INTRAMUSCULAR ONCE
Refills: 0 | Status: COMPLETED | OUTPATIENT
Start: 2022-12-26 | End: 2022-12-26

## 2022-12-26 RX ADMIN — Medication 1 TABLET(S): at 14:07

## 2022-12-26 RX ADMIN — TETANUS TOXOID, REDUCED DIPHTHERIA TOXOID AND ACELLULAR PERTUSSIS VACCINE, ADSORBED 0.5 MILLILITER(S): 5; 2.5; 8; 8; 2.5 SUSPENSION INTRAMUSCULAR at 14:07

## 2022-12-26 NOTE — ED PROCEDURE NOTE - CPROC ED INFORMED CONSENT1
Yes-Patient/Caregiver accepts free interpretation services...
Benefits, risks, and possible complications of procedure explained to patient/caregiver who verbalized understanding and gave verbal consent.

## 2022-12-26 NOTE — ED PROVIDER NOTE - OBJECTIVE STATEMENT
72-year-old female, past medical history of COPD, anxiety, pulmonary hypertension, hyperlipidemia, osteoarthritis, presents emergency department for fall.  Patient states he fell last night landed on his left knee, sustained laceration, unsure of tetanus status mild aching nonradiating pain to site.  Patient denies close head injury.

## 2022-12-26 NOTE — ED PROVIDER NOTE - NSFOLLOWUPINSTRUCTIONS_ED_ALL_ED_FT
RETURN TO ED FOR SUTURE REMOVAL IN 14DAYS.       Laceration    A laceration is a cut that goes through all of the layers of the skin and into the tissue that is right under the skin. Some lacerations heal on their own. Others need to be closed with skin adhesive strips, skin glue, stitches (sutures), or staples. Proper laceration care minimizes the risk of infection and helps the laceration to heal better.  If non-absorbable stitches or staples have been placed, they must be taken out within the time frame instructed by your healthcare provider.    SEEK IMMEDIATE MEDICAL CARE IF YOU HAVE ANY OF THE FOLLOWING SYMPTOMS: swelling around the wound, worsening pain, drainage from the wound, red streaking going away from your wound, inability to move finger or toe near the laceration, or discoloration of skin near the laceration.

## 2022-12-26 NOTE — ED PROVIDER NOTE - NS ED ATTENDING STATEMENT MOD
This was a shared visit with the TAYA. I reviewed and verified the documentation and independently performed the documented:

## 2022-12-26 NOTE — ED PROVIDER NOTE - ATTENDING APP SHARED VISIT CONTRIBUTION OF CARE
72-year-old female, lives at home, walks with walker at baseline, presents with left knee pain status post trip and fall last night, sx are constant, worse with certain movements and position, better with rest, denies fever, tactile temp, chills, paresthesias, focal weakness, or other associated injuries / complaints at present. Old chart reviewed. I have reviewed and agree with the initial nursing note, except as documented in my note.    VSS, awake, alert, LLE; no hip, ankle or foot tenderness, knee; approx 10 cm linear lac over ant knee, appears symmetrical, no gross deformity, swelling, crepitus, joint line tenderness, able to fully flex and extend knee without encouragement, no joint laxity noted on varus or valgus stress at 0 and 30 degrees, normal Lachman and posterior drawer, motor and sensation intact distally, NV intact with 2+ DP pulses. No warmth, erythema, induration, fluctuance, lymphangitis or purulence.

## 2022-12-26 NOTE — ED PROVIDER NOTE - PHYSICAL EXAMINATION
Physical Exam    Vital Signs: I have reviewed the initial vital signs.  Constitutional: appears stated age, no acute distress  Eyes: Conjunctiva pink, Sclera clear.  Musculoskeleta: no ttp rom intact   Integumentary: 10 cm lac to left knee   Neurologic: awake, alert nvi

## 2022-12-26 NOTE — ED PROVIDER NOTE - NSICDXFAMILYHX_GEN_ALL_CORE_FT
FAMILY HISTORY:  Father  Still living? No  Family history of Alzheimer's disease, Age at diagnosis: 71-80    Mother  Still living? No  Family history of bladder cancer, Age at diagnosis: 51-60     yes

## 2022-12-26 NOTE — ED PROVIDER NOTE - PATIENT PORTAL LINK FT
You can access the FollowMyHealth Patient Portal offered by [facility] by registering at the following website: [email or web address]. By joining [employer]’s FollowMyHealth portal, you will also be able to view your health information using other applications (apps) compatible with our system.

## 2022-12-26 NOTE — ED PROVIDER NOTE - NS ED ROS FT
Constitutional: (-) fever, (-) chills  Eyes: (-) visual changes  ENT: (-) nasal congestions  Cardiovascular: (-) chest pain, (-) syncope  Respiratory: (-) cough, (-) shortness of breath, (-) dyspnea,   Gastrointestinal: (-) vomiting, (-) diarrhea, (-)nausea,  Musculoskeletal: (-) neck pain, (-) back pain, (+) joint pain,  Integumentary: (-) rash, (-) edema, (-) bruises (+)lac  Neurological: (-) headache, (-) loc, (-) dizziness, (-) tingling, (-)numbness,  Peripheral Vascular: (-) leg swelling  :  (-)dysuria,  (-) hematuria  Allergic/Immunologic: (-) pruritus

## 2022-12-26 NOTE — ED ADULT TRIAGE NOTE - CHIEF COMPLAINT QUOTE
pt came to ED c/o left knee pain s/p trip and fall last night while walking into her house. denies head injury, denies A/C use

## 2023-01-06 ENCOUNTER — INPATIENT (INPATIENT)
Facility: HOSPITAL | Age: 73
LOS: 2 days | Discharge: HOME HEALTH | End: 2023-01-09
Attending: INTERNAL MEDICINE | Admitting: INTERNAL MEDICINE
Payer: MEDICARE

## 2023-01-06 VITALS
SYSTOLIC BLOOD PRESSURE: 106 MMHG | RESPIRATION RATE: 19 BRPM | OXYGEN SATURATION: 99 % | DIASTOLIC BLOOD PRESSURE: 58 MMHG | TEMPERATURE: 98 F | HEART RATE: 84 BPM

## 2023-01-06 DIAGNOSIS — T85.192A OTHER MECHANICAL COMPLICATION OF IMPLANTED ELECTRONIC NEUROSTIMULATOR OF SPINAL CORD ELECTRODE (LEAD), INITIAL ENCOUNTER: Chronic | ICD-10-CM

## 2023-01-06 DIAGNOSIS — Z96.651 PRESENCE OF RIGHT ARTIFICIAL KNEE JOINT: Chronic | ICD-10-CM

## 2023-01-06 LAB
ALBUMIN SERPL ELPH-MCNC: 4.8 G/DL — SIGNIFICANT CHANGE UP (ref 3.5–5.2)
ALP SERPL-CCNC: 123 U/L — HIGH (ref 30–115)
ALT FLD-CCNC: 12 U/L — SIGNIFICANT CHANGE UP (ref 0–41)
ANION GAP SERPL CALC-SCNC: 12 MMOL/L — SIGNIFICANT CHANGE UP (ref 7–14)
AST SERPL-CCNC: 13 U/L — SIGNIFICANT CHANGE UP (ref 0–41)
BASOPHILS # BLD AUTO: 0.05 K/UL — SIGNIFICANT CHANGE UP (ref 0–0.2)
BASOPHILS NFR BLD AUTO: 0.5 % — SIGNIFICANT CHANGE UP (ref 0–1)
BILIRUB SERPL-MCNC: 0.3 MG/DL — SIGNIFICANT CHANGE UP (ref 0.2–1.2)
BUN SERPL-MCNC: 22 MG/DL — HIGH (ref 10–20)
CALCIUM SERPL-MCNC: 10.2 MG/DL — SIGNIFICANT CHANGE UP (ref 8.4–10.5)
CHLORIDE SERPL-SCNC: 110 MMOL/L — SIGNIFICANT CHANGE UP (ref 98–110)
CO2 SERPL-SCNC: 20 MMOL/L — SIGNIFICANT CHANGE UP (ref 17–32)
CREAT SERPL-MCNC: 1.3 MG/DL — SIGNIFICANT CHANGE UP (ref 0.7–1.5)
EGFR: 43 ML/MIN/1.73M2 — LOW
EOSINOPHIL # BLD AUTO: 1.48 K/UL — HIGH (ref 0–0.7)
EOSINOPHIL NFR BLD AUTO: 16.2 % — HIGH (ref 0–8)
GLUCOSE SERPL-MCNC: 102 MG/DL — HIGH (ref 70–99)
HCT VFR BLD CALC: 34.3 % — LOW (ref 37–47)
HGB BLD-MCNC: 11.5 G/DL — LOW (ref 12–16)
IMM GRANULOCYTES NFR BLD AUTO: 0.5 % — HIGH (ref 0.1–0.3)
LYMPHOCYTES # BLD AUTO: 1.3 K/UL — SIGNIFICANT CHANGE UP (ref 1.2–3.4)
LYMPHOCYTES # BLD AUTO: 14.3 % — LOW (ref 20.5–51.1)
MCHC RBC-ENTMCNC: 33.5 G/DL — SIGNIFICANT CHANGE UP (ref 32–37)
MCHC RBC-ENTMCNC: 36.5 PG — HIGH (ref 27–31)
MCV RBC AUTO: 108.9 FL — HIGH (ref 81–99)
MONOCYTES # BLD AUTO: 0.54 K/UL — SIGNIFICANT CHANGE UP (ref 0.1–0.6)
MONOCYTES NFR BLD AUTO: 5.9 % — SIGNIFICANT CHANGE UP (ref 1.7–9.3)
NEUTROPHILS # BLD AUTO: 5.69 K/UL — SIGNIFICANT CHANGE UP (ref 1.4–6.5)
NEUTROPHILS NFR BLD AUTO: 62.6 % — SIGNIFICANT CHANGE UP (ref 42.2–75.2)
NRBC # BLD: 0 /100 WBCS — SIGNIFICANT CHANGE UP (ref 0–0)
PLATELET # BLD AUTO: 261 K/UL — SIGNIFICANT CHANGE UP (ref 130–400)
POTASSIUM SERPL-MCNC: 4.2 MMOL/L — SIGNIFICANT CHANGE UP (ref 3.5–5)
POTASSIUM SERPL-SCNC: 4.2 MMOL/L — SIGNIFICANT CHANGE UP (ref 3.5–5)
PROT SERPL-MCNC: 6.9 G/DL — SIGNIFICANT CHANGE UP (ref 6–8)
RBC # BLD: 3.15 M/UL — LOW (ref 4.2–5.4)
RBC # FLD: 14.3 % — SIGNIFICANT CHANGE UP (ref 11.5–14.5)
SARS-COV-2 RNA SPEC QL NAA+PROBE: SIGNIFICANT CHANGE UP
SODIUM SERPL-SCNC: 142 MMOL/L — SIGNIFICANT CHANGE UP (ref 135–146)
WBC # BLD: 9.11 K/UL — SIGNIFICANT CHANGE UP (ref 4.8–10.8)
WBC # FLD AUTO: 9.11 K/UL — SIGNIFICANT CHANGE UP (ref 4.8–10.8)

## 2023-01-06 PROCEDURE — 73562 X-RAY EXAM OF KNEE 3: CPT | Mod: 26,LT

## 2023-01-06 PROCEDURE — 99497 ADVNCD CARE PLAN 30 MIN: CPT | Mod: 25

## 2023-01-06 PROCEDURE — 99222 1ST HOSP IP/OBS MODERATE 55: CPT

## 2023-01-06 PROCEDURE — 99285 EMERGENCY DEPT VISIT HI MDM: CPT

## 2023-01-06 RX ORDER — DIAZEPAM 5 MG
10 TABLET ORAL THREE TIMES A DAY
Refills: 0 | Status: DISCONTINUED | OUTPATIENT
Start: 2023-01-06 | End: 2023-01-09

## 2023-01-06 RX ORDER — PANTOPRAZOLE SODIUM 20 MG/1
40 TABLET, DELAYED RELEASE ORAL
Refills: 0 | Status: DISCONTINUED | OUTPATIENT
Start: 2023-01-06 | End: 2023-01-09

## 2023-01-06 RX ORDER — ATORVASTATIN CALCIUM 80 MG/1
1 TABLET, FILM COATED ORAL
Qty: 0 | Refills: 0 | DISCHARGE

## 2023-01-06 RX ORDER — COLCHICINE 0.6 MG
1 TABLET ORAL
Qty: 0 | Refills: 0 | DISCHARGE

## 2023-01-06 RX ORDER — CEFAZOLIN SODIUM 1 G
2000 VIAL (EA) INJECTION ONCE
Refills: 0 | Status: COMPLETED | OUTPATIENT
Start: 2023-01-06 | End: 2023-01-06

## 2023-01-06 RX ORDER — SIMETHICONE 80 MG/1
80 TABLET, CHEWABLE ORAL DAILY
Refills: 0 | Status: DISCONTINUED | OUTPATIENT
Start: 2023-01-06 | End: 2023-01-09

## 2023-01-06 RX ORDER — SPIRONOLACTONE 25 MG/1
0 TABLET, FILM COATED ORAL
Qty: 0 | Refills: 0 | DISCHARGE

## 2023-01-06 RX ORDER — BUPROPION HYDROCHLORIDE 150 MG/1
150 TABLET, EXTENDED RELEASE ORAL DAILY
Refills: 0 | Status: DISCONTINUED | OUTPATIENT
Start: 2023-01-06 | End: 2023-01-09

## 2023-01-06 RX ORDER — LANOLIN ALCOHOL/MO/W.PET/CERES
5 CREAM (GRAM) TOPICAL AT BEDTIME
Refills: 0 | Status: DISCONTINUED | OUTPATIENT
Start: 2023-01-06 | End: 2023-01-09

## 2023-01-06 RX ORDER — CHOLECALCIFEROL (VITAMIN D3) 125 MCG
1 CAPSULE ORAL
Qty: 0 | Refills: 0 | DISCHARGE

## 2023-01-06 RX ORDER — FOLIC ACID 0.8 MG
1 TABLET ORAL
Qty: 0 | Refills: 0 | DISCHARGE

## 2023-01-06 RX ORDER — MAGNESIUM OXIDE 400 MG ORAL TABLET 241.3 MG
200 TABLET ORAL DAILY
Refills: 0 | Status: DISCONTINUED | OUTPATIENT
Start: 2023-01-06 | End: 2023-01-09

## 2023-01-06 RX ORDER — ALBUTEROL 90 UG/1
2 AEROSOL, METERED ORAL
Qty: 0 | Refills: 0 | DISCHARGE

## 2023-01-06 RX ORDER — ACETAMINOPHEN 500 MG
975 TABLET ORAL ONCE
Refills: 0 | Status: COMPLETED | OUTPATIENT
Start: 2023-01-06 | End: 2023-01-06

## 2023-01-06 RX ORDER — FOLIC ACID 0.8 MG
1 TABLET ORAL DAILY
Refills: 0 | Status: DISCONTINUED | OUTPATIENT
Start: 2023-01-06 | End: 2023-01-09

## 2023-01-06 RX ORDER — PRIMIDONE 250 MG/1
50 TABLET ORAL AT BEDTIME
Refills: 0 | Status: DISCONTINUED | OUTPATIENT
Start: 2023-01-06 | End: 2023-01-09

## 2023-01-06 RX ORDER — OXYCODONE AND ACETAMINOPHEN 5; 325 MG/1; MG/1
1 TABLET ORAL ONCE
Refills: 0 | Status: DISCONTINUED | OUTPATIENT
Start: 2023-01-06 | End: 2023-01-06

## 2023-01-06 RX ORDER — BUPROPION HYDROCHLORIDE 150 MG/1
150 TABLET, EXTENDED RELEASE ORAL
Qty: 0 | Refills: 0 | DISCHARGE

## 2023-01-06 RX ORDER — FLUOXETINE HCL 10 MG
1 CAPSULE ORAL
Qty: 0 | Refills: 0 | DISCHARGE

## 2023-01-06 RX ORDER — ALBUTEROL 90 UG/1
2 AEROSOL, METERED ORAL EVERY 6 HOURS
Refills: 0 | Status: DISCONTINUED | OUTPATIENT
Start: 2023-01-06 | End: 2023-01-09

## 2023-01-06 RX ORDER — FUROSEMIDE 40 MG
20 TABLET ORAL DAILY
Refills: 0 | Status: DISCONTINUED | OUTPATIENT
Start: 2023-01-06 | End: 2023-01-09

## 2023-01-06 RX ORDER — ENOXAPARIN SODIUM 100 MG/ML
40 INJECTION SUBCUTANEOUS EVERY 24 HOURS
Refills: 0 | Status: DISCONTINUED | OUTPATIENT
Start: 2023-01-06 | End: 2023-01-09

## 2023-01-06 RX ORDER — ATENOLOL 25 MG/1
1 TABLET ORAL
Qty: 0 | Refills: 0 | DISCHARGE

## 2023-01-06 RX ORDER — TRAZODONE HCL 50 MG
100 TABLET ORAL AT BEDTIME
Refills: 0 | Status: DISCONTINUED | OUTPATIENT
Start: 2023-01-06 | End: 2023-01-09

## 2023-01-06 RX ORDER — BUDESONIDE AND FORMOTEROL FUMARATE DIHYDRATE 160; 4.5 UG/1; UG/1
2 AEROSOL RESPIRATORY (INHALATION)
Refills: 0 | Status: DISCONTINUED | OUTPATIENT
Start: 2023-01-06 | End: 2023-01-09

## 2023-01-06 RX ORDER — TRAZODONE HCL 50 MG
0 TABLET ORAL
Qty: 0 | Refills: 0 | DISCHARGE

## 2023-01-06 RX ORDER — FLUOXETINE HCL 10 MG
20 CAPSULE ORAL
Refills: 0 | Status: DISCONTINUED | OUTPATIENT
Start: 2023-01-06 | End: 2023-01-06

## 2023-01-06 RX ORDER — PRIMIDONE 250 MG/1
1 TABLET ORAL
Qty: 0 | Refills: 0 | DISCHARGE

## 2023-01-06 RX ORDER — ONDANSETRON 8 MG/1
4 TABLET, FILM COATED ORAL EVERY 8 HOURS
Refills: 0 | Status: DISCONTINUED | OUTPATIENT
Start: 2023-01-06 | End: 2023-01-09

## 2023-01-06 RX ORDER — VALACYCLOVIR 500 MG/1
500 TABLET, FILM COATED ORAL DAILY
Refills: 0 | Status: DISCONTINUED | OUTPATIENT
Start: 2023-01-06 | End: 2023-01-07

## 2023-01-06 RX ORDER — FLUTICASONE PROPIONATE AND SALMETEROL 50; 250 UG/1; UG/1
0 POWDER ORAL; RESPIRATORY (INHALATION)
Qty: 0 | Refills: 0 | DISCHARGE

## 2023-01-06 RX ORDER — ATORVASTATIN CALCIUM 80 MG/1
80 TABLET, FILM COATED ORAL AT BEDTIME
Refills: 0 | Status: DISCONTINUED | OUTPATIENT
Start: 2023-01-06 | End: 2023-01-09

## 2023-01-06 RX ORDER — CHOLECALCIFEROL (VITAMIN D3) 125 MCG
1000 CAPSULE ORAL DAILY
Refills: 0 | Status: DISCONTINUED | OUTPATIENT
Start: 2023-01-06 | End: 2023-01-09

## 2023-01-06 RX ORDER — DIAZEPAM 5 MG
1 TABLET ORAL
Qty: 0 | Refills: 0 | DISCHARGE

## 2023-01-06 RX ORDER — SPIRONOLACTONE 25 MG/1
50 TABLET, FILM COATED ORAL DAILY
Refills: 0 | Status: DISCONTINUED | OUTPATIENT
Start: 2023-01-06 | End: 2023-01-09

## 2023-01-06 RX ORDER — VANCOMYCIN HCL 1 G
1000 VIAL (EA) INTRAVENOUS ONCE
Refills: 0 | Status: COMPLETED | OUTPATIENT
Start: 2023-01-06 | End: 2023-01-06

## 2023-01-06 RX ORDER — OMEPRAZOLE 10 MG/1
1 CAPSULE, DELAYED RELEASE ORAL
Qty: 0 | Refills: 0 | DISCHARGE

## 2023-01-06 RX ORDER — HYOSCYAMINE SULFATE 0.13 MG
0 TABLET ORAL
Qty: 0 | Refills: 0 | DISCHARGE

## 2023-01-06 RX ORDER — VALACYCLOVIR 500 MG/1
1 TABLET, FILM COATED ORAL
Qty: 0 | Refills: 0 | DISCHARGE

## 2023-01-06 RX ORDER — ALENDRONATE SODIUM 70 MG/1
1 TABLET ORAL
Qty: 0 | Refills: 0 | DISCHARGE

## 2023-01-06 RX ORDER — ATENOLOL 25 MG/1
50 TABLET ORAL DAILY
Refills: 0 | Status: DISCONTINUED | OUTPATIENT
Start: 2023-01-06 | End: 2023-01-09

## 2023-01-06 RX ORDER — BUDESONIDE AND FORMOTEROL FUMARATE DIHYDRATE 160; 4.5 UG/1; UG/1
2 AEROSOL RESPIRATORY (INHALATION)
Qty: 0 | Refills: 0 | DISCHARGE

## 2023-01-06 RX ORDER — CEFAZOLIN SODIUM 1 G
500 VIAL (EA) INJECTION EVERY 8 HOURS
Refills: 0 | Status: DISCONTINUED | OUTPATIENT
Start: 2023-01-06 | End: 2023-01-07

## 2023-01-06 RX ORDER — FUROSEMIDE 40 MG
1 TABLET ORAL
Qty: 0 | Refills: 0 | DISCHARGE

## 2023-01-06 RX ORDER — ALLOPURINOL 300 MG
1 TABLET ORAL
Qty: 0 | Refills: 0 | DISCHARGE

## 2023-01-06 RX ORDER — NIFEDIPINE 30 MG
60 TABLET, EXTENDED RELEASE 24 HR ORAL DAILY
Refills: 0 | Status: DISCONTINUED | OUTPATIENT
Start: 2023-01-06 | End: 2023-01-06

## 2023-01-06 RX ORDER — ALLOPURINOL 300 MG
300 TABLET ORAL AT BEDTIME
Refills: 0 | Status: DISCONTINUED | OUTPATIENT
Start: 2023-01-06 | End: 2023-01-09

## 2023-01-06 RX ORDER — NIFEDIPINE 30 MG
1 TABLET, EXTENDED RELEASE 24 HR ORAL
Qty: 0 | Refills: 0 | DISCHARGE

## 2023-01-06 RX ADMIN — Medication 100 MILLIGRAM(S): at 22:43

## 2023-01-06 RX ADMIN — ATORVASTATIN CALCIUM 80 MILLIGRAM(S): 80 TABLET, FILM COATED ORAL at 22:42

## 2023-01-06 RX ADMIN — Medication 300 MILLIGRAM(S): at 22:43

## 2023-01-06 RX ADMIN — PRIMIDONE 50 MILLIGRAM(S): 250 TABLET ORAL at 22:43

## 2023-01-06 RX ADMIN — Medication 100 MILLIGRAM(S): at 22:42

## 2023-01-06 RX ADMIN — Medication 250 MILLIGRAM(S): at 09:49

## 2023-01-06 RX ADMIN — Medication 975 MILLIGRAM(S): at 19:12

## 2023-01-06 RX ADMIN — Medication 100 MILLIGRAM(S): at 09:48

## 2023-01-06 RX ADMIN — Medication 5 MILLIGRAM(S): at 22:42

## 2023-01-06 RX ADMIN — Medication 20 MILLIGRAM(S): at 22:43

## 2023-01-06 RX ADMIN — Medication 975 MILLIGRAM(S): at 12:32

## 2023-01-06 NOTE — H&P ADULT - NSHPLABSRESULTS_GEN_ALL_CORE
MEDICATIONS:  STANDING MEDICATIONS  ceFAZolin   IVPB 500 milliGRAM(s) IV Intermittent every 8 hours  oxycodone    5 mG/acetaminophen 325 mG 1 Tablet(s) Oral once    PRN MEDICATIONS      LABS:                        11.5   9.11  )-----------( 261      ( 06 Jan 2023 09:30 )             34.3     01-06    142  |  110  |  22<H>  ----------------------------<  102<H>  4.2   |  20  |  1.3    Ca    10.2      06 Jan 2023 09:30    TPro  6.9  /  Alb  4.8  /  TBili  0.3  /  DBili  x   /  AST  13  /  ALT  12  /  AlkPhos  123<H>  01-06    RADIOLOGY:    < from: Xray Knee 3 Views, Left (01.06.23 @ 09:16) >      IMPRESSION:  1.  No acute osseous abnormality seen.  2.  Prepatellar soft tissue swelling and possible skin laceration.   Correlate with direct inspection    --- End of Report ---    < end of copied text >        VITALS:   T(F): 98  HR: 84  BP: 106/58  RR: 19  SpO2: 99%

## 2023-01-06 NOTE — ED ADULT NURSE REASSESSMENT NOTE - NS ED NURSE REASSESS COMMENT FT1
pharmacy notified for pts medications, made writing RN aware that there are questions for the order. pharmacist contacted prescribing MD. awaiting response.

## 2023-01-06 NOTE — ED ADULT NURSE REASSESSMENT NOTE - NS ED NURSE REASSESS COMMENT FT1
pt reports frequent falls to RN  fall precautions in place (alarms, socks, fall risk band)  educated on using call bell  will continue care pending bed assignment to inpatient unit

## 2023-01-06 NOTE — H&P ADULT - NSHPREVIEWOFSYSTEMS_GEN_ALL_CORE
General:	denies night sweats, wt changes, cold/heat intolerance    Skin: denies rashes, pruritis, nail/hair changes  	  Ophthalmologic: denies vision changes, denies eye discharge or irritation  	  ENMT: denies nasal discharge, hearing changes, mouth sores, difficulty swallowing    Respiratory and Thorax: endorses chronic non productive cough, denies difficulty breathing, endorses chronic shortness of breath  	  Cardiovascular: denies CP, denies EVANS, denies palpitation    Gastrointestinal: denies n/v/d    Genitourinary: denies urgency, burning, nocturia    Musculoskeletal: denies muscle/joint pain except as described above    Neurological: denies dizziness, syncope, HA    Psychiatric: denies si/hi and hallucinations    Hematology/Lymphatics: denies easy bleeding/bruising    Endocrine: denies wt changes, hair/nail changes, denies cold/heat sensitivity

## 2023-01-06 NOTE — H&P ADULT - ASSESSMENT
71 y/o woman with PMHx of COPD, Asthma, prior PE no longer on eliquis, Pulmonary HTN - on Home O2 intermittently as needed, chronic back pain, cervical radiculopathy, CKD 3 (BL Cr 1.2), DLD, HTN, and pshx of L ankle ORIF on 9/23/2021 c/b MRSA infection requiring 6wks of vanc; admitted to medicine for cellulitis    # nonpurulent cellulitis w/ hx of mrsa   - c/w ancef  - f/u cultures  - f/u ID cs  - f/u Burn cs ?debridement (doubt)     # COPD/asthma - not in exacerbation  - c/w home symbicort bid and rescue inhaler at bedside    # pHTN  - c/w home sildenafil     # chronic back pain   - c/w home oxycodone 5 tid prn increased to q6 prn for acute pain    # DLD  - c/w home lipitor    # gout - not in acute episode  - c/w allopurinol     # HTN  - c/w home procardia qd  - c/w home atenolol     # MDD/Anxiety  - c/w home paroxatine, fluoxetine, bupropion  - c/w home prn vallium    #Hx herpes simplex  - c/w home valtrex    # ckd3  - Cr at baseline, supportive care    DVT lovenox ppx  Diet dash  CODE DNR/DNI  Activity Ambulate w/ assistance    GOC - pt sister was her HCP but she recently passed and has not updated forms    71 y/o woman with PMHx of COPD, Asthma, prior PE no longer on eliquis, Pulmonary HTN - on Home O2 intermittently as needed, chronic back pain, cervical radiculopathy, CKD 3 (BL Cr 1.2), DLD, HTN, and pshx of L ankle ORIF on 9/23/2021 c/b MRSA infection requiring 6wks of vanc; admitted to medicine for cellulitis    # nonpurulent cellulitis w/ hx of mrsa   - c/w ancef  - f/u cultures  - f/u ID cs    # COPD/asthma - not in exacerbation  - c/w home symbicort bid and rescue inhaler at bedside    # pHTN  - c/w home sildenafil     # chronic back pain   - c/w home oxycodone 5 tid prn increased to q6 prn for acute pain    # DLD  - c/w home lipitor    # gout - not in acute episode  - c/w allopurinol     # HTN  - c/w home procardia qd  - c/w home atenolol     # MDD/Anxiety  - c/w home paroxatine, fluoxetine, bupropion  - c/w home prn vallium    #Hx herpes simplex  - c/w home valtrex    # ckd3  - Cr at baseline, supportive care    DVT lovenox ppx  Diet dash  CODE DNR/DNI  Activity Ambulate w/ assistance    GOC - pt sister was her HCP but she recently passed and has not updated forms

## 2023-01-06 NOTE — H&P ADULT - HISTORY OF PRESENT ILLNESS
Ms Díaz is a 73 y/o woman with PMHx of COPD, Asthma, prior PE no longer on eliquis, Pulmonary HTN - on Home O2 intermittently as needed, chronic back pain, cervical radiculopathy, CKD 3 (BL Cr 1.2), DLD, HTN, and pshx of L ankle ORIF on 9/23/2021 c/b MRSA infection requiring 6wks of vanc; presents w/ L knee pain;    Pt was in usual state of health until 12/26/2022 when she suffered a L knee laceration 2/2 mechanical fall; presented to [facility] and recieved stitches and sent home on Augmentin. Pt completed augmentin however knee is now erythematous, warm to the touch, and acutely tender to light palpation; Pt returned to [facility] for stitches removal and evaluation of knee.     Of note pt had a previous knee laceration in same location ~5years prior; and L ankle fx s/p ORIF c/b MRSA infection as noted above.    Pulmonologist Dr. Evans; Cardio Dr. Padilla     Pt denies fevers, n/v/d, syncope, c/p. Endorses no worsening of her chronic sob.    ED:  /58 HR 84 RR 19 99% RA T 98F  WBC 9.11 Hg 11.5 .9 Cr 1.2  XR no osseous abnormalities; soft tissue swelling    In the ED pt sutures removed and was given vanc/cefazolin; admitted to medicine for cellulitis     Ms Díaz is a 73 y/o woman with PMHx of COPD, Asthma, prior PE no longer on eliquis, Pulmonary HTN - on Home O2 intermittently as needed, chronic back pain, cervical radiculopathy, CKD 3 (BL Cr 1.2), DLD, HTN, and pshx of L ankle ORIF on 9/23/2021 c/b MRSA infection requiring 6wks of vanc; presents w/ L knee pain;    Pt was in usual state of health until 12/26/2022 when she suffered a L knee laceration 2/2 mechanical fall; presented to [facility] and recieved stitches and sent home on Augmentin. Pt completed augmentin however knee is now erythematous, warm to the touch, and acutely tender to light palpation; Pt returned to [facility] for stitches removal and evaluation of knee.     Of note pt had a previous knee laceration in same location ~5years prior; and L ankle fx s/p ORIF c/b MRSA infection as noted above.    Also of note pt is highly motivated to have a short hospital stay so she can get home to her partner w/ alzheimer's;     Pulmonologist Dr. Evans; Cardio Dr. Padilla     Pt denies fevers, n/v/d, syncope, c/p. Endorses no worsening of her chronic sob.    ED:  /58 HR 84 RR 19 99% RA T 98F  WBC 9.11 Hg 11.5 .9 Cr 1.2  XR no osseous abnormalities; soft tissue swelling    In the ED pt sutures removed and was given vanc/cefazolin; admitted to medicine for cellulitis;

## 2023-01-06 NOTE — ED PROVIDER NOTE - CLINICAL SUMMARY MEDICAL DECISION MAKING FREE TEXT BOX
Patient with cellulitis considered sepsis considered abscess considered neck–however patient's vital stable no crepitus on exam well-appearing labs reviewed imaging reviewed started on antibiotics.  Will admit

## 2023-01-06 NOTE — ED PROVIDER NOTE - PHYSICAL EXAMINATION
Gen: NAD, AOx3  Head: NCAT  HEENT: PERRL, oral mucosa moist, normal conjunctiva, oropharynx clear without exudate or erythema  Lung: CTAB, no respiratory distress, no wheezing, rales, rhonchi  CV: normal s1/s2, rrr, Normal perfusion, pulses 2+ throughout  Abd: soft, NTND, no CVA tenderness  Genitourinary: no pelvic tenderness  MSK: no visible deformities, full range of motion in all 4 extremities, LLE: 2+ pulse, AROM, edema/erythema to knee with TTP, sutures in place with no active drainage   Neuro: CN II-XII grossly intact, No focal neurologic deficits  Skin: No rash   Psych: normal affect

## 2023-01-06 NOTE — H&P ADULT - CONVERSATION DETAILS
Team had Goals of Care Conversation done with pt. Discussed FULL CODE VS DNR/DNI. CPR and intubation discussed with Pt. Pt wishes to be DNR/DNI.  Pt AAOX3. All questions answered. CAMILA signed and placed in chart

## 2023-01-06 NOTE — H&P ADULT - NSHPPHYSICALEXAM_GEN_ALL_CORE
CONSTITUTIONAL: NAD, aged    EYES: PERRLA and symmetric, EOMI, No conjunctival or scleral injection, non-icteric    ENMT: Oral mucosa with moist membranes. No external nasal lesions; normal dentition; no gross hearing impairment noted.    NECK: Supple, symmetric and without tracheal deviation; thyroid gland not enlarged and without palpable masses    RESPIRATORY: No respiratory distress, no use of accessory muscles; shoddy breath sounds b/l, no wheezes, no rales or rhonchi, no dullness or hyperresonance to percussion, no tactile fremitus, no subcutaneous emphysema    CARDIOVASCULAR: RRRR, +S1S2, no murmur appreciated, no rubs, no gallops; no JVD; no peripheral edema    Vascular: no carotid bruits; no abdominal bruit; carotid pulse palpable, radial pulse palpable, posterior tibialis pulse palpable    GASTROINTESTINAL: Soft, non tender, non distended but pendulous, no rebound, no guarding; No palpable masses; no hepatosplenomegaly; no hernia palpated;    MUSCULOSKELETAL: LLE ROM limited 2/2 pain, no crepitus appreciated; exquisitely tender; erythematous, warm, no purulence appreciated    SKIN: No rashes or ulcers noted; no subcutaneous nodules or induration palpable    NEUROLOGIC: CN II-XII intact; sensation intact in upper and lower extremities b/l to light touch;     PSYCHIATRIC: Appropriate insight/judgment; A+O x 3, mood and affect appropriate, recent/remote memory intact

## 2023-01-06 NOTE — H&P ADULT - ATTENDING COMMENTS
Patient seen and examined at bedside independently of the residents. I read the resident's note and agree with the plan with the additions and corrections as noted below.    REVIEW OF SYSTEMS:  CONSTITUTIONAL: No weakness, fevers or chills  EYES/ENT: No visual changes;  No vertigo or throat pain   NECK: No pain or stiffness  RESPIRATORY: No cough, wheezing, hemoptysis; No shortness of breath  CARDIOVASCULAR: No chest pain or palpitations  GASTROINTESTINAL: No abdominal or epigastric pain. No nausea, vomiting, or hematemesis; No diarrhea or constipation. No melena or hematochezia.  GENITOURINARY: No dysuria, frequency or hematuria  NEUROLOGICAL: No numbness or weakness  MSK:  No weakness. Left knee pain.   SKIN: No itching, rashes.     PMH: COPD, Asthma, prior PE no longer on eliquis, Pulmonary HTN - on Home O2 intermittently as needed, chronic back pain, cervical radiculopathy, CKD 3 (BL Cr 1.2), DLD, HTN, and pshx of L ankle ORIF on 2021 c/b MRSA infection     FHx: Reviewed. No fhx of asthma/copd, No fhx of liver and pulmonary disease. No fhx of hematological disorder.     Physical Exam:  GEN: No acute distress. Awake, Alert and oriented x 3.   Head: Atraumatic, Normocephalic.   Eye: PEERLA. No sclera icterus. EOMI.   ENT: Normal oropharynx, no thyromegaly, no mass, no lymphadenopathy.   LUNGS: Clear to auscultation bilaterally. No wheeze/rales/crackles.   HEART: Normal. S1/S2 present. RRR. No murmur/gallops.   ABD: Soft, non-tender, non-distended. Bowel sounds present.   EXT: No pitting edema. No erythema. No tenderness.  Integumentary: No rash, No sore, No petechia.   NEURO: CN III-XII intact. Strength: 5/5 b/l ULE. Sensory intact b/l ULE.     Vital Signs Last 24 Hrs  T(C): 35.8 (2023 17:20), Max: 36.7 (2023 07:35)  T(F): 96.5 (2023 17:20), Max: 98 (2023 07:35)  HR: 69 (2023 17:20) (69 - 84)  BP: 94/51 (2023 17:20) (94/51 - 106/58)  BP(mean): --  RR: 18 (2023 17:20) (18 - 19)  SpO2: 99% (2023 17:20) (99% - 99%)    Parameters below as of 2023 17:20  Patient On (Oxygen Delivery Method): room air      Please see the above notes for Labs and radiology.     Assessment and Plan:      73 yo F with hx of COPD, Asthma, prior PE no longer on eliquis, Pulmonary HTN - on Home O2 intermittently as needed, chronic back pain, cervical radiculopathy, CKD 3 (BL Cr 1.2), DLD, HTN, and pshx of L ankle ORIF on 2021 c/b MRSA infection requiring 6wks of vanc; presents w/ L knee pain s/p left knee laceration from mechanical fall.     Left knee pain likely 2/2 left knee cellulitis   - X-ray left knee shows No acute osseous abnormality seen. Prepatellar soft tissue swelling and possible skin laceration.   - s/p vanc and cefazolin x 1 in ED.   - c/w cefazolin   - pain control prn.   - PT/Rehab.     CKD stage 3 - serum Cr stable at baseline.   HTN/HLD - c/w home med. hold nifedipine as patient's BP borderline.   COPD/Asthma - not on exacerbation. c/w home inhalers.   Pulmonary HTN - c/w home med  Chronic Back pain - c/w pain med prn.   Gout - on allopurinol.   Anxiety/MDD - c/w home med  HSV - on valacyclovir     DVT ppx: Lovenox SC  GI ppx:  not indicated.   Diet: DASH  Activity: as tolerated.     Date seen by the attendin2023 HPI as above.  Interval history: Pt seen and examined at bedside. No cp or sob.   Vital Signs (24 Hrs):  T(C): 35.8 (01-06-23 @ 17:20), Max: 36.7 (01-06-23 @ 07:35)  HR: 69 (01-06-23 @ 17:20) (69 - 84)  BP: 94/51 (01-06-23 @ 17:20) (94/51 - 106/58)  RR: 18 (01-06-23 @ 17:20) (18 - 19)  SpO2: 99% (01-06-23 @ 17:20) (99% - 99%)  Wt(kg): --  Daily     Daily     I&O's Summary    PHYSICAL EXAM:  GENERAL: NAD, elderly, frail  HEAD:  Atraumatic, Normocephalic  EYES: EOMI, PERRLA, conjunctiva and sclera clear  NECK: Supple, No JVD  CHEST/LUNG: Clear to auscultation bilaterally; No wheeze  HEART: Regular rate and rhythm; No murmurs, rubs, or gallops  ABDOMEN: Soft, Nontender, Nondistended; Bowel sounds present  EXTREMITIES:  2+ Peripheral Pulses, No clubbing, cyanosis, or edema, left knee cellultis   PSYCH: AAOx3  NEUROLOGY: non-focal  SKIN: No rashes or lesions  Labs reviewed  Imaging reviewed independently and reviewed read  < from: Xray Knee 3 Views, Left (01.06.23 @ 09:16) >      IMPRESSION:  1.  No acute osseous abnormality seen.  2.  Prepatellar soft tissue swelling and possible skin laceration.   Correlate with direct inspection    < end of copied text >    Plan as above dw resident in real time.     #Progress Note Handoff  Pending (specify):  ID consult in am. continue ABX,  PT  Disposition: home vs snf  Decision to admit the pt is based on acuity as above

## 2023-01-06 NOTE — ED PROVIDER NOTE - OBJECTIVE STATEMENT
73 yo female with a pmh of htn and hld presents for L knee suture removal. pt had suture placed on 12/26 and was d/c with augmentin. pt states to have noted erythema/warmth to L knee over the past 2 days. pt also admits to experiencing chills. pt denies any other symptoms including headache, recent illness/travel, cough, abdominal pain, chest pain, or SOB.

## 2023-01-06 NOTE — H&P ADULT - NSHPSOCIALHISTORY_GEN_ALL_CORE
No smoking no drinking no recreational drug use    [place]/ [place] who has Alzheimer's     Uses cane to ambulate at BL    No sick contacts no travel    1 pet dog

## 2023-01-06 NOTE — ED PROVIDER NOTE - HIV OFFER
Previously Declined (within the last year) Spine appears normal, range of motion is not limited, no muscle or joint tenderness

## 2023-01-06 NOTE — CHART NOTE - NSCHARTNOTEFT_GEN_A_CORE
Per [facility] on [address]  Pt only taking Paxil 40 mg daily  No Rx for Prozac- Prozac d/c'd

## 2023-01-07 LAB
ANION GAP SERPL CALC-SCNC: 10 MMOL/L — SIGNIFICANT CHANGE UP (ref 7–14)
BUN SERPL-MCNC: 21 MG/DL — HIGH (ref 10–20)
CALCIUM SERPL-MCNC: 9.1 MG/DL — SIGNIFICANT CHANGE UP (ref 8.4–10.5)
CHLORIDE SERPL-SCNC: 106 MMOL/L — SIGNIFICANT CHANGE UP (ref 98–110)
CO2 SERPL-SCNC: 21 MMOL/L — SIGNIFICANT CHANGE UP (ref 17–32)
CREAT SERPL-MCNC: 1.2 MG/DL — SIGNIFICANT CHANGE UP (ref 0.7–1.5)
EGFR: 48 ML/MIN/1.73M2 — LOW
GLUCOSE SERPL-MCNC: 86 MG/DL — SIGNIFICANT CHANGE UP (ref 70–99)
HCT VFR BLD CALC: 31.1 % — LOW (ref 37–47)
HGB BLD-MCNC: 10.3 G/DL — LOW (ref 12–16)
MCHC RBC-ENTMCNC: 33.1 G/DL — SIGNIFICANT CHANGE UP (ref 32–37)
MCHC RBC-ENTMCNC: 36.4 PG — HIGH (ref 27–31)
MCV RBC AUTO: 109.9 FL — HIGH (ref 81–99)
NRBC # BLD: 0 /100 WBCS — SIGNIFICANT CHANGE UP (ref 0–0)
PLATELET # BLD AUTO: 235 K/UL — SIGNIFICANT CHANGE UP (ref 130–400)
POTASSIUM SERPL-MCNC: 3.7 MMOL/L — SIGNIFICANT CHANGE UP (ref 3.5–5)
POTASSIUM SERPL-SCNC: 3.7 MMOL/L — SIGNIFICANT CHANGE UP (ref 3.5–5)
RBC # BLD: 2.83 M/UL — LOW (ref 4.2–5.4)
RBC # FLD: 14 % — SIGNIFICANT CHANGE UP (ref 11.5–14.5)
SODIUM SERPL-SCNC: 137 MMOL/L — SIGNIFICANT CHANGE UP (ref 135–146)
WBC # BLD: 6.71 K/UL — SIGNIFICANT CHANGE UP (ref 4.8–10.8)
WBC # FLD AUTO: 6.71 K/UL — SIGNIFICANT CHANGE UP (ref 4.8–10.8)

## 2023-01-07 PROCEDURE — 93010 ELECTROCARDIOGRAM REPORT: CPT

## 2023-01-07 PROCEDURE — 99232 SBSQ HOSP IP/OBS MODERATE 35: CPT

## 2023-01-07 RX ORDER — VANCOMYCIN HCL 1 G
1000 VIAL (EA) INTRAVENOUS EVERY 12 HOURS
Refills: 0 | Status: DISCONTINUED | OUTPATIENT
Start: 2023-01-07 | End: 2023-01-07

## 2023-01-07 RX ORDER — CEFAZOLIN SODIUM 1 G
1000 VIAL (EA) INJECTION EVERY 8 HOURS
Refills: 0 | Status: DISCONTINUED | OUTPATIENT
Start: 2023-01-07 | End: 2023-01-09

## 2023-01-07 RX ORDER — IBUPROFEN 200 MG
400 TABLET ORAL ONCE
Refills: 0 | Status: COMPLETED | OUTPATIENT
Start: 2023-01-07 | End: 2023-01-07

## 2023-01-07 RX ORDER — BNT162B2 ORIGINAL AND OMICRON BA.4/BA.5 .1125; .1125 MG/2.25ML; MG/2.25ML
0.3 INJECTION, SUSPENSION INTRAMUSCULAR ONCE
Refills: 0 | Status: DISCONTINUED | OUTPATIENT
Start: 2023-01-07 | End: 2023-01-09

## 2023-01-07 RX ORDER — IBUPROFEN 200 MG
400 TABLET ORAL EVERY 8 HOURS
Refills: 0 | Status: DISCONTINUED | OUTPATIENT
Start: 2023-01-07 | End: 2023-01-09

## 2023-01-07 RX ADMIN — Medication 400 MILLIGRAM(S): at 15:40

## 2023-01-07 RX ADMIN — Medication 50 MILLIGRAM(S): at 14:48

## 2023-01-07 RX ADMIN — Medication 20 MILLIGRAM(S): at 05:37

## 2023-01-07 RX ADMIN — PANTOPRAZOLE SODIUM 40 MILLIGRAM(S): 20 TABLET, DELAYED RELEASE ORAL at 07:57

## 2023-01-07 RX ADMIN — Medication 100 MILLIGRAM(S): at 14:00

## 2023-01-07 RX ADMIN — Medication 5 MILLIGRAM(S): at 22:25

## 2023-01-07 RX ADMIN — Medication 100 MILLIGRAM(S): at 05:37

## 2023-01-07 RX ADMIN — BUPROPION HYDROCHLORIDE 150 MILLIGRAM(S): 150 TABLET, EXTENDED RELEASE ORAL at 11:58

## 2023-01-07 RX ADMIN — VALACYCLOVIR 500 MILLIGRAM(S): 500 TABLET, FILM COATED ORAL at 11:00

## 2023-01-07 RX ADMIN — PRIMIDONE 50 MILLIGRAM(S): 250 TABLET ORAL at 22:24

## 2023-01-07 RX ADMIN — Medication 250 MILLIGRAM(S): at 09:01

## 2023-01-07 RX ADMIN — Medication 1000 UNIT(S): at 11:58

## 2023-01-07 RX ADMIN — Medication 100 MILLIGRAM(S): at 22:37

## 2023-01-07 RX ADMIN — Medication 100 MILLIGRAM(S): at 22:23

## 2023-01-07 RX ADMIN — MAGNESIUM OXIDE 400 MG ORAL TABLET 200 MILLIGRAM(S): 241.3 TABLET ORAL at 11:58

## 2023-01-07 RX ADMIN — Medication 20 MILLIGRAM(S): at 14:48

## 2023-01-07 RX ADMIN — Medication 1 MILLIGRAM(S): at 11:58

## 2023-01-07 RX ADMIN — ENOXAPARIN SODIUM 40 MILLIGRAM(S): 100 INJECTION SUBCUTANEOUS at 17:11

## 2023-01-07 RX ADMIN — Medication 300 MILLIGRAM(S): at 22:37

## 2023-01-07 RX ADMIN — Medication 20 MILLIGRAM(S): at 17:11

## 2023-01-07 RX ADMIN — ATORVASTATIN CALCIUM 80 MILLIGRAM(S): 80 TABLET, FILM COATED ORAL at 22:25

## 2023-01-07 RX ADMIN — Medication 400 MILLIGRAM(S): at 16:35

## 2023-01-07 NOTE — PROGRESS NOTE ADULT - SUBJECTIVE AND OBJECTIVE BOX
BERNARD SPEARS 72y Female  MRN#: [id number]     Hospital Day: 1d    Pt is currently admitted with the primary diagnosis of cellulitis    SUBJECTIVE    Patient seen and examined at bedside. Resting comfortably in NAD. Afebrile, no complaints                                            ----------------------------------------------------------  OBJECTIVE  PAST MEDICAL & SURGICAL HISTORY  History of neck pain    Spinal stenosis of lumbar region with radiculopathy    Anxiety    Depression    Osteoarthritis    H/O osteoporosis    History of pulmonary hypertension    H/O pulmonary hypertension    COPD (chronic obstructive pulmonary disease)    Hyperlipidemia    H/O total knee replacement, right    Failed spinal cord stimulator                                              -----------------------------------------------------------  ALLERGIES:  adhesives (Pruritus; Rash)  No Known Drug Allergies                                            ------------------------------------------------------------    HOME MEDICATIONS  Home Medications:  allopurinol 300 mg oral tablet: 1 tab(s) orally once a day (at bedtime) (06 Jan 2023 16:32)  atenolol 50 mg oral tablet: 1 tab(s) orally once a day (06 Jan 2023 16:32)  folic acid 1 mg oral tablet: 1 tab(s) orally once a day (06 Jan 2023 16:32)  Fosamax 70 mg oral tablet: 1 tab(s) orally once a week (06 Jan 2023 16:32)  furosemide 20 mg oral tablet: 1 tab(s) orally once a day (06 Jan 2023 16:32)  Lipitor 80 mg oral tablet: 1 tab(s) orally once a day (at bedtime) (06 Jan 2023 16:32)  magnesium oxide 250 mg oral tablet: 0.5 tab(s) orally once a day (06 Jan 2023 16:32)  melatonin 5 mg oral tablet: 1 tab(s) orally once a day (at bedtime), As needed, Insomnia (06 Jan 2023 16:32)  oxycodone-acetaminophen 5 mg-325 mg oral tablet: 1 tab(s) orally every 8 hours, As Needed (06 Jan 2023 16:32)  PARoxetine 40 mg oral tablet: 1 tab(s) orally once a day (06 Jan 2023 16:32)  PriLOSEC 20 mg oral delayed release capsule: 1 cap(s) orally once a day (06 Jan 2023 16:32)  primidone 50 mg oral tablet: 1 tab(s) orally once a day (at bedtime) (06 Jan 2023 16:32)  Procardia XL 60 mg oral tablet, extended release: 1 tab(s) orally once a day (06 Jan 2023 16:32)  promethazine 50 mg oral tablet: 1 tab(s) orally once (06 Jan 2023 16:32)  sildenafil 20 mg oral tablet: 1 tab(s) orally 3 times a day (06 Jan 2023 16:32)  simethicone 80 mg oral tablet, chewable: 1 tab(s) orally once a day, As needed, Indigestion (06 Jan 2023 16:32)  spironolactone 50 mg oral tablet: orally once a day (06 Jan 2023 16:32)  Symbicort 160 mcg-4.5 mcg/inh inhalation aerosol: 2 puff(s) inhaled 2 times a day (06 Jan 2023 16:32)  traZODone 100 mg oral tablet: orally once a day (at bedtime) (06 Jan 2023 16:32)  Valium 10 mg oral tablet: 1 tab(s) orally 3 times a day, As Needed (06 Jan 2023 16:32)  Valtrex 500 mg oral tablet: 1 tab(s) orally once a day (06 Jan 2023 16:32)  Ventolin HFA 90 mcg/inh inhalation aerosol: 2 puff(s) inhaled every 6 hours, As Needed (06 Jan 2023 16:33)  Vitamin D3 400 intl units (10 mcg) oral tablet: 1 tab(s) orally once a day (06 Jan 2023 16:32)  Wellbutrin: 150  orally once a day (06 Jan 2023 16:32)                           MEDICATIONS:  STANDING MEDICATIONS  allopurinol 300 milliGRAM(s) Oral at bedtime  atenolol  Tablet 50 milliGRAM(s) Oral daily  atorvastatin 80 milliGRAM(s) Oral at bedtime  budesonide 160 MICROgram(s)/formoterol 4.5 MICROgram(s) Inhaler 2 Puff(s) Inhalation two times a day  buPROPion XL (24-Hour) . 150 milliGRAM(s) Oral daily  ceFAZolin   IVPB 500 milliGRAM(s) IV Intermittent every 8 hours  cholecalciferol 1000 Unit(s) Oral daily  coronavirus bivalent (EUA) Booster Vaccine (PFIZER) 0.3 milliLiter(s) IntraMuscular once  enoxaparin Injectable 40 milliGRAM(s) SubCutaneous every 24 hours  folic acid 1 milliGRAM(s) Oral daily  furosemide    Tablet 20 milliGRAM(s) Oral daily  magnesium oxide 200 milliGRAM(s) Oral daily  melatonin 5 milliGRAM(s) Oral at bedtime  pantoprazole    Tablet 40 milliGRAM(s) Oral before breakfast  PARoxetine 20 milliGRAM(s) Oral two times a day  primidone 50 milliGRAM(s) Oral at bedtime  promethazine 50 milliGRAM(s) Oral daily  sildenafil (REVATIO) 20 milliGRAM(s) Oral three times a day  spironolactone 50 milliGRAM(s) Oral daily  traZODone 100 milliGRAM(s) Oral at bedtime  valACYclovir 500 milliGRAM(s) Oral daily  vancomycin  IVPB 1000 milliGRAM(s) IV Intermittent every 12 hours    PRN MEDICATIONS  albuterol    90 MICROgram(s) HFA Inhaler 2 Puff(s) Inhalation every 6 hours PRN  aluminum hydroxide/magnesium hydroxide/simethicone Suspension 30 milliLiter(s) Oral every 4 hours PRN  diazepam    Tablet 10 milliGRAM(s) Oral three times a day PRN  ondansetron Injectable 4 milliGRAM(s) IV Push every 8 hours PRN  oxycodone    5 mG/acetaminophen 325 mG 1 Tablet(s) Oral every 6 hours PRN  simethicone 80 milliGRAM(s) Chew daily PRN                                            ------------------------------------------------------------  VITAL SIGNS: Last 24 Hours  T(C): 36.7 (07 Jan 2023 05:00), Max: 36.7 (07 Jan 2023 05:00)  T(F): 98 (07 Jan 2023 05:00), Max: 98 (07 Jan 2023 05:00)  HR: 67 (07 Jan 2023 05:00) (67 - 74)  BP: 97/51 (07 Jan 2023 05:00) (94/51 - 108/62)  BP(mean): --  RR: 18 (07 Jan 2023 05:00) (18 - 18)  SpO2: 99% (06 Jan 2023 22:33) (99% - 99%)                                             --------------------------------------------------------------  LABS:                        11.5   9.11  )-----------( 261      ( 06 Jan 2023 09:30 )             34.3     01-06    142  |  110  |  22<H>  ----------------------------<  102<H>  4.2   |  20  |  1.3    Ca    10.2      06 Jan 2023 09:30    TPro  6.9  /  Alb  4.8  /  TBili  0.3  /  DBili  x   /  AST  13  /  ALT  12  /  AlkPhos  123<H>  01-06                                                              -------------------------------------------------------------  RADIOLOGY:  < from: Xray Knee 3 Views, Left (01.06.23 @ 09:16) >      IMPRESSION:  1.  No acute osseous abnormality seen.  2.  Prepatellar soft tissue swelling and possible skin laceration.   Correlate with direct inspection    --- End of Report ---                                            --------------------------------------------------------------    PHYSICAL EXAM:  GENERAL: NAD, elderly, frail  HEAD:  Atraumatic, Normocephalic  EYES: EOMI, PERRLA, conjunctiva and sclera clear  NECK: Supple, No JVD  CHEST/LUNG: Clear to auscultation bilaterally; No wheeze  HEART: Regular rate and rhythm; No murmurs, rubs, or gallops  ABDOMEN: Soft, Nontender, Nondistended; Bowel sounds present  EXTREMITIES:  2+ Peripheral Pulses, No clubbing, cyanosis, or edema, left knee cellultis   PSYCH: AAOx3  NEUROLOGY: non-focal  SKIN: No rashes or lesions                                           --------------------------------------------------------------    ASSESSMENT & PLAN    73 y/o woman with PMHx of COPD, Asthma, prior PE no longer on eliquis, Pulmonary HTN - on Home O2 intermittently as needed, chronic back pain, cervical radiculopathy, CKD 3 (BL Cr 1.2), DLD, HTN, and pshx of L ankle ORIF on 9/23/2021 c/b MRSA infection requiring 6wks of vanc; admitted to medicine for cellulitis    # Non-purulent Cellulitis  # hx of MRSA  - completed a course of Augmentin s/p L knee laceration 12/26/22  - c/w Ancef and Vancomycin   - f/u cultures  - ID consult     # COPD/asthma - not in exacerbation  - c/w home Symbicort BID    # pHTN  - c/w home Dildenafil     # Chronic back pain   - home med: Oxycodone 5mg TID PRN  - increased to q6 for acute pain    # DLD  - c/w home Lipitor    # Gout, not in acute flare   - c/w Allopurinol     # HTN  - c/w home Procardia qd  - c/w home Atenolol     # MDD/Anxiety  - c/w home paroxetine and buproprion   - c/w home prn vallium    # hx herpes simplex  - c/w home valtrex    # CKD3  - Cr at baseline    #DVT ppx: Lovenox 40mg qd  #GI ppx: PPI 40mg qd  #Diet: DASH/TLC  #Code status: DNR/DNI  #Activity: ambulate w/ assistance      BERNARD SPEARS 72y Female  MRN#: [id number]     Hospital Day: 1d    Pt is currently admitted with the primary diagnosis of cellulitis    SUBJECTIVE    Patient seen and examined at bedside. Resting comfortably in NAD. Afebrile, no complaints.                                            ----------------------------------------------------------  OBJECTIVE  PAST MEDICAL & SURGICAL HISTORY  History of neck pain    Spinal stenosis of lumbar region with radiculopathy    Anxiety    Depression    Osteoarthritis    H/O osteoporosis    History of pulmonary hypertension    H/O pulmonary hypertension    COPD (chronic obstructive pulmonary disease)    Hyperlipidemia    H/O total knee replacement, right    Failed spinal cord stimulator                                              -----------------------------------------------------------  ALLERGIES:  adhesives (Pruritus; Rash)  No Known Drug Allergies                                            ------------------------------------------------------------    HOME MEDICATIONS  Home Medications:  allopurinol 300 mg oral tablet: 1 tab(s) orally once a day (at bedtime) (06 Jan 2023 16:32)  atenolol 50 mg oral tablet: 1 tab(s) orally once a day (06 Jan 2023 16:32)  folic acid 1 mg oral tablet: 1 tab(s) orally once a day (06 Jan 2023 16:32)  Fosamax 70 mg oral tablet: 1 tab(s) orally once a week (06 Jan 2023 16:32)  furosemide 20 mg oral tablet: 1 tab(s) orally once a day (06 Jan 2023 16:32)  Lipitor 80 mg oral tablet: 1 tab(s) orally once a day (at bedtime) (06 Jan 2023 16:32)  magnesium oxide 250 mg oral tablet: 0.5 tab(s) orally once a day (06 Jan 2023 16:32)  melatonin 5 mg oral tablet: 1 tab(s) orally once a day (at bedtime), As needed, Insomnia (06 Jan 2023 16:32)  oxycodone-acetaminophen 5 mg-325 mg oral tablet: 1 tab(s) orally every 8 hours, As Needed (06 Jan 2023 16:32)  PARoxetine 40 mg oral tablet: 1 tab(s) orally once a day (06 Jan 2023 16:32)  PriLOSEC 20 mg oral delayed release capsule: 1 cap(s) orally once a day (06 Jan 2023 16:32)  primidone 50 mg oral tablet: 1 tab(s) orally once a day (at bedtime) (06 Jan 2023 16:32)  Procardia XL 60 mg oral tablet, extended release: 1 tab(s) orally once a day (06 Jan 2023 16:32)  promethazine 50 mg oral tablet: 1 tab(s) orally once (06 Jan 2023 16:32)  sildenafil 20 mg oral tablet: 1 tab(s) orally 3 times a day (06 Jan 2023 16:32)  simethicone 80 mg oral tablet, chewable: 1 tab(s) orally once a day, As needed, Indigestion (06 Jan 2023 16:32)  spironolactone 50 mg oral tablet: orally once a day (06 Jan 2023 16:32)  Symbicort 160 mcg-4.5 mcg/inh inhalation aerosol: 2 puff(s) inhaled 2 times a day (06 Jan 2023 16:32)  traZODone 100 mg oral tablet: orally once a day (at bedtime) (06 Jan 2023 16:32)  Valium 10 mg oral tablet: 1 tab(s) orally 3 times a day, As Needed (06 Jan 2023 16:32)  Valtrex 500 mg oral tablet: 1 tab(s) orally once a day (06 Jan 2023 16:32)  Ventolin HFA 90 mcg/inh inhalation aerosol: 2 puff(s) inhaled every 6 hours, As Needed (06 Jan 2023 16:33)  Vitamin D3 400 intl units (10 mcg) oral tablet: 1 tab(s) orally once a day (06 Jan 2023 16:32)  Wellbutrin: 150  orally once a day (06 Jan 2023 16:32)                           MEDICATIONS:  STANDING MEDICATIONS  allopurinol 300 milliGRAM(s) Oral at bedtime  atenolol  Tablet 50 milliGRAM(s) Oral daily  atorvastatin 80 milliGRAM(s) Oral at bedtime  budesonide 160 MICROgram(s)/formoterol 4.5 MICROgram(s) Inhaler 2 Puff(s) Inhalation two times a day  buPROPion XL (24-Hour) . 150 milliGRAM(s) Oral daily  ceFAZolin   IVPB 500 milliGRAM(s) IV Intermittent every 8 hours  cholecalciferol 1000 Unit(s) Oral daily  coronavirus bivalent (EUA) Booster Vaccine (PFIZER) 0.3 milliLiter(s) IntraMuscular once  enoxaparin Injectable 40 milliGRAM(s) SubCutaneous every 24 hours  folic acid 1 milliGRAM(s) Oral daily  furosemide    Tablet 20 milliGRAM(s) Oral daily  magnesium oxide 200 milliGRAM(s) Oral daily  melatonin 5 milliGRAM(s) Oral at bedtime  pantoprazole    Tablet 40 milliGRAM(s) Oral before breakfast  PARoxetine 20 milliGRAM(s) Oral two times a day  primidone 50 milliGRAM(s) Oral at bedtime  promethazine 50 milliGRAM(s) Oral daily  sildenafil (REVATIO) 20 milliGRAM(s) Oral three times a day  spironolactone 50 milliGRAM(s) Oral daily  traZODone 100 milliGRAM(s) Oral at bedtime  valACYclovir 500 milliGRAM(s) Oral daily  vancomycin  IVPB 1000 milliGRAM(s) IV Intermittent every 12 hours    PRN MEDICATIONS  albuterol    90 MICROgram(s) HFA Inhaler 2 Puff(s) Inhalation every 6 hours PRN  aluminum hydroxide/magnesium hydroxide/simethicone Suspension 30 milliLiter(s) Oral every 4 hours PRN  diazepam    Tablet 10 milliGRAM(s) Oral three times a day PRN  ondansetron Injectable 4 milliGRAM(s) IV Push every 8 hours PRN  oxycodone    5 mG/acetaminophen 325 mG 1 Tablet(s) Oral every 6 hours PRN  simethicone 80 milliGRAM(s) Chew daily PRN                                            ------------------------------------------------------------  VITAL SIGNS: Last 24 Hours  T(C): 36.7 (07 Jan 2023 05:00), Max: 36.7 (07 Jan 2023 05:00)  T(F): 98 (07 Jan 2023 05:00), Max: 98 (07 Jan 2023 05:00)  HR: 67 (07 Jan 2023 05:00) (67 - 74)  BP: 97/51 (07 Jan 2023 05:00) (94/51 - 108/62)  BP(mean): --  RR: 18 (07 Jan 2023 05:00) (18 - 18)  SpO2: 99% (06 Jan 2023 22:33) (99% - 99%)                                             --------------------------------------------------------------  LABS:                        11.5   9.11  )-----------( 261      ( 06 Jan 2023 09:30 )             34.3     01-06    142  |  110  |  22<H>  ----------------------------<  102<H>  4.2   |  20  |  1.3    Ca    10.2      06 Jan 2023 09:30    TPro  6.9  /  Alb  4.8  /  TBili  0.3  /  DBili  x   /  AST  13  /  ALT  12  /  AlkPhos  123<H>  01-06                                                              -------------------------------------------------------------  RADIOLOGY:  < from: Xray Knee 3 Views, Left (01.06.23 @ 09:16) >      IMPRESSION:  1.  No acute osseous abnormality seen.  2.  Prepatellar soft tissue swelling and possible skin laceration.   Correlate with direct inspection    --- End of Report ---                                            --------------------------------------------------------------    PHYSICAL EXAM:  GENERAL: NAD, elderly, frail  HEAD:  Atraumatic, Normocephalic  EYES: EOMI, PERRLA, conjunctiva and sclera clear  NECK: Supple, No JVD  CHEST/LUNG: Clear to auscultation bilaterally; No wheeze  HEART: Regular rate and rhythm; No murmurs, rubs, or gallops  ABDOMEN: Soft, Nontender, Nondistended; Bowel sounds present  EXTREMITIES:  2+ Peripheral Pulses, No clubbing, cyanosis, or edema, left knee pre-patellar erythema, swelling, stiches intact without drainage  PSYCH: AAOx3  NEUROLOGY: non-focal  SKIN: No rashes or lesions                                           --------------------------------------------------------------    ASSESSMENT & PLAN    73 y/o woman with PMHx of COPD, Asthma, prior PE no longer on eliquis, Pulmonary HTN - on Home O2 intermittently as needed, chronic back pain, cervical radiculopathy, CKD 3 (BL Cr 1.2), DLD, HTN, and pshx of L ankle ORIF on 9/23/2021 c/b MRSA infection requiring 6wks of vanc; admitted to medicine for cellulitis    # Non-purulent Cellulitis  # hx of MRSA  - completed a course of Augmentin s/p L knee laceration 12/26/22  - c/w Ancef and Vancomycin   - f/u cultures  - ID consult     # COPD/asthma - not in exacerbation  - c/w home Symbicort BID    # pHTN  - c/w home Dildenafil     # Chronic back pain   - home med: Oxycodone 5mg TID PRN  - increased to q6 for acute pain    # DLD  - c/w home Lipitor    # Gout, not in acute flare   - c/w Allopurinol     # HTN  - c/w home Procardia qd  - c/w home Atenolol     # MDD/Anxiety  - c/w home paroxetine and buproprion   - c/w home prn vallium    # hx herpes simplex  - c/w home valtrex    # CKD3  - Cr at baseline    #DVT ppx: Lovenox 40mg qd  #GI ppx: PPI 40mg qd  #Diet: DASH/TLC  #Code status: DNR/DNI  #Activity: ambulate w/ assistance

## 2023-01-07 NOTE — PROGRESS NOTE ADULT - ATTENDING COMMENTS
# Left knee erythema and swelling, suspected cellulitis vs. hematoma, recent laceration 12/26/2022 s/p stitches   # hx of MRSA  - c/w IV ancef  - Burn evaluation for possible drainage as recommended by ID    # HTN - on home procardia and atenolol, procardia held on admission, atenolol held this morning due to low BP, reassess tomorrow if med can be resumed  # pHTN - c/w home sildenafil, lasix and spironolactone (sildenafil, spironolactone held this morning for low BP, resume as tolerated)  # COPD/asthma, stable - c/w home Symbicort BID, albuterol prn  # Chronic back pain - c/w home pain regimen  # DLD - c/w home Lipitor  # Gout - c/w allopurinol   # MDD/Anxiety - c/w home paroxetine and buproprion, home prn valium  # hx herpes simplex - c/w home valtrex  # CKD Stage 3a - Cr at baseline, monitor BMP  # Folate deficiney - c/w folic acid  # DVT ppx: Lovenox 40mg qd  # GI ppx: PPI 40mg qd

## 2023-01-07 NOTE — PATIENT PROFILE ADULT - FALL HARM RISK - HARM RISK INTERVENTIONS

## 2023-01-08 ENCOUNTER — TRANSCRIPTION ENCOUNTER (OUTPATIENT)
Age: 73
End: 2023-01-08

## 2023-01-08 LAB
ALBUMIN SERPL ELPH-MCNC: 4.3 G/DL — SIGNIFICANT CHANGE UP (ref 3.5–5.2)
ALP SERPL-CCNC: 116 U/L — HIGH (ref 30–115)
ALT FLD-CCNC: 9 U/L — SIGNIFICANT CHANGE UP (ref 0–41)
ANION GAP SERPL CALC-SCNC: 13 MMOL/L — SIGNIFICANT CHANGE UP (ref 7–14)
AST SERPL-CCNC: 22 U/L — SIGNIFICANT CHANGE UP (ref 0–41)
BASOPHILS # BLD AUTO: 0.05 K/UL — SIGNIFICANT CHANGE UP (ref 0–0.2)
BASOPHILS NFR BLD AUTO: 0.6 % — SIGNIFICANT CHANGE UP (ref 0–1)
BILIRUB SERPL-MCNC: <0.2 MG/DL — SIGNIFICANT CHANGE UP (ref 0.2–1.2)
BUN SERPL-MCNC: 27 MG/DL — HIGH (ref 10–20)
CALCIUM SERPL-MCNC: 9.7 MG/DL — SIGNIFICANT CHANGE UP (ref 8.4–10.5)
CHLORIDE SERPL-SCNC: 107 MMOL/L — SIGNIFICANT CHANGE UP (ref 98–110)
CO2 SERPL-SCNC: 22 MMOL/L — SIGNIFICANT CHANGE UP (ref 17–32)
CREAT SERPL-MCNC: 1.5 MG/DL — SIGNIFICANT CHANGE UP (ref 0.7–1.5)
EGFR: 37 ML/MIN/1.73M2 — LOW
EOSINOPHIL # BLD AUTO: 0.34 K/UL — SIGNIFICANT CHANGE UP (ref 0–0.7)
EOSINOPHIL NFR BLD AUTO: 4.4 % — SIGNIFICANT CHANGE UP (ref 0–8)
GLUCOSE SERPL-MCNC: 93 MG/DL — SIGNIFICANT CHANGE UP (ref 70–99)
HCT VFR BLD CALC: 33.6 % — LOW (ref 37–47)
HGB BLD-MCNC: 11.1 G/DL — LOW (ref 12–16)
IMM GRANULOCYTES NFR BLD AUTO: 0.4 % — HIGH (ref 0.1–0.3)
LYMPHOCYTES # BLD AUTO: 1.46 K/UL — SIGNIFICANT CHANGE UP (ref 1.2–3.4)
LYMPHOCYTES # BLD AUTO: 18.8 % — LOW (ref 20.5–51.1)
MAGNESIUM SERPL-MCNC: 1.8 MG/DL — SIGNIFICANT CHANGE UP (ref 1.8–2.4)
MCHC RBC-ENTMCNC: 33 G/DL — SIGNIFICANT CHANGE UP (ref 32–37)
MCHC RBC-ENTMCNC: 36.3 PG — HIGH (ref 27–31)
MCV RBC AUTO: 109.8 FL — HIGH (ref 81–99)
MONOCYTES # BLD AUTO: 0.56 K/UL — SIGNIFICANT CHANGE UP (ref 0.1–0.6)
MONOCYTES NFR BLD AUTO: 7.2 % — SIGNIFICANT CHANGE UP (ref 1.7–9.3)
NEUTROPHILS # BLD AUTO: 5.32 K/UL — SIGNIFICANT CHANGE UP (ref 1.4–6.5)
NEUTROPHILS NFR BLD AUTO: 68.6 % — SIGNIFICANT CHANGE UP (ref 42.2–75.2)
NRBC # BLD: 0 /100 WBCS — SIGNIFICANT CHANGE UP (ref 0–0)
PLATELET # BLD AUTO: 251 K/UL — SIGNIFICANT CHANGE UP (ref 130–400)
POTASSIUM SERPL-MCNC: 4.6 MMOL/L — SIGNIFICANT CHANGE UP (ref 3.5–5)
POTASSIUM SERPL-SCNC: 4.6 MMOL/L — SIGNIFICANT CHANGE UP (ref 3.5–5)
PROT SERPL-MCNC: 6.2 G/DL — SIGNIFICANT CHANGE UP (ref 6–8)
RBC # BLD: 3.06 M/UL — LOW (ref 4.2–5.4)
RBC # FLD: 14.6 % — HIGH (ref 11.5–14.5)
SODIUM SERPL-SCNC: 142 MMOL/L — SIGNIFICANT CHANGE UP (ref 135–146)
WBC # BLD: 7.76 K/UL — SIGNIFICANT CHANGE UP (ref 4.8–10.8)
WBC # FLD AUTO: 7.76 K/UL — SIGNIFICANT CHANGE UP (ref 4.8–10.8)

## 2023-01-08 PROCEDURE — 99221 1ST HOSP IP/OBS SF/LOW 40: CPT | Mod: GC

## 2023-01-08 PROCEDURE — 99232 SBSQ HOSP IP/OBS MODERATE 35: CPT

## 2023-01-08 RX ORDER — BACITRACIN ZINC 500 UNIT/G
1 OINTMENT IN PACKET (EA) TOPICAL
Refills: 0 | Status: DISCONTINUED | OUTPATIENT
Start: 2023-01-08 | End: 2023-01-09

## 2023-01-08 RX ORDER — COLLAGENASE CLOSTRIDIUM HIST. 250 UNIT/G
1 OINTMENT (GRAM) TOPICAL
Refills: 0 | Status: DISCONTINUED | OUTPATIENT
Start: 2023-01-08 | End: 2023-01-09

## 2023-01-08 RX ADMIN — ATENOLOL 50 MILLIGRAM(S): 25 TABLET ORAL at 05:22

## 2023-01-08 RX ADMIN — Medication 100 MILLIGRAM(S): at 21:20

## 2023-01-08 RX ADMIN — Medication 5 MILLIGRAM(S): at 21:20

## 2023-01-08 RX ADMIN — Medication 20 MILLIGRAM(S): at 21:20

## 2023-01-08 RX ADMIN — Medication 1 APPLICATION(S): at 18:46

## 2023-01-08 RX ADMIN — Medication 300 MILLIGRAM(S): at 21:19

## 2023-01-08 RX ADMIN — BUPROPION HYDROCHLORIDE 150 MILLIGRAM(S): 150 TABLET, EXTENDED RELEASE ORAL at 13:18

## 2023-01-08 RX ADMIN — ENOXAPARIN SODIUM 40 MILLIGRAM(S): 100 INJECTION SUBCUTANEOUS at 18:50

## 2023-01-08 RX ADMIN — PANTOPRAZOLE SODIUM 40 MILLIGRAM(S): 20 TABLET, DELAYED RELEASE ORAL at 06:29

## 2023-01-08 RX ADMIN — PRIMIDONE 50 MILLIGRAM(S): 250 TABLET ORAL at 21:20

## 2023-01-08 RX ADMIN — Medication 50 MILLIGRAM(S): at 13:24

## 2023-01-08 RX ADMIN — ATORVASTATIN CALCIUM 80 MILLIGRAM(S): 80 TABLET, FILM COATED ORAL at 21:20

## 2023-01-08 RX ADMIN — SPIRONOLACTONE 50 MILLIGRAM(S): 25 TABLET, FILM COATED ORAL at 05:21

## 2023-01-08 RX ADMIN — Medication 20 MILLIGRAM(S): at 06:27

## 2023-01-08 RX ADMIN — Medication 100 MILLIGRAM(S): at 14:15

## 2023-01-08 RX ADMIN — Medication 20 MILLIGRAM(S): at 13:17

## 2023-01-08 RX ADMIN — Medication 100 MILLIGRAM(S): at 05:20

## 2023-01-08 RX ADMIN — Medication 20 MILLIGRAM(S): at 06:25

## 2023-01-08 RX ADMIN — MAGNESIUM OXIDE 400 MG ORAL TABLET 200 MILLIGRAM(S): 241.3 TABLET ORAL at 13:18

## 2023-01-08 RX ADMIN — BUDESONIDE AND FORMOTEROL FUMARATE DIHYDRATE 2 PUFF(S): 160; 4.5 AEROSOL RESPIRATORY (INHALATION) at 19:43

## 2023-01-08 RX ADMIN — Medication 20 MILLIGRAM(S): at 05:21

## 2023-01-08 RX ADMIN — Medication 1 MILLIGRAM(S): at 13:18

## 2023-01-08 RX ADMIN — Medication 20 MILLIGRAM(S): at 18:51

## 2023-01-08 RX ADMIN — Medication 1000 UNIT(S): at 13:18

## 2023-01-08 NOTE — DISCHARGE NOTE NURSING/CASE MANAGEMENT/SOCIAL WORK - PATIENT PORTAL LINK FT
You can access the FollowMyHealth Patient Portal offered by [facility] by registering at the following website: [email or web address]. By joining [employer]’s FollowMyHealth portal, you will also be able to view your health information using other applications (apps) compatible with our system.

## 2023-01-08 NOTE — PHYSICAL THERAPY INITIAL EVALUATION ADULT - GENERAL OBSERVATIONS, REHAB EVAL
9:00-9:30 pt encountered in bed in NAD, Left knee laceration dry and inctact, steri strips in place.  Pt ambulates with contact guard/RW, with unsteady gait.  PT recommends home PT

## 2023-01-08 NOTE — CONSULT NOTE ADULT - NS ATTEND AMEND GEN_ALL_CORE FT
Patient seen and examined and agree with above except as noted.  Patients history, notes, labs, imaging, vitals and meds reviewed personally.  Patient was being followed for essential tremor for a few years with Dr. Hagen, last visit virtually in 1/2022.  Has been on primidone 50mg at night for a couple of years.  Believes tremors have gotten worse.  Tremors in R>L hands worse with intention    Plan  1. Increase primidone to 100mg QHS  2. Follow with Dr. Hagen as out patient  3. No need for MRI brain at this time  4. Falls likely multifactorial

## 2023-01-08 NOTE — PHYSICAL THERAPY INITIAL EVALUATION ADULT - PERTINENT HX OF CURRENT PROBLEM, REHAB EVAL
Ms Díaz is a 71 y/o woman with PMHx of COPD, Asthma, prior PE no longer on eliquis, Pulmonary HTN - on Home O2 intermittently as needed, chronic back pain, cervical radiculopathy, CKD 3 (BL Cr 1.2), DLD, HTN, and pshx of L ankle ORIF on 9/23/2021 c/b MRSA infection requiring 6wks of vanc; presents w/ L knee pain;    Pt was in usual state of health until 12/26/2022 when she suffered a L knee laceration 2/2 mechanical fall; presented to [facility] and recieved stitches and sent home on Augmentin.

## 2023-01-08 NOTE — PROGRESS NOTE ADULT - ASSESSMENT
73 y/o woman with PMHx of COPD, Asthma, prior PE no longer on eliquis, Pulmonary HTN - on Home O2 intermittently as needed, chronic back pain, cervical radiculopathy, CKD 3 (BL Cr 1.2), DLD, HTN, and pshx of L ankle ORIF on 9/23/2021 c/b MRSA infection requiring 6wks of vanc; admitted to medicine for cellulitis    # Non-purulent Cellulitis  # hx of MRSA  - currently afebrile, HDS  - no leukocytosis  - blood cx NGTD  - completed a course of Augmentin s/p L knee laceration 12/26/22  - c/w Ancef and Vancomycin   - f/u cultures  - ID consult       Eventually might need drainage      For now Ancef 1 gm iv q8h and d/c on po Doxycycline 100 mg q12h till 1/15      Burn evaluation prior to discharge    # COPD/asthma   - not in exacerbation  - c/w home Symbicort BID    # pHTN  - c/w home Dildenafil     # Chronic back pain   - home med: Oxycodone 5mg TID PRN  - increased to q6 for acute pain    # DLD  - c/w home Lipitor    # Gout, not in acute flare   - c/w Allopurinol     # HTN  - c/w home Procardia qd  - c/w home Atenolol     # MDD/Anxiety  - c/w home paroxetine and buproprion   - c/w home prn vallium    # hx herpes simplex  - c/w home valtrex    # CKD3  - Cr at baseline    #DVT ppx: Lovenox 40mg qd  #GI ppx: PPI 40mg qd  #Diet: DASH/TLC  #Code status: DNR/DNI  #Activity: ambulate w/ assistance     #Progress Note Handoff  Pending (specify):  Burn eval for possible drainage?, PT eval  Family discussion: updated  Disposition: home in the next 24-48hrs

## 2023-01-08 NOTE — DISCHARGE NOTE NURSING/CASE MANAGEMENT/SOCIAL WORK - NSDCPEFALRISK_GEN_ALL_CORE
For information on Fall & Injury Prevention, visit: [email or web address] OR  [email or web address] OR  https://www.cdc.gov/steadi/patient.html

## 2023-01-08 NOTE — CONSULT NOTE ADULT - ASSESSMENT
A&P  Infected wound from fall onto right knee sustained 12/26, initially sutured close, returning to hospital with wound infection.  Culture collected 1/8.       -Patient offered operative management, she is currently refusing, will do local wound care with secondary intent  -Local wound care: wash with soap and water twice daily, apply Santyl and Bacitracin to xeroform and kerlix, to be done by nursing staff  -ABx per ID  -Culture sent 1/8, please follow  -Remaining management per primary team
Assessment: 73 y/o woman with PMHx of COPD, Asthma, prior PE no longer on eliquis, Pulmonary HTN - on Home O2 intermittently as needed, chronic back pain, cervical radiculopathy, CKD 3 (BL Cr 1.2), DLD, HTN, and pshx of L ankle ORIF on 9/23/2021 c/b MRSA infection requiring 6wks of vanc, macular degeneration b/l; presents w/ L knee pain. Patient admitted for cellulitis. Neurology consulted for falls and tremors. Patient takes Primidone 50 mg daily at bedtime for tremors, patient states it occurs at rest and with movement. She states her handwriting has gotten worse. She also mentions she has been having recurrent falls. She states these problems have been chronic and follows up with Dr. De Leon outpatient. Patient denies any slurred speech, facial droop, focal weakness, vision changes, urinary incontience/fecal incontinence saddle anesthesia.    Suggestions:  MRI brain non contrast   Can check b12, folate  PT  Medical management as per primary team  Discussed with Dr. Huerta on call. Pending neurology attending note to follow with final recommendations 
73 y/o woman with PMHx of COPD, Asthma, prior PE no longer on eliquis, Pulmonary HTN - on Home O2 intermittently as needed, chronic back pain, cervical radiculopathy, CKD 3 (BL Cr 1.2), DLD, HTN, and pshx of L ankle ORIF on 9/23/2021 c/b MRSA infection requiring 6wks of vanc; presents w/ L knee pain;    Pt was in usual state of health until 12/26/2022 when she suffered a L knee laceration 2/2 mechanical fall; presented to [facility] and recieved stitches and sent home on Augmentin. Pt completed augmentin however knee is now erythematous, warm to the touch, and acutely tender to light palpation.    IMPRESSION;   Hematoma left knee pre patellar area with no abscess/cellulitis  WBC 9.1    RECOMMENDATIONS;  Eventually might need drainage  For now Ancef 1 gm iv q8h and d/c on po Doxycycline 100 mg q12h till 1/15  Burn evaluation prior to discharge  Please do not hesitate to recall ID if any questions arise either through [employer] or through [employer]

## 2023-01-08 NOTE — CONSULT NOTE ADULT - SUBJECTIVE AND OBJECTIVE BOX
BERNARD SPEARS  72y, Female  Allergy: adhesives (Pruritus; Rash)  No Known Drug Allergies      All historical available data reviewed.    HPI:  Ms Spears is a 71 y/o woman with PMHx of COPD, Asthma, prior PE no longer on eliquis, Pulmonary HTN - on Home O2 intermittently as needed, chronic back pain, cervical radiculopathy, CKD 3 (BL Cr 1.2), DLD, HTN, and pshx of L ankle ORIF on 9/23/2021 c/b MRSA infection requiring 6wks of vanc; presents w/ L knee pain;    Pt was in usual state of health until 12/26/2022 when she suffered a L knee laceration 2/2 mechanical fall; presented to [facility] and recieved stitches and sent home on Augmentin. Pt completed augmentin however knee is now erythematous, warm to the touch, and acutely tender to light palpation; Pt returned to [facility] for stitches removal and evaluation of knee.     Of note pt had a previous knee laceration in same location ~5years prior; and L ankle fx s/p ORIF c/b MRSA infection as noted above.    Also of note pt is highly motivated to have a short hospital stay so she can get home to her partner w/ alzheimer's;     Pulmonologist Dr. Evans; Cardio Dr. Padilla     Pt denies fevers, n/v/d, syncope, c/p. Endorses no worsening of her chronic sob.    ED:  /58 HR 84 RR 19 99% RA T 98F  WBC 9.11 Hg 11.5 .9 Cr 1.2  XR no osseous abnormalities; soft tissue swelling    In the ED pt sutures removed and was given vanc/cefazolin; admitted to medicine for cellulitis;    (06 Jan 2023 15:51)    FAMILY HISTORY:  Family history of bladder cancer (Mother)    Family history of Alzheimer&#x27;s disease (Father)      PAST MEDICAL & SURGICAL HISTORY:  History of neck pain      Spinal stenosis of lumbar region with radiculopathy      Anxiety      Depression      Osteoarthritis      H/O osteoporosis      History of pulmonary hypertension      H/O pulmonary hypertension      COPD (chronic obstructive pulmonary disease)      Hyperlipidemia      H/O total knee replacement, right      Failed spinal cord stimulator            VITALS:  T(F): 98, Max: 98 (01-07-23 @ 05:00)  HR: 67  BP: 97/51  RR: 18Vital Signs Last 24 Hrs  T(C): 36.7 (07 Jan 2023 05:00), Max: 36.7 (07 Jan 2023 05:00)  T(F): 98 (07 Jan 2023 05:00), Max: 98 (07 Jan 2023 05:00)  HR: 67 (07 Jan 2023 05:00) (67 - 74)  BP: 97/51 (07 Jan 2023 05:00) (94/51 - 108/62)  BP(mean): --  RR: 18 (07 Jan 2023 05:00) (18 - 18)  SpO2: 99% (06 Jan 2023 22:33) (99% - 99%)    Parameters below as of 06 Jan 2023 22:33  Patient On (Oxygen Delivery Method): room air        TESTS & MEASUREMENTS:                        10.3   6.71  )-----------( 235      ( 07 Jan 2023 08:43 )             31.1     01-07    137  |  106  |  21<H>  ----------------------------<  86  3.7   |  21  |  1.2    Ca    9.1      07 Jan 2023 08:43    TPro  6.9  /  Alb  4.8  /  TBili  0.3  /  DBili  x   /  AST  13  /  ALT  12  /  AlkPhos  123<H>  01-06    LIVER FUNCTIONS - ( 06 Jan 2023 09:30 )  Alb: 4.8 g/dL / Pro: 6.9 g/dL / ALK PHOS: 123 U/L / ALT: 12 U/L / AST: 13 U/L / GGT: x                   RADIOLOGY & ADDITIONAL TESTS:  Personal review of radiological diagnostics performed  Echo and EKG results noted when applicable.     MEDICATIONS:  albuterol    90 MICROgram(s) HFA Inhaler 2 Puff(s) Inhalation every 6 hours PRN  allopurinol 300 milliGRAM(s) Oral at bedtime  aluminum hydroxide/magnesium hydroxide/simethicone Suspension 30 milliLiter(s) Oral every 4 hours PRN  atenolol  Tablet 50 milliGRAM(s) Oral daily  atorvastatin 80 milliGRAM(s) Oral at bedtime  budesonide 160 MICROgram(s)/formoterol 4.5 MICROgram(s) Inhaler 2 Puff(s) Inhalation two times a day  buPROPion XL (24-Hour) . 150 milliGRAM(s) Oral daily  ceFAZolin   IVPB 500 milliGRAM(s) IV Intermittent every 8 hours  cholecalciferol 1000 Unit(s) Oral daily  coronavirus bivalent (EUA) Booster Vaccine (PFIZER) 0.3 milliLiter(s) IntraMuscular once  diazepam    Tablet 10 milliGRAM(s) Oral three times a day PRN  enoxaparin Injectable 40 milliGRAM(s) SubCutaneous every 24 hours  folic acid 1 milliGRAM(s) Oral daily  furosemide    Tablet 20 milliGRAM(s) Oral daily  magnesium oxide 200 milliGRAM(s) Oral daily  melatonin 5 milliGRAM(s) Oral at bedtime  ondansetron Injectable 4 milliGRAM(s) IV Push every 8 hours PRN  oxycodone    5 mG/acetaminophen 325 mG 1 Tablet(s) Oral every 6 hours PRN  pantoprazole    Tablet 40 milliGRAM(s) Oral before breakfast  PARoxetine 20 milliGRAM(s) Oral two times a day  primidone 50 milliGRAM(s) Oral at bedtime  promethazine 50 milliGRAM(s) Oral daily  sildenafil (REVATIO) 20 milliGRAM(s) Oral three times a day  simethicone 80 milliGRAM(s) Chew daily PRN  spironolactone 50 milliGRAM(s) Oral daily  traZODone 100 milliGRAM(s) Oral at bedtime  valACYclovir 500 milliGRAM(s) Oral daily      ANTIBIOTICS:  ceFAZolin   IVPB 500 milliGRAM(s) IV Intermittent every 8 hours  valACYclovir 500 milliGRAM(s) Oral daily    
Neurology Consult    Patient is a 72y old  Female who presents with a chief complaint of Left knee swelling (07 Jan 2023 09:12)      HPI:  Ms Díaz is a 73 y/o woman with PMHx of COPD, Asthma, prior PE no longer on eliquis, Pulmonary HTN - on Home O2 intermittently as needed, chronic back pain, cervical radiculopathy, CKD 3 (BL Cr 1.2), DLD, HTN, and pshx of L ankle ORIF on 9/23/2021 c/b MRSA infection requiring 6wks of vanc; presents w/ L knee pain;    Pt was in usual state of health until 12/26/2022 when she suffered a L knee laceration 2/2 mechanical fall; presented to [facility] and recieved stitches and sent home on Augmentin. Pt completed augmentin however knee is now erythematous, warm to the touch, and acutely tender to light palpation; Pt returned to [facility] for stitches removal and evaluation of knee.     Of note pt had a previous knee laceration in same location ~5years prior; and L ankle fx s/p ORIF c/b MRSA infection as noted above.    Also of note pt is highly motivated to have a short hospital stay so she can get home to her partner w/ alzheimer's;     Pulmonologist Dr. Evans; Cardio Dr. Padilla     Pt denies fevers, n/v/d, syncope, c/p. Endorses no worsening of her chronic sob.    ED:  /58 HR 84 RR 19 99% RA T 98F  WBC 9.11 Hg 11.5 .9 Cr 1.2  XR no osseous abnormalities; soft tissue swelling    In the ED pt sutures removed and was given vanc/cefazolin; admitted to medicine for cellulitis;    (06 Jan 2023 15:51)      PAST MEDICAL & SURGICAL HISTORY:  History of neck pain      Spinal stenosis of lumbar region with radiculopathy      Anxiety      Depression      Osteoarthritis      H/O osteoporosis      History of pulmonary hypertension      H/O pulmonary hypertension      COPD (chronic obstructive pulmonary disease)      Hyperlipidemia      H/O total knee replacement, right      Failed spinal cord stimulator          FAMILY HISTORY:  Family history of bladder cancer (Mother)    Family history of Alzheimer&#x27;s disease (Father)        Social History: (-) x 3    Allergies    adhesives (Pruritus; Rash)  No Known Drug Allergies    Intolerances        MEDICATIONS  (STANDING):  allopurinol 300 milliGRAM(s) Oral at bedtime  atenolol  Tablet 50 milliGRAM(s) Oral daily  atorvastatin 80 milliGRAM(s) Oral at bedtime  bacitracin   Ointment 1 Application(s) Topical two times a day  budesonide 160 MICROgram(s)/formoterol 4.5 MICROgram(s) Inhaler 2 Puff(s) Inhalation two times a day  buPROPion XL (24-Hour) . 150 milliGRAM(s) Oral daily  ceFAZolin   IVPB 1000 milliGRAM(s) IV Intermittent every 8 hours  cholecalciferol 1000 Unit(s) Oral daily  collagenase Ointment 1 Application(s) Topical two times a day  coronavirus bivalent (EUA) Booster Vaccine (PFIZER) 0.3 milliLiter(s) IntraMuscular once  enoxaparin Injectable 40 milliGRAM(s) SubCutaneous every 24 hours  folic acid 1 milliGRAM(s) Oral daily  furosemide    Tablet 20 milliGRAM(s) Oral daily  magnesium oxide 200 milliGRAM(s) Oral daily  melatonin 5 milliGRAM(s) Oral at bedtime  pantoprazole    Tablet 40 milliGRAM(s) Oral before breakfast  PARoxetine 20 milliGRAM(s) Oral two times a day  primidone 50 milliGRAM(s) Oral at bedtime  promethazine 50 milliGRAM(s) Oral daily  sildenafil (REVATIO) 20 milliGRAM(s) Oral three times a day  spironolactone 50 milliGRAM(s) Oral daily  traZODone 100 milliGRAM(s) Oral at bedtime    MEDICATIONS  (PRN):  albuterol    90 MICROgram(s) HFA Inhaler 2 Puff(s) Inhalation every 6 hours PRN Shortness of Breath and/or Wheezing  aluminum hydroxide/magnesium hydroxide/simethicone Suspension 30 milliLiter(s) Oral every 4 hours PRN Dyspepsia  diazepam    Tablet 10 milliGRAM(s) Oral three times a day PRN anxiety  ibuprofen  Tablet. 400 milliGRAM(s) Oral every 8 hours PRN Moderate Pain (4 - 6)  ondansetron Injectable 4 milliGRAM(s) IV Push every 8 hours PRN Nausea and/or Vomiting  oxycodone    5 mG/acetaminophen 325 mG 1 Tablet(s) Oral every 6 hours PRN Severe Pain (7 - 10)  simethicone 80 milliGRAM(s) Chew daily PRN Indigestion        Vital Signs Last 24 Hrs  T(C): 36.2 (08 Jan 2023 15:54), Max: 36.2 (08 Jan 2023 15:54)  T(F): 97.1 (08 Jan 2023 15:54), Max: 97.1 (08 Jan 2023 15:54)  HR: 64 (08 Jan 2023 15:54) (61 - 84)  BP: 109/62 (08 Jan 2023 15:54) (99/51 - 139/63)  BP(mean): --  RR: 18 (08 Jan 2023 15:54) (18 - 18)  SpO2: --        Examination:  General:  Appearance is consistent with chronologic age.  No abnormal facies.  Gross skin survey within normal limits.    Cognitive/Language:  The patient is oriented to person, place, time and date.  Could not recall 3 words 'ball flag tree'  Fund of knowledge is intact and normal.  Language with normal repetition, comprehension and naming.  Nondysarthric.    Eyes: intact, VFF.  EOMI w/o nystagmus  Face:  Facial sensation normal V1 - 3, no facial asymmetry.    Ears/Nose/Throat:  Hearing grossly intact b/l.  Palate elevates midline.  Tongue  midline.   Motor examination:     RUE 5/5   LUE 5/5   strength 5/5  RLE 5/5, dorsiflexion and plantar flexion 5/5  LLE 5/5,dorsiflexion and plantar flexion 5/5  Reflexes:   1+ b/l  , patella and Achilles.  Plantar response downgoing. clonus absent.  Sensory examination:   Intact to light touch all throughout, vibration diminished more on right hallux valgus than left (subjectively feels burning b/l legs which is chronic)  Cerebellum:   FTN intact  No dysmetria.  Gait: Deferred     Labs:   CBC Full  -  ( 08 Jan 2023 04:30 )  WBC Count : 7.76 K/uL  RBC Count : 3.06 M/uL  Hemoglobin : 11.1 g/dL  Hematocrit : 33.6 %  Platelet Count - Automated : 251 K/uL  Mean Cell Volume : 109.8 fL  Mean Cell Hemoglobin : 36.3 pg  Mean Cell Hemoglobin Concentration : 33.0 g/dL  Auto Neutrophil # : 5.32 K/uL  Auto Lymphocyte # : 1.46 K/uL  Auto Monocyte # : 0.56 K/uL  Auto Eosinophil # : 0.34 K/uL  Auto Basophil # : 0.05 K/uL  Auto Neutrophil % : 68.6 %  Auto Lymphocyte % : 18.8 %  Auto Monocyte % : 7.2 %  Auto Eosinophil % : 4.4 %  Auto Basophil % : 0.6 %    01-08    142  |  107  |  27<H>  ----------------------------<  93  4.6   |  22  |  1.5    Ca    9.7      08 Jan 2023 04:30  Mg     1.8     01-08    TPro  6.2  /  Alb  4.3  /  TBili  <0.2  /  DBili  x   /  AST  22  /  ALT  9   /  AlkPhos  116<H>  01-08    LIVER FUNCTIONS - ( 08 Jan 2023 04:30 )  Alb: 4.3 g/dL / Pro: 6.2 g/dL / ALK PHOS: 116 U/L / ALT: 9 U/L / AST: 22 U/L / GGT: x                   Neuroimaging:  NCT:     01-08-23 @ 17:20      
Patient is a 72y old  Female who presents with a chief complaint of Left knee swelling (07 Jan 2023 09:12)      Steristrips removed bedside 1/8, wrapped with Xeroform. Will recommend Santyl.           HPI:  Ms Díaz is a 73 y/o woman with PMHx of COPD, Asthma, prior PE no longer on eliquis, Pulmonary HTN - on Home O2 intermittently as needed, chronic back pain, cervical radiculopathy, CKD 3 (BL Cr 1.2), DLD, HTN, and pshx of L ankle ORIF on 9/23/2021 c/b MRSA infection requiring 6wks of vanc; presents w/ L knee pain;    Pt was in usual state of health until 12/26/2022 when she suffered a L knee laceration 2/2 mechanical fall; presented to [facility] and recieved stitches and sent home on Augmentin. Pt completed augmentin however knee is now erythematous, warm to the touch, and acutely tender to light palpation; Pt returned to [facility] for stitches removal and evaluation of knee.     Of note pt had a previous knee laceration in same location ~5years prior; and L ankle fx s/p ORIF c/b MRSA infection as noted above.    Also of note pt is highly motivated to have a short hospital stay so she can get home to her partner w/ alzheimer's;     Pulmonologist Dr. Evans; Cardio Dr. Padilla     Pt denies fevers, n/v/d, syncope, c/p. Endorses no worsening of her chronic sob.    ED:  /58 HR 84 RR 19 99% RA T 98F  WBC 9.11 Hg 11.5 .9 Cr 1.2  XR no osseous abnormalities; soft tissue swelling    In the ED pt sutures removed and was given vanc/cefazolin; admitted to medicine for cellulitis;    (06 Jan 2023 15:51)      PAST MEDICAL & SURGICAL HISTORY:  History of neck pain      Spinal stenosis of lumbar region with radiculopathy      Anxiety      Depression      Osteoarthritis      H/O osteoporosis      History of pulmonary hypertension      H/O pulmonary hypertension      COPD (chronic obstructive pulmonary disease)      Hyperlipidemia      H/O total knee replacement, right      Failed spinal cord stimulator          SOCIAL HX:   Smoking                         ETOH                            Other    FAMILY HISTORY:  Family history of bladder cancer (Mother)    Family history of Alzheimer&#x27;s disease (Father)            PHYSICAL EXAM    ICU Vital Signs Last 24 Hrs  T(C): 35.7 (08 Jan 2023 05:03), Max: 36.1 (07 Jan 2023 12:53)  T(F): 96.3 (08 Jan 2023 05:03), Max: 97 (07 Jan 2023 12:53)  HR: 84 (08 Jan 2023 06:26) (61 - 84)  BP: 139/63 (08 Jan 2023 06:26) (99/51 - 139/63)  RR: 18 (08 Jan 2023 05:03) (18 - 18)          PHYSICAL EXAM:  GENERAL: NAD, well-developed  HEAD:  Atraumatic, Normocephalic  EYES: conjunctiva and sclera clear  NECK: Supple,   CHEST/LUNG: no cp distress  HEART: no cv distress  EXTREMITIES:  2+ Peripheral Pulses,   PSYCH: AAOx3  NEUROLOGY: non-focal  SKIN: 11cm horizontal full thickness open wound to anterior left knee; superior margins with healthy pink tissue

## 2023-01-08 NOTE — DISCHARGE NOTE NURSING/CASE MANAGEMENT/SOCIAL WORK - NSDCVIVACCINE_GEN_ALL_CORE_FT
Tdap; 29-Aug-2019 16:27; Vangie Mojica (RN); [employer]; [id number] (Exp. Date: 04-Jul-2021); IntraMuscular; Deltoid Left.; 0.5 milliLiter(s); VIS (VIS Published: 09-May-2013, VIS Presented: 29-Aug-2019);   Tdap; 26-Dec-2022 14:07; Mary Santiago (RN); [employer]; [id number] (Exp. Date: 08-Dec-2024); IntraMuscular; Deltoid Left.; 0.5 milliLiter(s); VIS (VIS Published: 09-May-2013, VIS Presented: 26-Dec-2022);

## 2023-01-08 NOTE — PROGRESS NOTE ADULT - SUBJECTIVE AND OBJECTIVE BOX
BERNARD SPEARS  72y  Female      Patient is a 72y old  Female who presents with a chief complaint of Left knee swelling (07 Jan 2023 09:12)      INTERVAL HPI/OVERNIGHT EVENTS: no acute events overnight. pain well controlled with percocet. worked with PT this AM. patient reluctant for surgery and rather just do medical management with abx and wound care.       REVIEW OF SYSTEMS:  CONSTITUTIONAL: No fever, weight loss, or fatigue  RESPIRATORY: No cough, wheezing, chills or hemoptysis; No shortness of breath  CARDIOVASCULAR: No chest pain, palpitations, dizziness, or leg swelling  GASTROINTESTINAL: No abdominal or epigastric pain. No nausea, vomiting, or hematemesis; No diarrhea or constipation. No melena or hematochezia.  GENITOURINARY: No dysuria, frequency, hematuria, or incontinence  NEUROLOGICAL: No headaches, memory loss, loss of strength, numbness, or tremors  SKIN: No itching, burning, rashes, or lesions   MUSCULOSKELETAL: No joint pain or swelling; No muscle, back, or extremity pain  PSYCHIATRIC: No depression, anxiety, mood swings, or difficulty sleeping  All other review of systems negative    VITALS  T(C): 35.7 (01-08-23 @ 05:03), Max: 36.1 (01-07-23 @ 20:30)  HR: 84 (01-08-23 @ 06:26) (61 - 84)  BP: 139/63 (01-08-23 @ 06:26) (99/51 - 139/63)  RR: 18 (01-08-23 @ 05:03) (18 - 18)  SpO2: --  Wt(kg): --Vital Signs Last 24 Hrs  T(C): 35.7 (08 Jan 2023 05:03), Max: 36.1 (07 Jan 2023 20:30)  T(F): 96.3 (08 Jan 2023 05:03), Max: 96.9 (07 Jan 2023 20:30)  HR: 84 (08 Jan 2023 06:26) (61 - 84)  BP: 139/63 (08 Jan 2023 06:26) (99/51 - 139/63)  BP(mean): --  RR: 18 (08 Jan 2023 05:03) (18 - 18)  SpO2: --    PHYSICAL EXAM:  GENERAL: elderly F, NAD, non toxic appearing  EYES: anicteric sclera, non injected conjunctiva, EOMI  PSYCH: no agitation, baseline mentation  NERVOUS SYSTEM:  Alert & Oriented X3, CN 2-12 grossly intact  PULMONARY: symmetrical chest rise, no accessory muscle use  CARDIOVASCULAR: nontachycardic  GI: nondistended  EXTREMITIES:  No clubbing, cyanosis, left knee wrapped in extensive bandage  SKIN: No rashes or lesions    Consultant(s) Notes Reviewed:  [x ] YES  [ ] NO    Discussed with Consultants/Other Providers [ x] YES     LABS                          11.1   7.76  )-----------( 251      ( 08 Jan 2023 04:30 )             33.6     01-08    142  |  107  |  27<H>  ----------------------------<  93  4.6   |  22  |  1.5    Ca    9.7      08 Jan 2023 04:30  Mg     1.8     01-08    TPro  6.2  /  Alb  4.3  /  TBili  <0.2  /  DBili  x   /  AST  22  /  ALT  9   /  AlkPhos  116<H>  01-08    RADIOLOGY & ADDITIONAL TESTS:  Xray Knee 3 Views, Left (01.06.23 @ 09:16) >  IMPRESSION:  1.  No acute osseous abnormality seen.  2.  Prepatellar soft tissue swelling and possible skin laceration.   Correlate with direct inspection    Imaging Personally Reviewed:  [ ] YES  [ ] NO    HEALTH ISSUES - PROBLEM Dx:      MEDICATIONS  (STANDING):  allopurinol 300 milliGRAM(s) Oral at bedtime  atenolol  Tablet 50 milliGRAM(s) Oral daily  atorvastatin 80 milliGRAM(s) Oral at bedtime  budesonide 160 MICROgram(s)/formoterol 4.5 MICROgram(s) Inhaler 2 Puff(s) Inhalation two times a day  buPROPion XL (24-Hour) . 150 milliGRAM(s) Oral daily  ceFAZolin   IVPB 1000 milliGRAM(s) IV Intermittent every 8 hours  cholecalciferol 1000 Unit(s) Oral daily  coronavirus bivalent (EUA) Booster Vaccine (PFIZER) 0.3 milliLiter(s) IntraMuscular once  enoxaparin Injectable 40 milliGRAM(s) SubCutaneous every 24 hours  folic acid 1 milliGRAM(s) Oral daily  furosemide    Tablet 20 milliGRAM(s) Oral daily  magnesium oxide 200 milliGRAM(s) Oral daily  melatonin 5 milliGRAM(s) Oral at bedtime  pantoprazole    Tablet 40 milliGRAM(s) Oral before breakfast  PARoxetine 20 milliGRAM(s) Oral two times a day  primidone 50 milliGRAM(s) Oral at bedtime  promethazine 50 milliGRAM(s) Oral daily  sildenafil (REVATIO) 20 milliGRAM(s) Oral three times a day  spironolactone 50 milliGRAM(s) Oral daily  traZODone 100 milliGRAM(s) Oral at bedtime    MEDICATIONS  (PRN):  albuterol    90 MICROgram(s) HFA Inhaler 2 Puff(s) Inhalation every 6 hours PRN Shortness of Breath and/or Wheezing  aluminum hydroxide/magnesium hydroxide/simethicone Suspension 30 milliLiter(s) Oral every 4 hours PRN Dyspepsia  diazepam    Tablet 10 milliGRAM(s) Oral three times a day PRN anxiety  ibuprofen  Tablet. 400 milliGRAM(s) Oral every 8 hours PRN Moderate Pain (4 - 6)  ondansetron Injectable 4 milliGRAM(s) IV Push every 8 hours PRN Nausea and/or Vomiting  oxycodone    5 mG/acetaminophen 325 mG 1 Tablet(s) Oral every 6 hours PRN Severe Pain (7 - 10)  simethicone 80 milliGRAM(s) Chew daily PRN Indigestion

## 2023-01-09 ENCOUNTER — TRANSCRIPTION ENCOUNTER (OUTPATIENT)
Age: 73
End: 2023-01-09

## 2023-01-09 VITALS
DIASTOLIC BLOOD PRESSURE: 57 MMHG | RESPIRATION RATE: 20 BRPM | SYSTOLIC BLOOD PRESSURE: 121 MMHG | HEART RATE: 76 BPM | TEMPERATURE: 97 F

## 2023-01-09 LAB
ALBUMIN SERPL ELPH-MCNC: 3.8 G/DL — SIGNIFICANT CHANGE UP (ref 3.5–5.2)
ALP SERPL-CCNC: 115 U/L — SIGNIFICANT CHANGE UP (ref 30–115)
ALT FLD-CCNC: 6 U/L — SIGNIFICANT CHANGE UP (ref 0–41)
ANION GAP SERPL CALC-SCNC: 9 MMOL/L — SIGNIFICANT CHANGE UP (ref 7–14)
AST SERPL-CCNC: 23 U/L — SIGNIFICANT CHANGE UP (ref 0–41)
BASOPHILS # BLD AUTO: 0.05 K/UL — SIGNIFICANT CHANGE UP (ref 0–0.2)
BASOPHILS NFR BLD AUTO: 0.6 % — SIGNIFICANT CHANGE UP (ref 0–1)
BILIRUB SERPL-MCNC: <0.2 MG/DL — SIGNIFICANT CHANGE UP (ref 0.2–1.2)
BUN SERPL-MCNC: 27 MG/DL — HIGH (ref 10–20)
CALCIUM SERPL-MCNC: 9.2 MG/DL — SIGNIFICANT CHANGE UP (ref 8.4–10.5)
CHLORIDE SERPL-SCNC: 107 MMOL/L — SIGNIFICANT CHANGE UP (ref 98–110)
CO2 SERPL-SCNC: 24 MMOL/L — SIGNIFICANT CHANGE UP (ref 17–32)
CREAT SERPL-MCNC: 1.3 MG/DL — SIGNIFICANT CHANGE UP (ref 0.7–1.5)
EGFR: 43 ML/MIN/1.73M2 — LOW
EOSINOPHIL # BLD AUTO: 0.39 K/UL — SIGNIFICANT CHANGE UP (ref 0–0.7)
EOSINOPHIL NFR BLD AUTO: 4.5 % — SIGNIFICANT CHANGE UP (ref 0–8)
GLUCOSE SERPL-MCNC: 90 MG/DL — SIGNIFICANT CHANGE UP (ref 70–99)
HCT VFR BLD CALC: 29.6 % — LOW (ref 37–47)
HGB BLD-MCNC: 9.8 G/DL — LOW (ref 12–16)
IMM GRANULOCYTES NFR BLD AUTO: 0.3 % — SIGNIFICANT CHANGE UP (ref 0.1–0.3)
LYMPHOCYTES # BLD AUTO: 2.03 K/UL — SIGNIFICANT CHANGE UP (ref 1.2–3.4)
LYMPHOCYTES # BLD AUTO: 23.4 % — SIGNIFICANT CHANGE UP (ref 20.5–51.1)
MAGNESIUM SERPL-MCNC: 1.7 MG/DL — LOW (ref 1.8–2.4)
MCHC RBC-ENTMCNC: 33.1 G/DL — SIGNIFICANT CHANGE UP (ref 32–37)
MCHC RBC-ENTMCNC: 36.3 PG — HIGH (ref 27–31)
MCV RBC AUTO: 109.6 FL — HIGH (ref 81–99)
MONOCYTES # BLD AUTO: 0.77 K/UL — HIGH (ref 0.1–0.6)
MONOCYTES NFR BLD AUTO: 8.9 % — SIGNIFICANT CHANGE UP (ref 1.7–9.3)
NEUTROPHILS # BLD AUTO: 5.4 K/UL — SIGNIFICANT CHANGE UP (ref 1.4–6.5)
NEUTROPHILS NFR BLD AUTO: 62.3 % — SIGNIFICANT CHANGE UP (ref 42.2–75.2)
NRBC # BLD: 0 /100 WBCS — SIGNIFICANT CHANGE UP (ref 0–0)
PLATELET # BLD AUTO: 213 K/UL — SIGNIFICANT CHANGE UP (ref 130–400)
POTASSIUM SERPL-MCNC: 3.8 MMOL/L — SIGNIFICANT CHANGE UP (ref 3.5–5)
POTASSIUM SERPL-SCNC: 3.8 MMOL/L — SIGNIFICANT CHANGE UP (ref 3.5–5)
PROT SERPL-MCNC: 5.6 G/DL — LOW (ref 6–8)
RBC # BLD: 2.7 M/UL — LOW (ref 4.2–5.4)
RBC # FLD: 14.6 % — HIGH (ref 11.5–14.5)
SODIUM SERPL-SCNC: 140 MMOL/L — SIGNIFICANT CHANGE UP (ref 135–146)
WBC # BLD: 8.67 K/UL — SIGNIFICANT CHANGE UP (ref 4.8–10.8)
WBC # FLD AUTO: 8.67 K/UL — SIGNIFICANT CHANGE UP (ref 4.8–10.8)

## 2023-01-09 PROCEDURE — 99239 HOSP IP/OBS DSCHRG MGMT >30: CPT

## 2023-01-09 PROCEDURE — 99222 1ST HOSP IP/OBS MODERATE 55: CPT

## 2023-01-09 RX ORDER — OXYCODONE HYDROCHLORIDE 5 MG/1
1 TABLET ORAL
Qty: 12 | Refills: 0
Start: 2023-01-09 | End: 2023-01-11

## 2023-01-09 RX ORDER — L.ACIDOPH/B.ANIMALIS/B.LONGUM 15B CELL
1 CAPSULE ORAL
Qty: 30 | Refills: 0
Start: 2023-01-09 | End: 2023-01-18

## 2023-01-09 RX ORDER — MAGNESIUM OXIDE 400 MG ORAL TABLET 241.3 MG
0.5 TABLET ORAL
Qty: 30 | Refills: 0
Start: 2023-01-09 | End: 2023-03-09

## 2023-01-09 RX ORDER — COLLAGENASE CLOSTRIDIUM HIST. 250 UNIT/G
1 OINTMENT (GRAM) TOPICAL
Qty: 1 | Refills: 0
Start: 2023-01-09

## 2023-01-09 RX ORDER — MAGNESIUM OXIDE 400 MG ORAL TABLET 241.3 MG
0.5 TABLET ORAL
Qty: 0 | Refills: 0 | DISCHARGE

## 2023-01-09 RX ORDER — OXYCODONE AND ACETAMINOPHEN 5; 325 MG/1; MG/1
1 TABLET ORAL
Qty: 0 | Refills: 0 | DISCHARGE

## 2023-01-09 RX ORDER — BACITRACIN ZINC 500 UNIT/G
1 OINTMENT IN PACKET (EA) TOPICAL
Qty: 1 | Refills: 0
Start: 2023-01-09

## 2023-01-09 RX ADMIN — Medication 20 MILLIGRAM(S): at 05:40

## 2023-01-09 RX ADMIN — Medication 100 MILLIGRAM(S): at 13:21

## 2023-01-09 RX ADMIN — Medication 20 MILLIGRAM(S): at 13:21

## 2023-01-09 RX ADMIN — Medication 100 MILLIGRAM(S): at 05:40

## 2023-01-09 RX ADMIN — BUPROPION HYDROCHLORIDE 150 MILLIGRAM(S): 150 TABLET, EXTENDED RELEASE ORAL at 12:26

## 2023-01-09 RX ADMIN — Medication 50 MILLIGRAM(S): at 12:26

## 2023-01-09 RX ADMIN — Medication 1000 UNIT(S): at 12:27

## 2023-01-09 RX ADMIN — ATENOLOL 50 MILLIGRAM(S): 25 TABLET ORAL at 05:40

## 2023-01-09 RX ADMIN — Medication 1 APPLICATION(S): at 04:54

## 2023-01-09 RX ADMIN — Medication 1 MILLIGRAM(S): at 12:26

## 2023-01-09 RX ADMIN — Medication 1 APPLICATION(S): at 04:53

## 2023-01-09 RX ADMIN — MAGNESIUM OXIDE 400 MG ORAL TABLET 200 MILLIGRAM(S): 241.3 TABLET ORAL at 12:27

## 2023-01-09 RX ADMIN — PANTOPRAZOLE SODIUM 40 MILLIGRAM(S): 20 TABLET, DELAYED RELEASE ORAL at 05:40

## 2023-01-09 NOTE — DISCHARGE NOTE PROVIDER - ATTENDING DISCHARGE PHYSICAL EXAMINATION:
GENERAL: elderly F, NAD, non toxic appearing  EYES: anicteric sclera, non injected conjunctiva, EOMI  PSYCH: no agitation, baseline mentation  NERVOUS SYSTEM:  Alert & Oriented X3, CN 2-12 grossly intact  PULMONARY: symmetrical chest rise, no accessory muscle use  CARDIOVASCULAR: nontachycardic  GI: nondistended  EXTREMITIES:  No clubbing, cyanosis, left knee wrapped in extensive bandage  SKIN: No rashes or lesions

## 2023-01-09 NOTE — DISCHARGE NOTE PROVIDER - YES NO FOR MLM POSITIVE OR NEGATIVE COVID RESULT
2022       Preston Garcia MD  [address]  [address]  [facility] [address]  Via Fax: [phone]      Patient: Martín Sauer   YOB: 1958   Date of Visit: 2022       Dear Dr. Garcia:    Thank you for referring Martín Sauer to me for evaluation. Below are my notes for this visit with him.    If you have questions, please do not hesitate to call me. I look forward to following your patient along with you.      Sincerely,        Michael Rain MD        CC: No Recipients  Michael Rain MD  2022  8:40 AM  Signed    [place] [address], [place] [address]  P [phone]  F [phone]  ELECTROPHYSIOLOGY PROGRESS NOTE         PATIENT:  Martín Sauer   : 1958    CHIEF COMPLAINT  No chief complaint on file.     Referring Physician: Preston Garcia MD     HISTORY OF PRESENT ILLNESS  Mr. Sauer is a pleasant 64 year old male who presents to me for a second opinion with regards to management of his atrial fibrillation. In the past he has been on no AADs and has had no DCCVs. His most recent echocardiogram shows LV function of 40% and his stress test shows evidence of fixed defects and a LV function of 25%. Prior to ablation janice has likely been in persistent atrial fibrillation now for at least 6 months. He has no symptoms with his atrial fibrillation. He underwent cardiac cath showing normal coronaries with mild decline in LV function. He feels well and has no complaints except for the cost of eliquis at this time.     MEDICATIONS  Prior to Admission medications    Medication Sig Start Date End Date Taking? Authorizing Provider   Multiple Vitamin (MULTIVITAMIN ADULT PO) every 24 hours.    Outside Provider   amLODIPine (NORVASC) 5 MG tablet Take 5 mg by mouth every morning.  21   Outside Provider   Eliquis 5 MG Tab Take 5 mg by mouth 2 times daily. HOLD AM DOSE 21   Outside Provider   lisinopril-hydroCHLOROthiazide (ZESTORETIC) 20-12.5 MG  per tablet Take 1 tablet by mouth 2 times daily. AM AND PM 12/8/21   Outside Provider   metoPROLOL succinate (TOPROL-XL) 50 MG 24 hr tablet Take 50 mg by mouth every morning.  12/21/21   Outside Provider   rosuvastatin (CRESTOR) 10 MG tablet Take 10 mg by mouth every morning.  12/9/21   Outside Provider     The patient's current medications were reviewed.    ALLERGIES  ALLERGIES:  No Known Allergies     HISTORIES  Past Medical History:   Diagnosis Date   • Arthritis     rheumatoid athritis   • Atrial fibrillation (CMS/HCC)    • Essential hypertension 3/3/2022   • Mixed hyperlipidemia 3/3/2022   • NICM (nonischemic cardiomyopathy) (CMS/HCC) 3/3/2022       Past Surgical History:   Procedure Laterality Date   • Cardiac catherization      CV ANGIOGRAM   • Colonoscopy     • Knee arthroscopy w/ debridement      L KNEE   • Shoulder surgery      KEO. ARTHROSCOPIES       FAMILY HISTORY  Family History   Problem Relation Age of Onset   • Cancer Mother        SOCIAL HISTORY  Social History     Tobacco Use   • Smoking status: Never Smoker   • Smokeless tobacco: Never Used   Vaping Use   • Vaping Use: never used   Substance Use Topics   • Alcohol use: Yes     Comment: SOCIALLY    • Drug use: Yes     Types: Marijuana     Comment: gummies and smoke         REVIEW OF SYSTEMS    Constitutional: Negative for fatigue.   HENT: Negative for congestion and ear discharge.    Eyes: Negative for discharge.   Respiratory: Negative for shortness of breath.    Cardiovascular: Negative for chest pain.  Gastrointestinal: Negative for abdominal distention, nausea or vomiting.   Endocrine: Negative for polyphagia.   Genitourinary: Negative for hematuria.   Musculoskeletal: Negative for arthralgias.   Skin: Negative for color change.   Allergic/Immunologic: Negative for environmental allergies.   Neurological: Negative for seizures.   Hematological: Does not bruise/bleed easily.   Psychiatric/Behavioral: Negative for behavioral problems.      PHYSICAL EXAM  There were no vitals taken for this visit.  Constitutional: Appears well-developed and well-nourished.   Head: Normocephalic.   Mouth/Throat: Mucous membranes are normal.   Eyes: Conjunctivae are normal. Pupils are equal, round, and reactive to light.   Neck: Normal range of motion. Neck supple. No JVD present. Carotid bruit is not present.   Cardiovascular: RRR nl S1S2, no m/r/g, no LE edema, no JVD  Pulmonary/Chest: Effort normal and breath sounds normal. No respiratory distress. No wheezes, rhonchi, rales, no tenderness.   Abdominal: Soft. Normal appearance and bowel sounds are normal. There is no tenderness.   Musculoskeletal: Normal range of motion. No joint swelling   Neurological: Grossly normal. Alert and oriented to person, place, and time.   Skin: Skin is warm, dry and intact. No rash noted. No erythema.   Psychiatric: Normal mood and affect. Behavior is normal.     CARDIAC TESTING/IMAGING  I have reviewed the pertinent imaging study reports. These are the pertinent findings:  · ECG performed and personally reviewed today - NSR   · TTE -12/21  1. Left ventricle: The cavity size is normal. Wall thickness is at the     upper limits of normal. The ejection fraction was measured by biplane     method of disks. The ejection fraction is 40%.  2. Aortic valve: Mild regurgitation.  3. Aorta: The aortic root internal end-diastolic diameter is 4.2cm.  4. Left atrium: The atrium is moderately dilated.  5. Baseline ECG: Atrial fibrillation.  Impressions:  Normal lv size with generalized hypokinesis with ef 40%.  Inferolateral akinesis. Biatrial enlargement.     Stress test 12/21  1. Myocardial perfusion imaging: The left ventricle is dilated. There is     no transient ischemic dilation of the left ventricle during stress.     There is a small, mild, fixed defect involving the apical wall(s). LV     myocardial perfusion is otherwise normal.  2. Gated SPECT: The calculated left ventricular ejection  fraction during     stress is 25%. LV global systolic function is depressed. No diagnostic     evidence for left ventricular regional abnormality.  3. Stress ECG conclusions: There are no stress arrhythmias or conduction     abnormalities. The stress ECG is negative for ischemia.  4. Baseline ECG: Atrial fibrillation.  Impressions:     1. There is a mild, fixed apical perfusion defect with no signficant     reversibility.  2. Abnormal nuclear perfusion study.  3. There is no evidence of myocardial ischemia.  4. Normal regional wall thickening is noted on gated SPECT analysis.  5. Abnormal ejection fraction.  · 6. Left ventricular systolic function is depressed.    Cardiac Cath:   No significant coronary artery disease.  Mildly depressed LVEF.    Ablation 3/22  Cryo ablation   Procedure Summary:  1. Successful cryoablation for paroxysmal atrial fibrillation with isolation of 4 out of 4 pulmonary veins and demonstration of persistent entrance and exit block.      ASSESSMENT/PLAN  Mr. Sauer is a pleasant 64 year old male who presents to me for a second opinion with regards to management of his atrial fibrillation. In the past he has been on no AADs and has had no DCCVs. His most recent echocardiogram shows LV function of 40% and his stress test shows evidence of fixed defects and a LV function of 25%. Prior to ablation he has likely been in persistent atrial fibrillation now for at least 6 months. He has no symptoms with his atrial fibrillation. He underwent cardiac cath showing normal coronaries with mild decline in LV function. He feels well and has no complaints except for the cost of eliquis at this time.     1) Atrial fibrillation which may be contributing to LV dysfunction. S/p Cryo PVI on 3/22. CHADSVASC 3   - Symptoms: none   - no evidence of recurrence   - continue anticoagulation with eliquis.    - Follow up 2 months time          2) Non ischemic cardiomyopathy   - continue GDMT.    - EF 45 on cath recently  .    3) Hypertension   - not controlled.   - refilled medications.   - he will follow up with general cardiology.     Michael Rain MD  Cardiology, Cardiac Electrophysiology  [facility]  [facility]                       ,

## 2023-01-09 NOTE — DISCHARGE NOTE PROVIDER - CARE PROVIDER_API CALL
Moris Simon)  Plastic Surgery  [address] [place] [address]  Phone: [phone]  Fax: [phone]  Follow Up Time: 2 weeks    Brielle Reid  INTERNAL MEDICINE  [address] [place] [address]  Phone: [phone]  Fax: [phone]  Follow Up Time: 2 weeks

## 2023-01-09 NOTE — DISCHARGE NOTE PROVIDER - NSDCMRMEDTOKEN_GEN_ALL_CORE_FT
allopurinol 300 mg oral tablet: 1 tab(s) orally once a day (at bedtime)  atenolol 50 mg oral tablet: 1 tab(s) orally once a day  folic acid 1 mg oral tablet: 1 tab(s) orally once a day  Fosamax 70 mg oral tablet: 1 tab(s) orally once a week  furosemide 20 mg oral tablet: 1 tab(s) orally once a day  Lipitor 80 mg oral tablet: 1 tab(s) orally once a day (at bedtime)  magnesium oxide 250 mg oral tablet: 0.5 tab(s) orally once a day  melatonin 5 mg oral tablet: 1 tab(s) orally once a day (at bedtime), As needed, Insomnia  oxycodone-acetaminophen 5 mg-325 mg oral tablet: 1 tab(s) orally every 8 hours, As Needed  PARoxetine 40 mg oral tablet: 1 tab(s) orally once a day  PriLOSEC 20 mg oral delayed release capsule: 1 cap(s) orally once a day  primidone 50 mg oral tablet: 1 tab(s) orally once a day (at bedtime)  Procardia XL 60 mg oral tablet, extended release: 1 tab(s) orally once a day  promethazine 50 mg oral tablet: 1 tab(s) orally once  sildenafil 20 mg oral tablet: 1 tab(s) orally 3 times a day  simethicone 80 mg oral tablet, chewable: 1 tab(s) orally once a day, As needed, Indigestion  spironolactone 50 mg oral tablet: orally once a day  Symbicort 160 mcg-4.5 mcg/inh inhalation aerosol: 2 puff(s) inhaled 2 times a day  traZODone 100 mg oral tablet: orally once a day (at bedtime)  Valium 10 mg oral tablet: 1 tab(s) orally 3 times a day, As Needed  Valtrex 500 mg oral tablet: 1 tab(s) orally once a day  Ventolin HFA 90 mcg/inh inhalation aerosol: 2 puff(s) inhaled every 6 hours, As Needed  Vitamin D3 400 intl units (10 mcg) oral tablet: 1 tab(s) orally once a day  Wellbutrin: 150  orally once a day   allopurinol 300 mg oral tablet: 1 tab(s) orally once a day (at bedtime)  atenolol 50 mg oral tablet: 1 tab(s) orally once a day  bacitracin 500 units/g topical ointment: 1 application topically 2 times a day  bifidobacterium-lactobacillus oral capsule: 1 cap(s) orally 3 times a day   collagenase 250 units/g topical ointment: 1 application topically 2 times a day  doxycycline monohydrate 100 mg oral capsule: 1 cap(s) orally 2 times a day till 1/15/2023  folic acid 1 mg oral tablet: 1 tab(s) orally once a day  Fosamax 70 mg oral tablet: 1 tab(s) orally once a week  furosemide 20 mg oral tablet: 1 tab(s) orally once a day  Lipitor 80 mg oral tablet: 1 tab(s) orally once a day (at bedtime)  magnesium oxide 250 mg oral tablet: 0.5 tab(s) orally once a day  melatonin 5 mg oral tablet: 1 tab(s) orally once a day (at bedtime), As needed, Insomnia  oxycodone-acetaminophen 5 mg-325 mg oral tablet: 1 tab(s) orally every 8 hours, As Needed  PARoxetine 40 mg oral tablet: 1 tab(s) orally once a day  PriLOSEC 20 mg oral delayed release capsule: 1 cap(s) orally once a day  primidone 50 mg oral tablet: 1 tab(s) orally once a day (at bedtime)  Procardia XL 60 mg oral tablet, extended release: 1 tab(s) orally once a day  promethazine 50 mg oral tablet: 1 tab(s) orally once  sildenafil 20 mg oral tablet: 1 tab(s) orally 3 times a day  simethicone 80 mg oral tablet, chewable: 1 tab(s) orally once a day, As needed, Indigestion  spironolactone 50 mg oral tablet: orally once a day  Symbicort 160 mcg-4.5 mcg/inh inhalation aerosol: 2 puff(s) inhaled 2 times a day  traZODone 100 mg oral tablet: orally once a day (at bedtime)  Valium 10 mg oral tablet: 1 tab(s) orally 3 times a day, As Needed  Valtrex 500 mg oral tablet: 1 tab(s) orally once a day  Ventolin HFA 90 mcg/inh inhalation aerosol: 2 puff(s) inhaled every 6 hours, As Needed  Vitamin D3 400 intl units (10 mcg) oral tablet: 1 tab(s) orally once a day  Wellbutrin: 150  orally once a day   allopurinol 300 mg oral tablet: 1 tab(s) orally once a day (at bedtime)  atenolol 50 mg oral tablet: 1 tab(s) orally once a day  bacitracin 500 units/g topical ointment: 1 application topically 2 times a day  bifidobacterium-lactobacillus oral capsule: 1 cap(s) orally 3 times a day   collagenase 250 units/g topical ointment: 1 application topically 2 times a day  doxycycline monohydrate 100 mg oral capsule: 1 cap(s) orally 2 times a day till 1/15/2023  folic acid 1 mg oral tablet: 1 tab(s) orally once a day  Fosamax 70 mg oral tablet: 1 tab(s) orally once a week  furosemide 20 mg oral tablet: 1 tab(s) orally once a day  Lipitor 80 mg oral tablet: 1 tab(s) orally once a day (at bedtime)  magnesium oxide 250 mg oral tablet: 0.5 tab(s) orally once a day  melatonin 5 mg oral tablet: 1 tab(s) orally once a day (at bedtime), As needed, Insomnia  oxyCODONE 5 mg oral capsule: 1 cap(s) orally every 6 hours, As Needed -for severe pain MDD:20mg  PARoxetine 40 mg oral tablet: 1 tab(s) orally once a day  PriLOSEC 20 mg oral delayed release capsule: 1 cap(s) orally once a day  primidone 50 mg oral tablet: 1 tab(s) orally once a day (at bedtime)  Procardia XL 60 mg oral tablet, extended release: 1 tab(s) orally once a day  promethazine 50 mg oral tablet: 1 tab(s) orally once  sildenafil 20 mg oral tablet: 1 tab(s) orally 3 times a day  simethicone 80 mg oral tablet, chewable: 1 tab(s) orally once a day, As needed, Indigestion  spironolactone 50 mg oral tablet: orally once a day  Symbicort 160 mcg-4.5 mcg/inh inhalation aerosol: 2 puff(s) inhaled 2 times a day  traZODone 100 mg oral tablet: orally once a day (at bedtime)  Valium 10 mg oral tablet: 1 tab(s) orally 3 times a day, As Needed  Valtrex 500 mg oral tablet: 1 tab(s) orally once a day  Ventolin HFA 90 mcg/inh inhalation aerosol: 2 puff(s) inhaled every 6 hours, As Needed  Vitamin D3 400 intl units (10 mcg) oral tablet: 1 tab(s) orally once a day  Wellbutrin: 150  orally once a day

## 2023-01-09 NOTE — DISCHARGE NOTE PROVIDER - NSDCCPCAREPLAN_GEN_ALL_CORE_FT
PRINCIPAL DISCHARGE DIAGNOSIS  Diagnosis: Cellulitis  Assessment and Plan of Treatment: Take the prescribed antibiotic medicine you are given as directed until it is gone. Take it even if you feel better. It treats the infection and stops it from  Not taking all the medicine can make future infections hard to treat. Keep the infected area clean. When possible, raise the infected area above the level of your heart. This helps keep swelling down. Apply clean bandages as advised. Wash your hands often to prevent spreading the infection. In the future, wash your hands before and after you touch cuts, scratches, or bandages. This will help prevent infection.   Call your doctor or returnt o the emergency room or call 911 immediately if you have any of the following: Difficulty or pain when moving the joints above or below the infected area, Discharge or pus draining from the area, rever of 100.4°F (38°C) or higher, or as directed by your healthcare provider, pain that gets worse in or around the infected site, redness that gets worse in or around the infected area, particularly if the area of redness expands to a wider area, shaking chills, swelling of the infected area, vomiting.

## 2023-01-09 NOTE — DISCHARGE NOTE PROVIDER - NSDCFUSCHEDAPPT_GEN_ALL_CORE_FT
Galdino Calderon  [facility]  [address]  Scheduled Appointment: 02/06/2023    Uday Hagen  [facility]  NEUROLOGY [address]  Scheduled Appointment: 03/07/2023

## 2023-01-09 NOTE — DISCHARGE NOTE PROVIDER - NSDCQMSTAIRS_GEN_ALL_CORE
HOME MONITORING REPORT    INR today:   Results for orders placed or performed in visit on 07/30/19   Protime-INR   Result Value Ref Range    INR 2.60        INR Goal: 2.0-3.0    Dosing Plan  As of 7/30/2019    TTR:   78.6 % (1.3 y)   Full warfarin instructions:   1 mg every day              PLAN:PATIENT NOTIFIED TO  CONTINUE CURRENT DOSE AND RECHECK IN ONE WEEK. Yes

## 2023-01-09 NOTE — DISCHARGE NOTE PROVIDER - NSDCHOSPICE_GEN_A_CORE
Vital signs stable. Postpartum assessment WDL. Pain controlled with Ibuprofen and Tylenol. Patient voiding without difficulty. Breastfeeding on cue independently. Patient and infant bonding well. Will continue with current plan of care.  Plan is to discharge in am.    No

## 2023-01-09 NOTE — DISCHARGE NOTE PROVIDER - NSDCFUADDINST_GEN_ALL_CORE_FT
-Local wound care: wash with soap and water twice daily, apply Santyl and Bacitracin to xeroform and kerlix, to be done by nursing staff   -Local wound care: wash with soap and water twice daily, apply Santyl and Bacitracin to xeroform and kerlix, to be done by nursing staff.
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
## 2023-01-11 LAB
CULTURE RESULTS: SIGNIFICANT CHANGE UP
SPECIMEN SOURCE: SIGNIFICANT CHANGE UP

## 2023-01-12 DIAGNOSIS — I12.9 HYPERTENSIVE CHRONIC KIDNEY DISEASE WITH STAGE 1 THROUGH STAGE 4 CHRONIC KIDNEY DISEASE, OR UNSPECIFIED CHRONIC KIDNEY DISEASE: ICD-10-CM

## 2023-01-12 DIAGNOSIS — R29.6 REPEATED FALLS: ICD-10-CM

## 2023-01-12 DIAGNOSIS — Z92.29 PERSONAL HISTORY OF OTHER DRUG THERAPY: ICD-10-CM

## 2023-01-12 DIAGNOSIS — Z86.14 PERSONAL HISTORY OF METHICILLIN RESISTANT STAPHYLOCOCCUS AUREUS INFECTION: ICD-10-CM

## 2023-01-12 DIAGNOSIS — G89.29 OTHER CHRONIC PAIN: ICD-10-CM

## 2023-01-12 DIAGNOSIS — J44.9 CHRONIC OBSTRUCTIVE PULMONARY DISEASE, UNSPECIFIED: ICD-10-CM

## 2023-01-12 DIAGNOSIS — I27.20 PULMONARY HYPERTENSION, UNSPECIFIED: ICD-10-CM

## 2023-01-12 DIAGNOSIS — M54.9 DORSALGIA, UNSPECIFIED: ICD-10-CM

## 2023-01-12 DIAGNOSIS — L03.116 CELLULITIS OF LEFT LOWER LIMB: ICD-10-CM

## 2023-01-12 DIAGNOSIS — Z96.651 PRESENCE OF RIGHT ARTIFICIAL KNEE JOINT: ICD-10-CM

## 2023-01-12 DIAGNOSIS — E78.5 HYPERLIPIDEMIA, UNSPECIFIED: ICD-10-CM

## 2023-01-12 DIAGNOSIS — Z79.01 LONG TERM (CURRENT) USE OF ANTICOAGULANTS: ICD-10-CM

## 2023-01-12 DIAGNOSIS — M51.16 INTERVERTEBRAL DISC DISORDERS WITH RADICULOPATHY, LUMBAR REGION: ICD-10-CM

## 2023-01-12 DIAGNOSIS — F41.9 ANXIETY DISORDER, UNSPECIFIED: ICD-10-CM

## 2023-01-12 DIAGNOSIS — Z91.048 OTHER NONMEDICINAL SUBSTANCE ALLERGY STATUS: ICD-10-CM

## 2023-01-12 DIAGNOSIS — Z66 DO NOT RESUSCITATE: ICD-10-CM

## 2023-01-12 DIAGNOSIS — R25.1 TREMOR, UNSPECIFIED: ICD-10-CM

## 2023-01-12 DIAGNOSIS — M10.9 GOUT, UNSPECIFIED: ICD-10-CM

## 2023-01-12 DIAGNOSIS — Z82.0 FAMILY HISTORY OF EPILEPSY AND OTHER DISEASES OF THE NERVOUS SYSTEM: ICD-10-CM

## 2023-01-12 DIAGNOSIS — M19.90 UNSPECIFIED OSTEOARTHRITIS, UNSPECIFIED SITE: ICD-10-CM

## 2023-01-12 DIAGNOSIS — N18.31 CHRONIC KIDNEY DISEASE, STAGE 3A: ICD-10-CM

## 2023-01-12 DIAGNOSIS — F32.9 MAJOR DEPRESSIVE DISORDER, SINGLE EPISODE, UNSPECIFIED: ICD-10-CM

## 2023-01-12 DIAGNOSIS — E53.8 DEFICIENCY OF OTHER SPECIFIED B GROUP VITAMINS: ICD-10-CM

## 2023-01-12 DIAGNOSIS — Z86.19 PERSONAL HISTORY OF OTHER INFECTIOUS AND PARASITIC DISEASES: ICD-10-CM

## 2023-01-12 NOTE — ED ADULT NURSE NOTE - CHIEF COMPLAINT
Detail Level: Detailed Detail Level: Generalized The patient is a 71y Female complaining of shortness of breath.

## 2023-01-19 ENCOUNTER — APPOINTMENT (OUTPATIENT)
Dept: BURN CARE | Facility: CLINIC | Age: 73
End: 2023-01-19
Payer: MEDICARE

## 2023-01-19 ENCOUNTER — OUTPATIENT (OUTPATIENT)
Dept: OUTPATIENT SERVICES | Facility: HOSPITAL | Age: 73
LOS: 1 days | Discharge: HOME | End: 2023-01-19

## 2023-01-19 VITALS — SYSTOLIC BLOOD PRESSURE: 69 MMHG | HEART RATE: 59 BPM | DIASTOLIC BLOOD PRESSURE: 38 MMHG

## 2023-01-19 DIAGNOSIS — Z96.651 PRESENCE OF RIGHT ARTIFICIAL KNEE JOINT: Chronic | ICD-10-CM

## 2023-01-19 DIAGNOSIS — T85.192A OTHER MECHANICAL COMPLICATION OF IMPLANTED ELECTRONIC NEUROSTIMULATOR OF SPINAL CORD ELECTRODE (LEAD), INITIAL ENCOUNTER: Chronic | ICD-10-CM

## 2023-01-19 PROCEDURE — 11042 DBRDMT SUBQ TIS 1ST 20SQCM/<: CPT
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
## 2023-01-21 LAB — BACTERIA SPEC CULT: ABNORMAL
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
## 2023-01-31 NOTE — PHYSICAL EXAM
[Healing] : healing [Size%: ______] : Size: [unfilled]% [3] : 3 out of 10 [Abnormal] : abnormal [Medium] : medium [] : yes [Infected?] : Infected: No [de-identified] : The traumatic wound left knee is healing with adherent devitalized tissue and measures 3x2cm.  A wound culture was obtained. Excisional debridement with scissors was performed on devitalized tissue down  to and including subcutaneous tissue. The patient was instructed to clean  the wound with soap and water. Continue local wound care.  Follow up 2 - 4  weeks.  [TWNoteComboBox1] : xeroform

## 2023-01-31 NOTE — ASSESSMENT
[Wound Care] : wound care [FreeTextEntry1] : The traumatic wound left knee is healing with adherent devitalized tissue and measures 3x2cm.  A wound culture was obtained. Excisional debridement with scissors was performed on devitalized tissue down  to and including subcutaneous tissue. The patient was instructed to clean  the wound with soap and water. Continue local wound care.  Follow up 2 - 4  weeks.

## 2023-02-02 ENCOUNTER — APPOINTMENT (OUTPATIENT)
Dept: BURN CARE | Facility: CLINIC | Age: 73
End: 2023-02-02
Payer: MEDICARE

## 2023-02-02 ENCOUNTER — OUTPATIENT (OUTPATIENT)
Dept: OUTPATIENT SERVICES | Facility: HOSPITAL | Age: 73
LOS: 1 days | Discharge: HOME | End: 2023-02-02

## 2023-02-02 VITALS — SYSTOLIC BLOOD PRESSURE: 102 MMHG | DIASTOLIC BLOOD PRESSURE: 62 MMHG | HEART RATE: 111 BPM

## 2023-02-02 DIAGNOSIS — T85.192A OTHER MECHANICAL COMPLICATION OF IMPLANTED ELECTRONIC NEUROSTIMULATOR OF SPINAL CORD ELECTRODE (LEAD), INITIAL ENCOUNTER: Chronic | ICD-10-CM

## 2023-02-02 DIAGNOSIS — S80.02XD CONTUSION OF LEFT KNEE, SUBSEQUENT ENCOUNTER: ICD-10-CM

## 2023-02-02 DIAGNOSIS — Z96.651 PRESENCE OF RIGHT ARTIFICIAL KNEE JOINT: Chronic | ICD-10-CM
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
## 2023-02-02 PROCEDURE — 11042 DBRDMT SUBQ TIS 1ST 20SQCM/<: CPT
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
## 2023-02-04 NOTE — ASSESSMENT
[FreeTextEntry1] : The traumatic wound left knee is healing with adherent devitalized tissue and measures 3x2cm.  . Excisional debridement with scissors was performed on devitalized tissue down  to and including subcutaneous tissue. The patient was instructed to clean  the wound with soap and water. Continue local wound care.  Follow up 2 - 4  weeks.  [Wound Care] : wound care

## 2023-02-04 NOTE — PHYSICAL EXAM
[Healing] : healing [Size%: ______] : Size: [unfilled]% [Infected?] : Infected: No [3] : 3 out of 10 [Medium] : medium [Abnormal] : abnormal [] : no [de-identified] : The traumatic wound left knee is healing with adherent devitalized tissue and measures 3x2cm.  . Excisional debridement with scissors was performed on devitalized tissue down  to and including subcutaneous tissue. The patient was instructed to clean  the wound with soap and water. Continue local wound care.  Follow up 2 - 4  weeks.  [TWNoteComboBox1] : xeroform
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
## 2023-02-06 ENCOUNTER — APPOINTMENT (OUTPATIENT)
Dept: ORTHOPEDIC SURGERY | Facility: CLINIC | Age: 73
End: 2023-02-06
Payer: MEDICARE

## 2023-02-06 DIAGNOSIS — L03.116 CELLULITIS OF LEFT LOWER LIMB: ICD-10-CM

## 2023-02-06 DIAGNOSIS — S82.892S OTHER FRACTURE OF LEFT LOWER LEG, SEQUELA: ICD-10-CM

## 2023-02-06 PROCEDURE — 99213 OFFICE O/P EST LOW 20 MIN: CPT

## 2023-02-06 NOTE — ASSESSMENT
[FreeTextEntry1] :  patient is stable now home doing some walking still reports some diffuse pain we again had a lengthy discussion no reason to consider any additional surgery will progress weight-bearing with the Aircast or sleeve I will see her in 3 months   no indication today for repeat x-ray  continue dressing changes by Burn and home care nurse

## 2023-02-06 NOTE — PHYSICAL EXAM
[de-identified] : decreased ROM   no wounds   skin intact\par xrays   from last visit  plate on medial side some evidence of delayed union alignment acceptable\par  left leg calf mild swelling healing wound front of left knee dressed with Xeroform good knee motion

## 2023-02-06 NOTE — REASON FOR VISIT
[FreeTextEntry2] : Follow up left ankle fracture The hospitalized in January with cellulitis of the left leg using a walker still has some pain and swelling in the ankle finished with the PICC line off antibiotics still seeing Dr. Simon no antibiotics by mouth getting dressing changes daily by visiting nurse

## 2023-02-23 ENCOUNTER — OUTPATIENT (OUTPATIENT)
Dept: OUTPATIENT SERVICES | Facility: HOSPITAL | Age: 73
LOS: 1 days | End: 2023-02-23
Payer: MEDICARE

## 2023-02-23 ENCOUNTER — APPOINTMENT (OUTPATIENT)
Dept: BURN CARE | Facility: CLINIC | Age: 73
End: 2023-02-23
Payer: MEDICARE

## 2023-02-23 ENCOUNTER — APPOINTMENT (OUTPATIENT)
Dept: BURN CARE | Facility: CLINIC | Age: 73
End: 2023-02-23

## 2023-02-23 VITALS — DIASTOLIC BLOOD PRESSURE: 50 MMHG | SYSTOLIC BLOOD PRESSURE: 89 MMHG | HEART RATE: 64 BPM

## 2023-02-23 DIAGNOSIS — Z00.8 ENCOUNTER FOR OTHER GENERAL EXAMINATION: ICD-10-CM

## 2023-02-23 DIAGNOSIS — S81.802A UNSPECIFIED OPEN WOUND, LEFT LOWER LEG, INITIAL ENCOUNTER: ICD-10-CM

## 2023-02-23 DIAGNOSIS — Z96.651 PRESENCE OF RIGHT ARTIFICIAL KNEE JOINT: Chronic | ICD-10-CM

## 2023-02-23 DIAGNOSIS — T85.192A OTHER MECHANICAL COMPLICATION OF IMPLANTED ELECTRONIC NEUROSTIMULATOR OF SPINAL CORD ELECTRODE (LEAD), INITIAL ENCOUNTER: Chronic | ICD-10-CM

## 2023-02-23 PROCEDURE — 87070 CULTURE OTHR SPECIMN AEROBIC: CPT

## 2023-02-23 PROCEDURE — 99212 OFFICE O/P EST SF 10 MIN: CPT

## 2023-02-23 NOTE — ASSESSMENT
[FreeTextEntry1] : The traumatic wound left knee is healing and measures 1x0.5cm.   The wound is granulation with no adherent devitalized tissue . The patient was instructed to clean  the wound with soap and water. Continue local wound care.  Follow up 2 - 4  weeks.  [Wound Care] : wound care

## 2023-02-23 NOTE — PHYSICAL EXAM
[Healing] : healing [Size%: ______] : Size: [unfilled]% [Infected?] : Infected: No [3] : 3 out of 10 [Abnormal] : abnormal [Medium] : medium [] : no [de-identified] : The traumatic wound left knee is healing and measures 1x0.5cm.   The wound is granulation with no adherent devitalized tissue . The patient was instructed to clean  the wound with soap and water. Continue local wound care.  Follow up 2 - 4  weeks.  [TWNoteComboBox1] : bandaid
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
## 2023-02-24 DIAGNOSIS — S80.02XD CONTUSION OF LEFT KNEE, SUBSEQUENT ENCOUNTER: ICD-10-CM

## 2023-02-25 LAB — BACTERIA SPEC CULT: NORMAL

## 2023-03-07 ENCOUNTER — APPOINTMENT (OUTPATIENT)
Dept: NEUROLOGY | Facility: CLINIC | Age: 73
End: 2023-03-07
Payer: MEDICARE

## 2023-03-07 VITALS
WEIGHT: 132 LBS | DIASTOLIC BLOOD PRESSURE: 67 MMHG | OXYGEN SATURATION: 99 % | SYSTOLIC BLOOD PRESSURE: 118 MMHG | HEIGHT: 58 IN | BODY MASS INDEX: 27.71 KG/M2 | HEART RATE: 60 BPM | TEMPERATURE: 97.5 F

## 2023-03-07 DIAGNOSIS — G25.0 ESSENTIAL TREMOR: ICD-10-CM

## 2023-03-07 DIAGNOSIS — F32.A DEPRESSION, UNSPECIFIED: ICD-10-CM

## 2023-03-07 PROCEDURE — 99213 OFFICE O/P EST LOW 20 MIN: CPT

## 2023-03-07 RX ORDER — FLUOXETINE HYDROCHLORIDE 20 MG/1
20 CAPSULE ORAL
Refills: 0 | Status: DISCONTINUED | COMMUNITY
End: 2023-03-07

## 2023-03-07 RX ORDER — NIFEDIPINE 60 MG/1
60 TABLET, FILM COATED, EXTENDED RELEASE ORAL
Refills: 0 | Status: DISCONTINUED | COMMUNITY
End: 2023-03-07

## 2023-03-07 RX ORDER — VALACYCLOVIR 500 MG/1
500 TABLET, FILM COATED ORAL TWICE DAILY
Refills: 0 | Status: DISCONTINUED | COMMUNITY
End: 2023-03-07

## 2023-03-07 RX ORDER — ATORVASTATIN CALCIUM 80 MG/1
80 TABLET, FILM COATED ORAL
Refills: 0 | Status: DISCONTINUED | COMMUNITY
End: 2023-03-07

## 2023-03-07 RX ORDER — OXYCODONE AND ACETAMINOPHEN 5; 325 MG/1; MG/1
5-325 TABLET ORAL
Refills: 0 | Status: DISCONTINUED | COMMUNITY
End: 2023-03-07

## 2023-03-07 RX ORDER — BUPROPION HYDROCHLORIDE 150 MG/1
150 TABLET, FILM COATED ORAL
Refills: 0 | Status: DISCONTINUED | COMMUNITY
End: 2023-03-07

## 2023-03-07 RX ORDER — COLCHICINE 0.6 MG/1
0.6 TABLET ORAL DAILY
Refills: 0 | Status: DISCONTINUED | COMMUNITY
End: 2023-03-07

## 2023-03-07 RX ORDER — COLCHICINE 0.6 MG/1
0.6 TABLET, FILM COATED ORAL
Refills: 0 | Status: DISCONTINUED | COMMUNITY
End: 2023-03-07

## 2023-03-07 RX ORDER — PROMETHAZINE HYDROCHLORIDE 50 MG/1
50 TABLET ORAL
Refills: 0 | Status: DISCONTINUED | COMMUNITY
End: 2023-03-07
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
## 2023-03-07 NOTE — PHYSICAL EXAM
[FreeTextEntry1] : A/A Ox 3 good attention\par good mood \par slightly pale\par Good ROM of the neck\par + atrophy of the thenar muscles\par able to stand up with a little help from her hands\par limping on the left\par DTR\par hypo all exteremities
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
## 2023-03-09 ENCOUNTER — OUTPATIENT (OUTPATIENT)
Dept: OUTPATIENT SERVICES | Facility: HOSPITAL | Age: 73
LOS: 1 days | End: 2023-03-09
Payer: MEDICARE

## 2023-03-09 ENCOUNTER — APPOINTMENT (OUTPATIENT)
Dept: BURN CARE | Facility: CLINIC | Age: 73
End: 2023-03-09
Payer: MEDICARE

## 2023-03-09 VITALS — HEART RATE: 98 BPM | SYSTOLIC BLOOD PRESSURE: 98 MMHG | DIASTOLIC BLOOD PRESSURE: 72 MMHG | TEMPERATURE: 97.8 F

## 2023-03-09 DIAGNOSIS — T85.192A OTHER MECHANICAL COMPLICATION OF IMPLANTED ELECTRONIC NEUROSTIMULATOR OF SPINAL CORD ELECTRODE (LEAD), INITIAL ENCOUNTER: Chronic | ICD-10-CM

## 2023-03-09 DIAGNOSIS — Z00.8 ENCOUNTER FOR OTHER GENERAL EXAMINATION: ICD-10-CM

## 2023-03-09 DIAGNOSIS — Z96.651 PRESENCE OF RIGHT ARTIFICIAL KNEE JOINT: Chronic | ICD-10-CM

## 2023-03-09 PROCEDURE — 99212 OFFICE O/P EST SF 10 MIN: CPT

## 2023-03-14 NOTE — PHYSICAL EXAM
[Healing] : healing [Size%: ______] : Size: [unfilled]% [Infected?] : Infected: No [3] : 3 out of 10 [Abnormal] : abnormal [Medium] : medium [] : no [de-identified] : The traumatic wound left knee is healed and measures 1x0.5cm. The wound to the right index finger measures 0.5x0.5 cm and is healed .  The wounds are  pink and dry..  The patient was instructed to clean  the wound with soap and water. Continue local wound care with moisturizer and sunscreen. Follow up prn.  [TWNoteComboBox1] : MANOLO

## 2023-03-14 NOTE — ASSESSMENT
[FreeTextEntry1] : The traumatic wound left knee is healed and measures 1x0.5cm. The wound to the right index finger measures 0.5x0.5 cm and is healed .  The wounds are  pink and dry..  The patient was instructed to clean  the wound with soap and water. Continue local wound care with moisturizer and sunscreen. Follow up prn.  [Wound Care] : wound care
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
## 2023-03-16 DIAGNOSIS — Z87.828 PERSONAL HISTORY OF OTHER (HEALED) PHYSICAL INJURY AND TRAUMA: ICD-10-CM

## 2023-03-16 DIAGNOSIS — Z09 ENCOUNTER FOR FOLLOW-UP EXAMINATION AFTER COMPLETED TREATMENT FOR CONDITIONS OTHER THAN MALIGNANT NEOPLASM: ICD-10-CM

## 2023-07-10 ENCOUNTER — APPOINTMENT (OUTPATIENT)
Dept: PULMONOLOGY | Facility: CLINIC | Age: 73
End: 2023-07-10
Payer: MEDICARE

## 2023-08-07 ENCOUNTER — APPOINTMENT (OUTPATIENT)
Dept: ORTHOPEDIC SURGERY | Facility: CLINIC | Age: 73
End: 2023-08-07

## 2023-09-12 ENCOUNTER — APPOINTMENT (OUTPATIENT)
Dept: NEUROLOGY | Facility: CLINIC | Age: 73
End: 2023-09-12

## 2023-09-19 NOTE — OCCUPATIONAL THERAPY INITIAL EVALUATION ADULT - TOILETING, PREVIOUS LEVEL OF FUNCTION, OT EVAL
needs device and assist Retinoid Dermatitis Normal Treatment: I recommended more frequent application of Cetaphil or CeraVe to the areas of dermatitis.

## 2023-10-15 ENCOUNTER — EMERGENCY (EMERGENCY)
Facility: HOSPITAL | Age: 73
LOS: 0 days | Discharge: HOME | End: 2023-10-15
Attending: EMERGENCY MEDICINE
Payer: MEDICARE

## 2023-10-15 VITALS
RESPIRATION RATE: 18 BRPM | HEART RATE: 116 BPM | TEMPERATURE: 99 F | WEIGHT: 160.06 LBS | DIASTOLIC BLOOD PRESSURE: 53 MMHG | HEIGHT: 62 IN | OXYGEN SATURATION: 93 % | SYSTOLIC BLOOD PRESSURE: 91 MMHG

## 2023-10-15 DIAGNOSIS — S80.212A ABRASION, LEFT KNEE, INITIAL ENCOUNTER: ICD-10-CM

## 2023-10-15 DIAGNOSIS — M25.562 PAIN IN LEFT KNEE: ICD-10-CM
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
## 2023-10-15 DIAGNOSIS — M81.0 AGE-RELATED OSTEOPOROSIS WITHOUT CURRENT PATHOLOGICAL FRACTURE: ICD-10-CM

## 2023-10-15 DIAGNOSIS — F32.A DEPRESSION, UNSPECIFIED: ICD-10-CM

## 2023-10-15 DIAGNOSIS — F17.200 NICOTINE DEPENDENCE, UNSPECIFIED, UNCOMPLICATED: ICD-10-CM

## 2023-10-15 DIAGNOSIS — Z96.651 PRESENCE OF RIGHT ARTIFICIAL KNEE JOINT: Chronic | ICD-10-CM
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
## 2023-10-15 DIAGNOSIS — E78.5 HYPERLIPIDEMIA, UNSPECIFIED: ICD-10-CM

## 2023-10-15 DIAGNOSIS — J44.9 CHRONIC OBSTRUCTIVE PULMONARY DISEASE, UNSPECIFIED: ICD-10-CM

## 2023-10-15 DIAGNOSIS — T85.192A OTHER MECHANICAL COMPLICATION OF IMPLANTED ELECTRONIC NEUROSTIMULATOR OF SPINAL CORD ELECTRODE (LEAD), INITIAL ENCOUNTER: Chronic | ICD-10-CM

## 2023-10-15 DIAGNOSIS — M48.061 SPINAL STENOSIS, LUMBAR REGION WITHOUT NEUROGENIC CLAUDICATION: ICD-10-CM

## 2023-10-15 DIAGNOSIS — M19.90 UNSPECIFIED OSTEOARTHRITIS, UNSPECIFIED SITE: ICD-10-CM

## 2023-10-15 DIAGNOSIS — Z91.048 OTHER NONMEDICINAL SUBSTANCE ALLERGY STATUS: ICD-10-CM

## 2023-10-15 DIAGNOSIS — Z96.651 PRESENCE OF RIGHT ARTIFICIAL KNEE JOINT: ICD-10-CM

## 2023-10-15 DIAGNOSIS — F41.9 ANXIETY DISORDER, UNSPECIFIED: ICD-10-CM

## 2023-10-15 PROCEDURE — 73590 X-RAY EXAM OF LOWER LEG: CPT | Mod: LT

## 2023-10-15 PROCEDURE — 73590 X-RAY EXAM OF LOWER LEG: CPT | Mod: 26,LT

## 2023-10-15 PROCEDURE — 99283 EMERGENCY DEPT VISIT LOW MDM: CPT

## 2023-10-15 PROCEDURE — 99284 EMERGENCY DEPT VISIT MOD MDM: CPT | Mod: 25

## 2023-10-15 PROCEDURE — 73562 X-RAY EXAM OF KNEE 3: CPT | Mod: LT

## 2023-10-15 PROCEDURE — 73562 X-RAY EXAM OF KNEE 3: CPT | Mod: 26,LT
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
## 2023-10-15 NOTE — ED ADULT NURSE NOTE - TEMPLATE LIST FOR HEAD TO TOE ASSESSMENT
I called the manufacturers to check the status of Emery applications. He was temporaly enrolled into the program for his Brilinta. He was denied Eliquis they stated he has Medicare D through [employer]. Afshin Kirkland has stated to us that he does not have a Medicare D plan. Now that he has a plan he really needs to apply for Medicare extra help. He won't qualify for these programs if he has a active part d plan.      Isatu Mcfadden [address]   Medication Assistance  [place] [facility] [place]    [phone])[phone] (h)[phone]
Orthopedic

## 2023-10-15 NOTE — ED PROVIDER NOTE - PHYSICAL EXAMINATION
CONST: Well appearing in NAD  EYES: PERRL, EOMI, Sclera and conjunctiva clear.   ENT: Oropharynx normal appearing, no erythema or exudates. Uvula midline.  CARD: Normal S1 S2; Normal rate and rhythm  RESP: Equal BS B/L, No wheezes, rhonchi or rales. No distress  GI: Soft, non-tender, non-distended.  MS: TTP over the lateral aspect of L knee.   SKIN: Abrasion over anterior aspect of L knee.   NEURO: A&Ox3, No focal deficits. Strength 5/5 with no sensory deficits. Steady gait

## 2023-10-15 NOTE — ED PROVIDER NOTE - NSFOLLOWUPINSTRUCTIONS_ED_ALL_ED_FT
Our Emergency Department Referral Coordinators will be reaching out to you in the next 24-48 hours from 9:00am to 5:00pm with a follow up appointment. Please expect a phone call from the hospital in that time frame. If you do not receive a call or if you have any questions or concerns, you can reach them at   [phone]    Knee Pain    Knee pain is a very common symptom and can have many causes. Knee pain often goes away when you follow your health care provider's instructions for relieving pain and discomfort at home. However, knee pain can develop into a condition that needs treatment. Some conditions may include:     Arthritis caused by wear and tear (osteoarthritis).  Arthritis caused by swelling and irritation (rheumatoid arthritis or gout).  A cyst or growth in your knee.  An infection in your knee joint.  An injury that will not heal.  Damage, swelling, or irritation of the tissues that support your knee (torn ligaments or tendinitis).    If your knee pain continues, additional tests may be ordered to diagnose your condition. Tests may include X-rays or other imaging studies of your knee. You may also need to have fluid removed from your knee. Treatment for ongoing knee pain depends on the cause, but treatment may include:    Medicines to relieve pain or swelling.  Steroid injections in your knee.  Physical therapy.  Surgery.    HOME CARE INSTRUCTIONS  Take medicines only as directed by your health care provider.   Rest your knee and keep it raised (elevated) while you are resting.  Do not do things that cause or worsen pain.  Avoid high-impact activities or exercises, such as running, jumping rope, or doing jumping jacks.  Apply ice to the knee area:  Put ice in a plastic bag.  Place a towel between your skin and the bag.  Leave the ice on for 20 minutes, 2–3 times a day.  Ask your health care provider if you should wear an elastic knee support.  Keep a pillow under your knee when you sleep.  Lose weight if you are overweight. Extra weight can put pressure on your knee.  Do not use any tobacco products, including cigarettes, chewing tobacco, or electronic cigarettes. If you need help quitting, ask your health care provider. Smoking may slow the healing of any bone and joint problems that you may have.    SEEK MEDICAL CARE IF:  Your knee pain continues, changes, or gets worse.  You have a fever along with knee pain.  Your knee margot or locks up.  Your knee becomes more swollen.    SEEK IMMEDIATE MEDICAL CARE IF:  Your knee joint feels hot to the touch.  You have chest pain or trouble breathing.

## 2023-10-15 NOTE — ED PROVIDER NOTE - PROGRESS NOTE DETAILS
JS: All results discussed with patient.  X-ray within normal limits.  Wound healing cleaned and Steri-Strips applied first with overlying Ace bandage.  Will discharge with orthopedic follow-up and PMD follow-up.  Patient agrees with plan ready for discharge.

## 2023-10-15 NOTE — ED ADULT NURSE NOTE - NSFALLUNIVINTERV_ED_ALL_ED
Bed/Stretcher in lowest position, wheels locked, appropriate side rails in place/Call bell, personal items and telephone in reach/Instruct patient to call for assistance before getting out of bed/chair/stretcher/Non-slip footwear applied when patient is off stretcher/[place] to call system/Physically safe environment - no spills, clutter or unnecessary equipment/Purposeful proactive rounding/Room/bathroom lighting operational, light cord in reach

## 2023-10-15 NOTE — ED PROVIDER NOTE - CLINICAL SUMMARY MEDICAL DECISION MAKING FREE TEXT BOX
Patient presented status post fall as documented, complaining of left knee and tib-fib pain.  Denies head trauma or LOC or any other active complaints.  Otherwise on arrival, patient afebrile, hemodynamically stable, neurovascularly intact, full active and passive range of motion without difficulty, nontender.  X-ray obtained and negative for acute fracture or dislocation and patient able to ambulate in ED without difficulty.  Given the above, will discharge home with outpatient follow up. Patient agreeable with plan. Agrees to return to ED for any new or worsening symptoms.

## 2023-10-15 NOTE — ED PROVIDER NOTE - PATIENT PORTAL LINK FT
You can access the FollowMyHealth Patient Portal offered by [facility] by registering at the following website: [email or web address]. By joining [facility]’s FollowMyHealth portal, you will also be able to view your health information using other applications (apps) compatible with our system.

## 2023-10-19 NOTE — PATIENT PROFILE ADULT - DO YOU FEEL LIKE HURTING YOURSELF OR OTHERS?
Patient was seen virtually by sara forte today 10/19 and is requesting a work note   Please email note to [email or web address]   no

## 2023-10-21 NOTE — DISCHARGE NOTE PROVIDER - NSDCHC_MEDRECSTATUS_GEN_ALL_CORE
Admission Reconciliation is Completed  Discharge Reconciliation is Not Complete Yes Admission Reconciliation is Completed  Discharge Reconciliation is Completed

## 2023-11-02 ENCOUNTER — APPOINTMENT (OUTPATIENT)
Dept: NEUROLOGY | Facility: CLINIC | Age: 73
End: 2023-11-02
Payer: MEDICARE

## 2023-11-02 VITALS
HEART RATE: 70 BPM | TEMPERATURE: 96.3 F | HEIGHT: 58 IN | WEIGHT: 147 LBS | SYSTOLIC BLOOD PRESSURE: 128 MMHG | OXYGEN SATURATION: 99 % | BODY MASS INDEX: 30.86 KG/M2 | DIASTOLIC BLOOD PRESSURE: 80 MMHG

## 2023-11-02 DIAGNOSIS — R26.81 UNSTEADINESS ON FEET: ICD-10-CM

## 2023-11-02 DIAGNOSIS — M48.00 SPINAL STENOSIS, SITE UNSPECIFIED: ICD-10-CM

## 2023-11-02 PROCEDURE — 99213 OFFICE O/P EST LOW 20 MIN: CPT

## 2023-11-05 PROBLEM — M48.00 SPINAL STENOSIS: Status: ACTIVE | Noted: 2021-01-05

## 2023-11-14 ENCOUNTER — OUTPATIENT (OUTPATIENT)
Dept: OUTPATIENT SERVICES | Facility: HOSPITAL | Age: 73
LOS: 1 days | End: 2023-11-14
Payer: MEDICARE

## 2023-11-14 ENCOUNTER — RESULT REVIEW (OUTPATIENT)
Age: 73
End: 2023-11-14

## 2023-11-14 DIAGNOSIS — T85.192A OTHER MECHANICAL COMPLICATION OF IMPLANTED ELECTRONIC NEUROSTIMULATOR OF SPINAL CORD ELECTRODE (LEAD), INITIAL ENCOUNTER: Chronic | ICD-10-CM

## 2023-11-14 DIAGNOSIS — Z96.651 PRESENCE OF RIGHT ARTIFICIAL KNEE JOINT: Chronic | ICD-10-CM

## 2023-11-14 DIAGNOSIS — R26.81 UNSTEADINESS ON FEET: ICD-10-CM

## 2023-11-14 PROCEDURE — 72148 MRI LUMBAR SPINE W/O DYE: CPT | Mod: 26

## 2023-11-14 PROCEDURE — 72148 MRI LUMBAR SPINE W/O DYE: CPT

## 2023-11-15 DIAGNOSIS — R26.81 UNSTEADINESS ON FEET: ICD-10-CM

## 2023-12-11 ENCOUNTER — APPOINTMENT (OUTPATIENT)
Dept: PULMONOLOGY | Facility: CLINIC | Age: 73
End: 2023-12-11
Payer: MEDICARE

## 2023-12-11 VITALS
WEIGHT: 148 LBS | HEART RATE: 136 BPM | BODY MASS INDEX: 31.07 KG/M2 | HEIGHT: 58 IN | RESPIRATION RATE: 14 BRPM | OXYGEN SATURATION: 97 % | DIASTOLIC BLOOD PRESSURE: 60 MMHG | SYSTOLIC BLOOD PRESSURE: 110 MMHG

## 2023-12-11 DIAGNOSIS — I27.0 PRIMARY PULMONARY HYPERTENSION: ICD-10-CM

## 2023-12-11 DIAGNOSIS — J45.909 UNSPECIFIED ASTHMA, UNCOMPLICATED: ICD-10-CM

## 2023-12-11 DIAGNOSIS — R00.0 TACHYCARDIA, UNSPECIFIED: ICD-10-CM

## 2023-12-11 DIAGNOSIS — G47.33 OBSTRUCTIVE SLEEP APNEA (ADULT) (PEDIATRIC): ICD-10-CM

## 2023-12-11 PROCEDURE — 99215 OFFICE O/P EST HI 40 MIN: CPT

## 2023-12-11 RX ORDER — BUDESONIDE AND FORMOTEROL FUMARATE DIHYDRATE 80; 4.5 UG/1; UG/1
80-4.5 AEROSOL RESPIRATORY (INHALATION) TWICE DAILY
Qty: 1 | Refills: 5 | Status: ACTIVE | COMMUNITY
Start: 2023-12-11 | End: 1900-01-01

## 2024-04-01 NOTE — ED ADULT NURSE NOTE - NSFALLRSKOUTCOME_ED_ALL_ED
Anesthesia Pre Eval Note    Anesthesia ROS/Med Hx        Anesthetic Complication History:    Patient does not have a history of anesthetic complications      Pulmonary Review:  Patient does not have a pulmonary history      Neuro/Psych Review:       Positive for psychiatric history - Anxiety    Cardiovascular Review:  Patient does not have a cardiovascular history       GI/HEPATIC/RENAL Review:  Patient does not have a GI/hepatic/renalhistory       End/Other Review:    Positive for cancer    Overall Review of Systems Comments:  2 glasses of water at 6:30am  Additional Results:      ALLERGIES:   -- Codeine -- Other (See Comments)    --  Childhood, unsure of reaction.   Last Labs        Component                Value               Date/Time                  WBC                      6.9                 02/20/2024 0816            RBC                      4.96                02/20/2024 0816            HGB                      14.6                02/20/2024 0816            HCT                      44.7                02/20/2024 0816            MCV                      90.1                02/20/2024 0816            MCH                      29.4                02/20/2024 0816            MCHC                     32.7                02/20/2024 0816            RDW-CV                   13.9                02/20/2024 0816            Sodium                   144                 02/20/2024 0816            Potassium                4.8                 02/20/2024 0816            Chloride                 106                 02/20/2024 0816            Carbon Dioxide           30                  02/20/2024 0816            Glucose                  84                  02/20/2024 0816            BUN                      22 (H)              02/20/2024 0816            Creatinine               1.01                02/20/2024 0816            Glomerular Filtrati*     90                  02/20/2024 0816            Calcium                  9.1                  02/20/2024 0816            PLT                      208                 02/20/2024 0816        Past Medical History:  No date: Anxiety  07/15/2015: Left testicular cancer (CMD)      Comment:  Formatting of this note might be different from the                original. 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  Left radical orchiectomy done on                7/15/15  History reviewed. No pertinent surgical history.   Prior to Admission medications :  Medication acetaminophen (TYLENOL) 500 MG tablet, Sig Take 1,000 mg by mouth every 4 hours as needed for Pain., Start Date , End Date , Taking? Yes, Authorizing Provider Provider, Outside    Medication ibuprofen (MOTRIN) 200 MG tablet, Sig Take 400 mg by mouth as needed., Start Date , End Date , Taking? Yes, Authorizing Provider Provider, Outside    Medication escitalopram (LEXAPRO) 20 MG tablet, Sig Take 1 tablet by mouth daily., Start Date 2/19/24, End Date , Taking? , Authorizing Provider Jennifer Mahan APNP    Medication hydrOXYzine (ATARAX) 50 MG tablet, Sig Take 1 tablet by mouth 3 times daily as needed for Anxiety., Start Date 12/13/23, End Date , Taking? , Authorizing Provider Jennifer Mahan APNP    Medication hydrOXYzine (VISTARIL) 100 MG capsule, Sig Take 1 capsule by mouth 3 times daily as needed for Anxiety., Start Date 6/6/21, End Date 6/28/21, Taking? , Authorizing Provider Yvrose Parker MD            Relevant Problems   No relevant active problems       Physical Exam     Airway   Mallampati: II  TM Distance: >3 FB  Neck ROM: Full  TMJ Mobility: Good    Cardiovascular  Cardiovascular exam normal  Cardio Rhythm: Regular  Cardio Rate: Normal    Dental Exam    Patient has:  Poor Dentition and Significant Periodontal Disease    Legend: C=Chipped  M=Missing  L=Loose B=Bridge Cr=[place] I=Implant    Pulmonary Exam  Pulmonary exam normal    Other Findings  Multiple missing teeth, remaining teeth are  ground to nubs and blackened.  Pt states none are loose      Anesthesia Plan:    ASA Status: 2  Anesthesia Type: MAC      Post-op Pain Management: Per Surgeon      Checklist  Reviewed: Lab Results, EKG, Chest X-Rays, Pregnancy Test Results, Consultations, Outside Records, NPO Status, Medications, Problem list, Past Med History and Nursing Notes  Consent/Risks Discussed Statement:  The proposed anesthetic plan, including its risks and benefits, have been discussed with the Patient along with the risks and benefits of alternatives. Questions were encouraged and answered and the patient and/or representative understands and agrees to proceed.        I discussed with the patient (and/or patient's legal representative) the risks and benefits of the proposed anesthesia plan, MAC, which may include services performed by other anesthesia providers.    Alternative anesthesia plans, if available, were reviewed with the patient (and/or patient's legal representative). Discussion has been held with the patient (and/or patient's legal representative) regarding risks of anesthesia, which include Nausea, Vomiting, Intra-operative Awareness, Sore Throat, Allergic Reaction, Dental Injury, Emergence Delirium, Hypotension, Nerve Injury and Aspiration and emergent situations that may require change in anesthesia plan.    The patient (and/or patient's legal representative) has indicated understanding, his/her questions have been answered, and he/she wishes to proceed with the planned anesthetic.    Blood Products: Not Anticipated    Comments  Plan Comments: Discussed team model with patient. Discussed risks/Benefits of MAC, pt agrees and wishes to proceed.         Fall with Harm Risk

## 2024-05-17 ENCOUNTER — APPOINTMENT (OUTPATIENT)
Dept: NEUROLOGY | Facility: CLINIC | Age: 74
End: 2024-05-17
Payer: MEDICARE

## 2024-05-17 VITALS
RESPIRATION RATE: 17 BRPM | WEIGHT: 155 LBS | BODY MASS INDEX: 32.54 KG/M2 | TEMPERATURE: 96.3 F | DIASTOLIC BLOOD PRESSURE: 76 MMHG | OXYGEN SATURATION: 97 % | SYSTOLIC BLOOD PRESSURE: 106 MMHG | HEIGHT: 58 IN | HEART RATE: 66 BPM

## 2024-05-17 PROCEDURE — G2211 COMPLEX E/M VISIT ADD ON: CPT

## 2024-05-17 PROCEDURE — 99213 OFFICE O/P EST LOW 20 MIN: CPT

## 2024-05-17 RX ORDER — TIRZEPATIDE 2.5 MG/.5ML
2.5 INJECTION, SOLUTION SUBCUTANEOUS
Refills: 0 | Status: ACTIVE | COMMUNITY

## 2024-05-17 NOTE — ASSESSMENT
[FreeTextEntry1] : 1-Gait D/O mainly mechanical secondary to spinal stenosis and disease 2-essential tremor 3-mood D/O 4- HTN 5-DLD 6- swallowing problem R/O causes exam . limited   plan swallowing and speech eval F/U

## 2024-05-17 NOTE — HISTORY OF PRESENT ILLNESS
[FreeTextEntry1] : Nuria is here for the F/u . She is at her baseline. Continuing to be occasionally bothered and super conscious by her hand tremor. She did have her MRI of the spine that shows anterolisthesis of L5-S1 and sever spinal stenosis She is also reporting having occasional difficulty with swallowing more  so with solid food She tries to be careful and despite that could feel chocking . Denies difficulty with speech no visual symptoms no double vision She  is upset and sad about her partner that now lives in [place]. Her sleep is good she is socially quite active . having lunch with friends. She did have few days of sore throat and left ear pain. Saw her PMD  and was given steroid and antibiotics No falls Nio dizzy spells

## 2024-05-17 NOTE — PHYSICAL EXAM
[FreeTextEntry1] : A/A/Ox3 good mood good attention Follows 4 step commands Facial asymmetry( baseline) midline tongue does not let me use tongue blade  No drift + tremor Rt> Lt decreased ROM of the shoulders + kyphosis( barrel chest)

## 2024-06-05 ENCOUNTER — APPOINTMENT (OUTPATIENT)
Dept: PULMONOLOGY | Facility: CLINIC | Age: 74
End: 2024-06-05

## 2024-08-24 ENCOUNTER — RESULT REVIEW (OUTPATIENT)
Age: 74
End: 2024-08-24

## 2024-08-24 ENCOUNTER — OUTPATIENT (OUTPATIENT)
Dept: OUTPATIENT SERVICES | Facility: HOSPITAL | Age: 74
LOS: 1 days | End: 2024-08-24
Payer: MEDICARE

## 2024-08-24 DIAGNOSIS — T85.192A OTHER MECHANICAL COMPLICATION OF IMPLANTED ELECTRONIC NEUROSTIMULATOR OF SPINAL CORD ELECTRODE (LEAD), INITIAL ENCOUNTER: Chronic | ICD-10-CM

## 2024-08-24 DIAGNOSIS — R10.11 RIGHT UPPER QUADRANT PAIN: ICD-10-CM

## 2024-08-24 DIAGNOSIS — Z96.651 PRESENCE OF RIGHT ARTIFICIAL KNEE JOINT: Chronic | ICD-10-CM

## 2024-08-24 DIAGNOSIS — Z00.8 ENCOUNTER FOR OTHER GENERAL EXAMINATION: ICD-10-CM

## 2024-08-24 PROCEDURE — 76700 US EXAM ABDOM COMPLETE: CPT | Mod: 26

## 2024-08-24 PROCEDURE — 76700 US EXAM ABDOM COMPLETE: CPT

## 2024-08-25 DIAGNOSIS — R10.11 RIGHT UPPER QUADRANT PAIN: ICD-10-CM

## 2024-10-07 NOTE — ED ADULT NURSE NOTE - NS ED NURSE LEVEL OF CONSCIOUSNESS ORIENTATION
Goal Outcome Evaluation:  Plan of Care Reviewed With: patient        Progress: no change       Comfort. VSS. 4L NC. IV TEA IID. PRN meds given for restlessness. Family at bedside for awhile. Has been sleeping most of the night, has refused most scheduled meds. Deep suction PRN. Call light in reach, safety maintained.                             Oriented - self; Oriented - place; Oriented - time

## 2024-11-04 NOTE — ED PROVIDER NOTE - TEMPLATE, MLM
Family Medicine Clinic Note     Patient ID: Wilber is a 54 year old male.    MRN: [id number]    Chief Complaint   Patient presents with    Shoulder Pain     Right shoulder pain possible injection         HISTORY OF PRESENT ILLNESS  Wilber Wallace Jr. is a 54 year old that presents today for Chronic right shoulder pain    # Chronic Right shoulder pain  - Right shoulder pain  - Patient unable to sleep on either side of shoulder. Last night patient was not able to sleep at all.   - Recent xray showed osteoarthritis in right shoulder.   - Following up today for injection.   - Patient inquiring about ortho referral for chronic shoulder pain. Tore rotator cuff in the past.   - Has had shoulder injection in 2008.       Review of Systems  10 point ROS negative unless stated above    Health Maintenance:   -Vaccines:     Patient's medications, allergies, problem list, past medical, surgical, social and family histories were reviewed and updated as appropriate.    ALLERGIES  ALLERGIES:  No Known Allergies    MEDICAL HISTORY  Past Medical History:   Diagnosis Date    Abnormal cardiac CT angiography     Anxiety     Arthritis     Colon cancer  (CMD) 08/10/2021    Colon polyps     Diabetes mellitus  (CMD)     TREVINO (dyspnea on exertion)     Erectile dysfunction due to arterial insufficiency     Essential (primary) hypertension     Gastroesophageal reflux disease     High cholesterol     Induratio penis plastica     Malignant neoplasm  (CMD)     Obesity     Tachycardia     Wears reading eyeglasses      Patient Active Problem List   Diagnosis    Primary hypertension    Type 2 diabetes mellitus with hyperglycemia, with long-term current use of insulin (CMD)    Other hyperlipidemia    Malignant neoplasm of colon (CMD)    Acute rhinitis    Short bowel syndrome    Atypical chest pain    Hernia of abdominal cavity    Leukocytosis    Abscess    Class 2 obesity due to excess calories in adult    Abdominal wall abscess    Right foot pain     Lower extremity edema    Peyronie's disease    Venous stasis    CAD in native artery    Microalbuminuria    History of gastric bypass    Anemia    Immunization due    CAD, multiple vessel    Chronic right shoulder pain       SURGICAL HISTORY  Past Surgical History:   Procedure Laterality Date    ------------other-------------  08/30/2021    Laparoscopy with lysis of adhesions for greater than 45 minutes. Robotic converted to open extended right hemicolectomy. Primary repair of incisional hernia.     Anes i&d hematoma/seroma/fluid collec N/A 12/21/2022    Drainage of abdominal wall seroma    Colon surgery Right 2021    hemicolectomy    Colonoscopy diagnostic  02/17/2022    Colonoscopy w biopsy  11/12/2019    tubular adenoma    Colonoscopy w biopsy  08/10/2021    adenocarcinoma    Hernia repair  08/30/2021    Hernia repair  08/2008    Hernia repair  1999    Hernia repair  2022    OPEN RETRORECTUS REPAIR INCISIONAL HERNIA WITH MESH, BILATERAL COMPONENT SEPARATION    Sleeve gastroplasty      2015     Tonsillectomy         FAMILY HISTORY  Family History   Problem Relation Age of Onset    Diabetes Mother     Hypertension Mother     Heart disease Mother     Hepatitis C Mother     Diabetes Father     Cancer, Breast Paternal Aunt        SOCIAL  Social History     Socioeconomic History    Marital status: /Civil Union     Spouse name: Not on file    Number of children: Not on file    Years of education: Not on file    Highest education level: Not on file   Occupational History    Not on file   Tobacco Use    Smoking status: Never    Smokeless tobacco: Never   Vaping Use    Vaping status: never used   Substance and Sexual Activity    Alcohol use: Not Currently     Comment: rarely    Drug use: No    Sexual activity: Not Currently   Other Topics Concern    Not on file   Social History Narrative    Not on file     Social Determinants of Health     Financial Resource Strain: Low Risk  (3/14/2024)    Financial Resource  Strain     Unable to Get: None   Food Insecurity: Low Risk  (10/24/2024)    Food Insecurity     Worried about Food: Never true     Food is Gone: Never true   Transportation Needs: Not At Risk (10/24/2024)    Transportation Needs     Lack of Reliable Transportation: No   Physical Activity: Not on file   Stress: Not on file   Social Connections: Low Risk  (3/14/2024)    Social Connections     Social Connectivity: 5 or more times a week   Interpersonal Safety: Low Risk  (3/14/2024)    Interpersonal Safety     How often physically hurt: Never     How often insulted or talked down to: Never     How often threatened with harm: Never     How often scream or curse at: Never       CURRENT MEDICATIONS  Current Outpatient Medications   Medication Sig Dispense Refill    lisinopril (ZESTRIL) 40 MG tablet Take 1 tablet by mouth daily. 90 tablet 3    ticagrelor (BRILINTA) 90 MG Tab Take 1 tablet by mouth in the morning and 1 tablet in the evening. 180 tablet 3    diclofenac (VOLTAREN) 1 % gel Apply 4 g topically 4 times daily as needed (pain). (Patient not taking: Reported on 11/1/2024) 150 g 1    amLODIPine (NORVASC) 10 MG tablet TAKE 1 TABLET BY MOUTH EVERY DAY 90 tablet 1    acetaminophen (TYLENOL) 325 MG tablet Take 2 tablets by mouth every 6 hours as needed for Pain or Fever.      HYDROcodone-acetaminophen (NORCO) 5-325 MG per tablet Take 1 tablet by mouth every 8 hours as needed for Pain (Severe pain). (Patient not taking: Reported on 10/28/2024) 12 tablet 0    nitroGLYCERIN (NITROSTAT) 0.4 MG sublingual tablet Place 1 tablet under the tongue every 5 minutes as needed for Chest pain. 3 each 1    atorvastatin (LIPITOR) 80 MG tablet Take 1 tablet by mouth daily. 30 tablet 3    aspirin (ECOTRIN) 81 MG EC tablet Take 1 tablet by mouth daily.      fenofibrate 160 MG tablet TAKE 1 TABLET BY MOUTH EVERY DAY 90 tablet 0    Mounjaro 2.5 MG/0.5ML Solution Pen-injector 5 mg every 7 days.      Jardiance 25 MG tablet Take 1 tablet by  mouth daily (before breakfast). 90 tablet 0    NovoLIN 70/30 FlexPen (70-30) 100 UNIT/ML pen-injector Inject 50 Units into the skin daily. hold for BS <150 and take 1/2 dose for BS >150      blood glucose (Accu-Chek Bonnie Plus) test strip Test blood sugar 1 times daily as directed. Diagnosis: diabetes type 2 insulin dependent. Meter: accu-chek bonnie 100 strip 11     No current facility-administered medications for this visit.       OBJECTIVE  Vitals:    11/04/24 0847   BP: 122/78   BP Location: LUE - Left upper extremity   Patient Position: Sitting   Cuff Size: Regular   Pulse: 89   Resp: 18   Weight: 131.2 kg (289 lb 3.9 oz)   Height: 6' 1\" (1.854 m)   PainSc: 9    PainLoc: Shoulder     Physical Exam  Cardiovascular:      Rate and Rhythm: Normal rate and regular rhythm.      Pulses: Normal pulses.      Heart sounds: Normal heart sounds.   Pulmonary:      Effort: Pulmonary effort is normal.      Breath sounds: Normal breath sounds.   Musculoskeletal:         General: Tenderness present.      Comments: Decreased ROM d/t pain.   Unable to put arm behind back. Positive Jobs sign.             ASSESSMENT AND PLAN  Problem List Items Addressed This Visit          Musculoskeletal and Injuries    Chronic right shoulder pain - Primary     Corticosteroid injection today.   Ortho referral.          Relevant Medications    methylPREDNISolone ACETATE long-acting DEPOT (DEPO-Medrol) 40 MG/ML injection 40 mg (Completed)    Other Relevant Orders    SERVICE TO ORTHOPEDICS     Other Visit Diagnoses       Primary osteoarthritis of right shoulder        Relevant Medications    methylPREDNISolone ACETATE long-acting DEPOT (DEPO-Medrol) 40 MG/ML injection 40 mg (Completed)               Health Maintenance Summary       Diabetes Eye Exam (Yearly)  Ordered on 5/13/2024    COVID-19 Vaccine (7 - 2024-25 season)  Overdue since 9/1/2024    Hepatitis B Vaccine (2 of 2 - CpG 2-dose series)  Next due on 11/25/2024    Diabetes A1C (Every 3 Months)   Next due on 12/12/2024    DM/CKD Microalbumin Ratio (Yearly)  Next due on 5/13/2025    Diabetes Foot Exam (Yearly)  Next due on 5/13/2025    DM/CKD GFR (Yearly)  Next due on 10/17/2025    Depression Screening (Yearly)  Next due on 11/4/2025    DTaP/Tdap/Td Vaccine (2 - Td or Tdap)  Next due on 8/2/2032    Colorectal Cancer Screen (Colonoscopy - Every 10 Years)  Next due on 11/9/2033    Shingles Vaccine   Completed    Pneumococcal Vaccine 0-64   Completed    Influenza Vaccine   Completed    Meningococcal Vaccine   Aged Out    HPV Vaccine   Aged Out            Prior to discharge all questions were answered and patient expressed understanding of the plan of care.    Plan of care was discussed with attending physician, Dr. Vick.    Schedule follow up: Patient to follow up in 1 month     Aurea Hubbard  Family Medicine Resident PGY-3   General

## 2024-11-18 ENCOUNTER — APPOINTMENT (OUTPATIENT)
Dept: NEUROLOGY | Facility: CLINIC | Age: 74
End: 2024-11-18
Payer: MEDICARE

## 2024-11-18 ENCOUNTER — NON-APPOINTMENT (OUTPATIENT)
Age: 74
End: 2024-11-18

## 2024-11-18 VITALS
HEIGHT: 58 IN | DIASTOLIC BLOOD PRESSURE: 74 MMHG | WEIGHT: 151.13 LBS | TEMPERATURE: 97.6 F | BODY MASS INDEX: 31.72 KG/M2 | HEART RATE: 98 BPM | RESPIRATION RATE: 17 BRPM | OXYGEN SATURATION: 99 % | SYSTOLIC BLOOD PRESSURE: 119 MMHG

## 2024-11-18 DIAGNOSIS — M48.00 SPINAL STENOSIS, SITE UNSPECIFIED: ICD-10-CM

## 2024-11-18 DIAGNOSIS — G44.86 CERVICOGENIC HEADACHE: ICD-10-CM

## 2024-11-18 DIAGNOSIS — I27.0 PRIMARY PULMONARY HYPERTENSION: ICD-10-CM

## 2024-11-18 PROCEDURE — 99213 OFFICE O/P EST LOW 20 MIN: CPT

## 2024-11-18 RX ORDER — CEFDINIR 300 MG/1
300 CAPSULE ORAL
Refills: 0 | Status: ACTIVE | COMMUNITY

## 2024-12-08 NOTE — PRE-OP CHECKLIST - HAIR REMOVAL
[FreeTextEntry1] : #Breast Cancer - ILC of L breast ER+ (95%)/ RI+ (95%), HER2-, Ki 67 <5% s/p lumpectomy and SLN biopsy (T1b, N0, Mx) - Stage IA. - We reviewed the diagnosis, prognosis, and natural history of ILC, ER+ (95%)/ RI+ (95%), HER2- - We have reviewed her previous pathology and radiology reports. - We have reviewed the NCCN guidelines and patient expresses understanding and would like to proceed with the below plan. - Genetics - VUS PALB2. - She is s/p lumpectomy. - Oncotype 17 - At time she was 47 yo, Discussed that there is ~1.6% benefit of adding chemo in addition to endocrine therapy. Decided not to pursue chemo - s/p RT - Ovarian suppression with zoladex 3.6 mg monthly. - FSH/LH and estradiol reviewed - ok for exemestane - On exemestane 25 mg PO daily (started 6/2020) take this with Ca++ 1200 mg PO q day and Vit D 800 IU daily to protect her bone health. - Weaned off bupropiron supposedly. Follow up Dr. Brandan Swenson of [facility] to discuss medications and support. [phone]. If psychiatric health is compromised due to zoladex and exemestane can try tamoxifen. - 11/6/2020 - mammo - new architectural changes in the L breast cw breast conservation therapy. repeat diagnostic mammo/sono needed in 6 months (5/2021) No mammo or sonographic findings suspicious for malignancy in either breast - Does not want oopherectomy - DEXA - L spine T score 0.3, Hip T score 0.3, Femoral neck T score = -0.4 - 5/3/2021 - mammo reviewed - post treatment changes in L breast no new or recurrent malignancy of left breast. Bilateral diagnostic mammo 11/2021 - 8/9/2021- continues on zoladex and exemestane. vs and labs reviewed - 9/21/2021 - continues on zoladex and exemestane. vs and labs reviewed. remains premenopausal. bilateral diagnostic mammo/sono due for November. Names given for Dr. Mcpherson. - 10/18/2021 - vs and labs reviewed. continues on zoladex and exemestane. due for mammo/us in Nov - 11/16/2021 continue zoladex and exemestane, ammo/US scheduled 11/23/2021 - 12/14/2021 continue zoladex and exemestane, mammo/US reviewed- no suspicious findings, repeat 12 mos - 1/12/2022 - vs and labs reviewed. zoladex today. continue exemestane, 11/2022 - due for mammo/sono again. needs to follow up with Dr. Ramirez/Rojas. PE benign today - 3/16/2022 vs and labs reviewed, zoladex today. conitnue exemestane - 4/25/22 - reviewed hospitalization course with patient. vs and labs reviewed. zoladex today. will resume exemestane - held since 4/4. Will scan CT C/A/P to assess cancer status given new diagnosis of PEs. November 2022 - 5/24/2022 vs and labs reviewed; pending CT C (angio) A/P and NM - 6/21/2022 vs and labs reviewed; Dr. Spangler's note reviewed; NM scheduled for 6/24/2022 - 7/19/2022 vs and CBC reviewed; continue exemestane + monthly zoladex Recent imaging Reviewed: CTA- no discrete intrinsic filling defect is identified within the main right or left, lobar or proximal segmental pulmonary arteries to suggest central pulmonary emboli CT A/P- no evidence of suspicious mass or metastatic disease within the abdomen or pelvis NM- no scintigraphic evidence of bony metastatic disease - 8/17/22 - vs reviewed. labs drawn in office today. cbc wnl including wbc, hgb, plt. Had fall and oozing from staple site. will decrease eliquis to 2.5 mg BID temporarily for 2 weeks. She has been on treatment dose since 4/2022. She is not getting her period however the most recent FSH and LH are not in postmenopausal range. Estradiol <5. Will discuss with GYN. for now will continue with zoledex and exemestane. Tamoxifen is not a good option given history of blood clots. - 9/19/2022 vs and CBC reviewed, continue exemestane; mammo/US ordered; DEXA ordered-due 12/2022 - 10/17/2022 vs and CBC reviewed; WBC 4.10, hgb 13.8, 329; mammo and DEXA scheduled 12/6/2022 - 11/14/2022 vs and CBC reviewed; WBC 5.6, hgb 14, plt 320; continue exemestane + zoladex hormones last 10/17 estradiol 7, FSH 8.5, LH 0.4 - 12/12/22 - vs reviewed. labs drawn in office today. cbc within normal limits, cmp wnl. continue exemestane and zoladex. Reviewed mammo/us - no evidence of malignancy. DEXA 12/22 - reviewed. Osteopenia of hip. continue ca++ and vit D - 1/9/2023 vs and CBC reviewed; WBC 5.9, hgb 13.6, plt 323 continue exemestane + monthly zoladex injections; hormones last est <5, FSH 9.2, LH < 0.3 discussion about potentially trialing a different AI due to patient's symptoms - 2/6/2023 vs and CBC reviewed; WBC 4.2, hgb 13.6, plt 319; proceed with zoladex and continue PO exemestane - 3/6/2023 - vs reviewed. labs drawn in office today. wbc, hgb and plts wnl. continue with zoladex and exemestane, mammo from 12/22 reviewed. need repeat mammo/sono - 12/23 - 5/30/23 - vs reviewed. labs drawn in office today. wbc, hgb and plts wnl. continue with zoladex and exemestane, patient will decide over next few months if she wants to change regimen or pursue oophorectomy, need repeat mammo/sono - 12/23 - 8/28/23 vs and CBC reviewed; WBC 4.80, hgb 13.3, plt 337. Repeat hormones today. Continue exemestane and Zoladex monthly. Deciding about oopherectomy. Due for mammo/sono 12/2023, will order at next visit. - 11/30/23 vs and CBC reviewed; WBC 4.07, hgb 13.9, plt 369. Hormones are in postmenopausal range with slight fluctutations. Continue exemestane and Zoldex. Reviewed that she has been on Zoladex about 3 years with standard 2-5 years. If we trial off would need to monitor hormones. Does not want oopherectomy at this time. proceed with Zoladex today for now.  - 3/6/24 vs and CBC reviewed; WBC 4.57, hgb 13.9, plt 328. Continue Zoladex (overdue) and exemestane. Still thinking about oopherectomy. Zoladex for 2-5 years she has been on for about  3 years, can consider monitoring hormone levels off it, but will need to come monthly to check estradiol, FSH LH and if spike would restart. Mammo/sono 3/4/24 reviewed BIRADS 2 - 5/24 - vs and labs reviewed. labs drawn in office today. cbc wnl. continue zoladex and exemestane (will stop 6/2025). complains of back pain - will check MRI of L spine given discomfort also down legs. Mammo/sono 3/25.  - 9/2024 vs and labs reviewed; continue zoladex and exemestane. Did not go for L spine MRI. Will get scheduled today. Patient asked about estroven for post menoapausal s/s and losing weight. REviewed main ingredient is black cohash and recommend against due to estrogenergic effects. Offered weight management program  - 11/2024 vs and labs reviewed; continue zoladex and exemestane. Mammo due 3/2025notes some brain fog nad intermitent changes to vision - rec optho. Can be due to AI, holding vs monitor vs MRI   #Fatigue  - check irons b12 tfts   #back pain - Check MRI L spine - s/p MRI revealing No evidence of osseous metastasis in the lumbar spine.Grade 1 anterolisthesis L4-5 with resultant moderate spinal canal stenosis. can follow with ortho   #Hot Flashes - On Effexor  #Joint pains - Likely 2/2 zoladex. This may worsen with AI. - Given she has been on Zoladex for 3 years and standard is 2-5 years, can trial off and monitor hormones. Will proceed with Zoladex today and at next visits will discuss holding and monitoring hormones   #Neuropathy - Offered gabapentin - does not want to take at this time. can be exemestane in <1% of patients.  #Pancreatic Cysts - Two small up to 4 mm cysts in the pancreas of unlikely significance, due for 2 yr f/u MRI /MRCP to document stability - s/p follow up with GI Dr. Walker - s/p MRI/MRCP 9/2023 revealing stable 4 mm pancreatic cystic lesion since 05/10/2021.Continued surveillance imaging recommended with follow-up MRI/MRCP in 2 years. 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 due 9/2025 - Mammo/Sono 3/2024 - Reminded to follow up with GYN - EGD/CNY 2021 with Dr. Walker, due 2026 - of note she had LDCT revealing hiatal hernia and esophageal wall thickeneing - rec follow up with GI may need EGD  - Stil thinking about oopherectomy. For now continuing Zoladex and will think about d/c and monitor hormone levels   #Mental Health - Seeing psychiatry - Remains effexor, adderall, xanax PRN  #Weight gain - 5/26/23 has remained fairly stable over past few visits.  #Chronic Cough - Smoking history - Follows with pulm Dr. Salamanca and Dr. Dallas, last seen 8/2/23 - s/p LDCT 8/2023 reviewed since June 24, 2022; No new or enlarging pulmonary nodules. - due now ordered 9/2024 - LungRads 2 - stable 3mm nodule   #PE - Unprovoked - Elevated cardiolipin and LA - Tolerating Eliquis 5mg BID  - Repeat hypercoag work up 2/2023  - 1/9/2023 hypercoag work up pending - 2/6/2023 LA positive with silica slcotting time 1.4, cardiolipins remain elevated, patient to continue on eliquis - Phone conversation with Dr. Guidry Podiatrist (T [phone] F [phone]) regarding patient's upcoming foot surgery, the patient is cleared to proceed with upcoming procedure. She will require bridging of Lovenox prior to the procedure. Plan for 1 injection Lovenox 80mg twice daily Tuesday 2/7, Wednesday 2/8, and Thursday 2/9; no lovenox injection or eliquis Friday 2/10 the day of procedure. The patient can resume eliquis 5mg PO BID Saturday 2/11/2023 - Continue Eliquis 5mg BID - 11/30/23 patient sought [place] she does not recall the name of the surgeon and will get me this information. She is scheduled for L achiles tendon debridement and repair of calcenael Ex ostectomy and gastroc resection as needed, felxor hallicus longus tendon transfer. patient states this is ambulatory. She will require bridging of Lovenox prior to the procedure. Plan for 1 injection Lovenox 80mg twice daily Monday 12/18, Tuesday 12/19, and morning of Weds 12/20 no lovenox injection or eliquis Thursday 12/21 the day of procedure. The patient can resume eliquis 5mg PO BID Friday 12/22/2023 - 5/24 - need foot surgery again after labor day - will transition to lovenox prior to procedure - need another foot surgery, not scheduled advisesd to call when she knows   #Vertebral Artery dissection - Follows with [facility] at [facility], has follow up 9/2022 with Dr. Mcgee - Multiple requests made for medical records  #Orthopedic Surgery - Has ongoing pain to the b/l feet and ankle s/p previous tendon surgery. Pending another surgery/second opinion - As above recommendations for Lovenox  - She is s/p recent achilles surgery with Dr. Horton at [facility] - will need another surgery to R foot not scheduled   #Health Maintenance  - Mammo/Sono: 3/2024 BIRADS 2 due 3/2025  - CNY: due 2026  - MRI/MRCP 9/2023 Stable 4 mm pancreatic cystic lesion since 05/10/2021.Continued surveillance imaging recommended with follow-up MRI/MRCP in 2 years. Due 9/2025 - DEXA: 12/2022, due 12/2024 ordered  - CT Chest 2024 LungRads 2   LAB in 4 weeks with possible Zoladex LAB in 8 weeks with possible Zoladex RTC in 12 weeks with CBC with diff, CMP, vit D, ESR/CRP, Mag, estradiol, LH, FSH.          hair removal not indicated

## 2024-12-10 NOTE — ED ADULT TRIAGE NOTE - RESPIRATORY RATE (BREATHS/MIN)
Health Maintenance       COVID-19 Vaccine (1)  Never done    Hepatitis A Vaccine (1 of 2 - 2-dose series)  Never done    Influenza Vaccine (1 of 2)  Never done    Lead Blood/Venous (View Topic Details)  Due soon on 12/17/2024           Following review of the above:  Patient is not proceeding with: COVID-19 and Influenza    Note: Refer to final orders and clinician documentation.        0 18

## 2025-04-02 ENCOUNTER — APPOINTMENT (OUTPATIENT)
Dept: NEUROLOGY | Facility: CLINIC | Age: 75
End: 2025-04-02
Payer: MEDICARE

## 2025-04-02 ENCOUNTER — NON-APPOINTMENT (OUTPATIENT)
Age: 75
End: 2025-04-02

## 2025-04-02 VITALS
HEIGHT: 59 IN | WEIGHT: 156.31 LBS | HEART RATE: 82 BPM | BODY MASS INDEX: 31.51 KG/M2 | SYSTOLIC BLOOD PRESSURE: 95 MMHG | RESPIRATION RATE: 19 BRPM | OXYGEN SATURATION: 98 % | DIASTOLIC BLOOD PRESSURE: 58 MMHG

## 2025-04-02 DIAGNOSIS — G25.0 ESSENTIAL TREMOR: ICD-10-CM

## 2025-04-02 DIAGNOSIS — F32.A DEPRESSION, UNSPECIFIED: ICD-10-CM

## 2025-04-02 PROCEDURE — 99213 OFFICE O/P EST LOW 20 MIN: CPT
